# Patient Record
Sex: MALE | Race: BLACK OR AFRICAN AMERICAN | NOT HISPANIC OR LATINO | Employment: OTHER | URBAN - METROPOLITAN AREA
[De-identification: names, ages, dates, MRNs, and addresses within clinical notes are randomized per-mention and may not be internally consistent; named-entity substitution may affect disease eponyms.]

---

## 2018-03-23 ENCOUNTER — HOSPITAL ENCOUNTER (INPATIENT)
Facility: HOSPITAL | Age: 78
LOS: 19 days | Discharge: NURSING, REHAB OR LONG-TERM CARE | DRG: 064 | End: 2018-04-11
Attending: EMERGENCY MEDICINE | Admitting: PSYCHIATRY & NEUROLOGY
Payer: MEDICARE

## 2018-03-23 DIAGNOSIS — D64.9 ANEMIA, UNSPECIFIED TYPE: ICD-10-CM

## 2018-03-23 DIAGNOSIS — I63.411 EMBOLIC STROKE INVOLVING RIGHT MIDDLE CEREBRAL ARTERY: ICD-10-CM

## 2018-03-23 DIAGNOSIS — I63.9 CEREBROVASCULAR ACCIDENT (CVA), UNSPECIFIED MECHANISM: Primary | ICD-10-CM

## 2018-03-23 DIAGNOSIS — R13.12 OROPHARYNGEAL DYSPHAGIA: ICD-10-CM

## 2018-03-23 DIAGNOSIS — K85.80 OTHER ACUTE PANCREATITIS WITHOUT INFECTION OR NECROSIS: ICD-10-CM

## 2018-03-23 DIAGNOSIS — R41.82 ALTERED MENTAL STATUS: ICD-10-CM

## 2018-03-23 DIAGNOSIS — I48.91 A-FIB: ICD-10-CM

## 2018-03-23 DIAGNOSIS — R00.0 HEART RATE FAST: ICD-10-CM

## 2018-03-23 DIAGNOSIS — G93.40 ACUTE ENCEPHALOPATHY: ICD-10-CM

## 2018-03-23 DIAGNOSIS — K85.90 ACUTE PANCREATITIS, UNSPECIFIED COMPLICATION STATUS, UNSPECIFIED PANCREATITIS TYPE: ICD-10-CM

## 2018-03-23 PROBLEM — I10 HTN (HYPERTENSION): Status: ACTIVE | Noted: 2018-03-23

## 2018-03-23 PROBLEM — E78.5 HYPERLIPIDEMIA: Status: ACTIVE | Noted: 2018-03-23

## 2018-03-23 LAB
ABO + RH BLD: NORMAL
ALBUMIN SERPL BCP-MCNC: 3.2 G/DL
ALP SERPL-CCNC: 72 U/L
ALT SERPL W/O P-5'-P-CCNC: 11 U/L
AMPHET+METHAMPHET UR QL: NEGATIVE
ANION GAP SERPL CALC-SCNC: 8 MMOL/L
APTT BLDCRRT: <21 SEC
AST SERPL-CCNC: 25 U/L
BARBITURATES UR QL SCN>200 NG/ML: NEGATIVE
BASOPHILS # BLD AUTO: 0.02 K/UL
BASOPHILS NFR BLD: 0.1 %
BENZODIAZ UR QL SCN>200 NG/ML: NEGATIVE
BILIRUB SERPL-MCNC: 1.3 MG/DL
BILIRUB UR QL STRIP: NEGATIVE
BLD GP AB SCN CELLS X3 SERPL QL: NORMAL
BUN SERPL-MCNC: 14 MG/DL
BZE UR QL SCN: NEGATIVE
CALCIUM SERPL-MCNC: 10.2 MG/DL
CANNABINOIDS UR QL SCN: NEGATIVE
CHLORIDE SERPL-SCNC: 105 MMOL/L
CHOLEST SERPL-MCNC: 120 MG/DL
CHOLEST/HDLC SERPL: 3.5 {RATIO}
CLARITY UR REFRACT.AUTO: ABNORMAL
CO2 SERPL-SCNC: 24 MMOL/L
COLOR UR AUTO: YELLOW
CREAT SERPL-MCNC: 0.9 MG/DL
CREAT UR-MCNC: 107 MG/DL
CRP SERPL-MCNC: 13.33 MG/L
DIFFERENTIAL METHOD: ABNORMAL
EOSINOPHIL # BLD AUTO: 0 K/UL
EOSINOPHIL NFR BLD: 0.1 %
ERYTHROCYTE [DISTWIDTH] IN BLOOD BY AUTOMATED COUNT: 24.6 %
EST. GFR  (AFRICAN AMERICAN): >60 ML/MIN/1.73 M^2
EST. GFR  (NON AFRICAN AMERICAN): >60 ML/MIN/1.73 M^2
ESTIMATED AVG GLUCOSE: 100 MG/DL
ETHANOL UR-MCNC: <10 MG/DL
GLUCOSE SERPL-MCNC: 97 MG/DL
GLUCOSE UR QL STRIP: NEGATIVE
HBA1C MFR BLD HPLC: 5.1 %
HCT VFR BLD AUTO: 23.6 %
HDLC SERPL-MCNC: 34 MG/DL
HDLC SERPL: 28.3 %
HGB BLD-MCNC: 6.4 G/DL
HGB UR QL STRIP: NEGATIVE
IMM GRANULOCYTES # BLD AUTO: 0.12 K/UL
IMM GRANULOCYTES NFR BLD AUTO: 0.7 %
INR PPP: 1.1
KETONES UR QL STRIP: NEGATIVE
LACTATE SERPL-SCNC: 1.6 MMOL/L
LDLC SERPL CALC-MCNC: 70.2 MG/DL
LEUKOCYTE ESTERASE UR QL STRIP: NEGATIVE
LIPASE SERPL-CCNC: >1000 U/L
LYMPHOCYTES # BLD AUTO: 0.9 K/UL
LYMPHOCYTES NFR BLD: 5.5 %
MAGNESIUM SERPL-MCNC: 2.3 MG/DL
MCH RBC QN AUTO: 18.1 PG
MCHC RBC AUTO-ENTMCNC: 27.1 G/DL
MCV RBC AUTO: 67 FL
METHADONE UR QL SCN>300 NG/ML: NEGATIVE
MONOCYTES # BLD AUTO: 1.7 K/UL
MONOCYTES NFR BLD: 10.3 %
NEUTROPHILS # BLD AUTO: 13.9 K/UL
NEUTROPHILS NFR BLD: 83.3 %
NITRITE UR QL STRIP: NEGATIVE
NONHDLC SERPL-MCNC: 86 MG/DL
NRBC BLD-RTO: 0 /100 WBC
OPIATES UR QL SCN: NORMAL
PCP UR QL SCN>25 NG/ML: NEGATIVE
PH UR STRIP: 7 [PH] (ref 5–8)
PHOSPHATE SERPL-MCNC: 2.5 MG/DL
PLATELET # BLD AUTO: 438 K/UL
PMV BLD AUTO: 9.3 FL
POCT GLUCOSE: 99 MG/DL (ref 70–110)
POTASSIUM SERPL-SCNC: 4.6 MMOL/L
PROCALCITONIN SERPL IA-MCNC: 0.14 NG/ML
PROT SERPL-MCNC: 7.6 G/DL
PROT UR QL STRIP: NEGATIVE
PROTHROMBIN TIME: 11.2 SEC
RBC # BLD AUTO: 3.54 M/UL
SODIUM SERPL-SCNC: 137 MMOL/L
SP GR UR STRIP: >=1.03 (ref 1–1.03)
TOXICOLOGY INFORMATION: NORMAL
TRIGL SERPL-MCNC: 79 MG/DL
TRIGL SERPL-MCNC: 79 MG/DL
TSH SERPL DL<=0.005 MIU/L-ACNC: 0.81 UIU/ML
URN SPEC COLLECT METH UR: ABNORMAL
UROBILINOGEN UR STRIP-ACNC: NEGATIVE EU/DL
WBC # BLD AUTO: 16.66 K/UL

## 2018-03-23 PROCEDURE — 85025 COMPLETE CBC W/AUTO DIFF WBC: CPT

## 2018-03-23 PROCEDURE — 83036 HEMOGLOBIN GLYCOSYLATED A1C: CPT

## 2018-03-23 PROCEDURE — 84100 ASSAY OF PHOSPHORUS: CPT

## 2018-03-23 PROCEDURE — 86141 C-REACTIVE PROTEIN HS: CPT

## 2018-03-23 PROCEDURE — 85730 THROMBOPLASTIN TIME PARTIAL: CPT

## 2018-03-23 PROCEDURE — 84443 ASSAY THYROID STIM HORMONE: CPT

## 2018-03-23 PROCEDURE — 63600175 PHARM REV CODE 636 W HCPCS

## 2018-03-23 PROCEDURE — 99223 1ST HOSP IP/OBS HIGH 75: CPT | Mod: ,,, | Performed by: PSYCHIATRY & NEUROLOGY

## 2018-03-23 PROCEDURE — 80061 LIPID PANEL: CPT

## 2018-03-23 PROCEDURE — 84145 PROCALCITONIN (PCT): CPT

## 2018-03-23 PROCEDURE — 83605 ASSAY OF LACTIC ACID: CPT

## 2018-03-23 PROCEDURE — 93005 ELECTROCARDIOGRAM TRACING: CPT

## 2018-03-23 PROCEDURE — 82962 GLUCOSE BLOOD TEST: CPT

## 2018-03-23 PROCEDURE — 80053 COMPREHEN METABOLIC PANEL: CPT

## 2018-03-23 PROCEDURE — 25000003 PHARM REV CODE 250: Performed by: PSYCHIATRY & NEUROLOGY

## 2018-03-23 PROCEDURE — 25000003 PHARM REV CODE 250: Performed by: NURSE PRACTITIONER

## 2018-03-23 PROCEDURE — 99285 EMERGENCY DEPT VISIT HI MDM: CPT | Mod: ,,, | Performed by: PHYSICIAN ASSISTANT

## 2018-03-23 PROCEDURE — 81003 URINALYSIS AUTO W/O SCOPE: CPT

## 2018-03-23 PROCEDURE — 85610 PROTHROMBIN TIME: CPT

## 2018-03-23 PROCEDURE — 63600175 PHARM REV CODE 636 W HCPCS: Performed by: PHYSICIAN ASSISTANT

## 2018-03-23 PROCEDURE — P9021 RED BLOOD CELLS UNIT: HCPCS

## 2018-03-23 PROCEDURE — 99285 EMERGENCY DEPT VISIT HI MDM: CPT | Mod: 25

## 2018-03-23 PROCEDURE — 86901 BLOOD TYPING SEROLOGIC RH(D): CPT

## 2018-03-23 PROCEDURE — 83690 ASSAY OF LIPASE: CPT

## 2018-03-23 PROCEDURE — 80307 DRUG TEST PRSMV CHEM ANLYZR: CPT

## 2018-03-23 PROCEDURE — 36430 TRANSFUSION BLD/BLD COMPNT: CPT

## 2018-03-23 PROCEDURE — 93010 ELECTROCARDIOGRAM REPORT: CPT | Mod: ,,, | Performed by: INTERNAL MEDICINE

## 2018-03-23 PROCEDURE — 83735 ASSAY OF MAGNESIUM: CPT

## 2018-03-23 PROCEDURE — 86920 COMPATIBILITY TEST SPIN: CPT

## 2018-03-23 PROCEDURE — 25500020 PHARM REV CODE 255: Performed by: NURSE PRACTITIONER

## 2018-03-23 PROCEDURE — A4216 STERILE WATER/SALINE, 10 ML: HCPCS | Performed by: PSYCHIATRY & NEUROLOGY

## 2018-03-23 PROCEDURE — 20000000 HC ICU ROOM

## 2018-03-23 RX ORDER — INSULIN ASPART 100 [IU]/ML
1-10 INJECTION, SOLUTION INTRAVENOUS; SUBCUTANEOUS EVERY 6 HOURS PRN
Status: DISCONTINUED | OUTPATIENT
Start: 2018-03-23 | End: 2018-04-11 | Stop reason: HOSPADM

## 2018-03-23 RX ORDER — MIDAZOLAM HYDROCHLORIDE 1 MG/ML
INJECTION INTRAMUSCULAR; INTRAVENOUS
Status: COMPLETED
Start: 2018-03-23 | End: 2018-03-23

## 2018-03-23 RX ORDER — SODIUM,POTASSIUM PHOSPHATES 280-250MG
2 POWDER IN PACKET (EA) ORAL
Status: DISCONTINUED | OUTPATIENT
Start: 2018-03-23 | End: 2018-03-25

## 2018-03-23 RX ORDER — LABETALOL HYDROCHLORIDE 5 MG/ML
10 INJECTION, SOLUTION INTRAVENOUS EVERY 4 HOURS PRN
Status: DISCONTINUED | OUTPATIENT
Start: 2018-03-23 | End: 2018-03-25

## 2018-03-23 RX ORDER — SODIUM CHLORIDE 0.9 % (FLUSH) 0.9 %
3 SYRINGE (ML) INJECTION EVERY 8 HOURS
Status: DISCONTINUED | OUTPATIENT
Start: 2018-03-23 | End: 2018-03-25

## 2018-03-23 RX ORDER — POTASSIUM CHLORIDE 20 MEQ/15ML
40 SOLUTION ORAL
Status: DISCONTINUED | OUTPATIENT
Start: 2018-03-23 | End: 2018-03-25

## 2018-03-23 RX ORDER — SODIUM CHLORIDE 9 MG/ML
INJECTION, SOLUTION INTRAVENOUS CONTINUOUS
Status: DISCONTINUED | OUTPATIENT
Start: 2018-03-23 | End: 2018-03-27

## 2018-03-23 RX ORDER — HYDROCODONE BITARTRATE AND ACETAMINOPHEN 500; 5 MG/1; MG/1
TABLET ORAL
Status: DISCONTINUED | OUTPATIENT
Start: 2018-03-23 | End: 2018-03-25

## 2018-03-23 RX ORDER — ONDANSETRON 2 MG/ML
4 INJECTION INTRAMUSCULAR; INTRAVENOUS EVERY 8 HOURS PRN
Status: DISCONTINUED | OUTPATIENT
Start: 2018-03-23 | End: 2018-04-11 | Stop reason: HOSPADM

## 2018-03-23 RX ORDER — GLUCAGON 1 MG
1 KIT INJECTION
Status: DISCONTINUED | OUTPATIENT
Start: 2018-03-23 | End: 2018-04-11 | Stop reason: HOSPADM

## 2018-03-23 RX ORDER — LANOLIN ALCOHOL/MO/W.PET/CERES
800 CREAM (GRAM) TOPICAL
Status: DISCONTINUED | OUTPATIENT
Start: 2018-03-23 | End: 2018-03-25

## 2018-03-23 RX ORDER — MIDAZOLAM HYDROCHLORIDE 1 MG/ML
1 INJECTION INTRAMUSCULAR; INTRAVENOUS EVERY 5 MIN PRN
Status: COMPLETED | OUTPATIENT
Start: 2018-03-23 | End: 2018-03-23

## 2018-03-23 RX ADMIN — IOHEXOL 100 ML: 350 INJECTION, SOLUTION INTRAVENOUS at 04:03

## 2018-03-23 RX ADMIN — SODIUM CHLORIDE: 0.9 INJECTION, SOLUTION INTRAVENOUS at 10:03

## 2018-03-23 RX ADMIN — MIDAZOLAM HYDROCHLORIDE 1 MG: 1 INJECTION, SOLUTION INTRAMUSCULAR; INTRAVENOUS at 11:03

## 2018-03-23 RX ADMIN — Medication 3 ML: at 09:03

## 2018-03-23 RX ADMIN — SODIUM CHLORIDE 500 ML: 0.9 INJECTION, SOLUTION INTRAVENOUS at 08:03

## 2018-03-23 RX ADMIN — SODIUM CHLORIDE: 0.9 INJECTION, SOLUTION INTRAVENOUS at 06:03

## 2018-03-23 NOTE — ASSESSMENT & PLAN NOTE
-Lipase at OSH 3738 w/ leukocytosis, elevated Ca, peripancreatic fluid w/o gallstones on CT abdomen  -Pt NPO, NGT to suction 2/2 anemia, IV  mL/hr with strict I&Os  -Monitor CMP q8h for first 24 h, POC glucose q6h (122 mg/dL at OSH)  -Recheck of lipase pending, triglycerides and lipid panel pending  -Reimage if pt has unstable VS, increased abdominal distension  -Telemetry with continuous pulse ox in ICU x 24 h

## 2018-03-23 NOTE — ED TRIAGE NOTES
Pt arrived by [facility] from [facility] with left sided weakness, dysarthia, aphasia. Pt last known well was last night. Pt has hx HTN, pancreatitis, and chronic anemia. Pt received 1u PRBCs en route.

## 2018-03-23 NOTE — H&P
[facility]  Neurocritical Care  History & Physical    Admit Date: 3/23/2018  Service Date: 2018  Length of Stay: 0    Subjective:     Chief Complaint: Embolic stroke involving right middle cerebral artery    History of Present Illness: 77 yo M with PMHx HTN known to be non-compliant with medications and remote hx of anemia presents to [facility] [place] [facility] as a transfer from [facility].  Patient is a poor historian with no family at bedside, hx largely obtained from OSH records.  He presented to OSH originally with symptoms of weakness and fatigue, his daughter brought him to ED thinking he was likely anemic again.  It was noted that patient had some dysarthria and aphasia (unspecified expressive vs receptive) with L-sided weakness.  He was out of the window for tPA and was sent [place] [facility] [place] for CTA Stroke Multiphase and possible endovascular intervention. CTA stroke multiphase with no LVO, no intervention planned.  Of note, patient is anemic per OSH labs with a Hgb of 5.8 g/dL and likely has pancreatitis with lipase 3738, WBC count of 15.8 k/uL, and CT abdomen showing peripancreatic fluid around the tail.  He was transfused a unit of PRBCs prior to transfer.  Labs were otherwise largely unremarkable.  On presentation to [facility] [place], he is able to move both BLE but complains of pain in BLE.  Not moving LUE but is intact to noxious stimuli.  Has a R gaze preference that is not overcome by VOR testing and complaint of neck pain with moving head side-to-side.  Otherwise has mild abdominal distension with mild hypogastric tenderness to palpation. HTN to SBP 200s.  Of note, patient's wife  recently and family expressed concern in outpatient ED that patient has not been eating much for the past month but deny EtOH use.    PMHx:  HTN--non-compliant with medications  Anemia--remote, unclear what type    No past surgical history on file.   No current facility-administered medications  on file prior to encounter.      No current outpatient prescriptions on file prior to encounter.      Allergies: Patient has no allergy information on record.    No family history on file.  Social History   Substance Use Topics    Smoking status: Not on file    Smokeless tobacco: Not on file    Alcohol use Not on file     Review of Systems   Unable to perform ROS: Mental status change (Pt noting pain in neck, BLE, and lower abdomen throughout exam)     Objective:     Vitals:  Temp: 98 °F (36.7 °C)  Pulse: 88  BP: (!) 208/104  Resp: 16  SpO2: 95 %  O2 Device (Oxygen Therapy): room air    Temp  Min: 98 °F (36.7 °C)  Max: 98 °F (36.7 °C)  Pulse  Min: 84  Max: 88  BP  Min: 208/104  Max: 208/104  Resp  Min: 16  Max: 16  SpO2  Min: 95 %  Max: 99 %    No intake/output data recorded.         Physical Exam  General:  Well-developed, well-nourished, nad with notable R gaze preference  HEENT:  NCAT, PERRLA, EOMI, oropharyngeal membranes non-erythematous/without exudate  Neck:  Supple, normal ROM without nuchal rigidity but patient noting pain on moving head side-to-side for VOR testing  Resp:  Symmetric expansion, no increased wob, CTAB  CVS:  No LE edema, peripheral pulses 2+ (radial, dorsalis pedis)  GI:  Abd soft, mild distension, tympanitic to percussion, no HSM, no clear tenderness to palpation but pt reporting lower abdominal pain  Neurologic Exam:  Mental Status:  Awake, alert, not oriented to self, location, date.  Pt repeating what hurts repeatedly, unable to follow simple commands.  Cranial Nerves:  Blink to threat in R VF.  PERRLA, EOMI with prominent R gaze preference not corrected by VOR.  Mild lower L facial droop.  Pt otherwise not following commands.  Motor:  Normal bulk and tone, flaccid LUE.  Moving BLE spontaneously, moves RUE spontaneously and attempts to .  Sensory:  Intact to noxious stimuli at all extremities, does not move LUE to noxious stimuli but curses, says it hurts on testing  LUE.  Reflexes:  Biceps, brachioradialis, patellar, Achilles brisk 2+ and symmetric. +Crossductor response on patellar reflexes. No ankle clonus. Withdrawal with upgoing LLE, equivocal RLE toe.  Coordination:  Unable to follow commands for FNF, REINA, HTS. No resting tremor or myoclonus noted.   Gait:  Deferred 2/2 AMS, fall precautions    Today I personally reviewed pertinent imaging, lab results, notably:    OSH Reports:  Lipase 3738 (*H*)  WBC 15.8 k/uL (*H*)  Hgb 5.8 g/dL (*L*)    CT abdomen--peripancreatic fluid around tail, no gallstones appreciated  CT head--chronic bilateral occipital infarcts    03/23/18 CTA Stroke Multiphase:  Initial Impression:  Large hypodensity in R ICA concerning for thrombus, lack of contrast filling in segments of L vert  Radiology review:  Multifocal areas of abnormal decreased attenuation involving the left cerebellum, medulla, mike, and posterior right frontal cortex concerning for acute or subacute infarcts.  No significant mass effect, midline shift, or acute intracranial hemorrhage.  Generalized cerebral volume loss with a significant degree of chronic microvascular ischemic change, more prominent on the left.  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  Multifocal filling defects seen within the origin of the right subclavian artery, right common carotid, and left carotid bulb compatible with nonocclusive thrombi.  Additional smaller mural thrombi are seen along aortic arch.  Findings 1st observed by Dr. Aragon at 4:35 p.m on 3/23/18.  Preliminary report discussed with Dr. Polo Shook, neuro interventional fellow, by Dr. Aragon at 4:40 p.m. on 03/23/2018 on behalf of Dr. Hoffman.    Assessment/Plan:     Neuro   Embolic stroke involving right middle cerebral artery    -03/23/18 CTA Stroke Multiphase with multiple non-occlusive thrombi noted  -Vascular Neurology consulted, appreciate recs  -Risk factors:   HTN, diet, exercise  -Hgb A1c, lipid panel, TSH pending  -MRI brain w/o contrast pending  -2D Echo w/ CFD pending  -Will consult PT/OT/SLP  -Hold anticoagulation/anti-platelets due to anemia, concern for bleed  -Permissive HTN, goal SBP < 220.  prn labetalol ordered.  -q1h Neuro checks        Cardiac/Vascular   Hyperlipidemia    -Stroke risk factor, pending lipid panel        HTN (hypertension)    -Stroke risk factor, SBP 200s on admission  -Permissive HTN x 24 h, goal SBP < 220, prn labetalol ordered  -Pt non-compliant with anti-HTNives, start po meds as needed for HTN > 24 h        Oncology   Anemia    -Hgb at OSH 5.8 g/dL, repeat Hgb pending  -Pt received 1 U PRBCs en route to [facility], will transfuse if Hgb < 7.0 g/dL  -No signs of bleeding currently, will place NGT to suction, NPO 2/2 pancreatitis  -Hx of anemia per OSH records, baseline Hgb unknown        GI   Acute pancreatitis    -Lipase at OSH 3738 w/ leukocytosis, elevated Ca, peripancreatic fluid w/o gallstones on CT abdomen  -Pt NPO, NGT to suction 2/2 anemia, IV  mL/hr with strict I&Os  -Monitor CMP q8h for first 24 h, POC glucose q6h (122 mg/dL at OSH)  -Recheck of lipase pending, triglycerides and lipid panel pending  -Reimage if pt has unstable VS, increased abdominal distension  -Telemetry with continuous pulse ox in ICU x 24 h            Prophylaxis:  Venous Thromboembolism: mechanical  Stress Ulcer: None  Ventilator Pneumonia: not applicable     Activity Orders          None        Full Code    Sherry Canela MD  Neurocritical Care  [facility]

## 2018-03-23 NOTE — ASSESSMENT & PLAN NOTE
-Hgb at OSH 5.8 g/dL, repeat Hgb pending  -Pt received 1 U PRBCs en route to [facility], will transfuse if Hgb < 7.0 g/dL  -No signs of bleeding currently, will place NGT to suction, NPO 2/2 pancreatitis  -Hx of anemia per OSH records, baseline Hgb unknown

## 2018-03-23 NOTE — ASSESSMENT & PLAN NOTE
Presented to OSH in [place] with R hemispheric symptoms, anemia, and acute pancreatitis. He was transfered to [facility] for CTA multiphase and possible LVO intervention. On presentation patient is confused with left arm hemiplegia and right gaze preference. Pt H&H was 5 and 20 at the outside hospital and given 1 unit of PRBC during transport. His Lipase is >3000. CTA multiphase revealed systemic thrombus in multiple territories. IR concluded pt was a poor candidate for intervention. Consider hypercoagulable state due to acute pancreatitis. NCC called due to patient medical complexity. NCC to admit pt.   Antithrombotics for secondary stroke prevention: Hold until H&H stable and acute bleed can be ruled out  Statins for secondary stroke prevention and hyperlipidemia, if present:   Statins: Atorvastatin- 40 mg daily if LDL > 70     Aggressive risk factor modification: HTN,  hypercoagulable state      Rehab efforts: PT/OT/SLP to evaluate and treat    Diagnostics ordered/pending: HgbA1C to assess blood glucose levels, Lipid Profile to assess cholesterol levels, MRI head without contrast to assess brain parenchyma, TTE to assess cardiac function/status     VTE prophylaxis:  SCDs, Hold until H&H stable and acute bleed can be ruled out    BP parameters: Infarct: No intervention, SBP <220 if primary team deems appropriate

## 2018-03-23 NOTE — HOSPITAL COURSE
03/23/18:  Admission to Neuro ICU 2/2 concern for stroke with multiple concerns for patient to become unstable--pancreatitis, severe anemia, HTN.  03/24: has had no interval development of multiorgan involvement form pancreatitis, drop in hemoglobin secondary to erosive gastritis, transfused and on protonix bid  03/25: Patient on dilt drip for a-fib. Repeat EKG shows NSR.  3/26: Discontinuing diltiazem drip today. Will continue monitor HR. Continue IV fluids 50 cc/hr for pancreatitis. Repeat lipase.

## 2018-03-23 NOTE — HPI
Mr. Hines is a 78 year old male with past medical hx of HTN and non-compliance with medication. EMS states he is independent at home per family. He presented to OSH in [facility]) with left-sided weakness, dysarthria, anemia, and acute pancreatitis. No family present for interview. All history was obtained from outside medical records or EMS. Patient's hemoglobin was 5.8 at OSH and he was given 1 unit of PRBC during transport. His Lipase is >3000. Of note, patient is anemic per OSH labs with a Hgb of 5.8 g/dL and likely has pancreatitis with lipase 3738, WBC count of 15.8 k/uL, and CT abdomen showing peripancreatic fluid around the tail. He was transfered to [facility] for CTA multiphase and possible LVO intervention. On presentation patient is confused with left arm hemiplegia and right gaze preference. His Lipase is >3000. CTA multiphase revealed systemic thrombus in multiple territories. tPA not given as patient was out of window. IR concluded pt was a poor candidate for intervention. NCC called due to patient medical complexity. NCC admitted patient.

## 2018-03-23 NOTE — SUBJECTIVE & OBJECTIVE
No past medical history on file.  No past surgical history on file.  No family history on file.  Social History   Substance Use Topics    Smoking status: Not on file    Smokeless tobacco: Not on file    Alcohol use Not on file     Review of patient's allergies indicates:  Not on File    Medications: I have reviewed the current medication administration record.      (Not in a hospital admission)    Review of Systems   Constitutional: Negative for chills and fever.   HENT: Negative for congestion, sinus pain and sinus pressure.    Eyes: Negative for pain and redness.   Respiratory: Negative for cough and shortness of breath.    Cardiovascular: Negative for chest pain.   Gastrointestinal: Positive for abdominal pain.   Genitourinary: Negative for dysuria and hematuria.   Skin: Negative for color change and pallor.   Neurological: Positive for facial asymmetry, speech difficulty and weakness.   Psychiatric/Behavioral: Positive for confusion. Negative for behavioral problems.     Objective:     Vital Signs (Most Recent):  Temp: 98 °F (36.7 °C) (03/23/18 1552)  Pulse: 88 (03/23/18 1607)  Resp: 16 (03/23/18 1607)  BP: (!) 208/104 (03/23/18 1552)  SpO2: 95 % (03/23/18 1607)    Vital Signs Range (Last 24H):  Temp:  [98 °F (36.7 °C)]   Pulse:  [84-88]   Resp:  [16]   BP: (208)/(104)   SpO2:  [95 %-99 %]     Physical Exam   Constitutional: He appears well-developed.   HENT:   Head: Normocephalic and atraumatic.   Eyes: Pupils are equal, round, and reactive to light.   Neck: No tracheal deviation present.   Cardiovascular: Normal rate.    Pulmonary/Chest: Effort normal.   Abdominal: There is tenderness.   Musculoskeletal:   LUE weakness, flaccid    Neurological: He is alert.   Not oriented to time, place, or situation   Skin: Skin is warm. No rash noted.   Psychiatric: He has a normal mood and affect.   Vitals reviewed.      Neurological Exam:   LOC: alert  Attention Span: poor  Language: Mild aphasia  Articulation: Mild  dysarthria  Orientation: Disoriented to time, place, and situation  Visual Fields: Hemianopsia left  EOM (CN III, IV, VI): Horizontal gaze paralysis   Pupils (CN II, III): PERRL  Facial Movement (CN VII): Lower facial weakness on the Left  Motor: Arm left  Plegia 0/5  Leg left  Paresis: 2/5  Arm right  Normal 5/5  Leg right Paresis: 3/5  Cebellar: No evidence of appendicular or axial ataxia  Sensation: Intact to light touch  Tone: Flaccid  LUE       Laboratory:  CMP: No results for input(s): GLUCOSE, CALCIUM, ALBUMIN, PROT, NA, K, CO2, CL, BUN, CREATININE, ALKPHOS, ALT, AST, BILITOT in the last 24 hours.  CBC: No results for input(s): WBC, RBC, HGB, HCT, PLT, MCV, MCH, MCHC in the last 168 hours.  Lipid Panel: No results for input(s): CHOL, LDLCALC, HDL, TRIG in the last 168 hours.  Coagulation: No results for input(s): PT, INR, APTT in the last 168 hours.  Hgb A1C: No results for input(s): HGBA1C in the last 168 hours.  TSH: No results for input(s): TSH in the last 168 hours.    Diagnostic Results:      Brain imaging:  CTA 3/23  Multifocal areas of abnormal decreased attenuation involving the left cerebellum, medulla, mike, and posterior right frontal cortex concerning for acute or subacute infarcts.  No significant mass effect, midline shift, or acute intracranial hemorrhage.  Generalized cerebral volume loss with a significant degree of chronic microvascular ischemic change, more prominent on the left.  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  Multifocal filling defects seen within the origin of the right subclavian artery, right common carotid, and left carotid bulb compatible with nonocclusive thrombi.  Additional smaller mural thrombi are seen along aortic arch.    MRI pending    Vessel Imaging:  CTA 3/23  Multifocal areas of abnormal decreased attenuation involving the left cerebellum, medulla, mike, and posterior right frontal cortex  concerning for acute or subacute infarcts.  No significant mass effect, midline shift, or acute intracranial hemorrhage.  Generalized cerebral volume loss with a significant degree of chronic microvascular ischemic change, more prominent on the left.  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  Multifocal filling defects seen within the origin of the right subclavian artery, right common carotid, and left carotid bulb compatible with nonocclusive thrombi.  Additional smaller mural thrombi are seen along aortic arch.    Cardiac Evaluation:

## 2018-03-23 NOTE — ED PROVIDER NOTES
Encounter Date: 3/23/2018       History     Chief Complaint   Patient presents with    Transfer     Patient sent from [facility] for evaluation and treatment of stroke.     78 year old male with medical history of HTN, Pancreatitis, Anemia presenting to the ED transferred via flight care from [facility] for stroke evaluation. History obtained from chart review and flight care due to patient's current mental status. Patient was last seen at his baseline health last night at 7pm. Patient had evidence of left subacute cerebellar infarct and right cerebral infarct on head CT. Patient outside of the window for TPA. Patient also had significant anemia and received 1u RBC during transit. Patient evaluated by stroke team at bedside on arrival.           Review of patient's allergies indicates:  Not on File  No past medical history on file.  No past surgical history on file.  No family history on file.  Social History   Substance Use Topics    Smoking status: Not on file    Smokeless tobacco: Not on file    Alcohol use Not on file     Review of Systems   Unable to perform ROS: Acuity of condition       Physical Exam     Initial Vitals [03/23/18 1552]   BP Pulse Resp Temp SpO2   (!) 208/104 84 16 98 °F (36.7 °C) 99 %      MAP       138.67         Physical Exam    Constitutional: He appears lethargic. No distress.   HENT:   Head: Normocephalic and atraumatic.   Neck: Neck supple.   Cardiovascular: Normal rate and regular rhythm.   Pulmonary/Chest: Breath sounds normal. No respiratory distress.   Abdominal: Soft.   Musculoskeletal: He exhibits no edema or tenderness.   Lymphadenopathy:     He has no cervical adenopathy.   Neurological: He appears lethargic.   Left hemiparesis of extremities and facial movements   Skin: Skin is warm.       Imaging Results          X-Ray Chest 1 View (Final result)  Result time 03/23/18 18:08:48    Final result by Rusty Gutierrez MD (03/23/18 18:08:48)                 Impression:      1. Coarse  interstitial attenuation, nonspecific, interstitial edema is a consideration, correlation recommended.      Electronically signed by: Rusty Gutierrez MD  Date:    03/23/2018  Time:    18:08             Narrative:    EXAMINATION:  XR CHEST 1 VIEW    CLINICAL HISTORY:  Altered mental status, unspecified    TECHNIQUE:  Single frontal view of the chest was performed.    COMPARISON:  None    FINDINGS:  Patient is rotated.  The cardiomediastinal silhouette is prominent, likely related to magnification.  There is no pleural effusion.  The trachea is midline.  The lungs are symmetrically expanded bilaterally with coarse interstitial attenuation, may reflect interstitial edema.  No large focal consolidation seen.  There is no pneumothorax.  The osseous structures are remarkable for degenerative changes.                               CTA STROKE MULTI-PHASE (Final result)     Abnormal  Result time 03/23/18 17:22:45    Final result by Jian Hoffman MD (03/23/18 17:22:45)                 Impression:      Multifocal areas of abnormal decreased attenuation involving the left cerebellum, medulla, mike, and posterior right frontal cortex concerning for acute or subacute infarcts.  No significant mass effect, midline shift, or acute intracranial hemorrhage.    Generalized cerebral volume loss with a significant degree of chronic microvascular ischemic change, more prominent on the left.    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    Multifocal filling defects seen within the origin of the right subclavian artery, right common carotid, and left carotid bulb compatible with nonocclusive thrombi.  Additional smaller mural thrombi are seen along aortic arch.    Findings 1st observed by Dr. Aragon at 4:35 p.m on 3/23/18.  Preliminary report discussed with Dr. Polo Shook, neuro interventional fellow, by Dr. Aragon at 4:40 p.m. on 03/23/2018 on behalf of   Yasmin.    This report was flagged in Epic as abnormal.    Electronically signed by resident: Herber Aragon  Date:    03/23/2018  Time:    16:35    Electronically signed by: Jian Hoffman MD  Date:    03/23/2018  Time:    17:22             Narrative:    EXAMINATION:  CTA STROKE MULTI-PHASE    CLINICAL HISTORY:  stroke;    TECHNIQUE:  Non contrast low dose axial images were obtained thought the head. CT angiogram was performed from the level of the yoan to the top of the head following the IV administration of 100mL of Omnipaque 350.   Sagittal and coronal reconstructions and maximum intensity projection reconstructions were performed. Arterial stenosis percentages are based on NASCET measurement criteria.2 additional phases of immediate post-contrast CT images were performed through the head alone.    COMPARISON:  None    FINDINGS:  Intracranial Compartment:    Age-appropriate generalized cerebral volume loss with mild ex vacuo dilatation of the ventricular system without significant hydrocephalus.    There are multiple areas of abnormal decreased attenuation involving the inferior and paracentral left cerebellar hemisphere, central medulla, left and right mike and posterior right frontal lobe cortex concerning for acute infarcts.  No large intracranial hemorrhage or abnormal extra-axial fluid collection identified.  There is superimposed patchy and confluent areas of decreased attenuation involving the supratentorial white matter, asymmetrically more prominent on the left, compatible with a significant degree of chronic microvascular ischemic change.    Skull/Extracranial Contents (limited evaluation): No fracture. Mastoid air cells and paranasal sinuses are essentially clear.    Non-Vascular Structures of the Neck/Thoracic Inlet (limited evaluation): No significant magnet abnormalities detected.  There are a few mildly prominent left supraclavicular lymph nodes.  No significant cervical lymphadenopathy  detected.    Trachea appears patent.  The lung apices demonstrate changes consistent with centrilobular and paraseptal emphysema.    Aorta: Normal 3 vessel arch with scattered intraluminal filling defects predominantly along the lateral wall compatible with nonocclusive thrombi.    Extracranial carotid circulation: Prominent, nonocclusive filling defects are seen within the origin of the right subclavian vein artery, right common carotid artery just below the bifurcation, and at the left carotid bulb near the origin of the left ICA compatible with nonocclusive thrombi.  There is associated 60-70% stenosis of the distal right common carotid.  Less than 50% narrowing of the right ICA noted.  Less than 50% narrowing is seen at the left ICA.    Extracranial vertebral circulation: There is complete occlusion of the V4 segment of the left vertebral artery with associated slow flow within the distal V3 and V4 segments.  Less than 50% narrowing is seen at the origins of bilateral vertebral arteries.    Intracranial Arteries: There is mild atherosclerotic irregularity of bilateral cavernous segments and distal right M1 segment of the MCA with less than 50% stenosis.  No evidence of high-grade occlusion, aneurysm, or vascular malformation.    Venous structures (limited evaluation): Normal.                              ED Course   Procedures  Labs Reviewed   CBC W/ AUTO DIFFERENTIAL - Abnormal; Notable for the following:        Result Value    WBC 16.66 (*)     RBC 3.54 (*)     Hemoglobin 6.4 (*)     Hematocrit 23.6 (*)     MCV 67 (*)     MCH 18.1 (*)     MCHC 27.1 (*)     RDW 24.6 (*)     Platelets 438 (*)     Immature Granulocytes 0.7 (*)     Gran # (ANC) 13.9 (*)     Immature Grans (Abs) 0.12 (*)     Lymph # 0.9 (*)     Mono # 1.7 (*)     Gran% 83.3 (*)     Lymph% 5.5 (*)     All other components within normal limits   HEMOGLOBIN A1C   APTT   PROTIME-INR   TOXICOLOGY SCREEN, URINE, RANDOM (COMPLIANCE)   DRUGS OF ABUSE  SCREEN, BLOOD   POCT GLUCOSE   POCT GLUCOSE MONITORING CONTINUOUS   POCT GLUCOSE MONITORING CONTINUOUS                   APC / Resident Notes:   78 year old male with medical history of HTN, Pancreatitis, Anemia presenting to the ED transferred via flight care from [facility] for stroke evaluation. Patient evaluated by stroke team at bedside on arrival. No TPA administered as patient outside window. CTA multiphase ordered.    Patient will be admitted to Neuro ICU for further management. Will allow for permissive hypertension. I have discussed the care of this patient with my supervising physician.            Attending Attestation:     Physician Attestation Statement for NP/PA:   I discussed this assessment and plan of this patient with the NP/PA, but I did not personally examine the patient. The face to face encounter was performed by the NP/PA.                     Clinical Impression:   The primary encounter diagnosis was Cerebrovascular accident (CVA), unspecified mechanism. Diagnoses of Altered mental status, Acute pancreatitis, unspecified complication status, unspecified pancreatitis type, and Anemia, unspecified type were also pertinent to this visit.    Disposition:   Disposition: Admitted                        Riley Porras PA-C  03/23/18 [id number]

## 2018-03-23 NOTE — HPI
77 yo M with PMHx HTN known to be non-compliant with medications and remote hx of anemia presents to [facility] as a transfer from [facility].  Patient is a poor historian with no family at bedside, hx largely obtained from OSH records.  He presented to OSH originally with symptoms of weakness and fatigue, his daughter brought him to ED thinking he was likely anemic again.  It was noted that patient had some dysarthria and aphasia (unspecified expressive vs receptive) with L-sided weakness.  He was out of the window for tPA and was sent to [facility] emerg[place] for CTA Stroke Multiphase and possible endovascular intervention. CTA stroke multiphase with no LVO, no intervention planned.  Of note, patient is anemic per OSH labs with a Hgb of 5.8 g/dL and likely has pancreatitis with lipase 3738, WBC count of 15.8 k/uL, and CT abdomen showing peripancreatic fluid around the tail.  He was transfused a unit of PRBCs prior to transfer.  Labs were otherwise largely unremarkable.  On presentation to [facility], he is able to move both BLE but complains of pain in BLE.  Not moving LUE but is intact to noxious stimuli.  Has a R gaze preference that is not overcome by VOR testing and complaint of neck pain with moving head side-to-side.  Otherwise has mild abdominal distension with mild hypogastric tenderness to palpation. HTN to SBP 200s.  Of note, patient's wife  recently and family expressed concern in outpatient ED that patient has not been eating much for the past month but deny EtOH use.

## 2018-03-23 NOTE — SUBJECTIVE & OBJECTIVE
PMHx:  HTN--non-compliant with medications  Anemia--remote, unclear what type    No past surgical history on file.   No current facility-administered medications on file prior to encounter.      No current outpatient prescriptions on file prior to encounter.      Allergies: Patient has no allergy information on record.    No family history on file.  Social History   Substance Use Topics    Smoking status: Not on file    Smokeless tobacco: Not on file    Alcohol use Not on file     Review of Systems   Unable to perform ROS: Mental status change (Pt noting pain in neck, BLE, and lower abdomen throughout exam)     Objective:     Vitals:  Temp: 98 °F (36.7 °C)  Pulse: 88  BP: (!) 208/104  Resp: 16  SpO2: 95 %  O2 Device (Oxygen Therapy): room air    Temp  Min: 98 °F (36.7 °C)  Max: 98 °F (36.7 °C)  Pulse  Min: 84  Max: 88  BP  Min: 208/104  Max: 208/104  Resp  Min: 16  Max: 16  SpO2  Min: 95 %  Max: 99 %    No intake/output data recorded.         Physical Exam  General:  Well-developed, well-nourished, nad with notable R gaze preference  HEENT:  NCAT, PERRLA, EOMI, oropharyngeal membranes non-erythematous/without exudate  Neck:  Supple, normal ROM without nuchal rigidity but patient noting pain on moving head side-to-side for VOR testing  Resp:  Symmetric expansion, no increased wob, CTAB  CVS:  No LE edema, peripheral pulses 2+ (radial, dorsalis pedis)  GI:  Abd soft, mild distension, tympanitic to percussion, no HSM, no clear tenderness to palpation but pt reporting lower abdominal pain  Neurologic Exam:  Mental Status:  Awake, alert, not oriented to self, location, date.  Pt repeating what hurts repeatedly, unable to follow simple commands.  Cranial Nerves:  Blink to threat in R VF.  PERRLA, EOMI with prominent R gaze preference not corrected by VOR.  Mild lower L facial droop.  Pt otherwise not following commands.  Motor:  Normal bulk and tone, flaccid LUE.  Moving BLE spontaneously, moves RUE spontaneously and  attempts to .  Sensory:  Intact to noxious stimuli at all extremities, does not move LUE to noxious stimuli but curses, says it hurts on testing LUE.  Reflexes:  Biceps, brachioradialis, patellar, Achilles brisk 2+ and symmetric. +Crossductor response on patellar reflexes. No ankle clonus. Withdrawal with upgoing LLE, equivocal RLE toe.  Coordination:  Unable to follow commands for FNF, REINA, HTS. No resting tremor or myoclonus noted.   Gait:  Deferred 2/2 AMS, fall precautions    Today I personally reviewed pertinent imaging, lab results, notably:    OSH Reports:  Lipase 3738 (*H*)  WBC 15.8 k/uL (*H*)  Hgb 5.8 g/dL (*L*)    CT abdomen--peripancreatic fluid around tail, no gallstones appreciated  CT head--chronic bilateral occipital infarcts    03/23/18 CTA Stroke Multiphase:  Initial Impression:  Large hypodensity in R ICA concerning for thrombus, lack of contrast filling in segments of L vert  Radiology review:  Multifocal areas of abnormal decreased attenuation involving the left cerebellum, medulla, mike, and posterior right frontal cortex concerning for acute or subacute infarcts.  No significant mass effect, midline shift, or acute intracranial hemorrhage.  Generalized cerebral volume loss with a significant degree of chronic microvascular ischemic change, more prominent on the left.  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  Multifocal filling defects seen within the origin of the right subclavian artery, right common carotid, and left carotid bulb compatible with nonocclusive thrombi.  Additional smaller mural thrombi are seen along aortic arch.  Findings 1st observed by Dr. Aragon at 4:35 p.m on 3/23/18.  Preliminary report discussed with Dr. Polo Shook, neuro interventional fellow, by Dr. Aragon at 4:40 p.m. on 03/23/2018 on behalf of Dr. Hoffman.

## 2018-03-23 NOTE — ASSESSMENT & PLAN NOTE
-Stroke risk factor, SBP 200s on admission  -Permissive HTN x 24 h, goal SBP < 220, prn labetalol ordered  -Pt non-compliant with anti-HTNives, start po meds as needed for HTN > 24 h

## 2018-03-23 NOTE — ASSESSMENT & PLAN NOTE
-03/23/18 CTA Stroke Multiphase with multiple non-occlusive thrombi noted  -Vascular Neurology consulted, appreciate recs  -Risk factors:  HTN, diet, exercise  -Hgb A1c, lipid panel, TSH pending  -MRI brain w/o contrast pending  -2D Echo w/ CFD pending  -Will consult PT/OT/SLP  -Hold anticoagulation/anti-platelets due to anemia, concern for bleed  -Permissive HTN, goal SBP < 220.  prn labetalol ordered.  -q1h Neuro checks

## 2018-03-23 NOTE — CONSULTS
[facility]  Vascular Neurology  Comprehensive Stroke Center  Consult Note    Inpatient consult to Vascular (Stroke) Neurology  Consult performed by: MOHINDER PEREZ  Consult ordered by: LIZBETH BARKSDALE  Reason for consult: Stroke        Assessment/Plan:     Patient is a 78 y.o. year old male with:    * Embolic stroke involving right middle cerebral artery    Presented to OSH in [place] with R hemispheric symptoms, anemia, and acute pancreatitis. He was transfered to [facility] for [place]A multiphase and possible LVO intervention. On presentation patient is confused with left arm hemiplegia and right gaze preference.CTA multiphase revealed systemic thrombus in multiple territories. IR concluded pt was a poor candidate for intervention. Consider hypercoagulable state due to acute pancreatitis. NCC called due to patients medical complexity. NCC to admit pt and complete stroke work up.  Antithrombotics for secondary stroke prevention: Hold until H&H stable and acute bleed can be ruled out  Statins for secondary stroke prevention and hyperlipidemia, if present:   Statins: Atorvastatin- 40 mg daily if LDL > 70     Aggressive risk factor modification: HTN, acute hypercoagulable state      Rehab efforts: PT/OT/SLP to evaluate and treat    Diagnostics ordered/pending: HgbA1C to assess blood glucose levels, Lipid Profile to assess cholesterol levels, MRI head without contrast to assess brain parenchyma, TTE to assess cardiac function/status     VTE prophylaxis:  SCDs, Hold until H&H stable and acute bleed can be ruled out    BP parameters: Infarct: No intervention, SBP <220 if primary team deems appropriate           Acute pancreatitis    Per primary team  May be causing hypercoagulable state        Hyperlipidemia    LDL pending        HTN (hypertension)    Stroke risk factor   Non compliant with medication            STROKE DOCUMENTATION     Acute Stroke Times   Last Known Normal Date: 03/22/18  Last Known Normal  Time: 2100  Symptom Onset Date: 03/22/18  Symptom Onset Time: 2100  Stroke Team Called Date: 03/23/18  Stroke Team Called Time: 1555  Stroke Team Arrival Date: 03/23/18  Stroke Team Arrival Time: 1555  CT Interpretation Time: 1610  Decision to Treat Time for IR:  (Not a candidate)    NIH Scale:  1a. Level Of Consciousness: 1-->Not alert: but arousable by minor stimulation to obey, answer, or respond  1b. LOC Questions: 2-->Answers neither question correctly  1c. LOC Commands: 2-->Performs neither task correctly  2. Best Gaze: 2-->Forced deviation, or total gaze paresis not overcome by the oculocephalic maneuver  3. Visual: 1-->Partial hemianopia  4. Facial Palsy: 1-->Minor paralysis (flattened nasolabial fold, asymmetry on smiling)  5a. Motor Arm, Left: 4-->No movement  5b. Motor Arm, Right: 0-->No drift: limb holds 90 (or 45) degrees for full 10 secs  6a. Motor Leg, Left: 3-->No effort against gravity: leg falls to bed immediately  6b. Motor Leg, Right: 2-->Some effort against gravity: leg falls to bed by 5 secs, but has some effort against gravity  7. Limb Ataxia: 0-->Absent  8. Sensory: 0-->Normal: no sensory loss  9. Best Language: 2-->Severe aphasia: all communication is through fragmentary expression: great need for inference, questioning, and guessing by the listener. Range of information that can be exchanged is limited: listener carries burden of. . . (see row details)  10. Dysarthria: 1-->Mild-to-moderate dysarthria: patient slurs at least some words and, at worst, can be understood with some difficulty  11. Extinction and Inattention (formerly Neglect): 0-->No abnormality  Total (NIH Stroke Scale): 21    Modified Boston Score: 0 (per family)  R[place] Coma Scale:11   ABCD2 Score:    [id number] Score:   HAS -BLED Score:   ICH Score:   Hunt & Connors Classification:       Thrombolysis Candidate? No, Out of window       Interventional Revascularization Candidate?   Is the patient eligible for mechanical  endovascular reperfusion (LESA)?  No; No large vessel occlusion      Hemorrhagic change of an Ischemic Stroke: Does this patient have an ischemic stroke with hemorrhagic changes? No     Subjective:     History of Present Illness:  Mr. Hines is a 78 year old male with past medical hx of HTN and non compliance with medication. EMS states he is independent at home per family. He presented to OSH in [place] with R hemispheric symptoms, anemia, and acute pancreatitis. [place] [facility] and possible LVO intervention. On presentation patient is confused with left arm hemiplegia and right gaze preference. Pt H&H was 5 and 20 at the outside hospital and given 1 unit of PRBC during transport. His Lipase is >3000. CTA multiphase revealed systemic thrombus in multiple territories. IR concluded pt was a poor candidate for intervention. NCC called due to patient medical complexity. NCC to admit pt. All history was obtained from outside medical records or EMS.         No past medical history on file.  No past surgical history on file.  No family history on file.  Social History   Substance Use Topics    Smoking status: Not on file    Smokeless tobacco: Not on file    Alcohol use Not on file     Review of patient's allergies indicates:  Not on File    Medications: I have reviewed the current medication administration record.      (Not in a hospital admission)    Review of Systems   Constitutional: Negative for chills and fever.   HENT: Negative for congestion, sinus pain and sinus pressure.    Eyes: Negative for pain and redness.   Respiratory: Negative for cough and shortness of breath.    Cardiovascular: Negative for chest pain.   Gastrointestinal: Positive for abdominal pain.   Genitourinary: Negative for dysuria and hematuria.   Skin: Negative for color change and pallor.   Neurological: Positive for facial asymmetry, speech difficulty and weakness.   Psychiatric/Behavioral: Positive for confusion.  Negative for behavioral problems.     Objective:     Vital Signs (Most Recent):  Temp: 98 °F (36.7 °C) (03/23/18 1552)  Pulse: 88 (03/23/18 1607)  Resp: 16 (03/23/18 1607)  BP: (!) 208/104 (03/23/18 1552)  SpO2: 95 % (03/23/18 1607)    Vital Signs Range (Last 24H):  Temp:  [98 °F (36.7 °C)]   Pulse:  [84-88]   Resp:  [16]   BP: (208)/(104)   SpO2:  [95 %-99 %]     Physical Exam   Constitutional: He appears well-developed.   HENT:   Head: Normocephalic and atraumatic.   Eyes: Pupils are equal, round, and reactive to light.   Neck: No tracheal deviation present.   Cardiovascular: Normal rate.    Pulmonary/Chest: Effort normal.   Abdominal: There is tenderness.   Musculoskeletal:   LUE weakness, flaccid    Neurological: He is alert.   Not oriented to time, place, or situation   Skin: Skin is warm. No rash noted.   Psychiatric: He has a normal mood and affect.   Vitals reviewed.      Neurological Exam:   LOC: alert  Attention Span: poor  Language: Mild aphasia  Articulation: Mild dysarthria  Orientation: Disoriented to time, place, and situation  Visual Fields: Hemianopsia left  EOM (CN III, IV, VI): Horizontal gaze paralysis   Pupils (CN II, III): PERRL  Facial Movement (CN VII): Lower facial weakness on the Left  Motor: Arm left  Plegia 0/5  Leg left  Paresis: 2/5  Arm right  Normal 5/5  Leg right Paresis: 3/5  Cebellar: No evidence of appendicular or axial ataxia  Sensation: Intact to light touch  Tone: Flaccid  LUE       Laboratory:  CMP: No results for input(s): GLUCOSE, CALCIUM, ALBUMIN, PROT, NA, K, CO2, CL, BUN, CREATININE, ALKPHOS, ALT, AST, BILITOT in the last 24 hours.  CBC: No results for input(s): WBC, RBC, HGB, HCT, PLT, MCV, MCH, MCHC in the last 168 hours.  Lipid Panel: No results for input(s): CHOL, LDLCALC, HDL, TRIG in the last 168 hours.  Coagulation: No results for input(s): PT, INR, APTT in the last 168 hours.  Hgb A1C: No results for input(s): HGBA1C in the last 168 hours.  TSH: No results for  input(s): TSH in the last 168 hours.    Diagnostic Results:      Brain imaging:  CTA 3/23  Multifocal areas of abnormal decreased attenuation involving the left cerebellum, medulla, mike, and posterior right frontal cortex concerning for acute or subacute infarcts.  No significant mass effect, midline shift, or acute intracranial hemorrhage.  Generalized cerebral volume loss with a significant degree of chronic microvascular ischemic change, more prominent on the left.  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  Multifocal filling defects seen within the origin of the right subclavian artery, right common carotid, and left carotid bulb compatible with nonocclusive thrombi.  Additional smaller mural thrombi are seen along aortic arch.    MRI pending    Vessel Imaging:  CTA 3/23  Multifocal areas of abnormal decreased attenuation involving the left cerebellum, medulla, mike, and posterior right frontal cortex concerning for acute or subacute infarcts.  No significant mass effect, midline shift, or acute intracranial hemorrhage.  Generalized cerebral volume loss with a significant degree of chronic microvascular ischemic change, more prominent on the left.  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  Multifocal filling defects seen within the origin of the right subclavian artery, right common carotid, and left carotid bulb compatible with nonocclusive thrombi.  Additional smaller mural thrombi are seen along aortic arch.    Cardiac Evaluation:         Kika Victor NP  [facility]  Department of Vascular Neurology   [facility]

## 2018-03-23 NOTE — ED NOTES
Bed: 08  Expected date:   Expected time:   Means of arrival:   Comments:  Hold [facility]

## 2018-03-23 NOTE — ED NOTES
Patient identifiers verified and correct for Zion Hines.   LOC: The patient is awake, not alert and aware of environment with an appropriate affect, the patient is oriented x1, self, and speaking inappropriately.   APPEARANCE: Patient appears comfortable and in no acute distress, patient is clean and well groomed.  SKIN: The skin is warm and dry, color consistent with ethnicity, patient has normal skin turgor and moist mucus membranes, skin intact, no breakdown or bruising noted.   MUSCULOSKELETAL: Patient has left sided weakness, no swelling noted.  RESPIRATORY: Airway is open and patent, respirations are spontaneous, patient has a normal effort and rate, no accessory muscle use noted, pt placed on continuous pulse ox with O2 sats noted at 95% on room air.  CARDIAC: Pt placed on cardiac monitor. Patient has a normal rate and regular rhythm, no edema noted, capillary refill < 3 seconds.   GASTRO: Soft and non tender to palpation, slight distention noted, normoactive bowel sounds present in all four quadrants. Pt cannot bowel movements have been regular.  : Pt cant report/deny any pain or frequency with urination.  NEURO: Pt opens eyes spontaneously, behavior inappropriate to situation, follows commands, facial expression left sided droop, bilateral hand grasp unequal and uneven, purposeful motor response noted, abnormal sensation in left extremities when touched with a finger.

## 2018-03-24 PROBLEM — I63.411 EMBOLIC STROKE INVOLVING RIGHT MIDDLE CEREBRAL ARTERY: Status: ACTIVE | Noted: 2018-03-24

## 2018-03-24 PROBLEM — D50.9 MICROCYTIC ANEMIA: Status: ACTIVE | Noted: 2018-03-23

## 2018-03-24 PROBLEM — K85.90 ACUTE PANCREATITIS: Status: ACTIVE | Noted: 2018-03-24

## 2018-03-24 PROBLEM — D50.9 MICROCYTIC ANEMIA: Status: ACTIVE | Noted: 2018-03-24

## 2018-03-24 PROBLEM — I63.9 CEREBROVASCULAR ACCIDENT (CVA): Status: ACTIVE | Noted: 2018-03-24

## 2018-03-24 PROBLEM — G93.40 ACUTE ENCEPHALOPATHY: Status: ACTIVE | Noted: 2018-03-23

## 2018-03-24 LAB
ALBUMIN SERPL BCP-MCNC: 3 G/DL
ALP SERPL-CCNC: 71 U/L
ALT SERPL W/O P-5'-P-CCNC: 12 U/L
ANION GAP SERPL CALC-SCNC: 8 MMOL/L
ANISOCYTOSIS BLD QL SMEAR: SLIGHT
AST SERPL-CCNC: 31 U/L
BASOPHILS # BLD AUTO: 0.05 K/UL
BASOPHILS NFR BLD: 0.1 %
BILIRUB SERPL-MCNC: 3.3 MG/DL
BLD PROD TYP BPU: NORMAL
BLD PROD TYP BPU: NORMAL
BLOOD UNIT EXPIRATION DATE: NORMAL
BLOOD UNIT EXPIRATION DATE: NORMAL
BLOOD UNIT TYPE CODE: 5100
BLOOD UNIT TYPE CODE: 5100
BLOOD UNIT TYPE: NORMAL
BLOOD UNIT TYPE: NORMAL
BUN SERPL-MCNC: 16 MG/DL
BURR CELLS BLD QL SMEAR: ABNORMAL
CALCIUM SERPL-MCNC: 9.8 MG/DL
CHLORIDE SERPL-SCNC: 110 MMOL/L
CO2 SERPL-SCNC: 20 MMOL/L
CODING SYSTEM: NORMAL
CODING SYSTEM: NORMAL
CREAT SERPL-MCNC: 0.9 MG/DL
DIFFERENTIAL METHOD: ABNORMAL
DISPENSE STATUS: NORMAL
DISPENSE STATUS: NORMAL
EOSINOPHIL # BLD AUTO: 0 K/UL
EOSINOPHIL NFR BLD: 0 %
ERYTHROCYTE [DISTWIDTH] IN BLOOD BY AUTOMATED COUNT: 27.1 %
EST. GFR  (AFRICAN AMERICAN): >60 ML/MIN/1.73 M^2
EST. GFR  (NON AFRICAN AMERICAN): >60 ML/MIN/1.73 M^2
ESTIMATED PA SYSTOLIC PRESSURE: 61.24
GLUCOSE SERPL-MCNC: 87 MG/DL
HCT VFR BLD AUTO: 30.8 %
HCT VFR BLD AUTO: 30.9 %
HGB BLD-MCNC: 8.8 G/DL
HGB BLD-MCNC: 8.9 G/DL
HYPOCHROMIA BLD QL SMEAR: ABNORMAL
IMM GRANULOCYTES # BLD AUTO: 0.58 K/UL
IMM GRANULOCYTES NFR BLD AUTO: 1.6 %
LYMPHOCYTES # BLD AUTO: 1.1 K/UL
LYMPHOCYTES NFR BLD: 3.2 %
MAGNESIUM SERPL-MCNC: 2.3 MG/DL
MCH RBC QN AUTO: 20.3 PG
MCHC RBC AUTO-ENTMCNC: 28.8 G/DL
MCV RBC AUTO: 70 FL
MITRAL VALVE REGURGITATION: ABNORMAL
MONOCYTES # BLD AUTO: 2.1 K/UL
MONOCYTES NFR BLD: 5.9 %
NEUTROPHILS # BLD AUTO: 32.2 K/UL
NEUTROPHILS NFR BLD: 89.2 %
NRBC BLD-RTO: 0 /100 WBC
PHOSPHATE SERPL-MCNC: 2.1 MG/DL
PLATELET # BLD AUTO: 450 K/UL
PLATELET BLD QL SMEAR: ABNORMAL
PMV BLD AUTO: 9.1 FL
POCT GLUCOSE: 95 MG/DL (ref 70–110)
POCT GLUCOSE: 98 MG/DL (ref 70–110)
POCT GLUCOSE: 98 MG/DL (ref 70–110)
POCT GLUCOSE: 99 MG/DL (ref 70–110)
POIKILOCYTOSIS BLD QL SMEAR: SLIGHT
POLYCHROMASIA BLD QL SMEAR: ABNORMAL
POTASSIUM SERPL-SCNC: 4.5 MMOL/L
PROT SERPL-MCNC: 7.1 G/DL
RBC # BLD AUTO: 4.39 M/UL
RETIRED EF AND QEF - SEE NOTES: 15 (ref 55–65)
SCHISTOCYTES BLD QL SMEAR: PRESENT
SODIUM SERPL-SCNC: 138 MMOL/L
TRANS ERYTHROCYTES VOL PATIENT: NORMAL ML
TRANS ERYTHROCYTES VOL PATIENT: NORMAL ML
TRICUSPID VALVE REGURGITATION: ABNORMAL
WBC # BLD AUTO: 36.13 K/UL

## 2018-03-24 PROCEDURE — G8997 SWALLOW GOAL STATUS: HCPCS | Mod: CM

## 2018-03-24 PROCEDURE — 25000003 PHARM REV CODE 250

## 2018-03-24 PROCEDURE — 93306 TTE W/DOPPLER COMPLETE: CPT | Mod: 26,,, | Performed by: INTERNAL MEDICINE

## 2018-03-24 PROCEDURE — 84100 ASSAY OF PHOSPHORUS: CPT

## 2018-03-24 PROCEDURE — 99233 SBSQ HOSP IP/OBS HIGH 50: CPT | Mod: GC,,, | Performed by: PSYCHIATRY & NEUROLOGY

## 2018-03-24 PROCEDURE — 63600175 PHARM REV CODE 636 W HCPCS

## 2018-03-24 PROCEDURE — 25000003 PHARM REV CODE 250: Performed by: NURSE PRACTITIONER

## 2018-03-24 PROCEDURE — 63600175 PHARM REV CODE 636 W HCPCS: Performed by: NURSE PRACTITIONER

## 2018-03-24 PROCEDURE — 63600175 PHARM REV CODE 636 W HCPCS: Performed by: PSYCHIATRY & NEUROLOGY

## 2018-03-24 PROCEDURE — 80053 COMPREHEN METABOLIC PANEL: CPT

## 2018-03-24 PROCEDURE — 85014 HEMATOCRIT: CPT

## 2018-03-24 PROCEDURE — G8996 SWALLOW CURRENT STATUS: HCPCS | Mod: CN

## 2018-03-24 PROCEDURE — C9113 INJ PANTOPRAZOLE SODIUM, VIA: HCPCS | Performed by: PSYCHIATRY & NEUROLOGY

## 2018-03-24 PROCEDURE — 20000000 HC ICU ROOM

## 2018-03-24 PROCEDURE — 25000003 PHARM REV CODE 250: Performed by: PSYCHIATRY & NEUROLOGY

## 2018-03-24 PROCEDURE — A4216 STERILE WATER/SALINE, 10 ML: HCPCS | Performed by: PSYCHIATRY & NEUROLOGY

## 2018-03-24 PROCEDURE — 85018 HEMOGLOBIN: CPT

## 2018-03-24 PROCEDURE — 83735 ASSAY OF MAGNESIUM: CPT

## 2018-03-24 PROCEDURE — 99233 SBSQ HOSP IP/OBS HIGH 50: CPT | Mod: ,,, | Performed by: PSYCHIATRY & NEUROLOGY

## 2018-03-24 PROCEDURE — 85025 COMPLETE CBC W/AUTO DIFF WBC: CPT

## 2018-03-24 PROCEDURE — P9021 RED BLOOD CELLS UNIT: HCPCS

## 2018-03-24 PROCEDURE — 97802 MEDICAL NUTRITION INDIV IN: CPT | Performed by: DIETITIAN, REGISTERED

## 2018-03-24 PROCEDURE — 97167 OT EVAL HIGH COMPLEX 60 MIN: CPT

## 2018-03-24 PROCEDURE — 93306 TTE W/DOPPLER COMPLETE: CPT

## 2018-03-24 PROCEDURE — 92610 EVALUATE SWALLOWING FUNCTION: CPT

## 2018-03-24 RX ORDER — FENTANYL CITRATE 50 UG/ML
25 INJECTION, SOLUTION INTRAMUSCULAR; INTRAVENOUS ONCE
Status: COMPLETED | OUTPATIENT
Start: 2018-03-24 | End: 2018-03-24

## 2018-03-24 RX ORDER — OXYCODONE HYDROCHLORIDE 5 MG/1
10 TABLET ORAL EVERY 6 HOURS PRN
Status: DISCONTINUED | OUTPATIENT
Start: 2018-03-24 | End: 2018-03-26

## 2018-03-24 RX ORDER — FENTANYL CITRATE 50 UG/ML
25 INJECTION, SOLUTION INTRAMUSCULAR; INTRAVENOUS
Status: DISCONTINUED | OUTPATIENT
Start: 2018-03-24 | End: 2018-03-25

## 2018-03-24 RX ORDER — OXYCODONE HYDROCHLORIDE 5 MG/1
10 TABLET ORAL EVERY 6 HOURS PRN
Status: DISCONTINUED | OUTPATIENT
Start: 2018-03-24 | End: 2018-03-24

## 2018-03-24 RX ORDER — PANTOPRAZOLE SODIUM 40 MG/10ML
40 INJECTION, POWDER, LYOPHILIZED, FOR SOLUTION INTRAVENOUS 2 TIMES DAILY
Status: DISCONTINUED | OUTPATIENT
Start: 2018-03-24 | End: 2018-03-24

## 2018-03-24 RX ORDER — ACETAMINOPHEN 325 MG/1
650 TABLET ORAL EVERY 6 HOURS PRN
Status: DISCONTINUED | OUTPATIENT
Start: 2018-03-24 | End: 2018-04-11 | Stop reason: HOSPADM

## 2018-03-24 RX ORDER — ACETAMINOPHEN 325 MG/1
TABLET ORAL
Status: COMPLETED
Start: 2018-03-24 | End: 2018-03-24

## 2018-03-24 RX ORDER — FENTANYL CITRATE 50 UG/ML
INJECTION, SOLUTION INTRAMUSCULAR; INTRAVENOUS
Status: COMPLETED
Start: 2018-03-24 | End: 2018-03-24

## 2018-03-24 RX ORDER — BISACODYL 5 MG
5 TABLET, DELAYED RELEASE (ENTERIC COATED) ORAL DAILY
Status: DISCONTINUED | OUTPATIENT
Start: 2018-03-24 | End: 2018-03-27

## 2018-03-24 RX ADMIN — Medication 3 ML: at 06:03

## 2018-03-24 RX ADMIN — POTASSIUM & SODIUM PHOSPHATES POWDER PACK 280-160-250 MG 2 PACKET: 280-160-250 PACK at 06:03

## 2018-03-24 RX ADMIN — MOXIFLOXACIN HYDROCHLORIDE 400 MG: 400 INJECTION, SOLUTION INTRAVENOUS at 09:03

## 2018-03-24 RX ADMIN — POTASSIUM & SODIUM PHOSPHATES POWDER PACK 280-160-250 MG 2 PACKET: 280-160-250 PACK at 10:03

## 2018-03-24 RX ADMIN — FENTANYL CITRATE 25 MCG: 50 INJECTION, SOLUTION INTRAMUSCULAR; INTRAVENOUS at 03:03

## 2018-03-24 RX ADMIN — PANTOPRAZOLE SODIUM 40 MG: 40 INJECTION, POWDER, FOR SOLUTION INTRAVENOUS at 09:03

## 2018-03-24 RX ADMIN — ACETAMINOPHEN 650 MG: 325 TABLET ORAL at 09:03

## 2018-03-24 RX ADMIN — FENTANYL CITRATE 25 MCG: 50 INJECTION INTRAMUSCULAR; INTRAVENOUS at 05:03

## 2018-03-24 RX ADMIN — FENTANYL CITRATE 25 MCG: 50 INJECTION INTRAMUSCULAR; INTRAVENOUS at 10:03

## 2018-03-24 RX ADMIN — PANTOPRAZOLE SODIUM 40 MG: 40 INJECTION, POWDER, FOR SOLUTION INTRAVENOUS at 06:03

## 2018-03-24 RX ADMIN — FENTANYL CITRATE 25 MCG: 50 INJECTION INTRAMUSCULAR; INTRAVENOUS at 02:03

## 2018-03-24 RX ADMIN — BISACODYL 5 MG: 5 TABLET, COATED ORAL at 10:03

## 2018-03-24 RX ADMIN — OXYCODONE HYDROCHLORIDE 10 MG: 5 TABLET ORAL at 10:03

## 2018-03-24 RX ADMIN — POTASSIUM & SODIUM PHOSPHATES POWDER PACK 280-160-250 MG 2 PACKET: 280-160-250 PACK at 04:03

## 2018-03-24 RX ADMIN — Medication 3 ML: at 09:03

## 2018-03-24 RX ADMIN — OXYCODONE HYDROCHLORIDE 10 MG: 5 TABLET ORAL at 05:03

## 2018-03-24 RX ADMIN — SODIUM CHLORIDE: 0.9 INJECTION, SOLUTION INTRAVENOUS at 03:03

## 2018-03-24 RX ADMIN — ACETAMINOPHEN 650 MG: 325 TABLET, FILM COATED ORAL at 09:03

## 2018-03-24 NOTE — PLAN OF CARE
Problem: Occupational Therapy Goal  Goal: Occupational Therapy Goal  Goals to be met by:  2 Weeks (2018)    Patient will increase functional independence with ADLs by performin. Supine to sit with Moderate Assistance.  2. Roll to R with Min A.  3. Roll to L with Min A.  4. Grooming while supine with HOB elevated with Minimal Assistance.  5. UE Dressing with Moderate Assistance.  6. Pt will perform grasp/release 2/3 functional items (towel, cup, tissue box) per UE crossing midline with Min A while seated EOB.  7. Pt will perform functional sitting at EOB x5 min with Min A.         OT eval completed and goals set.  FRANCISCO Gonzalez  3/24/2018

## 2018-03-24 NOTE — CONSULTS
" [facility]  Adult Nutrition  Progress Note    SUMMARY       Recommendations    Recommendation/Intervention: Recommend low sodium diet, texture per SLP. If PO intake is poor, send Boost Plus TID.     Pt sleeping at visit, but left low sodium diet education at bedside.   If unable to advance diet within 3 days, RD to provide enteral nutrition recommendations.     Goals: Diet advancement within 3 days  Nutrition Goal Status: new  Communication of RD Recs: reviewed with RN    Reason for Assessment    Reason for Assessment: consult  Diagnosis: stroke/CVA  Relevant Medical History: HTN  General Information Comments: pt with acute pancreatitis, NPO awaiting SLP, pt sleeping at visit and did not awake to name, per chart wife  recently and pt with poor oral intake x1 month  Nutrition Discharge Planning: Adequate PO nutrition    Nutrition Risk Screen         Nutrition/Diet History    Do you have any cultural, spiritual, Catholic conflicts, given your current situation?: no  Factors Affecting Nutritional Intake: NPO    Anthropometrics    Temp: 96.9 °F (36.1 °C)  Height Method: Stated (per daughter )  Height: 5' 7.99" (172.7 cm)  Height (inches): 67.99 in  Weight Method: Bed Scale  Weight: 73 kg (160 lb 15 oz)  Weight (lb): 160.94 lb  Ideal Body Weight (IBW), Male: 153.94 lb  % Ideal Body Weight, Male (lb): 104.55 lb  BMI (Calculated): 24.5  BMI Grade: 18.5-24.9 - normal    Anthropometrics Special Consideration         Lab/Procedures/Meds    Pertinent Labs Reviewed: reviewed  Pertinent Labs Comments: P 2.1, TBili 3.3  Pertinent Medications Reviewed: reviewed  Pertinent Medications Comments: fentanyl, protonix, NaCl    Physical Findings/Assessment    Overall Physical Appearance: advanced age, lethargic  Oral/Mouth Cavity: tooth/teeth missing  Skin: intact    Estimated/Assessed Needs    Weight Used For Calorie Calculations: 73 kg (160 lb 15 oz)  Height: 5' 7.99" (172.7 cm)  Energy Calorie Requirements " (kcal): 1838 (1.3x PAL)  20 kcal/kg (kcal): 1460  25 kcal/kg (kcal): 1825  30 kcal/kg (kcal): 2190  35 kcal/kg (kcal): 2555  40 kcal/kg (kcal): 2920  45 kcal/kg (kcal): 3285  50 kcal/kg (kcal): 3650  RMR (Shamrock-St. Jeor Equation): 1414.37  Protein Requirements: 73g  Weight Used For Protein Calculations: 73 kg (160 lb 15 oz)  0.6 gm Protein (gm): 43.89  0.7 gm Protein (gm): 51.21  0.8 gm Protein (gm): 58.52  0.9 gm Protein (gm): 65.84  1.0 gm Protein (gm): 73.15  1.1 gm Protein (gm): 80.47  1.2 gm Protein (gm): 87.78  1.3 gm Protein (gm): 95.1  1.4 gm Protein (gm): 102.41  1.5 gm Protein (gm): 109.73  1.6 gm Protein (gm): 117.04  1.7 gm Protein (gm): 124.36  1.8 gm Protein (gm): 131.67  1.9 gm Protein (gm): 138.99  2.0 gm Protein (gm): 146.31  2.1 gm Protein (gm): 153.62  2.2 gm Protein (gm): 160.94  2.3 gm Protein (gm): 168.25  2.4 gm Protein (gm): 175.57  2.5 gm Protein (gm): 182.88  RDA Method (mL): 1838       Nutrition Prescription Ordered    Current Diet Order: NPO (awaiting SLP eval)    Evaluation of Received Nutrient/Fluid Intake    Energy Calories Required: not meeting needs  Protein Required: not meeting needs  % Intake of Estimated Energy Needs: 0 - 25 %  % Meal Intake: NPO    Nutrition Risk    Level of Risk/Frequency of Follow-up:  (2x/week)     Assessment and Plan    Cerebrovascular accident (CVA)    Contributing Nutrition Diagnosis  Inadequate energy intake    Related to (etiology):   NPO    Signs and Symptoms (as evidenced by):   Pt meeting 0% EEN/EPN    Interventions/Recommendations (treatment strategy):  See recs    Nutrition Diagnosis Status:   New               Monitor and Evaluation    Food and Nutrient Intake: energy intake, food and beverage intake  Food and Nutrient Adminstration: diet order  Knowledge/Beliefs/Attitudes: food and nutrition knowledge/skill  Anthropometric Measurements: weight, weight change, body mass index  Biochemical Data, Medical Tests and Procedures: electrolyte and renal  panel, inflammatory profile, lipid profile, gastrointestinal profile, glucose/endocrine profile  Nutrition-Focused Physical Findings: overall appearance     Nutrition Follow-Up    RD Follow-up?: Yes

## 2018-03-24 NOTE — NURSING
Pt rec'd from ED, connected to wall monitor, VSS at this time.  MD Sophie at bedside to assess pt.

## 2018-03-24 NOTE — ASSESSMENT & PLAN NOTE
-Lipase at OSH 3738 w/ leukocytosis, elevated Ca, peripancreatic fluid w/o gallstones on CT abdomen  -Pt NPO, NGT to suction 2/2 anemia, IV  mL/hr with strict I&Os  -Monitor CMP q8h for first 24 h, POC glucose q6h (122 mg/dL at OSH)  -Recheck of lipase pending, triglycerides and lipid panel pending  -Reimage if pt has unstable VS, increased abdominal distension  -Telemetry with continuous pulse ox in ICU x 24 h  No wrsening in last 24 hrs, bun remains nl, no signs of hemoconcentration or third space fluid loss  Triglycerides nl, procal nl,slight elevation of crp  Mild pancreatitis  Pain regimen optimized

## 2018-03-24 NOTE — PT/OT/SLP EVAL
"Speech Language Pathology Evaluation  Bedside Swallow    Patient Name:  Zion Hines   MRN:  [id number]  Admitting Diagnosis: Embolic stroke involving right middle cerebral artery    Recommendations:                 General Recommendations:  Dysphagia therapy, Speech language evaluation and Cognitive-linguistic evaluation  Diet recommendations:  NPO, NPO   Aspiration Precautions: Continue alternate means of nutrition and Strict aspiration precautions   General Precautions: Standard, aspiration, fall, NPO  Communication strategies:  provide increased time to answer and go to room if call light pushed    History:     No past medical history on file.    No past surgical history on file.    Social History: Patient did not respond to PLOF questions, cont to assess.    Prior Intubation HX:  None this admission    Subjective     "Take this off my hand." Pt distracted by restraints and SCDs t/o session, difficulty to redirect, decreased participation with evaluation tasks, R gaze preference with head turn.    Pain/Comfort:  · Pain Rating 1:  (pt c/o discomfort to R arm/leg intermittently t/o session)  · Pain Addressed 1: Distraction, Reposition, Cessation of Activity    Objective:     Oral Musculature Evaluation  · Oral Musculature: unable to assess due to poor participation/comprehension (mild left facial droop at rest)  · Volitional Cough: not elicited  · Volitional Swallow: not elicited  · Voice Prior to PO Intake: mildly wet intermittently    Bedside Swallow Eval:   Consistencies Assessed:  · Thin liquids tspn x1, ice chip x2     Oral Phase:   · Anterior loss  · Decreased closure around utensil  · Poor oral acceptance  · Slow oral transit time  · Spitting out of food/liquid with ice chips    Pharyngeal Phase:   · Coughing/choking delayed with thin  · delayed swallow initation    Compensatory Strategies  · None    Treatment: Education provided to pt re: role of SLP, POC, reason for po trials, and recs for npo.  Pt did not " demonstrate comprehension of any education provided.  Education to be ongoing.     Assessment:     Zion Hines is a 78 y.o. male with an SLP diagnosis of Dysphagia and Cognitive-Linguistic Impairment.     Goals:    SLP Goals        Problem: SLP Goal    Goal Priority Disciplines Outcome   SLP Goal     SLP Ongoing (interventions implemented as appropriate)   Description:  Speech Language Pathology Goals  Goals expected to be met by 3/31  1. Pt will participate with ongoing assessment of swallow.  2. Pt will participate with speech, language, cognitive evaluation to determine need for tx.                        Plan:     · Patient to be seen:  4 x/week   · Plan of Care expires:  04/23/18  · Plan of Care reviewed with:  patient   · SLP Follow-Up:  Yes       Discharge recommendations:   (tbd pending progress)   Barriers to Discharge:  Level of Skilled Assistance Needed   and Safety Awareness      Time Tracking:     SLP Treatment Date:   03/24/18  Speech Start Time:  0815  Speech Stop Time:  0823     Speech Total Time (min):  8 min    Billable Minutes: Eval Swallow and Oral Function 8    YANNI Farris, CCC-SLP  03/24/2018

## 2018-03-24 NOTE — ASSESSMENT & PLAN NOTE
-Hgb at OSH 5.8 g/dL, repeat Hgb pending  -Pt received 1 U PRBCs en route to [facility], will transfuse if Hgb < 7.0 g/dL  -No signs of bleeding currently, will place NGT to suction, NPO 2/2 pancreatitis  -Hx of anemia per OSH records, baseline Hgb unknown  Upper gi source, adequately transfused

## 2018-03-24 NOTE — PROGRESS NOTES
[facility]  Neurocritical Care  Progress Note    Admit Date: 3/23/2018  Service Date: 2018  Length of Stay: 1    Subjective:     Chief Complaint: Embolic stroke involving right middle cerebral artery    History of Present Illness: 79 yo M with PMHx HTN known to be non-compliant with medications and remote hx of anemia presents to [facility] 18 as a transfer from [facility].  Patient is a poor historian with no family at bedside, hx largely obtained from OSH records.  He presented to OSH originally with symptoms of weakness and fatigue, his daughter brought him to ED thinking he was likely anemic again.  It was noted that patient had some dysarthria and aphasia (unspecified expressive vs receptive) with L-sided weakness.  He was out of the window for tPA and was sent to [facility] emergently for CTA Stroke Multiphase and possible endovascular intervention. CTA stroke multiphase with no LVO, no intervention planned.  Of note, patient is anemic per OSH labs with a Hgb of 5.8 g/dL and likely has pancreatitis with lipase 3738, WBC count of 15.8 k/uL, and CT abdomen showing peripancreatic fluid around the tail.  He was transfused a unit of PRBCs prior to transfer.  Labs were otherwise largely unremarkable.  On presentation to [facility] ED, he is able to move both BLE but complains of pain in BLE.  Not moving LUE but is intact to noxious stimuli.  Has a R gaze preference that is not overcome by VOR testing and complaint of neck pain with moving head side-to-side.  Otherwise has mild abdominal distension with mild hypogastric tenderness to palpation. HTN to SBP 200s.  Of note, patient's wife  recently and family expressed concern in outpatient ED that patient has not been eating much for the past month but deny EtOH use.    Hospital Course: 18:  Admission to Neuro ICU 2/2 concern for stroke with multiple concerns for patient to become unstable--pancreatitis, severe anemia,  HTN.  03/24: has had no interval development of multiorgan involvement form pancreatitis, drop in hemoglobin secondary to erosive gastritis, transfused and on protonix bid        Review of Systems   Unable to perform ROS: Mental status change       Objective:     Vitals:  Temp: 96.9 °F (36.1 °C)  Pulse: 100  Rhythm: sinus tachycardia  BP: (!) 148/89  MAP (mmHg): 112  Resp: (!) 23  SpO2: (!) 93 %  O2 Device (Oxygen Therapy): room air    Temp  Min: 96.9 °F (36.1 °C)  Max: 98.7 °F (37.1 °C)  Pulse  Min: 83  Max: 127  BP  Min: 148/89  Max: 228/112  MAP (mmHg)  Min: 112  Max: 157  Resp  Min: 14  Max: 43  SpO2  Min: 91 %  Max: 99 %    03/23 0701 - 03/24 0700  In: 2907.3 [I.V.:1876]  Out: 1055 [Urine:480; Drains:575]   Unmeasured Output  Stool Occurrence: 0       Physical Exam   Constitutional: He appears distressed.   HENT:   Head: Normocephalic.   Eyes: Pupils are equal, round, and reactive to light.   Cardiovascular: Tachycardia present.    Pulmonary/Chest: Tachypnea noted.   Abdominal: Soft. Bowel sounds are decreased. There is generalized tenderness.   Musculoskeletal: He exhibits no edema.   Lymphadenopathy:     He has no cervical adenopathy.   Neurological: A cranial nerve deficit is present.   Left upper extremity >lower extremity weakness   Skin: Skin is warm.         Medications:  Continuous  sodium chloride 0.9% Last Rate: 100 mL/hr at 03/24/18 1300   Scheduled  bisacodyl 5 mg Daily   pantoprazole (PROTONIX) IVPB 40 mg BID   sodium chloride 0.9% 3 mL Q8H   PRN  sodium chloride  Q24H PRN   dextrose 50% 12.5 g PRN   fentaNYL 25 mcg Q2H PRN   glucagon (human recombinant) 1 mg PRN   insulin aspart U-100 1-10 Units Q6H PRN   labetalol 10 mg Q4H PRN   magnesium oxide 800 mg PRN   magnesium oxide 800 mg PRN   ondansetron 4 mg Q8H PRN   oxyCODONE 10 mg Q6H PRN   potassium chloride 10% 40 mEq PRN   potassium chloride 10% 40 mEq PRN   potassium chloride 10% 40 mEq PRN   potassium, sodium phosphates 2 packet PRN    potassium, sodium phosphates 2 packet PRN   potassium, sodium phosphates 2 packet PRN     Today I personally reviewed pertinent medications, lines/drains/airways, imaging, lab results, notably:    Diet  Diet NPO  Diet NPO        Assessment/Plan:     Neuro   * Embolic stroke involving right middle cerebral artery    -03/23/18 CTA Stroke Multiphase with multiple non-occlusive thrombi noted  -Vascular Neurology consulted, appreciate recs  -Risk factors:  HTN, diet, exercise  -Hgb A1c, lipid panel, TSH pending  -MRI brain w/o contrast pending  -2D Echo w/ CFD pending  -Will consult PT/OT/SLP  -Hold anticoagulation/anti-platelets due to anemia, concern for bleed  -Permissive HTN, goal SBP < 220.  prn labetalol ordered.  -q1h Neuro checks   in most cases with concomitant pancreatitis and stroke, etiology was afib        Oncology   Anemia    -Hgb at OSH 5.8 g/dL, repeat Hgb pending  -Pt received 1 U PRBCs en route to [facility], will transfuse if Hgb < 7.0 g/dL  -No signs of bleeding currently, will place NGT to suction, NPO 2/2 pancreatitis  -Hx of anemia per OSH records, baseline Hgb unknown  Upper gi source, adequately transfused        GI   Acute pancreatitis    -Lipase at OSH 3738 w/ leukocytosis, elevated Ca, peripancreatic fluid w/o gallstones on CT abdomen  -Pt NPO, NGT to suction 2/2 anemia, IV  mL/hr with strict I&Os  -Monitor CMP q8h for first 24 h, POC glucose q6h (122 mg/dL at OSH)  -Recheck of lipase pending, triglycerides and lipid panel pending  -Reimage if pt has unstable VS, increased abdominal distension  -Telemetry with continuous pulse ox in ICU x 24 h  No wrsening in last 24 hrs, bun remains nl, no signs of hemoconcentration or third space fluid loss  Triglycerides nl, procal nl,slight elevation of crp  Mild pancreatitis  Pain regimen optimized            Prophylaxis:  Venous Thromboembolism: chemical  Stress Ulcer: PPI  Ventilator Pneumonia: no     Activity Orders          None        Full  Code    Marcello Louis MD  Neurocritical Care  [facility]

## 2018-03-24 NOTE — PT/OT/SLP EVAL
"Occupational Therapy   Evaluation    Name: Zion Hines  MRN: [id number]  Admitting Diagnosis:  Embolic stroke involving right middle cerebral artery      Recommendations:     Discharge Recommendations:  (TBD pending further response to therapy; currently pt not safe or appropriate to go home or SNF/rehab facility)  Discharge Equipment Recommendations:   (TBD pending response to treatment)  Barriers to discharge:  Other (Comment)    History:     Occupational Profile:  Living Environment/ PLOF: Pt poor historian with limited verbal communication. Pt indicated he lies in a home with NAIF but unable to provide further information.   Equipment Owned:   (unable to obtain)  Assistance upon Discharge: NA    No past medical history on file.    No past surgical history on file.    Subjective     Chief Complaint: arm pain  Patient/Family stated goals: none stated  Communicated with: RN prior to session. - RN (Andreina) stated "let's just do in bed today."  Pt - "My arm hurts" frequently reported    Pain/Comfort:  · Pain Rating 1: other (see comments) (pt frequently stating "my arm hurts" but unable to quantify or indicated level (high/low))  · Pain Rating Post-Intervention 1:  (no change indicated)    Patients cultural, spiritual, Christian conflicts given the current situation: no    Objective:     Patient found with: blood pressure cuff, NG tube, castro catheter, peripheral IV, pulse ox (continuous), restraints, SCD, telemetry    General Precautions: Standard, fall   Orthopedic Precautions:    Braces:       Occupational Performance:    Bed Mobility:   Rolling: Total A to R/L   Supine>Sit: Total A by elevating HOB   Sit>Supine: Total A by lowering HOB   Scooting/Bridging: Total A to HOB drawsheet and align in bed    Functional Mobility/ Transfers:   Sit>Stand: NA   Stand>Sit: NA   Toilet: NA    Activities of Daily Living:  Feeding: NA  UE Dressing: NA  LE Dressing: NA  Grooming: Total Assistance to wash face while supine with HOB " elevated.  Toileting: NA  Bathing: NA    Balance:  Static Sitting:           NA  Dynamic Sitting:      NA  Static Standing:       NA  Dynamic Standing:  NA      Cognitive/Visual Perceptual:  Cognitive/Psychosocial Skills:     Oriented to: responds occasionally to name with sternal rub   Follows Commands/attention:lethargic with limited command following <25%  Communication: aphasia noted throughout  Memory: impaired  Safety awareness/insight to disability: impaired   Mood/Affect/Coping skills/emotional control: Lethargic and Agitated  Visual/Perceptual:      -LEVON    Physical Exam:  Postural examination/scapula alignment:    -       Rounded shoulders  -       Forward head  -       Affected scapula elevated  Skin integrity: Visible skin intact  Edema:  None noted  Sensation: limited responses; LEVON  Motor Planning: impaired  Dominant hand: LEVON  Upper Extremity Range of Motion:     -       Right Upper Extremity: PROM WFL; pt observed scratching leg but no command following during assessment  -       Left Upper Extremity: PROM WFL  Upper Extremity Strength:    -       Right Upper Extremity: NA  -       Left Upper Extremity: NA   Strength:    -       Right Upper Extremity: not following commands with stimulation provided but able to open/close per observation - grossly fair-  -       Left Upper Extremity: NA  Fine Motor Coordination:    -       Impaired  Left hand thumb/finger opposition skills   and Right hand thumb/finger opposition skills    Gross motor coordination:   impraired  Neurological: impaired - little ability to focus, follow simple commands or participate in therapy tasks; pt noted to scratch leg voluntarily with RUE       Patient left left sidelying with all lines intact, call button in reach and RN notified    AMPAC 6 Click:  AMPAC Total Score: 6    Treatment & Education:  Pt educated on orientation information, role of OT, and importance of participating in daily ax.   Pt whiteboard  "updated.    Education:    Assessment:     Zion Hines is a 78 y.o. male with a medical diagnosis of Embolic stroke involving right middle cerebral artery.  Pt tolerated session fairly well and limited participation noted due to cognition and lethargy. Pt required constant stimulation with occasional attending, complete A for assessments but pt noted to be able to move RUE when he desired. Pt overall significantly impaired (I) and safety for ADLs, self-care and functional mobility. Pt will benefit from further OT in order to maximize (I) and safety for functional tasks.      Performance deficits affecting function are weakness, impaired self care skills, impaired balance, decreased safety awareness, decreased coordination, decreased ROM, impaired coordination, decreased upper extremity function, pain, impaired functional mobilty, impaired endurance, gait instability, impaired cognition, decreased lower extremity function, impaired fine motor.      Rehab Prognosis:  Fair; patient would benefit from acute skilled OT services to address these deficits and reach maximum level of function.         Clinical Decision Making:     3.  OT High:  "Pt evaluation falls under high complexity for evaluation category due to 5+ performance deficits identified with comprehensive assessments and significant modifications/assistance required. An expanded review of history and occupational profile completed in addition to expanded review of physical, cognitive and psychosocial history. Several treatment options considered in care."     Plan:     Patient to be seen 6 x/week to address the above listed problems via self-care/home management, community/work re-entry, therapeutic activities, therapeutic exercises  · Plan of Care Expires: 04/23/18  · Plan of Care Reviewed with: patient    This Plan of care has been discussed with the patient who was involved in its development and understands and is in agreement with the identified goals and " treatment plan    GOALS:    Occupational Therapy Goals        Problem: Occupational Therapy Goal    Goal Priority Disciplines Outcome Interventions   Occupational Therapy Goal     OT, PT/OT     Description:  Goals to be met by:  2 Weeks (2018)    Patient will increase functional independence with ADLs by performin. Supine to sit with Moderate Assistance.  2. Roll to R with Min A.  3. Roll to L with Min A.  4. Grooming while supine with HOB elevated with Minimal Assistance.  5. UE Dressing with Moderate Assistance.  6. Pt will perform grasp/release 2/3 functional items (towel, cup, tissue box) per UE crossing midline with Min A while seated EOB.  7. Pt will perform functional sitting at EOB x5 min with Min A.                           Time Tracking:     OT Date of Treatment: 18  OT Start Time: 749  OT Stop Time: 807  OT Total Time (min): 18 min    Billable Minutes:Evaluation 18    FRANCISCO Gonzalez  3/24/2018

## 2018-03-24 NOTE — ASSESSMENT & PLAN NOTE
- Hemoglobin 5.8 at OSH, 6.4 here after 1UPRBC at OSH.  - 2U PRBC transfused here and now hemoglobin at ~9.  - Cause known; likely secondary to chronic inflammation; iron studies may have low yield in acute inflammatory state.   - History has not been adequately attained and unknown if patient has history of GIB; possible considering microcytic nature.   - per NCC

## 2018-03-24 NOTE — ASSESSMENT & PLAN NOTE
-03/23/18 CTA Stroke Multiphase with multiple non-occlusive thrombi noted  -Vascular Neurology consulted, appreciate recs  -Risk factors:  HTN, diet, exercise  -Hgb A1c, lipid panel, TSH pending  -MRI brain w/o contrast pending  -2D Echo w/ CFD pending  -Will consult PT/OT/SLP  -Hold anticoagulation/anti-platelets due to anemia, concern for bleed  -Permissive HTN, goal SBP < 220.  prn labetalol ordered.  -q1h Neuro checks   in most cases with concomitant pancreatitis and stroke, etiology was afib

## 2018-03-24 NOTE — PLAN OF CARE
Problem: Patient Care Overview  Goal: Plan of Care Review  POC reviewed with pt at 0500. Pt is minimally cooperative and did not verbalize understanding. Questions and concerns to be addressed with family later.     Pt transported to MRI for scheduled head scan. 2mg of versed given per order d/t restless/agitation. NGT placed during shift and hooked to low intermittent suction. 475mL bright green output resulted. Output noted to changed to coffee ground emesis and then jayleen blood. Michele with [facility] notified and at bedside for assessment. Verbal order for NG lavage, 25 mcg of fentanyl, and 40 mg of protonix IV BID ordered. 2 units of PRBCs given. H&H stablized this AM. Phos replaced per PRN. On coming RN will continue to monitor. See flowsheets for full assessment and VS info

## 2018-03-24 NOTE — ASSESSMENT & PLAN NOTE
Pt is a 77 y/o male with medical history of HTN presented to OSH in [place] ([facility]) with left-sided weakness, dysarthria, anemia, and acute pancreatitis.Patient had evidence of left subacute cerebellar infarct and right cerebral infarct on head CT. He was out of the window for tPA and was sent to [facility] emergently for CTA Stroke Multiphase and possible endovascular intervention. CTA stroke multiphase with no LVO, no intervention planned. MRI here with  bilateral frontal, parietal, occipital, cerebellar infarcts L>R. Image suggestive of acute extensive acute embolic stroke    3/24 Patient with significant leukocytosis with WBC of 36 on transfer here. With tachycardia overnight (120-130s) but upon chart review, in sinus tachycardia.     - Cause of pancreatitis is unknown. With significant leukocytosis, infection considered although LA and pro-calcitonin negative; UA negative for UTI and CXR not suspicious from PNA --> may benefit from blood cultures to rule out bacteremia.   - Awaiting TTE; septic embolism in differential, although low.  - Likely cardio-embolic; would recommend 30 day event monitor on discharge.     - If stable for stepdown from [facility], would recommend hospital medicine team due to medical complexities and lack of acute interventions from stroke team. Vascular neurology will continue to follow.     Antithrombotics for secondary stroke prevention: Being held as patient with significant anemia and cause known; r/o bleed. If ruled out, could be placed on ASA or clopidogrel.    Statins for secondary stroke prevention and hyperlipidemia, if present:   Statins: Atorvastatin- 40 mg daily LDL = 70     Aggressive risk factor modification: HTN,  hypercoagulable state with pancreatitis      Rehab efforts: PT/OT/SLP to evaluate and treat    Diagnostics ordered/pending: TTE to assess cardiac function/status, LDL 70    VTE prophylaxis:  SCDs, Hold until H&H stable and acute bleed can be ruled out    BP  parameters: Infarct: No intervention, SBP <220 if primary team deems appropriate

## 2018-03-24 NOTE — HOSPITAL COURSE
3/24 Patient's hemoglobin was 6.4 on arrival (s/p 1U PRBC on transport); transfused additional 2U PRBC; repeat hemoglobin 8.8. Of note, patient's WBC on arrival here significant for leukocytosis of 36. MRI demonstrable for bilateral frontal, parietal, occipital, cerebellar infarcts L>R. Cause likely cardio-embolic.   3/30/18 No new imaging or neurologic changes. Patient's ECHo was concerning for a stress induced cardiomyopathy, EF 15-20%. Patient likely needs PEG tube, still NPO. Disposition pending SNF. Still not anticoagulated at this time due to anemia, but primary team plans on starting soon when they feel H/H stable and appropriate.   3/31: awaiting peg tube. Hb dropped overnight from 8.4>>7.4. GI consulted and recommended further evaluation of inflammatory changes of the pancreatic tail, recommending EUS or MRCP in future with PBS follow up outpatient. US ordered for inguinal hernia.   4/1/18 No acute neuro changes. Medical management per primary team. Plan for PEG tomorrow or Tuesday.   4/3/18: PEG today. No DVT in LUE. Hb stable. Primary team working on improved BP control, started Lisinopril. On Metoprolol for NSVT. Concern for dellerium.   4/5/18 TF initiated.  Leukocytosis stable.  Restarted Eliquis.  H&H remains low but stable.  No new symptoms.  4/6/18: NAEON. Slight bump up again of WBC to 15 (12 yesterday). H/H stable.

## 2018-03-24 NOTE — PROVIDER TRANSFER
Neuro Critical Care Transfer of Care note    Date of Admit: 3/23/2018  Date of Transfer / Stepdown: 3/24/2018    Brief History of Present Illness:      Zion Hines is a 78 y.o. male who  has no past medical history on file.. The patient presented to [facility] on 3/23/2018 with a primary complaint of Transfer (Patient sent from [facility] for evaluation and treatment of stroke.)   Patient is a poor historian with no family at bedside, hx largely obtained from OSH records.  He presented to OSH originally with symptoms of weakness and fatigue, his daughter brought him to ED thinking he was likely anemic again.  It was noted that patient had some dysarthria and aphasia (unspecified expressive vs receptive) with L-sided weakness.  He was out of the window for tPA and was sent to [facility] emergently for CTA Stroke Multiphase and possible endovascular intervention. CTA stroke multiphase with no LVO, no intervention planned.  Of note, patient is anemic per OSH labs with a Hgb of 5.8 g/dL and likely has pancreatitis with lipase 3738, WBC count of 15.8 k/uL, and CT abdomen showing peripancreatic fluid around the tail.  He was transfused a unit of PRBCs prior to transfer.  Labs were otherwise largely unremarkable.  On presentation to [facility] ED, he is able to move both BLE but complains of pain in BLE.  Not moving LUE but is intact to noxious stimuli.  Has a R gaze preference that is not overcome by VOR testing and complaint of neck pain with moving head side-to-side.  Otherwise has mild abdominal distension with mild hypogastric tenderness to palpation. HTN to SBP 200s.  Of note, patient's wife  recently and family expressed concern in outpatient ED that patient has not been eating much for the past month but deny EtOH use.     Hospital Course: 18:  Admission to Neuro ICU 2/2 concern for stroke with multiple concerns for patient to become unstable--pancreatitis, severe anemia, HTN.  : has had no interval  development of multiorgan involvement form pancreatitis, drop in hemoglobin secondary to erosive gastritis, transfused and on protonix bid          Hospital Course By Problem with Pertinent Findings:     1.   Neuro   * Embolic stroke involving right middle cerebral artery     -03/23/18 CTA Stroke Multiphase with multiple non-occlusive thrombi noted  -Vascular Neurology consulted, appreciate recs  -Risk factors:  HTN, diet, exercise  -Hgb A1c, lipid panel, TSH pending  -MRI brain w/o contrast pending  -2D Echo w/ CFD pending  -Will consult PT/OT/SLP  -Hold anticoagulation/anti-platelets due to anemia, concern for bleed  -Permissive HTN, goal SBP < 220.  prn labetalol ordered.  -q1h Neuro checks   in most cases with concomitant pancreatitis and stroke, etiology was afib          Oncology   Anemia     -Hgb at OSH 5.8 g/dL, repeat Hgb pending  -Pt received 1 U PRBCs en route to [facility], will transfuse if Hgb < 7.0 g/dL  -No signs of bleeding currently, will place NGT to suction, NPO 2/2 pancreatitis  -Hx of anemia per OSH records, baseline Hgb unknown  Upper gi source, adequately transfused          GI   Acute pancreatitis     -Lipase at OSH 3738 w/ leukocytosis, elevated Ca, peripancreatic fluid w/o gallstones on CT abdomen  -Pt NPO, NGT to suction 2/2 anemia, IV  mL/hr with strict I&Os  -Monitor CMP q8h for first 24 h, POC glucose q6h (122 mg/dL at OSH)  -Recheck of lipase pending, triglycerides and lipid panel pending  -Reimage if pt has unstable VS, increased abdominal distension  -Telemetry with continuous pulse ox in ICU x 24 h  No wrsening in last 24 hrs, bun remains nl, no signs of hemoconcentration or third space fluid loss  Triglycerides nl, procal nl,slight elevation of crp  Mild pancreatitis  Pain regimen optimized                Consultants and Procedures:     Consultants:  Vascular Neurology        Transfer Information:     Diet:  NPO    Physical Activity:  PT/OT    To Do / Pending Studies / Follow  ups:  -mild pancreatitis        Ida Chapman  Neuro Crtical Care

## 2018-03-24 NOTE — ASSESSMENT & PLAN NOTE
Contributing Nutrition Diagnosis  Inadequate energy intake    Related to (etiology):   NPO    Signs and Symptoms (as evidenced by):   Pt meeting 0% EEN/EPN    Interventions/Recommendations (treatment strategy):  See recs    Nutrition Diagnosis Status:   New

## 2018-03-24 NOTE — PROGRESS NOTES
Pt transported to and from MRI by one RN and PCT via bed with cardiac monitoring. 2mg of versed given due to agitation and restlessness. 2L NC applied d/t sats of 86% while supine during scan. Pt returned to room 7070 at 0015 on room air sats in the mid 90s. Pt continues to be anxious and restless. Neuro exam remains unchanged. RN to continue to monitor.

## 2018-03-24 NOTE — SUBJECTIVE & OBJECTIVE
Neurologic Chief Complaint: L sided weakness, dysphagia, dysarthria    Subjective:     Interval History: Patient is disoriented this morning. Complains of abdominal pain. With right gaze preference and left hemiparesis     HPI, Past Medical, Family, and Social History remains the same as documented in the initial encounter.     Review of Systems   Unable to perform ROS: Mental status change   Constitutional: Negative for chills and fever.   HENT: Negative for congestion, sinus pain and sinus pressure.    Eyes: Negative for pain and redness.   Respiratory: Negative for cough and shortness of breath.    Cardiovascular: Negative for chest pain.   Gastrointestinal: Positive for abdominal pain.   Genitourinary: Negative for dysuria and hematuria.   Skin: Negative for color change and pallor.   Neurological: Positive for facial asymmetry, speech difficulty and weakness.   Psychiatric/Behavioral: Positive for confusion. Negative for behavioral problems.     Scheduled Meds:   bisacodyl  5 mg Oral Daily    pantoprazole (PROTONIX) IVPB  40 mg Intravenous BID    sodium chloride 0.9%  3 mL Intravenous Q8H     Continuous Infusions:   sodium chloride 0.9% 100 mL/hr at 03/24/18 1300     PRN Meds:sodium chloride, dextrose 50%, fentaNYL, glucagon (human recombinant), insulin aspart U-100, labetalol, magnesium oxide, magnesium oxide, ondansetron, oxyCODONE, potassium chloride 10%, potassium chloride 10%, potassium chloride 10%, potassium, sodium phosphates, potassium, sodium phosphates, potassium, sodium phosphates    Objective:     Vital Signs (Most Recent):  Temp: 96.9 °F (36.1 °C) (03/24/18 1105)  Pulse: 100 (03/24/18 1105)  Resp: (!) 23 (03/24/18 1200)  BP: (!) 148/89 (03/24/18 1105)  SpO2: (!) 93 % (03/24/18 1105)  BP Location: Left arm    Vital Signs Range (Last 24H):  Temp:  [96.9 °F (36.1 °C)-98.7 °F (37.1 °C)]   Pulse:  []   Resp:  [14-43]   BP: (148-228)/()   SpO2:  [91 %-99 %]   BP Location: Left  arm    Physical Exam   Constitutional: He appears well-developed.   HENT:   Head: Normocephalic and atraumatic.   Eyes: Pupils are equal, round, and reactive to light.   Neck: No tracheal deviation present.   Cardiovascular: Normal rate.    Pulmonary/Chest: Effort normal.   Abdominal: There is tenderness.   Musculoskeletal:   LUE weakness, flaccid    Neurological: He is alert.   Not oriented to time, place, or situation   Skin: Skin is warm. No rash noted.   Psychiatric: He has a normal mood and affect.   Vitals reviewed.      Neurological Exam:   LOC: alert  Attention Span: poor  Language: Mild aphasia  Articulation: Mild dysarthria  Orientation: Disoriented to time, place, and situation  Visual Fields: Hemianopsia left  EOM (CN III, IV, VI): Horizontal gaze paralysis   Pupils (CN II, III): PERRL  Facial Movement (CN VII): Lower facial weakness on the Left  Motor: Arm left  Paresis 1/5  Leg left  Plegia: 0/5  Arm right  Normal 5/5  Leg right Paresis: 3/5  Cebellar: No evidence of appendicular or axial ataxia  Sensation: Intact to light touch  Tone: Flaccid  LUE        Laboratory:  CMP:   Recent Labs  Lab 03/24/18  0535   CALCIUM 9.8   ALBUMIN 3.0*   PROT 7.1      K 4.5   CO2 20*      BUN 16   CREATININE 0.9   ALKPHOS 71   ALT 12   AST 31   BILITOT 3.3*     CBC:   Recent Labs  Lab 03/24/18  0535 03/24/18  1038   WBC 36.13*  --    RBC 4.39*  --    HGB 8.9* 8.8*   HCT 30.9* 30.8*   *  --    MCV 70*  --    MCH 20.3*  --    MCHC 28.8*  --      Lipid Panel:   Recent Labs  Lab 03/23/18  1731   CHOL 120   LDLCALC 70.2   HDL 34*   TRIG 79  79     Hgb A1C:   Recent Labs  Lab 03/23/18  1731   HGBA1C 5.1       Diagnostic Results     Brain Imaging   MRI Brain without Contrast Impression       Acute infarcts involving the frontal, parietal, and occipital lobes with involvement of the cerebellum, left greater than right.  These were seen to some extent on prior CTA.  No hemorrhagic conversion at this time.   The findings are concerning for extensive acute embolic stroke.    Moderate chronic microvascular ischemic changes.    This report was flagged in Epic as abnormal.    Electronically signed by resident: Barry Valadez  Date: 03/24/2018  Time: 00:16    Electronically signed by: Christos Guido MD  Date: 03/24/2018  Time: 00:47

## 2018-03-24 NOTE — PROGRESS NOTES
[facility]  Vascular Neurology  Comprehensive Stroke Center  Progress Note    Assessment/Plan:     * Embolic stroke involving right middle cerebral artery    Pt is a 79 y/o male with medical history of HTN presented to OSH in [place] ([facility]) with left-sided weakness, dysarthria, anemia, and acute pancreatitis.Patient had evidence of left subacute cerebellar infarct and right cerebral infarct on head CT. He was out of the window for tPA and was [place] [facility] [place]A Stroke Multiphase and possible endovascular intervention. CTA stroke multiphase with no LVO, no intervention planned. MRI here with  bilateral frontal, parietal, occipital, cerebellar infarcts L>R. Image suggestive of acute extensive acute embolic stroke    3/24 Patient with significant leukocytosis with WBC of 36 on transfer here. With tachycardia overnight (120-130s) but upon chart review, in sinus tachycardia.     - Cause of pancreatitis is unknown. With significant leukocytosis, infection considered although LA and pro-calcitonin negative; UA negative for UTI and CXR not suspicious from PNA --> may benefit from blood cultures to rule out bacteremia.   - Awaiting TTE; septic embolism in differential, although low.  - Likely cardio-embolic; would recommend 30 day event monitor on discharge.     - If stable for stepdown from [facility], would recommend hospital medicine team due to medical complexities and lack of acute interventions from stroke team. Vascular neurology will continue to follow.     Antithrombotics for secondary stroke prevention: Being held as patient with significant anemia and cause known; r/o bleed. If ruled out, could be placed on ASA or clopidogrel.    Statins for secondary stroke prevention and hyperlipidemia, if present:   Statins: Atorvastatin- 40 mg daily LDL = 70     Aggressive risk factor modification: HTN,  hypercoagulable state with pancreatitis      Rehab efforts: PT/OT/SLP to evaluate and  treat    Diagnostics ordered/pending: TTE to assess cardiac function/status, LDL 70    VTE prophylaxis:  SCDs, Hold until H&H stable and acute bleed can be ruled out    BP parameters: Infarct: No intervention, SBP <220 if primary team deems appropriate           Acute pancreatitis    - Per primary team, cause unknown - triglycerides not elevated and patient history has not been adequately attained.   - May be causing hypercoagulable state  - Lipase >1000 with imaging findings of peripancreatic fluid collection at tail from OSH.  - Continue IVF per NCC team        Microcytic anemia    - Hemoglobin 5.8 at OSH, 6.4 here after 1UPRBC at OSH.  - 2U PRBC transfused here and now hemoglobin at ~9.  - Cause known; likely secondary to chronic inflammation; iron studies may have low yield in acute inflammatory state.   - History has not been adequately attained and unknown if patient has history of GIB; possible considering microcytic nature.   - per [facility]        HTN (hypertension)    Stroke risk factor   Non compliant with medication        Acute encephalopathy    - Multifactorial; likely 2/2 to CVA, pancreatitis, and possible infectious cause             3/24 Patient's hemoglobin was 6.4 on arrival (s/p 1U PRBC on transport); transfused additional 2U PRBC; repeat hemoglobin 8.8. Of note, patient's WBC on arrival here significant for leukocytosis of 36. MRI demonstrable for bilateral frontal, parietal, occipital, cerebellar infarcts L>R. Cause likely cardio-embolic.     STROKE DOCUMENTATION   Acute Stroke Times   Last Known Normal Date: 03/22/18  Last Known Normal Time: 2100  Symptom Onset Date: 03/22/18  Symptom Onset Time: 2100  Stroke Team Called Date: 03/23/18  Stroke Team Called Time: 1555  Stroke Team Arrival Date: 03/23/18  Stroke Team Arrival Time: 1555  CT Interpretation Time: 1610  Decision to Treat Time for IR:  (Not a candidate)    NIH Scale:  1a. Level Of Consciousness: 2-->Not alert: requires repeated stimulation  to attend, or is obtunded and requires strong or painful stimulation to make movements (not stereotyped)  1b. LOC Questions: 2-->Answers neither question correctly  1c. LOC Commands: 2-->Performs neither task correctly  2. Best Gaze: 1-->Partial gaze palsy: gaze is abnormal in one or both eyes, but forced deviation or total gaze paresis is not present  3. Visual: 1-->Partial hemianopia  4. Facial Palsy: 0-->Normal symmetrical movements  5a. Motor Arm, Left: 3-->No effort against gravity: limb falls  5b. Motor Arm, Right: 1-->Drift: limb holds 90 (or 45) degrees, but drifts down before full 10 secs: does not hit bed or other support  6a. Motor Leg, Left: 4-->No movement  6b. Motor Leg, Right: 2-->Some effort against gravity: leg falls to bed by 5 secs, but has some effort against gravity  7. Limb Ataxia: 0-->Absent  8. Sensory: 0-->Normal: no sensory loss  9. Best Language: 2-->Severe aphasia: all communication is through fragmentary expression: great need for inference, questioning, and guessing by the listener. Range of information that can be exchanged is limited: listener carries burden of. . . (see row details)  10. Dysarthria: 2-->Severe dysarthria: patients speech is so slurred as to be unintelligible in the absence of or out of proportion to any dysphasia, or is mute/anarthric  11. Extinction and Inattention (formerly Neglect): 0-->No abnormality  Total (NIH Stroke Scale): 22       Modified Isabel Score: 1  Art Coma Scale:12   ABCD2 Score:    [id number] Score:   HAS -BLED Score:   ICH Score:   Hunt & Connors Classification:      Hemorrhagic change of an Ischemic Stroke: Does this patient have an ischemic stroke with hemorrhagic changes? No     Neurologic Chief Complaint: L sided weakness, dysphagia, dysarthria    Subjective:     Interval History: Patient is disoriented this morning. Complains of abdominal pain. With right gaze preference and left hemiparesis     HPI, Past Medical, Family, and Social  History remains the same as documented in the initial encounter.     Review of Systems   Unable to perform ROS: Mental status change   Constitutional: Negative for chills and fever.   HENT: Negative for congestion, sinus pain and sinus pressure.    Eyes: Negative for pain and redness.   Respiratory: Negative for cough and shortness of breath.    Cardiovascular: Negative for chest pain.   Gastrointestinal: Positive for abdominal pain.   Genitourinary: Negative for dysuria and hematuria.   Skin: Negative for color change and pallor.   Neurological: Positive for facial asymmetry, speech difficulty and weakness.   Psychiatric/Behavioral: Positive for confusion. Negative for behavioral problems.     Scheduled Meds:   bisacodyl  5 mg Oral Daily    pantoprazole (PROTONIX) IVPB  40 mg Intravenous BID    sodium chloride 0.9%  3 mL Intravenous Q8H     Continuous Infusions:   sodium chloride 0.9% 100 mL/hr at 03/24/18 1300     PRN Meds:sodium chloride, dextrose 50%, fentaNYL, glucagon (human recombinant), insulin aspart U-100, labetalol, magnesium oxide, magnesium oxide, ondansetron, oxyCODONE, potassium chloride 10%, potassium chloride 10%, potassium chloride 10%, potassium, sodium phosphates, potassium, sodium phosphates, potassium, sodium phosphates    Objective:     Vital Signs (Most Recent):  Temp: 96.9 °F (36.1 °C) (03/24/18 1105)  Pulse: 100 (03/24/18 1105)  Resp: (!) 23 (03/24/18 1200)  BP: (!) 148/89 (03/24/18 1105)  SpO2: (!) 93 % (03/24/18 1105)  BP Location: Left arm    Vital Signs Range (Last 24H):  Temp:  [96.9 °F (36.1 °C)-98.7 °F (37.1 °C)]   Pulse:  []   Resp:  [14-43]   BP: (148-228)/()   SpO2:  [91 %-99 %]   BP Location: Left arm    Physical Exam   Constitutional: He appears well-developed.   HENT:   Head: Normocephalic and atraumatic.   Eyes: Pupils are equal, round, and reactive to light.   Neck: No tracheal deviation present.   Cardiovascular: Normal rate.    Pulmonary/Chest: Effort  normal.   Abdominal: There is tenderness.   Musculoskeletal:   LUE weakness, flaccid    Neurological: He is alert.   Not oriented to time, place, or situation   Skin: Skin is warm. No rash noted.   Psychiatric: He has a normal mood and affect.   Vitals reviewed.      Neurological Exam:   LOC: alert  Attention Span: poor  Language: Mild aphasia  Articulation: Mild dysarthria  Orientation: Disoriented to time, place, and situation  Visual Fields: Hemianopsia left  EOM (CN III, IV, VI): Horizontal gaze paralysis   Pupils (CN II, III): PERRL  Facial Movement (CN VII): Lower facial weakness on the Left  Motor: Arm left  Paresis 1/5  Leg left  Plegia: 0/5  Arm right  Normal 5/5  Leg right Paresis: 3/5  Cebellar: No evidence of appendicular or axial ataxia  Sensation: Intact to light touch  Tone: Flaccid  LUE        Laboratory:  CMP:   Recent Labs  Lab 03/24/18  0535   CALCIUM 9.8   ALBUMIN 3.0*   PROT 7.1      K 4.5   CO2 20*      BUN 16   CREATININE 0.9   ALKPHOS 71   ALT 12   AST 31   BILITOT 3.3*     CBC:   Recent Labs  Lab 03/24/18  0535 03/24/18  1038   WBC 36.13*  --    RBC 4.39*  --    HGB 8.9* 8.8*   HCT 30.9* 30.8*   *  --    MCV 70*  --    MCH 20.3*  --    MCHC 28.8*  --      Lipid Panel:   Recent Labs  Lab 03/23/18  1731   CHOL 120   LDLCALC 70.2   HDL 34*   TRIG 79  79     Hgb A1C:   Recent Labs  Lab 03/23/18  1731   HGBA1C 5.1       Diagnostic Results     Brain Imaging   MRI Brain without Contrast Impression       Acute infarcts involving the frontal, parietal, and occipital lobes with involvement of the cerebellum, left greater than right.  These were seen to some extent on prior CTA.  No hemorrhagic conversion at this time.  The findings are concerning for extensive acute embolic stroke.    Moderate chronic microvascular ischemic changes.    This report was flagged in Epic as abnormal.    Electronically signed by resident: Barry Valadez  Date: 03/24/2018  Time: 00:16    Electronically  signed by: Christos Guido MD  Date: 03/24/2018  Time: 00:47           Daniel Cartwright MD  PGY-2 Internal Medicine  [phone]    Comprehensive Stroke Center  Department of Vascular Neurology   [facility]

## 2018-03-24 NOTE — ASSESSMENT & PLAN NOTE
- Per primary team, cause unknown - triglycerides not elevated and patient history has not been adequately attained.   - May be causing hypercoagulable state  - Lipase >1000 with imaging findings of peripancreatic fluid collection at tail from OSH.  - Continue IVF per NCC team

## 2018-03-24 NOTE — CONSULTS
Inpatient consult to Physical Medicine Rehab  Consult performed by: CONNIE BARRAGAN  Consult ordered by: LIZBETH BARKSDALE  Reason for consult: assess rehab needs        Reviewed patient history and current admission.  Rehab team following.  Full consult to follow.    SAMSON Gaston, FNP-C  Physical Medicine & Rehabilitation   03/24/2018  Spectralink: [id number]

## 2018-03-24 NOTE — SUBJECTIVE & OBJECTIVE
Review of Systems   Unable to perform ROS: Mental status change       Objective:     Vitals:  Temp: 96.9 °F (36.1 °C)  Pulse: 100  Rhythm: sinus tachycardia  BP: (!) 148/89  MAP (mmHg): 112  Resp: (!) 23  SpO2: (!) 93 %  O2 Device (Oxygen Therapy): room air    Temp  Min: 96.9 °F (36.1 °C)  Max: 98.7 °F (37.1 °C)  Pulse  Min: 83  Max: 127  BP  Min: 148/89  Max: 228/112  MAP (mmHg)  Min: 112  Max: 157  Resp  Min: 14  Max: 43  SpO2  Min: 91 %  Max: 99 %    03/23 0701 - 03/24 0700  In: 2907.3 [I.V.:1876]  Out: 1055 [Urine:480; Drains:575]   Unmeasured Output  Stool Occurrence: 0       Physical Exam   Constitutional: He appears distressed.   HENT:   Head: Normocephalic.   Eyes: Pupils are equal, round, and reactive to light.   Cardiovascular: Tachycardia present.    Pulmonary/Chest: Tachypnea noted.   Abdominal: Soft. Bowel sounds are decreased. There is generalized tenderness.   Musculoskeletal: He exhibits no edema.   Lymphadenopathy:     He has no cervical adenopathy.   Neurological: A cranial nerve deficit is present.   Left upper extremity >lower extremity weakness   Skin: Skin is warm.         Medications:  Continuous  sodium chloride 0.9% Last Rate: 100 mL/hr at 03/24/18 1300   Scheduled  bisacodyl 5 mg Daily   pantoprazole (PROTONIX) IVPB 40 mg BID   sodium chloride 0.9% 3 mL Q8H   PRN  sodium chloride  Q24H PRN   dextrose 50% 12.5 g PRN   fentaNYL 25 mcg Q2H PRN   glucagon (human recombinant) 1 mg PRN   insulin aspart U-100 1-10 Units Q6H PRN   labetalol 10 mg Q4H PRN   magnesium oxide 800 mg PRN   magnesium oxide 800 mg PRN   ondansetron 4 mg Q8H PRN   oxyCODONE 10 mg Q6H PRN   potassium chloride 10% 40 mEq PRN   potassium chloride 10% 40 mEq PRN   potassium chloride 10% 40 mEq PRN   potassium, sodium phosphates 2 packet PRN   potassium, sodium phosphates 2 packet PRN   potassium, sodium phosphates 2 packet PRN     Today I personally reviewed pertinent medications, lines/drains/airways, imaging, lab  results, notably:    Diet  Diet NPO  Diet NPO

## 2018-03-25 LAB
ACANTHOCYTES BLD QL SMEAR: PRESENT
ALBUMIN SERPL BCP-MCNC: 2.5 G/DL
ALP SERPL-CCNC: 64 U/L
ALT SERPL W/O P-5'-P-CCNC: 12 U/L
ANION GAP SERPL CALC-SCNC: 7 MMOL/L
ANISOCYTOSIS BLD QL SMEAR: ABNORMAL
AST SERPL-CCNC: 33 U/L
BASOPHILS # BLD AUTO: 0.03 K/UL
BASOPHILS NFR BLD: 0.1 %
BILIRUB SERPL-MCNC: 1.1 MG/DL
BUN SERPL-MCNC: 18 MG/DL
BURR CELLS BLD QL SMEAR: ABNORMAL
CALCIUM SERPL-MCNC: 9.2 MG/DL
CHLORIDE SERPL-SCNC: 114 MMOL/L
CO2 SERPL-SCNC: 20 MMOL/L
CREAT SERPL-MCNC: 1 MG/DL
DIFFERENTIAL METHOD: ABNORMAL
EOSINOPHIL # BLD AUTO: 0 K/UL
EOSINOPHIL NFR BLD: 0 %
ERYTHROCYTE [DISTWIDTH] IN BLOOD BY AUTOMATED COUNT: 26.6 %
EST. GFR  (AFRICAN AMERICAN): >60 ML/MIN/1.73 M^2
EST. GFR  (NON AFRICAN AMERICAN): >60 ML/MIN/1.73 M^2
GIANT PLATELETS BLD QL SMEAR: PRESENT
GLUCOSE SERPL-MCNC: 75 MG/DL
HCT VFR BLD AUTO: 29.3 %
HGB BLD-MCNC: 8.4 G/DL
HYPOCHROMIA BLD QL SMEAR: ABNORMAL
IMM GRANULOCYTES # BLD AUTO: 0.21 K/UL
IMM GRANULOCYTES NFR BLD AUTO: 0.7 %
LYMPHOCYTES # BLD AUTO: 1 K/UL
LYMPHOCYTES NFR BLD: 3.6 %
MAGNESIUM SERPL-MCNC: 1.9 MG/DL
MCH RBC QN AUTO: 20.1 PG
MCHC RBC AUTO-ENTMCNC: 28.7 G/DL
MCV RBC AUTO: 70 FL
MONOCYTES # BLD AUTO: 2.3 K/UL
MONOCYTES NFR BLD: 7.9 %
NEUTROPHILS # BLD AUTO: 25.3 K/UL
NEUTROPHILS NFR BLD: 87.7 %
NRBC BLD-RTO: 0 /100 WBC
OVALOCYTES BLD QL SMEAR: ABNORMAL
PHOSPHATE SERPL-MCNC: 2.5 MG/DL
PHOSPHATE SERPL-MCNC: 2.5 MG/DL
PHOSPHATE SERPL-MCNC: 3 MG/DL
PLATELET # BLD AUTO: 377 K/UL
PLATELET BLD QL SMEAR: ABNORMAL
PMV BLD AUTO: 9.5 FL
POCT GLUCOSE: 58 MG/DL (ref 70–110)
POCT GLUCOSE: 66 MG/DL (ref 70–110)
POCT GLUCOSE: 67 MG/DL (ref 70–110)
POCT GLUCOSE: 67 MG/DL (ref 70–110)
POCT GLUCOSE: 68 MG/DL (ref 70–110)
POCT GLUCOSE: 77 MG/DL (ref 70–110)
POCT GLUCOSE: 88 MG/DL (ref 70–110)
POCT GLUCOSE: 95 MG/DL (ref 70–110)
POCT GLUCOSE: 98 MG/DL (ref 70–110)
POIKILOCYTOSIS BLD QL SMEAR: ABNORMAL
POLYCHROMASIA BLD QL SMEAR: ABNORMAL
POTASSIUM SERPL-SCNC: 4.4 MMOL/L
PROT SERPL-MCNC: 6.5 G/DL
RBC # BLD AUTO: 4.17 M/UL
SCHISTOCYTES BLD QL SMEAR: PRESENT
SODIUM SERPL-SCNC: 141 MMOL/L
SPHEROCYTES BLD QL SMEAR: ABNORMAL
TARGETS BLD QL SMEAR: ABNORMAL
TOXIC GRANULES BLD QL SMEAR: PRESENT
WBC # BLD AUTO: 28.85 K/UL

## 2018-03-25 PROCEDURE — 97161 PT EVAL LOW COMPLEX 20 MIN: CPT

## 2018-03-25 PROCEDURE — 36415 COLL VENOUS BLD VENIPUNCTURE: CPT

## 2018-03-25 PROCEDURE — 25000003 PHARM REV CODE 250: Performed by: PSYCHIATRY & NEUROLOGY

## 2018-03-25 PROCEDURE — 80053 COMPREHEN METABOLIC PANEL: CPT

## 2018-03-25 PROCEDURE — 83735 ASSAY OF MAGNESIUM: CPT

## 2018-03-25 PROCEDURE — C9113 INJ PANTOPRAZOLE SODIUM, VIA: HCPCS | Performed by: PSYCHIATRY & NEUROLOGY

## 2018-03-25 PROCEDURE — 63600175 PHARM REV CODE 636 W HCPCS: Performed by: PSYCHIATRY & NEUROLOGY

## 2018-03-25 PROCEDURE — 99232 SBSQ HOSP IP/OBS MODERATE 35: CPT | Mod: ,,, | Performed by: PSYCHIATRY & NEUROLOGY

## 2018-03-25 PROCEDURE — A4216 STERILE WATER/SALINE, 10 ML: HCPCS | Performed by: PSYCHIATRY & NEUROLOGY

## 2018-03-25 PROCEDURE — 20600001 HC STEP DOWN PRIVATE ROOM

## 2018-03-25 PROCEDURE — 85025 COMPLETE CBC W/AUTO DIFF WBC: CPT

## 2018-03-25 PROCEDURE — 63600175 PHARM REV CODE 636 W HCPCS: Performed by: NURSE PRACTITIONER

## 2018-03-25 PROCEDURE — 84100 ASSAY OF PHOSPHORUS: CPT

## 2018-03-25 PROCEDURE — 25000003 PHARM REV CODE 250: Performed by: STUDENT IN AN ORGANIZED HEALTH CARE EDUCATION/TRAINING PROGRAM

## 2018-03-25 PROCEDURE — 84100 ASSAY OF PHOSPHORUS: CPT | Mod: 91

## 2018-03-25 RX ADMIN — DEXTROSE MONOHYDRATE 12.5 G: 500 INJECTION PARENTERAL at 11:03

## 2018-03-25 RX ADMIN — POTASSIUM & SODIUM PHOSPHATES POWDER PACK 280-160-250 MG 2 PACKET: 280-160-250 PACK at 05:03

## 2018-03-25 RX ADMIN — BISACODYL 5 MG: 5 TABLET, COATED ORAL at 08:03

## 2018-03-25 RX ADMIN — Medication 3 ML: at 05:03

## 2018-03-25 RX ADMIN — DEXTROSE MONOHYDRATE 12.5 G: 500 INJECTION PARENTERAL at 05:03

## 2018-03-25 RX ADMIN — FENTANYL CITRATE 25 MCG: 50 INJECTION INTRAMUSCULAR; INTRAVENOUS at 06:03

## 2018-03-25 RX ADMIN — PANTOPRAZOLE SODIUM 40 MG: 40 INJECTION, POWDER, FOR SOLUTION INTRAVENOUS at 08:03

## 2018-03-25 RX ADMIN — OXYCODONE HYDROCHLORIDE 10 MG: 5 TABLET ORAL at 05:03

## 2018-03-25 RX ADMIN — DEXTROSE MONOHYDRATE 12.5 G: 500 INJECTION PARENTERAL at 06:03

## 2018-03-25 RX ADMIN — DEXTROSE MONOHYDRATE 12.5 G: 500 INJECTION PARENTERAL at 12:03

## 2018-03-25 RX ADMIN — OXYCODONE HYDROCHLORIDE 10 MG: 5 TABLET ORAL at 08:03

## 2018-03-25 RX ADMIN — SODIUM CHLORIDE: 0.9 INJECTION, SOLUTION INTRAVENOUS at 05:03

## 2018-03-25 RX ADMIN — MOXIFLOXACIN HYDROCHLORIDE 400 MG: 400 INJECTION, SOLUTION INTRAVENOUS at 09:03

## 2018-03-25 RX ADMIN — Medication 3 ML: at 02:03

## 2018-03-25 RX ADMIN — FENTANYL CITRATE 25 MCG: 50 INJECTION INTRAMUSCULAR; INTRAVENOUS at 02:03

## 2018-03-25 RX ADMIN — POTASSIUM & SODIUM PHOSPHATES POWDER PACK 280-160-250 MG 2 PACKET: 280-160-250 PACK at 08:03

## 2018-03-25 RX ADMIN — FENTANYL CITRATE 25 MCG: 50 INJECTION INTRAMUSCULAR; INTRAVENOUS at 08:03

## 2018-03-25 NOTE — PLAN OF CARE
Problem: Patient Care Overview  Goal: Plan of Care Review  POC reviewed with pt at 0500. Pt minimally verbalized understanding. Questions and concerns addressed. Pt put on O2 at start of shift for low SpO2 of 90%. O2 removed this AM. Sats maintained greater than 92% since removal. PM bath given. Phos replaced per PRN order. PRN IV Dextrose given per poct bg of 66/67. 15 min recheck was 99. H&H remains stable this AM. No s/s of anemia or GI bleed noted. Pt progressing toward goals. On coming RN will continue to monitor. See flowsheets for full assessment and VS info

## 2018-03-25 NOTE — PLAN OF CARE
Problem: Patient Care Overview  Goal: Plan of Care Review  Outcome: Ongoing (interventions implemented as appropriate)  Patient transferred to floor from ICU. Patient remains NPO and in restraints to prevent pulling at NG tube. Patient has restless legs and is calling out his wife's name.

## 2018-03-25 NOTE — ASSESSMENT & PLAN NOTE
- Lipase at OSH 3738 w/ leukocytosis, elevated Ca, peripancreatic fluid w/o gallstones on CT abdomen  - Pt NPO, NGT to suction 2/2 anemia, IV  mL/hr with strict I&Os  - No worsening over the past 24 hrs, BUN remains nl, no signs of hemoconcentration or third space fluid loss  - Triglycerides nl, procal nl,slight elevation of crp  - Mild pancreatitis  - Pain regimen optimized  - IV fluids decreased to 50 cc/hr.

## 2018-03-25 NOTE — ASSESSMENT & PLAN NOTE
-03/23/18 CTA Stroke Multiphase with multiple non-occlusive thrombi noted  -MRI brain w/o show multiple infarcts without evidence of hemorrhagic conversion.  -2D Echo showed severely depressed LVEF 15-20%  -PT/OT/SLP  -Hold anticoagulation/anti-platelets due to anemia, concern for bleed.  -Vascular Neurology consulted, appreciate recs  -Permissive HTN, goal SBP < 220.  prn labetalol ordered.  -q1h Neuro checks

## 2018-03-25 NOTE — PT/OT/SLP EVAL
Physical Therapy Evaluation    Patient Name:  Zion Hines   MRN:  [id number]    Recommendations:     Discharge Recommendations:  nursing facility, skilled (pending progress)   Discharge Equipment Recommendations:  (TBD pending progress)   Barriers to discharge: Inaccessible home and Decreased caregiver support pt requires increase assist at this time    Assessment:     Zion Hines is a 78 y.o. male admitted with a medical diagnosis of Embolic stroke involving right middle cerebral artery.  He presents with the following impairments/functional limitations:  weakness, impaired endurance, gait instability, impaired balance, decreased upper extremity function, decreased lower extremity function, impaired cognition, impaired self care skills, impaired functional mobilty, decreased coordination, decreased safety awareness, impaired fine motor, abnormal tone.      Pt tolerated session well with no signs of distress or discomfort. Pt not following commands and lethargic on PT attempt.  Pt able to spontaneously move R UE and LE, but unable to reproduce upon command.   Pt would benefit from continued skilled physical therapy for the listed impairments to improve functional independence and overall safety with mobility prior to d/c. PT recommends d/c disposition to SNF pending progress and family assist at home.     Whiteboard updated with correct mobility information. RN/PCT notified.  Transfer with therapy only at this time.       Rehab Prognosis:  Good; patient would benefit from acute skilled PT services to address these deficits and reach maximum level of function.      Recent Surgery: * No surgery found *      Plan:     During this hospitalization, patient to be seen 4 x/week to address the above listed problems via gait training, therapeutic exercises, therapeutic activities, neuromuscular re-education  · Plan of Care Expires:  04/24/18   Plan of Care Reviewed with: patient    Subjective     Communicated with RN prior to  "session.  Patient found supine, upon PT entry to room, agreeable to evaluation.      "That's cold." per patient in response to RN placing temperature probe under arm  Patient comments/goals: none stated  Pain/Comfort:  None stated or indicated    Patients cultural, spiritual, Gnosticist conflicts given the current situation: none indicated    Living Environment:  Pt unable to provide social history or PLOF this visit. No family present at time of PT visit. Will follow-up as able.       Objective:     Patient found with: telemetry, restraints, peripheral IV, Condom Catheter, pressure relief boots     General Precautions: Standard, aspiration, fall, NPO   Orthopedic Precautions:N/A   Braces: N/A     Exams:  Pt oriented x pt not responding to name or orientation questions. Orientation provided. .     R cervical rotation with eyes closed throughout session. Possible L side neglect or inattention; will continue to reassess    Communication: clear/fluent    Follows Commands: followed no commands this visit    Posture: Rounded shoulder, Head forward and Posterior pelvic tilt  Skin Integrity: Visible skin intact  Edema:  none    Range of Motion and Strength Examination    Right Upper Extremity: WFL ROM; demonstrated at least 3/5 against gravity spontaneously, but unable to reproduce with MMT    Left Upper Extremity: WFL PROM; flaccid tone; no active movement either upon command or spontaneously    Right Lower Extremity: WFL ROM; demonstrated at least 2/5 with spontaneous movement    Left Lower Extremity: WFL PROM; normal tone with decrease hamstring and gastroc flexibility, but PROM easy through entire range    Sensation: unable to determine due to cognition or arousal this visit    Tone/Spasticity: flaccid L UE      Fine Motor Coordination:   impaired    Gross Motor Coordination:  impaired      Functional Mobility:    Bed Mobility:  Not assessed this visit due to pt's level of consciousness and confusion; will reassess " next visit        Education:  Education provided to pt regarding: PT role/POC.        AM-PAC 6 CLICK MOBILITY  Total Score:6       Patient left supine with all lines intact, call button in reach and restraints reapplied at end of session.    GOALS:    Physical Therapy Goals        Problem: Physical Therapy Goal    Goal Priority Disciplines Outcome Goal Variances Interventions   Physical Therapy Goal     PT/OT, PT      Description:  Goals to be met by: 18     Patient will increase functional independence with mobility by performin. Supine to sit with Maximum Assistance  2. Sit to supine with Maximum Assistance  3. Rolling to Left and Right with Maximum Assistance.  4. Sitting at edge of bed x 10 minutes with Maximum Assistance maintaining midline orientation.                      History:     No past medical history on file.    No past surgical history on file.    Clinical Decision Making:     Personal factors/comorbidities: n/a. The listed co-morbidities and personal factors impact pt's current level and progress with functional mobility and independence.   Body systems elements affected: lower extremities, upper extremities, trunk, musculoskeletal system, neuromuscular system, cardiovascular, integumentary system; orientation/level of consciousness  Impairments: see assessment  Clinical Presentation: evolving  Functional Outcome Tools: AMPAC, MMT, ROM  Evaluation Complexity Level: low    Time Tracking:     PT Received On: 18  PT Start Time: 1031     PT Stop Time: 1044  PT Total Time (min): 13 min     Billable Minutes: Evaluation 13      Nadja Sawyer PT, DPT  Pager: [phone]  3/25/2018

## 2018-03-25 NOTE — CARE UPDATE
Patient's chart was reviewed by a stroke team provider.  Patient non neurologic issues with greater urgency.  Physical examination unchanged - right gaze preference, left hemiparesis, and significant dysarthria and aphasia.  Echo now with HFrEF (EF estimated 15-20% and global akinesis with concern for stress-induced cardiomyopathy, left atrium moderately enlarged)   - Cause of multi-vascular infarcts suggestive of acute extensive embolic stroke.   - No known history of afib; would recommend 30 day event monitor on discharge.    - SBP goal<220 s/p infarction, reasonable to lower BP by 15% during the first 24 hours after stroke onset   - Continue atorvastatin  - For secondary stroke prevention, consider addition of clopidogrel 75 mg PO daily (avoiding aspirin with suspected erosive gastritis history) if acute bleed ruled out.   - Treatment of acute pancreatitis per primary team; with low-grade fever of 100.8 yesterday evening and with improving although significant of leukocytosis today with WBC of 29 - would consider blood cultures as well as further imaging as abdominal US per [facility]CC could not visualize pancreas, and infected pseudocysts has not been ruled out.     For other recommendations please see our previous note completed on: 3/24/2018    There are no new recommendations at this time. Will continue to follow. Discussed patient with staff, Dr. Perez. Please contact stroke team for any questions or concerns.     Daniel Cartwright MD  PGY-2 Internal Medicine  [phone]

## 2018-03-25 NOTE — NURSING
Patient report received from ICU nurseAndreina. Patient brought up in bed by nurse and CNA. Vital signs taken.

## 2018-03-25 NOTE — PLAN OF CARE
Problem: Physical Therapy Goal  Goal: Physical Therapy Goal  Goals to be met by: 18     Patient will increase functional independence with mobility by performin. Supine to sit with Maximum Assistance  2. Sit to supine with Maximum Assistance  3. Rolling to Left and Right with Maximum Assistance.  4. Sitting at edge of bed x 10 minutes with Maximum Assistance maintaining midline orientation.          PT evaluation completed. POC and goals established.    Nadja Sawyer, PT, DPT  3/25/2018

## 2018-03-25 NOTE — PROGRESS NOTES
[facility]  Neurocritical Care  Progress Note    Admit Date: 3/23/2018  Service Date: 2018  Length of Stay: 2    Subjective:     Chief Complaint: Embolic stroke involving right middle cerebral artery    History of Present Illness: 77 yo M with PMHx HTN known to be non-compliant with medications and remote hx of anemia [place] to [facility] as a transfer from [facility].  Patient is a poor historian with no family at bedside, hx largely obtained from OSH records.  He presented to OSH originally with symptoms of weakness and fatigue, his daughter brought him to ED thinking he was likely anemic again.  It was noted that patient had some dysarthria and aphasia (unspecified expressive vs receptive) with L-sided weakness.  He was out of the window for tPA and was sent to [facility] [place] for CTA Stroke Multiphase and possible endovascular intervention. CTA stroke multiphase with no LVO, no intervention planned.  Of note, patient is anemic per OSH labs with a Hgb of 5.8 g/dL and likely has pancreatitis with lipase 3738, WBC count of 15.8 k/uL, and CT abdomen showing peripancreatic fluid around the tail.  He was transfused a unit of PRBCs prior to transfer.  Labs were otherwise largely unremarkable.  On presentation to [facility], he is able to move both BLE but complains of pain in BLE.  Not moving LUE but is intact to noxious stimuli.  Has a R gaze preference that is not overcome by VOR testing and complaint of neck pain with moving head side-to-side.  Otherwise has mild abdominal distension with mild hypogastric tenderness to palpation. HTN to SBP 200s.  Of note, patient's wife  recently and family expressed concern in outpatient ED that patient has not been eating much for the past month but deny EtOH use.    Hospital Course: 18:  Admission to Neuro ICU 2/2 concern for stroke with multiple concerns for patient to become unstable--pancreatitis, severe anemia,  HTN.  03/24: has had no interval development of multiorgan involvement form pancreatitis, drop in hemoglobin secondary to erosive gastritis, transfused and on protonix bid    Interval History:  NAEON.     Review of Systems   Unable to perform ROS: Mental status change       Objective:     Vitals:  Temp: 97.5 °F (36.4 °C)  Pulse: 98  Rhythm: normal sinus rhythm  BP: (!) 168/86  MAP (mmHg): 116  Resp: 16  SpO2: (!) 90 %  O2 Device (Oxygen Therapy): room air    Temp  Min: 97.5 °F (36.4 °C)  Max: 100.8 °F (38.2 °C)  Pulse  Min: 90  Max: 117  BP  Min: 126/72  Max: 181/85  MAP (mmHg)  Min: 94  Max: 123  Resp  Min: 16  Max: 28  SpO2  Min: 90 %  Max: 98 %    03/24 0701 - 03/25 0700  In: 2420.2 [I.V.:2070.2]  Out: 930 [Urine:820; Drains:110]   Unmeasured Output  Stool Occurrence: 0       Physical Exam   Constitutional: He appears well-developed.   HENT:   Head: Normocephalic and atraumatic.   Eyes: Pupils are equal, round, and reactive to light.   Neck: No tracheal deviation present.   Cardiovascular: Normal rate, regular rhythm, normal heart sounds and intact distal pulses.    Pulmonary/Chest: Effort normal and breath sounds normal.   Abdominal: Soft. He exhibits no distension. There is tenderness.   Hypoactive bowel sounds.    Musculoskeletal:   LUE weakness   Neurological: He is alert.   Not oriented to time, place, or situation   Skin: Skin is warm. No rash noted.   Psychiatric: He has a normal mood and affect.   Vitals reviewed.        Medications:  Continuous  sodium chloride 0.9% Last Rate: 50 mL/hr at 03/25/18 0900   Scheduled  bisacodyl 5 mg Daily   moxifloxacin 400 mg Q24H   pantoprazole (PROTONIX) IVPB 40 mg BID   sodium chloride 0.9% 3 mL Q8H   PRN  sodium chloride  Q24H PRN   acetaminophen 650 mg Q6H PRN   dextrose 50% 12.5 g PRN   fentaNYL 25 mcg Q2H PRN   glucagon (human recombinant) 1 mg PRN   insulin aspart U-100 1-10 Units Q6H PRN   labetalol 10 mg Q4H PRN   magnesium oxide 800 mg PRN   magnesium oxide 800  mg PRN   ondansetron 4 mg Q8H PRN   oxyCODONE 10 mg Q6H PRN   potassium chloride 10% 40 mEq PRN   potassium chloride 10% 40 mEq PRN   potassium chloride 10% 40 mEq PRN   potassium, sodium phosphates 2 packet PRN   potassium, sodium phosphates 2 packet PRN   potassium, sodium phosphates 2 packet PRN     Today I personally reviewed pertinent medications, lines/drains/airways, imaging, lab results, notably:    Diet  Diet NPO  Diet NPO        Assessment/Plan:     Neuro   * Embolic stroke involving right middle cerebral artery    -03/23/18 CTA Stroke Multiphase with multiple non-occlusive thrombi noted  -MRI brain w/o show multiple infarcts without evidence of hemorrhagic conversion.  -2D Echo showed severely depressed LVEF 15-20%  -PT/OT/SLP  -Hold anticoagulation/anti-platelets due to anemia, concern for bleed.  -Vascular Neurology consulted, appreciate recs  -Permissive HTN, goal SBP < 220.  prn labetalol ordered.  -q1h Neuro checks           Cardiac/Vascular   HTN (hypertension)    -Stroke risk factor, SBP 200s on admission  -Permissive HTN x 24 h, goal SBP < 220, prn labetalol ordered  -Pt non-compliant with anti-HTNives, start po meds as needed for HTN > 24 h        Oncology   Microcytic anemia    -Hgb at OSH 5.8 g/dL, repeat Hgb pending  -Pt received 1 U PRBCs en route to [facility], will transfuse if Hgb < 7.0 g/dL  -No signs of bleeding currently, will place NGT to suction, NPO 2/2 pancreatitis  -Hx of anemia per OSH records, baseline Hgb unknown  - Upper gi source, adequately transfused  - Continue Protonix 40 mg IV BID.         GI   Acute pancreatitis    - Lipase at OSH 3738 w/ leukocytosis, elevated Ca, peripancreatic fluid w/o gallstones on CT abdomen  - Pt NPO, NGT to suction 2/2 anemia, IV  mL/hr with strict I&Os  - No worsening over the past 24 hrs, BUN remains nl, no signs of hemoconcentration or third space fluid loss  - Triglycerides nl, procal nl,slight elevation of crp  - Mild pancreatitis  - Pain  regimen optimized  - IV fluids decreased to 50 cc/hr.               Prophylaxis:  Venous Thromboembolism: mechanical  Stress Ulcer: PPI  Ventilator Pneumonia: N/A    Activity Orders          None        Full Code    Stevan Bernard MD  Neurocritical Care  [facility]

## 2018-03-25 NOTE — SUBJECTIVE & OBJECTIVE
Interval History:  NAEON.     Review of Systems   Unable to perform ROS: Mental status change       Objective:     Vitals:  Temp: 97.5 °F (36.4 °C)  Pulse: 98  Rhythm: normal sinus rhythm  BP: (!) 168/86  MAP (mmHg): 116  Resp: 16  SpO2: (!) 90 %  O2 Device (Oxygen Therapy): room air    Temp  Min: 97.5 °F (36.4 °C)  Max: 100.8 °F (38.2 °C)  Pulse  Min: 90  Max: 117  BP  Min: 126/72  Max: 181/85  MAP (mmHg)  Min: 94  Max: 123  Resp  Min: 16  Max: 28  SpO2  Min: 90 %  Max: 98 %    03/24 0701 - 03/25 0700  In: 2420.2 [I.V.:2070.2]  Out: 930 [Urine:820; Drains:110]   Unmeasured Output  Stool Occurrence: 0       Physical Exam   Constitutional: He appears well-developed.   HENT:   Head: Normocephalic and atraumatic.   Eyes: Pupils are equal, round, and reactive to light.   Neck: No tracheal deviation present.   Cardiovascular: Normal rate, regular rhythm, normal heart sounds and intact distal pulses.    Pulmonary/Chest: Effort normal and breath sounds normal.   Abdominal: Soft. He exhibits no distension. There is tenderness.   Hypoactive bowel sounds.    Musculoskeletal:   LUE weakness   Neurological: He is alert.   Not oriented to time, place, or situation   Skin: Skin is warm. No rash noted.   Psychiatric: He has a normal mood and affect.   Vitals reviewed.        Medications:  Continuous  sodium chloride 0.9% Last Rate: 50 mL/hr at 03/25/18 0900   Scheduled  bisacodyl 5 mg Daily   moxifloxacin 400 mg Q24H   pantoprazole (PROTONIX) IVPB 40 mg BID   sodium chloride 0.9% 3 mL Q8H   PRN  sodium chloride  Q24H PRN   acetaminophen 650 mg Q6H PRN   dextrose 50% 12.5 g PRN   fentaNYL 25 mcg Q2H PRN   glucagon (human recombinant) 1 mg PRN   insulin aspart U-100 1-10 Units Q6H PRN   labetalol 10 mg Q4H PRN   magnesium oxide 800 mg PRN   magnesium oxide 800 mg PRN   ondansetron 4 mg Q8H PRN   oxyCODONE 10 mg Q6H PRN   potassium chloride 10% 40 mEq PRN   potassium chloride 10% 40 mEq PRN   potassium chloride 10% 40 mEq PRN    potassium, sodium phosphates 2 packet PRN   potassium, sodium phosphates 2 packet PRN   potassium, sodium phosphates 2 packet PRN     Today I personally reviewed pertinent medications, lines/drains/airways, imaging, lab results, notably:    Diet  Diet NPO  Diet NPO

## 2018-03-25 NOTE — ASSESSMENT & PLAN NOTE
-Hgb at OSH 5.8 g/dL, repeat Hgb pending  -Pt received 1 U PRBCs en route to [facility], will transfuse if Hgb < 7.0 g/dL  -No signs of bleeding currently, will place NGT to suction, NPO 2/2 pancreatitis  -Hx of anemia per OSH records, baseline Hgb unknown  - Upper gi source, adequately transfused  - Continue Protonix 40 mg IV BID.

## 2018-03-26 LAB
ALBUMIN SERPL BCP-MCNC: 2.3 G/DL
ALP SERPL-CCNC: 72 U/L
ALT SERPL W/O P-5'-P-CCNC: 15 U/L
AMPHETAMINES SERPL QL: NEGATIVE
AMYLASE SERPL-CCNC: 198 U/L
ANION GAP SERPL CALC-SCNC: 11 MMOL/L
AST SERPL-CCNC: 33 U/L
BARBITURATES SERPL QL SCN: NEGATIVE
BASOPHILS # BLD AUTO: 0.02 K/UL
BASOPHILS NFR BLD: 0.1 %
BENZODIAZ SERPL QL: NEGATIVE
BILIRUB SERPL-MCNC: 1.4 MG/DL
BUN SERPL-MCNC: 19 MG/DL
CALCIUM SERPL-MCNC: 9.1 MG/DL
CANNABINOIDS SERPL QL: NEGATIVE
CHLORIDE SERPL-SCNC: 115 MMOL/L
CO2 SERPL-SCNC: 17 MMOL/L
COCAINE, BLOOD: NEGATIVE
CREAT SERPL-MCNC: 1 MG/DL
DIFFERENTIAL METHOD: ABNORMAL
EOSINOPHIL # BLD AUTO: 0 K/UL
EOSINOPHIL NFR BLD: 0.1 %
ERYTHROCYTE [DISTWIDTH] IN BLOOD BY AUTOMATED COUNT: 28.3 %
EST. GFR  (AFRICAN AMERICAN): >60 ML/MIN/1.73 M^2
EST. GFR  (NON AFRICAN AMERICAN): >60 ML/MIN/1.73 M^2
ETHANOL SERPL-MCNC: NEGATIVE MG/DL
GLUCOSE SERPL-MCNC: 67 MG/DL
HCT VFR BLD AUTO: 27.9 %
HGB BLD-MCNC: 8 G/DL
IMM GRANULOCYTES # BLD AUTO: 0.32 K/UL
IMM GRANULOCYTES NFR BLD AUTO: 1 %
LIPASE SERPL-CCNC: 100 U/L
LYMPHOCYTES # BLD AUTO: 1.3 K/UL
LYMPHOCYTES NFR BLD: 4.3 %
MAGNESIUM SERPL-MCNC: 1.9 MG/DL
MCH RBC QN AUTO: 20 PG
MCHC RBC AUTO-ENTMCNC: 28.7 G/DL
MCV RBC AUTO: 70 FL
METHADONE SERPL QL SCN: NEGATIVE
MONOCYTES # BLD AUTO: 2.3 K/UL
MONOCYTES NFR BLD: 7.6 %
NEUTROPHILS # BLD AUTO: 26.5 K/UL
NEUTROPHILS NFR BLD: 86.9 %
NRBC BLD-RTO: 0 /100 WBC
OPIATES SERPL QL SCN: NEGATIVE
PCP SERPL QL SCN: NEGATIVE
PHOSPHATE SERPL-MCNC: 3.1 MG/DL
PLATELET # BLD AUTO: 351 K/UL
PLATELET BLD QL SMEAR: ABNORMAL
PMV BLD AUTO: 9.3 FL
POCT GLUCOSE: 113 MG/DL (ref 70–110)
POCT GLUCOSE: 80 MG/DL (ref 70–110)
POCT GLUCOSE: 81 MG/DL (ref 70–110)
POCT GLUCOSE: 82 MG/DL (ref 70–110)
POCT GLUCOSE: 85 MG/DL (ref 70–110)
POTASSIUM SERPL-SCNC: 3.8 MMOL/L
PROPOXYPH SERPL QL: NEGATIVE
PROT SERPL-MCNC: 6.2 G/DL
RBC # BLD AUTO: 4 M/UL
SODIUM SERPL-SCNC: 143 MMOL/L
TSH SERPL DL<=0.005 MIU/L-ACNC: 1 UIU/ML
WBC # BLD AUTO: 30.51 K/UL

## 2018-03-26 PROCEDURE — 25000003 PHARM REV CODE 250: Performed by: ANESTHESIOLOGY

## 2018-03-26 PROCEDURE — 99222 1ST HOSP IP/OBS MODERATE 55: CPT | Mod: ,,, | Performed by: NURSE PRACTITIONER

## 2018-03-26 PROCEDURE — 83690 ASSAY OF LIPASE: CPT

## 2018-03-26 PROCEDURE — 93010 ELECTROCARDIOGRAM REPORT: CPT | Mod: 76,,, | Performed by: INTERNAL MEDICINE

## 2018-03-26 PROCEDURE — 93005 ELECTROCARDIOGRAM TRACING: CPT

## 2018-03-26 PROCEDURE — 63600175 PHARM REV CODE 636 W HCPCS: Performed by: ANESTHESIOLOGY

## 2018-03-26 PROCEDURE — 92526 ORAL FUNCTION THERAPY: CPT

## 2018-03-26 PROCEDURE — 84100 ASSAY OF PHOSPHORUS: CPT

## 2018-03-26 PROCEDURE — 94761 N-INVAS EAR/PLS OXIMETRY MLT: CPT

## 2018-03-26 PROCEDURE — 20000000 HC ICU ROOM

## 2018-03-26 PROCEDURE — 93010 ELECTROCARDIOGRAM REPORT: CPT | Mod: ,,, | Performed by: INTERNAL MEDICINE

## 2018-03-26 PROCEDURE — 85025 COMPLETE CBC W/AUTO DIFF WBC: CPT

## 2018-03-26 PROCEDURE — 63600175 PHARM REV CODE 636 W HCPCS: Performed by: PSYCHIATRY & NEUROLOGY

## 2018-03-26 PROCEDURE — 36415 COLL VENOUS BLD VENIPUNCTURE: CPT

## 2018-03-26 PROCEDURE — 92523 SPEECH SOUND LANG COMPREHEN: CPT

## 2018-03-26 PROCEDURE — 25000003 PHARM REV CODE 250: Performed by: PSYCHIATRY & NEUROLOGY

## 2018-03-26 PROCEDURE — C9113 INJ PANTOPRAZOLE SODIUM, VIA: HCPCS | Performed by: PSYCHIATRY & NEUROLOGY

## 2018-03-26 PROCEDURE — 80053 COMPREHEN METABOLIC PANEL: CPT

## 2018-03-26 PROCEDURE — 27000221 HC OXYGEN, UP TO 24 HOURS

## 2018-03-26 PROCEDURE — 84443 ASSAY THYROID STIM HORMONE: CPT

## 2018-03-26 PROCEDURE — 83735 ASSAY OF MAGNESIUM: CPT

## 2018-03-26 PROCEDURE — 82150 ASSAY OF AMYLASE: CPT

## 2018-03-26 RX ORDER — OXYCODONE HYDROCHLORIDE 5 MG/1
10 TABLET ORAL EVERY 6 HOURS PRN
Status: DISCONTINUED | OUTPATIENT
Start: 2018-03-26 | End: 2018-04-06

## 2018-03-26 RX ORDER — METOPROLOL TARTRATE 1 MG/ML
INJECTION, SOLUTION INTRAVENOUS
Status: DISPENSED
Start: 2018-03-26 | End: 2018-03-26

## 2018-03-26 RX ORDER — DILTIAZEM HYDROCHLORIDE 5 MG/ML
0.25 INJECTION INTRAVENOUS ONCE
Status: COMPLETED | OUTPATIENT
Start: 2018-03-26 | End: 2018-03-26

## 2018-03-26 RX ORDER — METOPROLOL TARTRATE 1 MG/ML
5 INJECTION, SOLUTION INTRAVENOUS EVERY 30 MIN PRN
Status: DISCONTINUED | OUTPATIENT
Start: 2018-03-26 | End: 2018-03-29

## 2018-03-26 RX ORDER — DILTIAZEM HCL/D5W 125 MG/125
5 PLASTIC BAG, INJECTION (ML) INTRAVENOUS CONTINUOUS
Status: DISCONTINUED | OUTPATIENT
Start: 2018-03-26 | End: 2018-03-26

## 2018-03-26 RX ORDER — PANTOPRAZOLE SODIUM 40 MG/1
40 TABLET, DELAYED RELEASE ORAL DAILY
Status: DISCONTINUED | OUTPATIENT
Start: 2018-03-27 | End: 2018-03-27

## 2018-03-26 RX ADMIN — OXYCODONE HYDROCHLORIDE 10 MG: 5 TABLET ORAL at 09:03

## 2018-03-26 RX ADMIN — MOXIFLOXACIN HYDROCHLORIDE 400 MG: 400 INJECTION, SOLUTION INTRAVENOUS at 10:03

## 2018-03-26 RX ADMIN — DILTIAZEM HYDROCHLORIDE 17.5 MG: 5 INJECTION INTRAVENOUS at 04:03

## 2018-03-26 RX ADMIN — OXYCODONE HYDROCHLORIDE 10 MG: 5 TABLET ORAL at 02:03

## 2018-03-26 RX ADMIN — BISACODYL 5 MG: 5 TABLET, COATED ORAL at 09:03

## 2018-03-26 RX ADMIN — METOPROLOL TARTRATE 5 MG: 5 INJECTION INTRAVENOUS at 04:03

## 2018-03-26 RX ADMIN — Medication 5 MG/HR: at 04:03

## 2018-03-26 RX ADMIN — ACETAMINOPHEN 650 MG: 325 TABLET ORAL at 09:03

## 2018-03-26 NOTE — ASSESSMENT & PLAN NOTE
-Hgb at OSH 5.8 g/dL, repeat Hgb pending  -Pt received 1 U PRBCs en route to [facility], will transfuse if Hgb < 7.0 g/dL  -No signs of bleeding currently, will place NGT to suction, NPO 2/2 pancreatitis  -Hx of anemia per OSH records, baseline Hgb unknown  - Upper gi source, adequately transfused  - Continue Protonix

## 2018-03-26 NOTE — HOSPITAL COURSE
3/24/18:  Evaluated by OT and SLP.  Found to have dysphagia and cognitive-linguistic impairment.  SLP recommendation:  NPO.  Bed mobility totalA.  Grooming totalA.   3/25/18:  Evaluated by PT.  Not following commands.  Bed mobility not assess 2/2 mental status/LOC and confusion.

## 2018-03-26 NOTE — PROGRESS NOTES
[facility]  Neurocritical Care  Progress Note    Admit Date: 3/23/2018  Service Date: 2018  Length of Stay: 3    Subjective:     Chief Complaint: Embolic stroke involving right middle cerebral artery    History of Present Illness: 77 yo M with PMHx HTN known to be non-compliant with medications and remote hx of anemia [place] [facility] [place] a [facility] from [facility].  Patient is a poor historian with no family at bedside, hx largely obtained from OSH records.  He presented to OSH originally with symptoms of weakness and fatigue, his daughter brought him to ED thinking he was likely anemic again.  It was noted that patient had some dysarthria and aphasia (unspecified expressive vs receptive) with L-sided weakness.  He was out of the window for tPA and was [place] [facility] [place]A Stroke Multiphase and possible endovascular intervention. CTA stroke multiphase with no LVO, no intervention planned.  Of note, patient is anemic per OSH labs with a Hgb of 5.8 g/dL and likely has pancreatitis with lipase 3738, WBC count of 15.8 k/uL, and CT abdomen showing peripancreatic fluid around the tail.  He was transfused a unit of PRBCs prior to transfer.  Labs were otherwise largely unremarkable.  [place] [facility] [place], he is able to move both BLE but complains of pain in BLE.  Not moving LUE but is intact to noxious stimuli.  Has a R gaze preference that is not overcome by VOR testing and complaint of neck pain with moving head side-to-side.  Otherwise has mild abdominal distension with mild hypogastric tenderness to palpation. HTN to SBP 200s.  Of note, patient's wife  recently and family expressed concern in outpatient ED that patient has not been eating much for the past month but deny EtOH use.    Hospital Course: 18:  Admission to Neuro ICU 2/2 concern for stroke with multiple concerns for patient to become unstable--pancreatitis, severe anemia,  HTN.  03/24: has had no interval development of multiorgan involvement form pancreatitis, drop in hemoglobin secondary to erosive gastritis, transfused and on protonix bid  03/25: Patient on dilt drip for a-fib. Repeat EKG shows NSR.  3/26: Discontinuing diltiazem drip today. Will continue monitor HR. Continue IV fluids 50 cc/hr for pancreatitis. Repeat lipase.     Interval History:  NAEON.     Review of Systems   Unable to perform ROS: Mental status change       Objective:     Vitals:  Temp: 98.4 °F (36.9 °C)  Pulse: 80  Rhythm: atrial rhythm  BP: (!) 149/83  MAP (mmHg): 110  Resp: 20  SpO2: 100 %  O2 Device (Oxygen Therapy): nasal cannula    Temp  Min: 98.4 °F (36.9 °C)  Max: 99.9 °F (37.7 °C)  Pulse  Min: 77  Max: 200  BP  Min: 98/73  Max: 165/93  MAP (mmHg)  Min: 88  Max: 110  Resp  Min: 12  Max: 22  SpO2  Min: 91 %  Max: 100 %    03/25 0701 - 03/26 0700  In: 1313.3 [I.V.:883.3]  Out: 405 [Urine:405]   Unmeasured Output  Urine Occurrence: 0  Stool Occurrence: 0  Emesis Occurrence: 0  Pad Count: 0       Physical Exam   Constitutional: He appears well-developed. No distress.   HENT:   Head: Normocephalic and atraumatic.   Eyes: Conjunctivae are normal. Pupils are equal, round, and reactive to light.   Neck: Neck supple. No JVD present.   Cardiovascular: Normal rate, regular rhythm, normal heart sounds and intact distal pulses.    Pulmonary/Chest: Effort normal and breath sounds normal.   Abdominal: Soft. Bowel sounds are normal. He exhibits no distension. There is no tenderness.   Musculoskeletal: He exhibits no edema or deformity.   LUE weakness   Neurological: He is alert.   Oriented to person and type of place only.   LUE flaccid.  Rightward gaze deviation.    Skin: Skin is warm. Capillary refill takes less than 2 seconds. No rash noted. He is not diaphoretic.   Psychiatric: He has a normal mood and affect.   Vitals reviewed.        Medications:  Continuous  sodium chloride 0.9% Last Rate: 50 mL/hr at 03/26/18  1605   Scheduled  bisacodyl 5 mg Daily   moxifloxacin 400 mg Q24H   [START ON 3/27/2018] pantoprazole 40 mg Daily   PRN  acetaminophen 650 mg Q6H PRN   dextrose 50% 12.5 g PRN   glucagon (human recombinant) 1 mg PRN   insulin aspart U-100 1-10 Units Q6H PRN   metoprolol 5 mg Q30 Min PRN   ondansetron 4 mg Q8H PRN   oxyCODONE 10 mg Q6H PRN     Today I personally reviewed pertinent medications, lines/drains/airways, imaging, cardiology, lab results, notably:    Diet  Diet NPO  Diet NPO        Assessment/Plan:     Neuro   * Embolic stroke involving right middle cerebral artery    -03/23/18 CTA Stroke Multiphase with multiple non-occlusive thrombi noted  -MRI brain w/o show multiple infarcts without evidence of hemorrhagic conversion.  -2D Echo showed severely depressed LVEF 15-20%  -PT/OT/SLP  -Hold anticoagulation/anti-platelets due to anemia, concern for bleed.  -Vascular Neurology consulted, appreciate recs  - SBP <180. prn labetalol ordered.  -q1h Neuro checks           Cardiac/Vascular   HTN (hypertension)    -Stroke risk factor, SBP 200s on admission  - SBP goal <180, prn labetalol ordered          Oncology   Microcytic anemia    -Hgb at OSH 5.8 g/dL, repeat Hgb pending  -Pt received 1 U PRBCs en route to [facility], will transfuse if Hgb < 7.0 g/dL  -No signs of bleeding currently, will place NGT to suction, NPO 2/2 pancreatitis  -Hx of anemia per OSH records, baseline Hgb unknown  - Upper gi source, adequately transfused  - Continue Protonix         GI   Acute pancreatitis    - Lipase at OSH 3738 w/ leukocytosis, elevated Ca, peripancreatic fluid w/o gallstones on CT abdomen  - Pt NPO, NGT to suction   - No worsening over the past 24 hrs, BUN remains nl, no signs of hemoconcentration or third space fluid loss  - Triglycerides nl, procal nl,slight elevation of crp  - Mild pancreatitis  - Pain regimen optimized  - Continue IV fluids @ 50 cc/hr.               Prophylaxis:  Venous Thromboembolism: mechanical  Stress  Ulcer: PPI  Ventilator Pneumonia: N/A    Activity Orders          None        Full Code    Stevan Bernard MD  Neurocritical Care  [facility]

## 2018-03-26 NOTE — PLAN OF CARE
Problem: Patient Care Overview  Goal: Plan of Care Review  Pt alert to self only on shift. Pt complains of pain but unable to verbalize where. PRN oxycodone given via NG tube to R nare. NG tube at 62 cm and set to low intermittent suction. Pts R hand in mitten restraints as well as soft wrist restraint. L arm flaccid. Restraint checks performed q 2 hrs as well as ROM exercises. NS infusing at 50 ml/hr. Cardizem gtt currently infusing at 5 ml/hr. A-fib on tele continued but rate better controlled. BP remains WNL. No other issues noted at this time. Will continue to monitor.

## 2018-03-26 NOTE — PT/OT/SLP DISCHARGE
Occupational Therapy Discharge Summary    Zion Hines  MRN: [id number]   Principal Problem: Embolic stroke involving right middle cerebral artery      Patient Discharged from acute Occupational Therapy on 3/26/18.  Please refer to prior OT note dated 3/24/18 for functional status.    Assessment:      Patient has not met goals.    Objective:     GOALS:    Occupational Therapy Goals     Not on file          Multidisciplinary Problems (Resolved)        Problem: Occupational Therapy Goal    Goal Priority Disciplines Outcome Interventions   Occupational Therapy Goal   (Resolved)     OT, PT/OT Outcome(s) achieved    Description:  Goals to be met by:  2 Weeks (2018)    Patient will increase functional independence with ADLs by performin. Supine to sit with Moderate Assistance. - not met  2. Roll to R with Min A. - not met  3. Roll to L with Min A. - not met  4. Grooming while supine with HOB elevated with Minimal Assistance. - not met  5. UE Dressing with Moderate Assistance. - not met  6. Pt will perform grasp/release 2/3 functional items (towel, cup, tissue box) per UE crossing midline with Min A while seated EOB. - not met  7. Pt will perform functional sitting at EOB x5 min with Min A. - not met                            Reasons for Discontinuation of Therapy Services  Transfer to alternate level of care.  Pt transferred to ICU on this date therefore will place pt on hold for therapy & discontinue OT orders.    Plan:     Patient Discharged to: ICU Discharge OT on acute until new resume OT orders are written.    FRANCISCO Quintero  3/26/2018

## 2018-03-26 NOTE — SUBJECTIVE & OBJECTIVE
Interval History:  NAEON.     Review of Systems   Unable to perform ROS: Mental status change       Objective:     Vitals:  Temp: 98.4 °F (36.9 °C)  Pulse: 80  Rhythm: atrial rhythm  BP: (!) 149/83  MAP (mmHg): 110  Resp: 20  SpO2: 100 %  O2 Device (Oxygen Therapy): nasal cannula    Temp  Min: 98.4 °F (36.9 °C)  Max: 99.9 °F (37.7 °C)  Pulse  Min: 77  Max: 200  BP  Min: 98/73  Max: 165/93  MAP (mmHg)  Min: 88  Max: 110  Resp  Min: 12  Max: 22  SpO2  Min: 91 %  Max: 100 %    03/25 0701 - 03/26 0700  In: 1313.3 [I.V.:883.3]  Out: 405 [Urine:405]   Unmeasured Output  Urine Occurrence: 0  Stool Occurrence: 0  Emesis Occurrence: 0  Pad Count: 0       Physical Exam   Constitutional: He appears well-developed. No distress.   HENT:   Head: Normocephalic and atraumatic.   Eyes: Conjunctivae are normal. Pupils are equal, round, and reactive to light.   Neck: Neck supple. No JVD present.   Cardiovascular: Normal rate, regular rhythm, normal heart sounds and intact distal pulses.    Pulmonary/Chest: Effort normal and breath sounds normal.   Abdominal: Soft. Bowel sounds are normal. He exhibits no distension. There is no tenderness.   Musculoskeletal: He exhibits no edema or deformity.   LUE weakness   Neurological: He is alert.   Oriented to person and type of place only.   LUE flaccid.  Rightward gaze deviation.    Skin: Skin is warm. Capillary refill takes less than 2 seconds. No rash noted. He is not diaphoretic.   Psychiatric: He has a normal mood and affect.   Vitals reviewed.        Medications:  Continuous  sodium chloride 0.9% Last Rate: 50 mL/hr at 03/26/18 1605   Scheduled  bisacodyl 5 mg Daily   moxifloxacin 400 mg Q24H   [START ON 3/27/2018] pantoprazole 40 mg Daily   PRN  acetaminophen 650 mg Q6H PRN   dextrose 50% 12.5 g PRN   glucagon (human recombinant) 1 mg PRN   insulin aspart U-100 1-10 Units Q6H PRN   metoprolol 5 mg Q30 Min PRN   ondansetron 4 mg Q8H PRN   oxyCODONE 10 mg Q6H PRN     Today I personally  reviewed pertinent medications, lines/drains/airways, imaging, cardiology, lab results, notably:    Diet  Diet NPO  Diet NPO

## 2018-03-26 NOTE — PROGRESS NOTES
Patient's chart was reviewed by a stroke team provider.  Patient developed afib with RVR overnight.  Was admitted to [facility] for management.  No new neurologic symptoms.  There is no new imaging to review.    For other recommendations please see our previous note completed on: 3/24 and 3/25.    There are no new recommendations at this time. Will continue to follow. Discussed patient with Dr. Perez Please contact stroke team for any questions or concerns.       Zoila Seals, NP-C  Vascular Neurology  [phone]

## 2018-03-26 NOTE — PLAN OF CARE
Problem: Occupational Therapy Goal  Goal: Occupational Therapy Goal  Goals to be met by:  2 Weeks (2018)    Patient will increase functional independence with ADLs by performin. Supine to sit with Moderate Assistance. - not met  2. Roll to R with Min A. - not met  3. Roll to L with Min A. - not met  4. Grooming while supine with HOB elevated with Minimal Assistance. - not met  5. UE Dressing with Moderate Assistance. - not met  6. Pt will perform grasp/release 2/3 functional items (towel, cup, tissue box) per UE crossing midline with Min A while seated EOB. - not met  7. Pt will perform functional sitting at EOB x5 min with Min A. - not met          Outcome: Outcome(s) achieved Date Met: 18   No goals met due to transfer to ICU.  Will discharge pt from OT due to transfer to ICU on this date.

## 2018-03-26 NOTE — NURSING
RN Proactive Rounding Note  Time of Visit: 430    Admit Date: 3/23/2018  LOS: 3  Code Status: Full Code   Date of Visit: 2018  : 1940  Age: 78 y.o.  Sex: male  Bed: [facility]8/[facility] A:   MRN: [id number]  Was the patient discharged from an ICU this admission? YES   Was the patient discharged from a PACU within last 24 hours? NO  Did the patient receive conscious sedation/general anesthesia in last 24 hours? NO  Was the patient in the ED within the past 24 hours? NO  Was the patient started on NIPPV within the past 24 hours? NO  Attending Physician: Marcello Loving MD  Primary Service: Networked reference to record PCT       ASSESSMENT:     Abnormal Vital Signs: Tachycardia  Clinical Issues: Circulatory     INTERVENTIONS/ RECOMMENDATIONS:     Received phone call that Mr. Hines was in AFIB w/ RVR, with HR >170 sustained, and had been as high as 190bpm. Charge nurse Vandana stated that she had spoken with Neuro CC, and a nontitrating Cardizem gtt was ordered, but pharmacy would be a minute in getting order filled. I recommended calling Neuro CC back and requesting Lopressor order as patient's BP was WNL. Patient had some reduction in HR, but remained tachy until Cardizem bolus/gtt was given, which brought HR down to WDL.     Discussed plan of care with TREY Ross and Kika BAKER ESCALATION:     Yes/No: No    Disposition: remains on floor unit    FOLLOW-UP/CONTINGENCY:       Call back the Rapid Response Nurse at x [phone]  for additional questions or concerns

## 2018-03-26 NOTE — CARE UPDATE
Spoke with Roman Eid, NP with Neuro critical Care. Patient will be transferred back to Neuro Critical Care Unit today.     Bahman Arroyo  10:22 AM

## 2018-03-26 NOTE — PROGRESS NOTES
"Patients family (daughters and niece) removed patients right wrist restraint and mitten and states patient is not pulling at lines/tubes. RN noted patient still attempting to pull at lines/tubes. RN explained need for restraint r/t patients AMS and that family may sit next to patients hand and watch closely and that break from restraints would be allowed. Family states they will move by patients hand "later" and that he "is not a child and will not pull at it". RN emphasized and educated on need for restraint if family unable to be next to bedside at all times. Family states they are watching him and he will not pull. RN to continue to monitor patient closely and continue to educate family and patient on plan of care and all care given.   "

## 2018-03-26 NOTE — ASSESSMENT & PLAN NOTE
-  Likely multifactorial--> stroke, pancreatitis, possible infection  -  On diltiazem gtt   Recommendations  -  Monitor sleep disturbances and establish consistent sleep-wake cycle  ·  Day time- lights on and shades open, night time- lights dim/off  -  Environmental modifications to limit agitation/confusion   · Appropriate lighting, family at bedside, visible clock and calendar, updated white board, reduce noise, limited visitors, clustered nursing care  -  Reorient patient to person, place, time, and situation on each encounter  -  Avoid restraints  -  May benefit from 24/7 supervision  -  Avoid/limit medications that can worsen delirium (benzodiazepines, antihistamines, anticholinergics, hypnotics, opiates)  -  Encourage mobility, OOB in chair, and early ambulation as appropriate  -  PT/OT evaluate and treat

## 2018-03-26 NOTE — ASSESSMENT & PLAN NOTE
-  Presented with left hemiparesis, right gaze preference, and dysarthria  -  CTH revealed L subacute cerebellar and R cerebral infarct  -  CTA without large vessel occlusion  -  Not tPA or interventional candidate  -  MRI brain showed bilateral frontal, parietal, occipital, and cerebellar infarcts L>R suggestive of acute extensive embolic stroke  See hospital course for functional, cognitive/speech/language, and nutrition/swallow status.      Recommendations  -  Encourage mobility, OOB in chair, and early ambulation as appropriate   -  PT/OT evaluate and treat  -  SLP speech and cognitive evaluate and treat  -  Monitor mood and sleep disturbances  -  Establish consistent sleep-wake cycle  -  Monitor for bowel and bladder dysfunction  -  Monitor for shoulder pain and subluxation  -  Monitor for spasticity  -  Monitor for and prevent skin breakdown and pressure ulcers  · Early mobility, repositioning/weight shifting every 20-30 minutes when sitting, turn patient every 2 hours, proper mattress/overlay and chair cushioning, pressure relief/heel protector boots  -  DVT prophylaxis:  ZENA, SCD

## 2018-03-26 NOTE — PROGRESS NOTES
Received patient via bed transfer from [facility]. Patient stable, no acute distress, cardiazem infusing per order, vitals wnl, notified CORINE Eid NP with N[facility]. Will continue to monitor.

## 2018-03-26 NOTE — HPI
Zion Hines is an 78-year-old male with PMHx of HTN and medication non-compliance.  Patient presented to [facility] with weakness and fatigue.  On arrival, found to have left hemiparesis, right gaze preference, and dysarthria.  Work-up revealed anemia requiring transfusion and acute pancreatitis.  CTH revealed L subacute cerebellar and R cerebral infarct.  Outside of tPA window.  Transferred to [facility] on 3/23/18 for further evaluation and management.  Upon admission, CTA without large vessel occlusion; therefore, not an interventional candidate.  MRI brain showed bilateral frontal, parietal, occipital, and cerebellar infarcts L>R suggestive of acute extensive embolic stroke.  Hospital course further complicated by significant leukocytosis (presented on admission) with tachycardia and acute pancreatitis.         Functional History: Unable to obtain 2/2 mental status and family/friends unavailable.

## 2018-03-26 NOTE — ASSESSMENT & PLAN NOTE
-03/23/18 CTA Stroke Multiphase with multiple non-occlusive thrombi noted  -MRI brain w/o show multiple infarcts without evidence of hemorrhagic conversion.  -2D Echo showed severely depressed LVEF 15-20%  -PT/OT/SLP  -Hold anticoagulation/anti-platelets due to anemia, concern for bleed.  -Vascular Neurology consulted, appreciate recs  - SBP <180. prn labetalol ordered.  -q1h Neuro checks

## 2018-03-26 NOTE — ASSESSMENT & PLAN NOTE
- Lipase at OSH 3738 w/ leukocytosis, elevated Ca, peripancreatic fluid w/o gallstones on CT abdomen  - Pt NPO, NGT to suction   - No worsening over the past 24 hrs, BUN remains nl, no signs of hemoconcentration or third space fluid loss  - Triglycerides nl, procal nl,slight elevation of crp  - Mild pancreatitis  - Pain regimen optimized  - Continue IV fluids @ 50 cc/hr.

## 2018-03-26 NOTE — NURSING TRANSFER
Nursing Transfer Note      3/26/2018     Transfer from 1068 to 70    Transfer via Bed    Transfer with IV, NG tube in place, belongings, SCD machine, bedside monitor    Transported by Barbara, RN, Gretchen, RN    Medicines sent: Yes    Chart send with patient: Yes    Notified:  Tele, Neuro ICU RN

## 2018-03-26 NOTE — CONSULTS
[facility]  Physical Medicine & Rehab  Consult Note    Patient Name: Zion Hines  MRN: [id number]  Admission Date: 3/23/2018  Hospital Length of Stay: 3 days  Attending Physician: Mica Mckeon MD     Inpatient consult to Physical Medicine & Rehabilitation  Consult performed by: Flower Longo NP  Consult requested by:  Mica Mckeon MD    Collaborating Physician: Aida Pedroza MD  Reason for Consult:  assess rehabilitation needs    Consults  Subjective:     Principal Problem: Embolic stroke involving right middle cerebral artery    HPI: Zion Hines is an 78-year-old male with PMHx of HTN and medication non-compliance.  Patient presented to [facility] with weakness and fatigue.  On arrival, found to have left hemiparesis, right gaze preference, and dysarthria.  Work-up revealed anemia requiring transfusion and acute pancreatitis.  CTH revealed L subacute cerebellar and R cerebral infarct.  Outside of tPA window.  Transferred to [facility] on 3/23/18 for further evaluation and management.  Upon admission, CTA without large vessel occlusion; therefore, not an interventional candidate.  MRI brain showed bilateral frontal, parietal, occipital, and cerebellar infarcts L>R suggestive of acute extensive embolic stroke.  Hospital course further complicated by significant leukocytosis (presented on admission) with tachycardia and acute pancreatitis.         Functional History: Unable to obtain 2/2 mental status and family/friends unavailable.     Hospital Course:   3/24/18:  Evaluated by OT and SLP.  Found to have dysphagia and cognitive-linguistic impairment.  SLP recommendation:  NPO.  Bed mobility totalA.  Grooming totalA.   3/25/18:  Evaluated by PT.  Not following commands.  Bed mobility not assess 2/2 mental status/LOC and confusion.     No past medical history on file.  No past surgical history on file.  Review of patient's allergies indicates:  Not on File    Scheduled  Medications:    bisacodyl  5 mg Oral Daily    metoprolol        moxifloxacin  400 mg Intravenous Q24H    pantoprazole (PROTONIX) IVPB  40 mg Intravenous BID       PRN Medications: acetaminophen, dextrose 50%, glucagon (human recombinant), insulin aspart U-100, metoprolol, ondansetron, oxyCODONE    Family History     None        Social History Main Topics    Smoking status: Not on file    Smokeless tobacco: Not on file    Alcohol use Not on file    Drug use: Unknown    Sexual activity: Not on file     Review of Systems   Unable to perform ROS: Other (mental status/aphasia)     Objective:     Vital Signs (Most Recent):  Temp: 98.8 °F (37.1 °C) (03/26/18 1120)  Pulse: 81 (03/26/18 1210)  Resp: 14 (03/26/18 1210)  BP: 115/70 (03/26/18 1210)  SpO2: 99 % (03/26/18 1210)    Vital Signs (24h Range):  Temp:  [98.4 °F (36.9 °C)-99.9 °F (37.7 °C)] 98.8 °F (37.1 °C)  Pulse:  [] 81  Resp:  [12-22] 14  SpO2:  [91 %-99 %] 99 %  BP: ()/(62-93) 115/70     Body mass index is 25.01 kg/m².    Physical Exam   Constitutional: He appears lethargic. He appears ill. No distress.   Appears elderly, frail.   HENT:   Head: Normocephalic and atraumatic.   Right Ear: External ear normal.   Left Ear: External ear normal.   Nose: Nose normal.   NGT in place.   Eyes: Right eye exhibits no discharge. Left eye exhibits no discharge. No scleral icterus.   Neck: Normal range of motion.   Cardiovascular: Normal rate, regular rhythm and intact distal pulses.    Pulmonary/Chest: Effort normal. No respiratory distress. He has no wheezes.   Abdominal: Soft. He exhibits no distension. There is no tenderness.   Musculoskeletal: Normal range of motion. He exhibits no edema or tenderness.   Neurological: He appears lethargic.   -  Mental Status:  Lethargic.  Opens eyes to voice.  Not following commands.   -  Speech and language:   Aphasia.  -  Motor:  Unable to formally assess 2/2 command following, moving R side spontaneously    Skin: Skin  is warm and dry. No rash noted.   Psychiatric: Cognition and memory are impaired.   In restraints   Vitals reviewed.         Diagnostic Results:   Labs: Reviewed  X-Ray: Reviewed  MRI: Reviewed  CTA: Reviewed    Assessment/Plan:     * Embolic stroke involving right middle cerebral artery    -  Presented with left hemiparesis, right gaze preference, and dysarthria  -  CTH revealed L subacute cerebellar and R cerebral infarct  -  CTA without large vessel occlusion  -  Not tPA or interventional candidate  -  MRI brain showed bilateral frontal, parietal, occipital, and cerebellar infarcts L>R suggestive of acute extensive embolic stroke  See hospital course for functional, cognitive/speech/language, and nutrition/swallow status.      Recommendations  -  Encourage mobility, OOB in chair, and early ambulation as appropriate   -  PT/OT evaluate and treat  -  SLP speech and cognitive evaluate and treat  -  Monitor mood and sleep disturbances  -  Establish consistent sleep-wake cycle  -  Monitor for bowel and bladder dysfunction  -  Monitor for shoulder pain and subluxation  -  Monitor for spasticity  -  Monitor for and prevent skin breakdown and pressure ulcers  · Early mobility, repositioning/weight shifting every 20-30 minutes when sitting, turn patient every 2 hours, proper mattress/overlay and chair cushioning, pressure relief/heel protector boots  -  DVT prophylaxis:  ZENA, SCD        Acute encephalopathy    -  Likely multifactorial--> stroke, pancreatitis, possible infection  -  On diltiazem gtt   Recommendations  -  Monitor sleep disturbances and establish consistent sleep-wake cycle  ·  Day time- lights on and shades open, night time- lights dim/off  -  Environmental modifications to limit agitation/confusion   · Appropriate lighting, family at bedside, visible clock and calendar, updated white board, reduce noise, limited visitors, clustered nursing care  -  Reorient patient to person, place, time, and situation on  each encounter  -  Avoid restraints  -  May benefit from 24/7 supervision  -  Avoid/limit medications that can worsen delirium (benzodiazepines, antihistamines, anticholinergics, hypnotics, opiates)  -  Encourage mobility, OOB in chair, and early ambulation as appropriate  -  PT/OT evaluate and treat        Per chart review, patient transferred back to ICU.  In addition, currently functionally low level (totalA with ADLs and mobility) and not following commands.  More appropriate for SNF at this time.  Continue therapy as appropriate.  Will sign off.  Please call with questions/concerns or re-consult if situation changes.    Thank you for your consult.     MATT Dowell  Department of Physical Medicine & Rehab  [facility]

## 2018-03-26 NOTE — SUBJECTIVE & OBJECTIVE
No past medical history on file.  No past surgical history on file.  Review of patient's allergies indicates:  Not on File    Scheduled Medications:    bisacodyl  5 mg Oral Daily    metoprolol        moxifloxacin  400 mg Intravenous Q24H    pantoprazole (PROTONIX) IVPB  40 mg Intravenous BID       PRN Medications: acetaminophen, dextrose 50%, glucagon (human recombinant), insulin aspart U-100, metoprolol, ondansetron, oxyCODONE    Family History     None        Social History Main Topics    Smoking status: Not on file    Smokeless tobacco: Not on file    Alcohol use Not on file    Drug use: Unknown    Sexual activity: Not on file     Review of Systems   Unable to perform ROS: Other (mental status/aphasia)     Objective:     Vital Signs (Most Recent):  Temp: 98.8 °F (37.1 °C) (03/26/18 1120)  Pulse: 81 (03/26/18 1210)  Resp: 14 (03/26/18 1210)  BP: 115/70 (03/26/18 1210)  SpO2: 99 % (03/26/18 1210)    Vital Signs (24h Range):  Temp:  [98.4 °F (36.9 °C)-99.9 °F (37.7 °C)] 98.8 °F (37.1 °C)  Pulse:  [] 81  Resp:  [12-22] 14  SpO2:  [91 %-99 %] 99 %  BP: ()/(62-93) 115/70     Body mass index is 25.01 kg/m².    Physical Exam   Constitutional: He appears lethargic. He appears ill. No distress.   Appears elderly, frail.   HENT:   Head: Normocephalic and atraumatic.   Right Ear: External ear normal.   Left Ear: External ear normal.   Nose: Nose normal.   NGT in place.   Eyes: Right eye exhibits no discharge. Left eye exhibits no discharge. No scleral icterus.   Neck: Normal range of motion.   Cardiovascular: Normal rate, regular rhythm and intact distal pulses.    Pulmonary/Chest: Effort normal. No respiratory distress. He has no wheezes.   Abdominal: Soft. He exhibits no distension. There is no tenderness.   Musculoskeletal: Normal range of motion. He exhibits no edema or tenderness.   Neurological: He appears lethargic.   -  Mental Status:  Lethargic.  Opens eyes to voice.  Not following commands.   -   Speech and language:   Aphasia.  -  Motor:  Unable to formally assess 2/2 command following, moving R side spontaneously    Skin: Skin is warm and dry. No rash noted.   Psychiatric: Cognition and memory are impaired.   In restraints   Vitals reviewed.         Diagnostic Results:   Labs: Reviewed  X-Ray: Reviewed  MRI: Reviewed  CTA: Reviewed

## 2018-03-26 NOTE — PT/OT/SLP EVAL
"Speech Language Pathology Evaluation  Cognitive Communication  Dysphagia Therapy    Patient Name:  Zion Hines   MRN:  [id number]   1068/1068 A    Admitting Diagnosis: Embolic stroke involving right middle cerebral artery    Recommendations:     Recommendations:                General Recommendations:  Dysphagia therapy and Cognitive-linguistic therapy  Diet recommendations:  NPO, NPO   Aspiration Precautions: Frequent oral care and Strict aspiration precautions   General Precautions: Standard, fall, aspiration, NPO  Communication strategies:  none    History:     No past medical history on file.    No past surgical history on file.    History of Present Illness: "79 yo M with PMHx HTN known to be non-compliant with medications and remote hx of anemia [place] [facility] [place] as a transfer from [facility].  Patient is a poor historian with no family at bedside, hx largely obtained from OSH records.  He presented to OSH originally with symptoms of weakness and fatigue, his daughter brought him to ED thinking he was likely anemic again.  It was noted that patient had some dysarthria and aphasia (unspecified expressive vs receptive) with L-sided weakness.  He was out of the window for tPA and was [place] [facility] [place] CTA Stroke Multiphase and possible endovascular intervention. CTA stroke multiphase with no LVO, no intervention planned.  Of note, patient is anemic per OSH labs with a Hgb of 5.8 g/dL and likely has pancreatitis with lipase 3738, WBC count of 15.8 k/uL, and CT abdomen showing peripancreatic fluid around the tail.  He was transfused a unit of PRBCs prior to transfer.  Labs were otherwise largely unremarkable.  On [place] [facility] [place], he is able to move both BLE but complains of pain in BLE.  Not moving LUE but is intact to noxious stimuli.  Has a R gaze preference that is not overcome by VOR testing and complaint of neck pain with moving head side-to-side.  Otherwise has mild " "abdominal distension with mild hypogastric tenderness to palpation. HTN to SBP 200s.  Of note, patient's wife  recently and family expressed concern in outpatient ED that patient has not been eating much for the past month but deny EtOH use."    Chest X-Rays:   Results for orders placed or performed during the hospital encounter of 18   X-Ray Chest 1 View    Narrative    EXAMINATION:  XR CHEST 1 VIEW    CLINICAL HISTORY:  Concern for pulmonary edema;    TECHNIQUE:  Single frontal view of the chest was performed.    COMPARISON:  2018.    FINDINGS:  Cardiac wires overlie the chest.  Cardiac silhouette is not significantly enlarged.  There is central vascular congestion and prominence of the abbi.  Interval development of patchy bibasilar airspace disease and coarsened interstitial lung markings.  Suspect small bilateral pleural effusions.  No pneumothorax.  Nasogastric tube is present with the tip terminating below the field of view.      Impression    Interval development of patchy bibasilar airspace disease suggestive of pulmonary edema.      Electronically signed by: Latrell Biggs MD  Date:    2018  Time:    20:47       Prior diet: unknown; no family present; pt unreliable         Subjective     Patient awake. Confusion evident.   Patient goals: "Just give me some water."     Pain/Comfort:  Pain Rating 1: 0/10    Objective:     COGNITIVE STATUS:  Behavioral Observations: confused and distractible-  Memory:  · Immediate: Impaired; patient unable to immediately recall words or functional information  · Short Term: impaired  · Long Term: impaired  Orientation: Inconsistent responses; patient initially not oriented to , year, month, situation, time, or place. When MD entered room, patient oriented to year, name, , and place.   Attention: impaired; requires frequent redirection.  Problem Solving: patient did not answer problem solving questions; perseverating on drinking water and " talking about his children despite attempts to redirect  Insight Awareness: impaired    LANGUAGE:   Receptive Language:  Simple y/n Questions: impaired; 70% acc  Complex y/n Questions: impaired; 50% acc  Identification: wf  1 Step Directions: impaired; unable to follow simple commands for oral motor examination    Expressive Language:   Naming:   · Divergent: Impaired; patient unable to follow directions  · Convergent: patient did not respond  · Confrontational: 85% acc  Automatic Speech: patient did not complete despite cues  Sentence Completion: patient did not complete despite cues  Responsive Speech: Patient did not complete despite cues  Repetition: patient did not attempt to repeat SLP despite cues    Motor Speech: dysarthria noted in conversational speech; patient 70-80% intelligible to an unknown skilled listener    Voice: wfl    Augmentative Alternative Communication: no     Visual-Spatial: Patient's gaze remained at midline despite cues to look to left and right. Patient does not track despite cues.       Treatment: Patient seen for an ongoing assessment of swallowing function. He was assessed with an ice chip x1, tsp sip of water x2, cup sips of water x2, cup sips of nectar thick x2, tsp sip of honey thick x2, 1/2 tsp bites of apple sauce x3. Patient presented with a delayed cough and/or throat clear following all trials. O2 sats noted to drop from low 90s to upper 80s during po trials. SLP cued patient to cough and patient noted to cough up thick, frothy mucous. SLP provided patient education on SLP role, s/s and risks of aspiraiton, safe swallow precautions, POC. He will require reinforcement. Two MDs entered room during ST session. SLP communicated continued NPO recommendations with both MDs.     Assessment:   Zion Hines is a 78 y.o. male with an SLP diagnosis of Dysphagia, Dysarthria and Cognitive-Linguistic Impairment.  ST will continue to follow.     Goals:    SLP Goals        Problem: SLP Goal     Goal Priority Disciplines Outcome   SLP Goal     SLP Ongoing (interventions implemented as appropriate)   Description:  Speech Therapy Short Term Goals  Goal expected to be met by 4/2  1. Patient will participate in an ongoing swallowing assessment to determine the least restrictive and safest po diet.   2. Patient will answer orientation questions x4 with 100% acc no cues.   3. Patient will follow 1 step directions given 1 repetition with 90% acc.   4. Patient will answer simple y/n questions with 90% acc no cues.   5. Patient will immediately recall functional information with 90% acc given 1 repetition.   6. Patient will participate in further assessment of convergent naming, sentence completion, and automatic speech tasks with updated goals to follow as appropriate.     Speech Language Pathology Goals  Goals expected to be met by 3/31  1. Pt will participate with ongoing assessment of swallow. -ongoing  2. Pt will participate with speech, language, cognitive evaluation to determine need for tx. -met                         Plan:   · Patient to be seen:  4 x/week   · Plan of Care expires:  04/23/18  · Plan of Care reviewed with:  patient   · SLP Follow-Up:  Yes       Discharge recommendations:  Discharge Facility/Level Of Care Needs:  (tbd pending progress)       Time Tracking:   SLP Treatment Date:   03/26/18  Speech Start Time:  0753  Speech Stop Time:  0823     Speech Total Time (min):  30 min    Billable Minutes: Eval 20  and Treatment Swallowing Dysfunction 10    YANNI Bautista, CCC-SLP   Pager: [phone]  03/26/2018

## 2018-03-26 NOTE — PLAN OF CARE
Problem: SLP Goal  Goal: SLP Goal  Speech Language Pathology Goals  Goals expected to be met by 3/31  1. Pt will participate with ongoing assessment of swallow.  2. Pt will participate with speech, language, cognitive evaluation to determine need for tx.       Outcome: Ongoing (interventions implemented as appropriate)  Pt seen for an ongoing swallowing assessment and Uclshk-[id number]-[id number] Evaluation completed. Recommend: continue NPO, excellent and frequent oral care. ST will continue to follow.   DADA Goel., CCC-SLP  Pager: [phone]  03/26/2018

## 2018-03-27 PROBLEM — I48.91 ATRIAL FIBRILLATION WITH RVR: Status: ACTIVE | Noted: 2018-03-27

## 2018-03-27 LAB
ACANTHOCYTES BLD QL SMEAR: PRESENT
ALBUMIN SERPL BCP-MCNC: 2.1 G/DL
ALP SERPL-CCNC: 73 U/L
ALT SERPL W/O P-5'-P-CCNC: 13 U/L
AMYLASE SERPL-CCNC: 151 U/L
ANION GAP SERPL CALC-SCNC: 7 MMOL/L
ANISOCYTOSIS BLD QL SMEAR: ABNORMAL
AST SERPL-CCNC: 23 U/L
BASOPHILS # BLD AUTO: 0.02 K/UL
BASOPHILS NFR BLD: 0.1 %
BILIRUB SERPL-MCNC: 0.8 MG/DL
BUN SERPL-MCNC: 22 MG/DL
BURR CELLS BLD QL SMEAR: ABNORMAL
CALCIUM SERPL-MCNC: 9 MG/DL
CHLORIDE SERPL-SCNC: 116 MMOL/L
CO2 SERPL-SCNC: 19 MMOL/L
CREAT SERPL-MCNC: 0.9 MG/DL
DACRYOCYTES BLD QL SMEAR: ABNORMAL
DIFFERENTIAL METHOD: ABNORMAL
EOSINOPHIL # BLD AUTO: 0.2 K/UL
EOSINOPHIL NFR BLD: 1 %
ERYTHROCYTE [DISTWIDTH] IN BLOOD BY AUTOMATED COUNT: 28.9 %
EST. GFR  (AFRICAN AMERICAN): >60 ML/MIN/1.73 M^2
EST. GFR  (NON AFRICAN AMERICAN): >60 ML/MIN/1.73 M^2
GIANT PLATELETS BLD QL SMEAR: PRESENT
GLUCOSE SERPL-MCNC: 135 MG/DL
HCT VFR BLD AUTO: 25.2 %
HGB BLD-MCNC: 7.2 G/DL
HYPOCHROMIA BLD QL SMEAR: ABNORMAL
IMM GRANULOCYTES # BLD AUTO: 0.13 K/UL
IMM GRANULOCYTES NFR BLD AUTO: 0.6 %
LIPASE SERPL-CCNC: 93 U/L
LYMPHOCYTES # BLD AUTO: 0.7 K/UL
LYMPHOCYTES NFR BLD: 2.9 %
MAGNESIUM SERPL-MCNC: 1.9 MG/DL
MCH RBC QN AUTO: 20.1 PG
MCHC RBC AUTO-ENTMCNC: 28.6 G/DL
MCV RBC AUTO: 70 FL
MONOCYTES # BLD AUTO: 1.9 K/UL
MONOCYTES NFR BLD: 8.3 %
NEUTROPHILS # BLD AUTO: 20.2 K/UL
NEUTROPHILS NFR BLD: 87.1 %
NRBC BLD-RTO: 0 /100 WBC
OVALOCYTES BLD QL SMEAR: ABNORMAL
PHOSPHATE SERPL-MCNC: 2.7 MG/DL
PLATELET # BLD AUTO: 267 K/UL
PLATELET BLD QL SMEAR: ABNORMAL
PMV BLD AUTO: 9.1 FL
POCT GLUCOSE: 116 MG/DL (ref 70–110)
POCT GLUCOSE: 118 MG/DL (ref 70–110)
POCT GLUCOSE: 119 MG/DL (ref 70–110)
POCT GLUCOSE: 120 MG/DL (ref 70–110)
POCT GLUCOSE: 66 MG/DL (ref 70–110)
POCT GLUCOSE: 68 MG/DL (ref 70–110)
POCT GLUCOSE: 73 MG/DL (ref 70–110)
POIKILOCYTOSIS BLD QL SMEAR: ABNORMAL
POLYCHROMASIA BLD QL SMEAR: ABNORMAL
POTASSIUM SERPL-SCNC: 3.7 MMOL/L
PROT SERPL-MCNC: 5.7 G/DL
RBC # BLD AUTO: 3.58 M/UL
SCHISTOCYTES BLD QL SMEAR: PRESENT
SODIUM SERPL-SCNC: 142 MMOL/L
SPHEROCYTES BLD QL SMEAR: ABNORMAL
TARGETS BLD QL SMEAR: ABNORMAL
TOXIC GRANULES BLD QL SMEAR: PRESENT
WBC # BLD AUTO: 23.15 K/UL

## 2018-03-27 PROCEDURE — 83690 ASSAY OF LIPASE: CPT

## 2018-03-27 PROCEDURE — 27000221 HC OXYGEN, UP TO 24 HOURS

## 2018-03-27 PROCEDURE — 85025 COMPLETE CBC W/AUTO DIFF WBC: CPT

## 2018-03-27 PROCEDURE — 84100 ASSAY OF PHOSPHORUS: CPT

## 2018-03-27 PROCEDURE — 25000003 PHARM REV CODE 250: Performed by: PSYCHIATRY & NEUROLOGY

## 2018-03-27 PROCEDURE — 25000003 PHARM REV CODE 250: Performed by: NURSE PRACTITIONER

## 2018-03-27 PROCEDURE — 83735 ASSAY OF MAGNESIUM: CPT

## 2018-03-27 PROCEDURE — 25000003 PHARM REV CODE 250: Performed by: PHYSICIAN ASSISTANT

## 2018-03-27 PROCEDURE — 82270 OCCULT BLOOD FECES: CPT

## 2018-03-27 PROCEDURE — 99233 SBSQ HOSP IP/OBS HIGH 50: CPT | Mod: GC,,, | Performed by: PSYCHIATRY & NEUROLOGY

## 2018-03-27 PROCEDURE — 80053 COMPREHEN METABOLIC PANEL: CPT

## 2018-03-27 PROCEDURE — 20000000 HC ICU ROOM

## 2018-03-27 PROCEDURE — 63600175 PHARM REV CODE 636 W HCPCS: Performed by: STUDENT IN AN ORGANIZED HEALTH CARE EDUCATION/TRAINING PROGRAM

## 2018-03-27 PROCEDURE — 25000003 PHARM REV CODE 250: Performed by: STUDENT IN AN ORGANIZED HEALTH CARE EDUCATION/TRAINING PROGRAM

## 2018-03-27 PROCEDURE — 82150 ASSAY OF AMYLASE: CPT

## 2018-03-27 RX ORDER — POTASSIUM CHLORIDE 20 MEQ/15ML
60 SOLUTION ORAL
Status: DISCONTINUED | OUTPATIENT
Start: 2018-03-27 | End: 2018-03-29

## 2018-03-27 RX ORDER — POTASSIUM CHLORIDE 20 MEQ/15ML
40 SOLUTION ORAL
Status: DISCONTINUED | OUTPATIENT
Start: 2018-03-27 | End: 2018-03-29

## 2018-03-27 RX ORDER — PANTOPRAZOLE SODIUM 40 MG/1
40 FOR SUSPENSION ORAL DAILY
Status: DISCONTINUED | OUTPATIENT
Start: 2018-03-27 | End: 2018-03-30

## 2018-03-27 RX ORDER — HEPARIN SODIUM 5000 [USP'U]/ML
5000 INJECTION, SOLUTION INTRAVENOUS; SUBCUTANEOUS EVERY 8 HOURS
Status: COMPLETED | OUTPATIENT
Start: 2018-03-27 | End: 2018-04-01

## 2018-03-27 RX ORDER — NAPROXEN SODIUM 220 MG/1
81 TABLET, FILM COATED ORAL DAILY
Status: DISCONTINUED | OUTPATIENT
Start: 2018-03-27 | End: 2018-03-31

## 2018-03-27 RX ORDER — METOPROLOL TARTRATE 25 MG/1
25 TABLET, FILM COATED ORAL 3 TIMES DAILY
Status: DISCONTINUED | OUTPATIENT
Start: 2018-03-27 | End: 2018-04-01

## 2018-03-27 RX ORDER — METOPROLOL TARTRATE 25 MG/1
25 TABLET, FILM COATED ORAL 3 TIMES DAILY
Status: DISCONTINUED | OUTPATIENT
Start: 2018-03-27 | End: 2018-03-27

## 2018-03-27 RX ORDER — POLYETHYLENE GLYCOL 3350 17 G/17G
17 POWDER, FOR SOLUTION ORAL DAILY
Status: DISCONTINUED | OUTPATIENT
Start: 2018-03-27 | End: 2018-04-03

## 2018-03-27 RX ORDER — AMOXICILLIN 250 MG
1 CAPSULE ORAL DAILY
Status: DISCONTINUED | OUTPATIENT
Start: 2018-03-27 | End: 2018-04-03

## 2018-03-27 RX ORDER — POLYETHYLENE GLYCOL 3350 17 G/17G
17 POWDER, FOR SOLUTION ORAL DAILY
Status: DISCONTINUED | OUTPATIENT
Start: 2018-03-27 | End: 2018-03-27

## 2018-03-27 RX ADMIN — POLYETHYLENE GLYCOL 3350 17 G: 17 POWDER, FOR SOLUTION ORAL at 09:03

## 2018-03-27 RX ADMIN — HEPARIN SODIUM 5000 UNITS: 5000 INJECTION, SOLUTION INTRAVENOUS; SUBCUTANEOUS at 01:03

## 2018-03-27 RX ADMIN — ASPIRIN 81 MG CHEWABLE TABLET 81 MG: 81 TABLET CHEWABLE at 09:03

## 2018-03-27 RX ADMIN — STANDARDIZED SENNA CONCENTRATE AND DOCUSATE SODIUM 1 TABLET: 8.6; 5 TABLET, FILM COATED ORAL at 09:03

## 2018-03-27 RX ADMIN — POTASSIUM CHLORIDE 40 MEQ: 20 SOLUTION ORAL at 05:03

## 2018-03-27 RX ADMIN — HEPARIN SODIUM 5000 UNITS: 5000 INJECTION, SOLUTION INTRAVENOUS; SUBCUTANEOUS at 09:03

## 2018-03-27 RX ADMIN — DEXTROSE MONOHYDRATE 12.5 G: 500 INJECTION PARENTERAL at 12:03

## 2018-03-27 RX ADMIN — OXYCODONE HYDROCHLORIDE 10 MG: 5 TABLET ORAL at 08:03

## 2018-03-27 RX ADMIN — METOPROLOL TARTRATE 25 MG: 25 TABLET ORAL at 09:03

## 2018-03-27 RX ADMIN — METOPROLOL TARTRATE 25 MG: 25 TABLET ORAL at 02:03

## 2018-03-27 RX ADMIN — PANTOPRAZOLE SODIUM 40 MG: 40 GRANULE, DELAYED RELEASE ORAL at 09:03

## 2018-03-27 NOTE — ASSESSMENT & PLAN NOTE
· Patient noted to be in Afib with RVR yesterday.  · In NSR this AM with rate of ~60s.  · Stroke risk factor  · Would recommend anticoaulation but will defer for now given h/o noncompliance, EtOH use and frequent falls.  · Currently on metoprolol 25mg TID for rate control.

## 2018-03-27 NOTE — PT/OT/SLP DISCHARGE
Physical Therapy Discharge Summary    Name: Zion Hines  MRN: [id number]   Principal Problem: Embolic stroke involving right middle cerebral artery     Patient Discharged from acute Physical Therapy on 3/26/18.  Please refer to prior PT noted date on 3/25/18 for functional status.     Assessment:     Patient has not met goals.    Objective:     GOALS:    Physical Therapy Goals        Problem: Physical Therapy Goal    Goal Priority Disciplines Outcome Goal Variances Interventions   Physical Therapy Goal     PT/OT, PT      Description:  Goals to be met by: 18     Patient will increase functional independence with mobility by performin. Supine to sit with Maximum Assistance  2. Sit to supine with Maximum Assistance  3. Rolling to Left and Right with Maximum Assistance.  4. Sitting at edge of bed x 10 minutes with Maximum Assistance maintaining midline orientation.                      Reasons for Discontinuation of Therapy Services  Transfer to alternate level of care. and Pt transferred to ICU      Plan:     Patient Discharged to: ICU. Will await new PT order to resume therapy. .    Zhanna Whitfield, PT  3/27/2018

## 2018-03-27 NOTE — PROGRESS NOTES
Patient's chart was reviewed by a stroke team provider.  Patient with no acute neurologic changes.   Started ASA and hep vte on 3/27/18 - plans to monitor in ICU x 24 hours     There is no new imaging to review.  Pending diagnostics to follow up on include:   TSH normal  LDL 70.2 - can consider if statin indicated prior to dc.   Echo - TTE suspicious for takotsubo cardiomyopathy, will repeat TTE in 1-2 weeks    May need to be on anticoagulation however, need to discuss risk vs benefit as patient has a history of alcohol abuse and poor compliance and also high fall risk.     For other recommendations please see our previous note completed on: 3/24/18 & 3/25/18      There are no new recommendations at this time. Will continue to follow. Discussed patient with Dr. Perez. Please contact stroke team for any questions or concerns.     Liza Brower PA-C  Vascular Neurology  [phone]

## 2018-03-27 NOTE — PROGRESS NOTES
[facility]  Neurocritical Care  Progress Note    Admit Date: 3/23/2018  Service Date: 2018  Length of Stay: 4    Subjective:     Chief Complaint: Embolic stroke involving right middle cerebral artery    History of Present Illness: 77 yo M with PMHx HTN known to be non-compliant with medications and remote hx of anemia presents to [facility] ED 18 as a transfer from [facility].  Patient is a poor historian with no family at bedside, hx largely obtained from OSH records.  He presented to OSH originally with symptoms of weakness and fatigue, his daughter brought him to ED thinking he was likely anemic again.  It was noted that patient had some dysarthria and aphasia (unspecified expressive vs receptive) with L-sided weakness.  He was out of the window for tPA and was sent to [facility] emergently for CTA Stroke Multiphase and possible endovascular intervention. CTA stroke multiphase with no LVO, no intervention planned.  Of note, patient is anemic per OSH labs with a Hgb of 5.8 g/dL and likely has pancreatitis with lipase 3738, WBC count of 15.8 k/uL, and CT abdomen showing peripancreatic fluid around the tail.  He was transfused a unit of PRBCs prior to transfer.  Labs were otherwise largely unremarkable.  On presentation to [facility] ED, he is able to move both BLE but complains of pain in BLE.  Not moving LUE but is intact to noxious stimuli.  Has a R gaze preference that is not overcome by VOR testing and complaint of neck pain with moving head side-to-side.  Otherwise has mild abdominal distension with mild hypogastric tenderness to palpation. HTN to SBP 200s.  Of note, patient's wife  recently and family expressed concern in outpatient ED that patient has not been eating much for the past month but deny EtOH use.    Hospital Course: 18:  Admission to Neuro ICU 2/2 concern for stroke with multiple concerns for patient to become unstable--pancreatitis, severe anemia,  HTN.  03/24: has had no interval development of multiorgan involvement form pancreatitis, drop in hemoglobin secondary to erosive gastritis, transfused and on protonix bid  03/25: Patient on dilt drip for a-fib. Repeat EKG shows NSR.  3/26: Discontinuing diltiazem drip today. Will continue monitor HR. Continue IV fluids 50 cc/hr for pancreatitis. Repeat lipase.     Interval History:  NAEON. VSSA. No overt signs of bleeding. Neuro exam unchanged from prior documented exams. Patient still speaking nonsensically with dysarthria.   Review of Systems   Unable to perform ROS: Mental status change       Objective:     Vitals:  Temp: 98.6 °F (37 °C)  Pulse: 95  Rhythm: normal sinus rhythm  BP: (!) 159/86  MAP (mmHg): 116  Resp: 20  SpO2: 97 %  O2 Device (Oxygen Therapy): nasal cannula    Temp  Min: 98.4 °F (36.9 °C)  Max: 98.9 °F (37.2 °C)  Pulse  Min: 77  Max: 99  BP  Min: 115/70  Max: 171/93  MAP (mmHg)  Min: 88  Max: 125  Resp  Min: 12  Max: 22  SpO2  Min: 97 %  Max: 100 %    03/26 0701 - 03/27 0700  In: 1399.4 [I.V.:949.4]  Out: 460 [Urine:460]   Unmeasured Output  Urine Occurrence: 0  Stool Occurrence: 0  Emesis Occurrence: 0  Pad Count: 0       Physical Exam   Constitutional: He appears well-developed. No distress.   HENT:   Head: Normocephalic and atraumatic.   Eyes: Conjunctivae are normal. Pupils are equal, round, and reactive to light.   Neck: Neck supple. No JVD present.   Cardiovascular: Normal rate, regular rhythm, normal heart sounds and intact distal pulses.    Pulmonary/Chest: Effort normal and breath sounds normal.   Abdominal: Soft. Bowel sounds are normal. He exhibits no distension. There is no tenderness.   Musculoskeletal: He exhibits no edema or deformity.   LUE weakness   Neurological: He is alert.   Oriented to person and type of place only.   LUE flaccid.  LLE markedly weak.  Rightward gaze deviation.    Skin: Skin is warm. Capillary refill takes less than 2 seconds. No rash noted. He is not  diaphoretic.   Psychiatric: His speech is slurred. He is slowed. He is inattentive.   Vitals reviewed.        Medications:  Continuous Scheduled    aspirin 81 mg Daily   heparin (porcine) 5,000 Units Q8H   metoprolol tartrate 25 mg TID   pantoprazole 40 mg Daily   polyethylene glycol 17 g Daily   senna-docusate 8.6-50 mg 1 tablet Daily   PRN    acetaminophen 650 mg Q6H PRN   dextrose 50% 12.5 g PRN   glucagon (human recombinant) 1 mg PRN   insulin aspart U-100 1-10 Units Q6H PRN   metoprolol 5 mg Q30 Min PRN   ondansetron 4 mg Q8H PRN   oxyCODONE 10 mg Q6H PRN   potassium chloride 10% 40 mEq PRN   potassium chloride 10% 40 mEq PRN   potassium chloride 10% 60 mEq PRN     Today I personally reviewed pertinent medications, lines/drains/airways, imaging, cardiology, lab results, notably:    Diet  Diet NPO  Diet NPO  Tube Feeds started today, Isosource with goal of 50cc/hr increasing to goal.     Assessment/Plan:     Neuro   * Embolic stroke involving right middle cerebral artery    · 03/23/18 CTA Stroke Multiphase with multiple non-occlusive thrombi noted  · MRI brain w/o show multiple infarcts without evidence of hemorrhagic conversion.  · Given recent in-hospital atrial fibrillation suspicion for embolic stroke.  · In addition 2D Echo showed severely depressed LVEF 15-20%  · PT/OT/SLP  · Anticoagulation held  · We do recommend careful consideration of anticoagulation but will defer at this time given history of EtOH abuse, frequent falls and medication noncompliance.  · Will start ASA 81 today  · Vascular Neurology consulted, recommendations appreciated.  · SBP <180. prn labetalol ordered.  · Has not required.  · q1h Neuro checks           Cerebrovascular accident (CVA)    · See embolic stroke involving R MCA        Cardiac/Vascular   Atrial fibrillation with RVR    · Patient noted to be in Afib with RVR yesterday.  · In NSR this AM with rate of ~60s.  · Stroke risk factor  · Would recommend anticoaulation but will  defer for now given h/o noncompliance, EtOH use and frequent falls.  · Currently on metoprolol 25mg TID for rate control.         HTN (hypertension)    · Stroke risk factor, SBP 200s on admission  · SBP goal <180, prn labetalol ordered  · Has not required.   · Now on metoprolol 25mg TID.           Oncology   Microcytic anemia    · Noted to have hgb 5.8 at OSH and was transfused  · Repeat Hgb on admit 6.8 and transfused 1 unit  · Total 2 units PRBCs so far.  · No obvious source of active bleeding.  · No blood in NGT suction  · No melena or bloody bowel movements  · Patient is also constipated making fulminant GI bleed unlikely.  · Will provide protonix IV 40mg daily  · Records mention history of anemia but baseline is unknown.  · No known history of GIB or varices either.  · Recent downtrend in Hgb may be dilutional as patient is 6L positive since admission.  · Will continue to monitor and transfuse prn Hgb <7.0 or acute bleed.  · Will send hemolytic workup, Fe studies with AM labs tomorrow.  · Consider GI consult if persistent transfusion need for non-emergent evaluation.        GI   Acute pancreatitis    · Lipase at OSH 3738 w/ leukocytosis, elevated Ca, peripancreatic fluid w/o gallstones on CT abdomen  · U/S also unremarkable.  · Lipase and amylase have trended down appropriately.   · No increase in LFTs or synthetic function.  · WIll start refeeding today with tube feeds.  · D/C IVF today.              Prophylaxis:  Venous Thromboembolism: mechanical  Stress Ulcer: PPI  Ventilator Pneumonia: not applicable     Activity Orders          None        Full Code    BOB Perez II  Neurocritical Care  [facility]

## 2018-03-27 NOTE — PHYSICIAN QUERY
PT Name: Zion Hines  MR #: [id number]    Physician Query Form - Nutrition Clarification     CDS: Malini Wallace RN, CCDS       Contact information: [email or web address]    This form is a permanent document in the medical record.     Query Date: 2018    By submitting this query, we are merely seeking further clarification of documentation.. Please utilize your independent clinical judgment when addressing the question(s) below.    The Medical record contains the following:   Indicators  Supporting Clinical Findings Location in Medical Record   X % of Estimated Energy Intake over a time frame from p.o., TF, or TPN % Intake of Estimated Energy Needs: 0 - 25 %  % Meal Intake: NPO 3/24- Consult    Weight Status over a time frame      Subcutaneous Fat and/or Muscle Loss      Fluid Accumulation or Edema      Reduced  Strength     X Wt / BMI / Usual Body Weight BMI=24.5 3/24-RD Consult    Delayed Wound Healing / Failure to Thrive     X Acute or Chronic Illness Embolic stroke involving right middle cerebral artery, Acute pancreatitis, Anemia 3/23-H&P    Medication     X Treatment Recommend low sodium diet, texture per SLP. If PO intake is poor, send Boost Plus TID.  3/24- Consult   X      X Other At present findings suggest mild pancreatitis, given malnourished status would observe overnight in ICU.    Wife  recently and pt with poor oral intake x1 month   3/23-H&P      3/24-RD consult     AND / ASPEN Clinical Characteristics (2011)  A minimum of two characteristics is recommended for diagnosing either moderate or severe malnutrition   Mild Malnutrition Moderate Malnutrition Severe Malnutrition   Energy Intake from p.o., TF or TPN. < 75% intake of estimated energy needs for less than 7 days < 75% intake of estimated energy needs for greater than 7 days < 50% intake of estimated energy needs for > 5 days   Weight Loss 1-2% in 1 month  5% in 3 months  7.5% in 6 months  10% in 1 year 1-2 % in 1  week  5% in 1 month  7.5% in 3 months  10% in 6 months  20% in 1 year > 2% in 1 week  > 5% in 1 month  > 7.5% in 3 months  > 10% in 6 months  > 20% in 1 year   Physical Findings     None *Mild subcutaneous fat and/or muscle loss  *Mild fluid accumulation  *Stage II decubitus  *Surgical wound or non-healing wound *Mod/severe subcutaneous fat and/or muscle loss  *Mod/severe fluid accumulation  *Stage III or IV decubitus  *Non-healing surgical wound     Provider, please specify diagnosis or diagnoses associated with above clinical findings.    [ ] Mild Protein-Calorie Malnutrition  [ x ] Moderate Protein-Calorie Malnutrition  [ ] Other Nutritional Diagnosis (please specify): ____________________________________  [ ] Other: ________________________________  [ ] Clinically Undetermined    Please document in your progress notes daily for the duration of treatment until resolved and include in your discharge summary.

## 2018-03-27 NOTE — PROGRESS NOTES
- Neurological exam is unchanged  - He will need to be eventually on AC but will need further discussion of benefits and risks in the settings of alcohol abuse, poor compliance and fall risks. At this point we will start asa  - On 2 L NC, wean as tolerated  - SBP<180. TTE suspicious for takotsubo cardiomyopathy, will repeat TTE in 1-2 weeks  - Start metoprolol for rate control  - DC IVF  - Start TF  SSI  - Start SQH   - Monitor in the ICU for another 24 h    Care level 3

## 2018-03-27 NOTE — PHYSICIAN QUERY
PT Name: Zion Hines  MR #: [id number]    Physician Query Form - Atrial Fibrillation Specificity     CDS: Malini Wallace RN, CCDS       Contact information: [email or web address]     This form is a permanent document in the medical record.     Query Date: March 27, 2018    By submitting this query, we are merely seeking further clarification of documentation. Please utilize your independent clinical judgment when addressing the question(s) below.    The medical record contains the following:   Indicators Supporting Clinical Findings Location in Medical Record   X Atrial Fibrillation Atrial fibrillation with RVR  Patient noted to be in Afib with RVR yesterday.  In NSR this AM with rate of ~60s.  Stroke risk factor  Would recommend anticoaulation but will defer for now given h/o noncompliance, EtOH use and frequent falls. 3/27-[facility]    EKG results     X Medication Currently on metoprolol 25mg TID for rate control.  3/27-[facility] PN    Treatment      Other         Provider, please further specify the Atrial Fibrillation diagnosis.    [  ] Chronic  [  ] Paroxysmal  [  ] Permanent  [  ] Persistent  [  ] Other (please specify): ____________________________  [ x  ] Clinically Undetermined    Please document in your progress notes daily for the duration of treatment until resolved, and include in your discharge summary.

## 2018-03-27 NOTE — ASSESSMENT & PLAN NOTE
· Noted to have hgb 5.8 at OSH and was transfused  · Repeat Hgb on admit 6.8 and transfused 1 unit  · Total 2 units PRBCs so far.  · No obvious source of active bleeding.  · No blood in NGT suction  · No melena or bloody bowel movements  · Patient is also constipated making fulminant GI bleed unlikely.  · Will provide protonix IV 40mg daily  · Records mention history of anemia but baseline is unknown.  · No known history of GIB or varices either.  · Recent downtrend in Hgb may be dilutional as patient is 6L positive since admission.  · Will continue to monitor and transfuse prn Hgb <7.0 or acute bleed.  · Will send hemolytic workup, Fe studies with AM labs tomorrow.  · Consider GI consult if persistent transfusion need for non-emergent evaluation.

## 2018-03-27 NOTE — SUBJECTIVE & OBJECTIVE
Interval History:  NAEON. VSSA. No overt signs of bleeding. Neuro exam unchanged from prior documented exams. Patient still speaking nonsensically with dysarthria.   Review of Systems   Unable to perform ROS: Mental status change       Objective:     Vitals:  Temp: 98.6 °F (37 °C)  Pulse: 95  Rhythm: normal sinus rhythm  BP: (!) 159/86  MAP (mmHg): 116  Resp: 20  SpO2: 97 %  O2 Device (Oxygen Therapy): nasal cannula    Temp  Min: 98.4 °F (36.9 °C)  Max: 98.9 °F (37.2 °C)  Pulse  Min: 77  Max: 99  BP  Min: 115/70  Max: 171/93  MAP (mmHg)  Min: 88  Max: 125  Resp  Min: 12  Max: 22  SpO2  Min: 97 %  Max: 100 %    03/26 0701 - 03/27 0700  In: 1399.4 [I.V.:949.4]  Out: 460 [Urine:460]   Unmeasured Output  Urine Occurrence: 0  Stool Occurrence: 0  Emesis Occurrence: 0  Pad Count: 0       Physical Exam   Constitutional: He appears well-developed. No distress.   HENT:   Head: Normocephalic and atraumatic.   Eyes: Conjunctivae are normal. Pupils are equal, round, and reactive to light.   Neck: Neck supple. No JVD present.   Cardiovascular: Normal rate, regular rhythm, normal heart sounds and intact distal pulses.    Pulmonary/Chest: Effort normal and breath sounds normal.   Abdominal: Soft. Bowel sounds are normal. He exhibits no distension. There is no tenderness.   Musculoskeletal: He exhibits no edema or deformity.   LUE weakness   Neurological: He is alert.   Oriented to person and type of place only.   LUE flaccid.  LLE markedly weak.  Rightward gaze deviation.    Skin: Skin is warm. Capillary refill takes less than 2 seconds. No rash noted. He is not diaphoretic.   Psychiatric: His speech is slurred. He is slowed. He is inattentive.   Vitals reviewed.        Medications:  Continuous Scheduled    aspirin 81 mg Daily   heparin (porcine) 5,000 Units Q8H   metoprolol tartrate 25 mg TID   pantoprazole 40 mg Daily   polyethylene glycol 17 g Daily   senna-docusate 8.6-50 mg 1 tablet Daily   PRN    acetaminophen 650 mg Q6H PRN    dextrose 50% 12.5 g PRN   glucagon (human recombinant) 1 mg PRN   insulin aspart U-100 1-10 Units Q6H PRN   metoprolol 5 mg Q30 Min PRN   ondansetron 4 mg Q8H PRN   oxyCODONE 10 mg Q6H PRN   potassium chloride 10% 40 mEq PRN   potassium chloride 10% 40 mEq PRN   potassium chloride 10% 60 mEq PRN     Today I personally reviewed pertinent medications, lines/drains/airways, imaging, cardiology, lab results, notably:    Diet  Diet NPO  Diet NPO  Tube Feeds started today, Isosource with goal of 50cc/hr increasing to goal.

## 2018-03-27 NOTE — PT/OT/SLP PROGRESS
Speech Language Pathology discharge Note      Zion Hines  MRN: [id number]    Patient not seen today secondary to pt t/f to ICU. Will discharge orders and will need new orders when appropriate.     YANNI Fisher, CCC-SLP  3/27/2018

## 2018-03-27 NOTE — ASSESSMENT & PLAN NOTE
· Lipase at OSH 3738 w/ leukocytosis, elevated Ca, peripancreatic fluid w/o gallstones on CT abdomen  · U/S also unremarkable.  · Lipase and amylase have trended down appropriately.   · No increase in LFTs or synthetic function.  · WIll start refeeding today with tube feeds.  · D/C IVF today.

## 2018-03-27 NOTE — ASSESSMENT & PLAN NOTE
· 03/23/18 CTA Stroke Multiphase with multiple non-occlusive thrombi noted  · MRI brain w/o show multiple infarcts without evidence of hemorrhagic conversion.  · Given recent in-hospital atrial fibrillation suspicion for embolic stroke.  · In addition 2D Echo showed severely depressed LVEF 15-20%  · PT/OT/SLP  · Anticoagulation held  · We do recommend careful consideration of anticoagulation but will defer at this time given history of EtOH abuse, frequent falls and medication noncompliance.  · Will start ASA 81 today  · Vascular Neurology consulted, recommendations appreciated.  · SBP <180. prn labetalol ordered.  · Has not required.  · q1h Neuro checks

## 2018-03-27 NOTE — PLAN OF CARE
Problem: Patient Care Overview  Goal: Plan of Care Review  Outcome: Ongoing (interventions implemented as appropriate)  POC reviewed with pt and family at bedside by RN and MD. Pt unable to verbalize understanding, family (niece) verbalized understanding. Questions and concerns addressed. No acute events today. Patient tolerating tube feeding at this time. No acute distress noted at this time. Pt progressing toward goals. Will continue to monitor. See flowsheets for full assessment and VS info.

## 2018-03-27 NOTE — PHYSICIAN QUERY
PT Name: Zion Hines  MR #: [id number]    Physician Query Form - Neurological Condition Clarification       CDS: Malini Wallace RN, CCDS       Contact information: [email or web address]    This form is a permanent document in the medical record.     Query Date: March 27, 2018    By submitting this query, we are merely seeking further clarification of documentation. Please utilize your independent clinical judgment when addressing the question(s) below.    The Medical record contains the following:   Indicators   Supporting Clinical Findings Location in Medical Record   X AMS, Confusion, LOC, etc. Gait:  Deferred 2/2 AMS, fall precautions 3/23-H&P   X Acute / Chronic Illness Embolic stroke involving right middle cerebral artery, Acute pancreatitis 3/23-H&P    Radiology Findings      Electrolyte Imbalance      Medication      Treatment     X        X Other Acute encephalopathy  - Multifactorial; likely 2/2 to CVA, pancreatitis, and possible infectious cause    Acute encephalopathy  -  Likely multifactorial--> stroke, pancreatitis, possible infection  -  On diltiazem gtt  3/24-Vascular Neuro PN        3/26-Rehab Consult     Provider, please specify the diagnosis or diagnoses associated with above clinical findings.    [  ] Metabolic Encephalopathy    [  ] Other Encephalopathy    [  X ] Unspecified Encephalopathy    [  ] Other (please specify): _____________________________________    [  ] Clinically Undetermined      Please document in your progress notes daily for the duration of treatment until resolved, and  include in your discharge summary.

## 2018-03-27 NOTE — PLAN OF CARE
This CM spoke with patient niece at bedside, she did not know his PCP or pharmacy patient use. Called place to patient neva Mcgarry, left message.       03/27/18 2566   Discharge Assessment   Assessment Type Discharge Planning Assessment   Confirmed/corrected address and phone number on facesheet? Yes   Assessment information obtained from? Caregiver   Expected Length of Stay (days) 3   Communicated expected length of stay with patient/caregiver yes   Prior to hospitilization cognitive status: Alert/Oriented   Prior to hospitalization functional status: Independent   Current cognitive status: Unable to Assess  (some confusion)   Current Functional Status: Completely Dependent   Facility Arrived From: ([facility])   Lives With child(harleen), adult   Able to Return to Prior Arrangements unable to determine at this time (comments)   Is patient able to care for self after discharge? Unable to determine at this time (comments)   Who are your caregiver(s) and their phone number(s)? (neva Mcgarry [phone])   Patient's perception of discharge disposition other (comments)  (tiesha)   Readmission Within The Last 30 Days no previous admission in last 30 days   Patient currently being followed by outpatient case management? No   Patient currently receives any other outside agency services? No   Equipment Currently Used at Home none   Do you have any problems affording any of your prescribed medications? No   Is the patient taking medications as prescribed? yes   Does the patient have transportation home? Yes   Transportation Available family or friend will provide   Does the patient receive services at the [facility]? No   Discharge Plan A New Nursing Home placement - longterm care facility   Discharge Plan B Home with family   Patient/Family In Agreement With Plan yes     Tamra Chapin RN/BSN/JUAN PABLO  [phone]  [facility]

## 2018-03-27 NOTE — ASSESSMENT & PLAN NOTE
· Stroke risk factor, SBP 200s on admission  · SBP goal <180, prn labetalol ordered  · Has not required.   · Now on metoprolol 25mg TID.

## 2018-03-27 NOTE — PLAN OF CARE
SW met with Pt neices at bedside and Pt daughter via phone. Discussed requested list for SNF/NH in area and provided list. Family to review and give choices asap.    Ivana Doshi LMSW  Neurocritical Care   [facility]  [id number]

## 2018-03-27 NOTE — PHYSICIAN QUERY
PT Name: Zion Hines  MR #: [id number]     Physician Query Form - Documentation Clarification      CDS: Malini Wallace RN, CCDS       Contact information: [email or web address]    This form is a permanent document in the medical record.     Query Date: March 27, 2018    By submitting this query, we are merely seeking further clarification of documentation. Please utilize your independent clinical judgment when addressing the question(s) below.    The Medical record reflects the following:    Supporting Clinical Findings Location in Medical Record     Echo now with HFrEF (EF estimated 15-20% and global akinesis with concern for stress-induced cardiomyopathy, left atrium moderately enlarged)    No LE edema  Resp:  Symmetric expansion, no increased wob, CTAB   3/25-Vascular Neuro Note      3/23-H&P       1 - Consider Stress induced CM - Takotsubo CM.     2 - Eccentric LVH with severely depressed left ventricular systolic function (EF 15-20%).     3 - Normal RV size with moderately depressed right ventricular systolic function .     4 - Mild mitral regurgitation.     5 - Mild tricuspid regurgitation.     6 - Pulmonary hypertension. The estimated PA systolic pressure is 61 mmHg.     7 - Increased central venous pressure.     8 - No prior echo in our system.     The lungs are symmetrically expanded bilaterally with coarse interstitial attenuation, may reflect interstitial edema.     Interval development of patchy bibasilar airspace disease suggestive of pulmonary edema.     3/24-ECHO                        3/23-CXR      3/24-CXR                                                                            Doctor, Please specify diagnosis or diagnoses associated with above clinical findings.    Please clarify Acuity of HFrEF.    Provider Use Only       [   ] Chronic HFrEF/Systolic CHF     [  x ] Other clarification (please specify) stress induces cardiomyopathy                                                                                                            [   ] Clinically undetermined

## 2018-03-28 PROBLEM — D50.9 IRON DEFICIENCY ANEMIA: Status: ACTIVE | Noted: 2018-03-28

## 2018-03-28 LAB
ALBUMIN SERPL BCP-MCNC: 2.3 G/DL
ALP SERPL-CCNC: 70 U/L
ALT SERPL W/O P-5'-P-CCNC: 14 U/L
ANION GAP SERPL CALC-SCNC: 8 MMOL/L
ANISOCYTOSIS BLD QL SMEAR: ABNORMAL
AST SERPL-CCNC: 20 U/L
BASOPHILS # BLD AUTO: 0.03 K/UL
BASOPHILS NFR BLD: 0.2 %
BILIRUB SERPL-MCNC: 0.6 MG/DL
BUN SERPL-MCNC: 24 MG/DL
BURR CELLS BLD QL SMEAR: ABNORMAL
CALCIUM SERPL-MCNC: 9.9 MG/DL
CHLORIDE SERPL-SCNC: 118 MMOL/L
CO2 SERPL-SCNC: 19 MMOL/L
CREAT SERPL-MCNC: 0.9 MG/DL
DIFFERENTIAL METHOD: ABNORMAL
EOSINOPHIL # BLD AUTO: 0.3 K/UL
EOSINOPHIL NFR BLD: 1.5 %
ERYTHROCYTE [DISTWIDTH] IN BLOOD BY AUTOMATED COUNT: 29.4 %
EST. GFR  (AFRICAN AMERICAN): >60 ML/MIN/1.73 M^2
EST. GFR  (NON AFRICAN AMERICAN): >60 ML/MIN/1.73 M^2
GLUCOSE SERPL-MCNC: 139 MG/DL
HAPTOGLOB SERPL-MCNC: 241 MG/DL
HCT VFR BLD AUTO: 29.3 %
HGB BLD-MCNC: 8 G/DL
IMM GRANULOCYTES # BLD AUTO: 0.15 K/UL
IMM GRANULOCYTES NFR BLD AUTO: 0.9 %
IRON SERPL-MCNC: 17 UG/DL
LDH SERPL L TO P-CCNC: 292 U/L
LYMPHOCYTES # BLD AUTO: 1.1 K/UL
LYMPHOCYTES NFR BLD: 6 %
MAGNESIUM SERPL-MCNC: 1.9 MG/DL
MCH RBC QN AUTO: 20 PG
MCHC RBC AUTO-ENTMCNC: 27.3 G/DL
MCV RBC AUTO: 73 FL
MONOCYTES # BLD AUTO: 1.8 K/UL
MONOCYTES NFR BLD: 10.1 %
NEUTROPHILS # BLD AUTO: 14.4 K/UL
NEUTROPHILS NFR BLD: 81.3 %
NRBC BLD-RTO: 1 /100 WBC
OB PNL STL: NEGATIVE
PHOSPHATE SERPL-MCNC: 1.9 MG/DL
PLATELET # BLD AUTO: 328 K/UL
PMV BLD AUTO: 9.3 FL
POCT GLUCOSE: 126 MG/DL (ref 70–110)
POCT GLUCOSE: 128 MG/DL (ref 70–110)
POCT GLUCOSE: 144 MG/DL (ref 70–110)
POIKILOCYTOSIS BLD QL SMEAR: ABNORMAL
POTASSIUM SERPL-SCNC: 3.6 MMOL/L
PROT SERPL-MCNC: 6.5 G/DL
RBC # BLD AUTO: 4.01 M/UL
RETICS/RBC NFR AUTO: 1.6 %
SATURATED IRON: 7 %
SCHISTOCYTES BLD QL SMEAR: ABNORMAL
SODIUM SERPL-SCNC: 145 MMOL/L
SPHEROCYTES BLD QL SMEAR: ABNORMAL
TARGETS BLD QL SMEAR: ABNORMAL
TOTAL IRON BINDING CAPACITY: 246 UG/DL
TRANSFERRIN SERPL-MCNC: 166 MG/DL
TRANSFERRIN SERPL-MCNC: 166 MG/DL
WBC # BLD AUTO: 17.63 K/UL

## 2018-03-28 PROCEDURE — 99233 SBSQ HOSP IP/OBS HIGH 50: CPT | Mod: ,,, | Performed by: PSYCHIATRY & NEUROLOGY

## 2018-03-28 PROCEDURE — 85045 AUTOMATED RETICULOCYTE COUNT: CPT

## 2018-03-28 PROCEDURE — 25000003 PHARM REV CODE 250: Performed by: PHYSICIAN ASSISTANT

## 2018-03-28 PROCEDURE — 97803 MED NUTRITION INDIV SUBSEQ: CPT

## 2018-03-28 PROCEDURE — 25000003 PHARM REV CODE 250: Performed by: STUDENT IN AN ORGANIZED HEALTH CARE EDUCATION/TRAINING PROGRAM

## 2018-03-28 PROCEDURE — 11000001 HC ACUTE MED/SURG PRIVATE ROOM

## 2018-03-28 PROCEDURE — 63600175 PHARM REV CODE 636 W HCPCS: Performed by: STUDENT IN AN ORGANIZED HEALTH CARE EDUCATION/TRAINING PROGRAM

## 2018-03-28 PROCEDURE — 83735 ASSAY OF MAGNESIUM: CPT

## 2018-03-28 PROCEDURE — 25000003 PHARM REV CODE 250: Performed by: PSYCHIATRY & NEUROLOGY

## 2018-03-28 PROCEDURE — 94761 N-INVAS EAR/PLS OXIMETRY MLT: CPT

## 2018-03-28 PROCEDURE — 25000003 PHARM REV CODE 250: Performed by: HOSPITALIST

## 2018-03-28 PROCEDURE — 83540 ASSAY OF IRON: CPT

## 2018-03-28 PROCEDURE — 25000003 PHARM REV CODE 250: Performed by: NURSE PRACTITIONER

## 2018-03-28 PROCEDURE — 83010 ASSAY OF HAPTOGLOBIN QUANT: CPT

## 2018-03-28 PROCEDURE — 83615 LACTATE (LD) (LDH) ENZYME: CPT

## 2018-03-28 PROCEDURE — 85025 COMPLETE CBC W/AUTO DIFF WBC: CPT

## 2018-03-28 PROCEDURE — 84100 ASSAY OF PHOSPHORUS: CPT

## 2018-03-28 PROCEDURE — 80053 COMPREHEN METABOLIC PANEL: CPT

## 2018-03-28 RX ORDER — LANOLIN ALCOHOL/MO/W.PET/CERES
800 CREAM (GRAM) TOPICAL
Status: DISCONTINUED | OUTPATIENT
Start: 2018-03-28 | End: 2018-03-29

## 2018-03-28 RX ORDER — FERROUS GLUCONATE 324(38)MG
324 TABLET ORAL
Status: DISCONTINUED | OUTPATIENT
Start: 2018-03-28 | End: 2018-03-28

## 2018-03-28 RX ORDER — SODIUM,POTASSIUM PHOSPHATES 280-250MG
2 POWDER IN PACKET (EA) ORAL
Status: DISCONTINUED | OUTPATIENT
Start: 2018-03-28 | End: 2018-03-29

## 2018-03-28 RX ORDER — FERROUS SULFATE 300 MG/5ML
300 LIQUID (ML) ORAL DAILY
Status: DISCONTINUED | OUTPATIENT
Start: 2018-03-28 | End: 2018-03-30

## 2018-03-28 RX ORDER — FOLIC ACID 1 MG/1
1 TABLET ORAL DAILY
Status: DISCONTINUED | OUTPATIENT
Start: 2018-03-28 | End: 2018-04-03

## 2018-03-28 RX ORDER — THIAMINE HCL 100 MG
100 TABLET ORAL DAILY
Status: DISCONTINUED | OUTPATIENT
Start: 2018-03-28 | End: 2018-04-03

## 2018-03-28 RX ADMIN — POTASSIUM & SODIUM PHOSPHATES POWDER PACK 280-160-250 MG 2 PACKET: 280-160-250 PACK at 05:03

## 2018-03-28 RX ADMIN — POTASSIUM & SODIUM PHOSPHATES POWDER PACK 280-160-250 MG 2 PACKET: 280-160-250 PACK at 11:03

## 2018-03-28 RX ADMIN — HEPARIN SODIUM 5000 UNITS: 5000 INJECTION, SOLUTION INTRAVENOUS; SUBCUTANEOUS at 02:03

## 2018-03-28 RX ADMIN — ASPIRIN 81 MG CHEWABLE TABLET 81 MG: 81 TABLET CHEWABLE at 08:03

## 2018-03-28 RX ADMIN — POTASSIUM CHLORIDE 40 MEQ: 20 SOLUTION ORAL at 05:03

## 2018-03-28 RX ADMIN — HEPARIN SODIUM 5000 UNITS: 5000 INJECTION, SOLUTION INTRAVENOUS; SUBCUTANEOUS at 09:03

## 2018-03-28 RX ADMIN — POTASSIUM & SODIUM PHOSPHATES POWDER PACK 280-160-250 MG 2 PACKET: 280-160-250 PACK at 08:03

## 2018-03-28 RX ADMIN — STANDARDIZED SENNA CONCENTRATE AND DOCUSATE SODIUM 1 TABLET: 8.6; 5 TABLET, FILM COATED ORAL at 08:03

## 2018-03-28 RX ADMIN — Medication 10 ML: at 11:03

## 2018-03-28 RX ADMIN — METOPROLOL TARTRATE 25 MG: 25 TABLET ORAL at 08:03

## 2018-03-28 RX ADMIN — METOPROLOL TARTRATE 25 MG: 25 TABLET ORAL at 03:03

## 2018-03-28 RX ADMIN — Medication 100 MG: at 11:03

## 2018-03-28 RX ADMIN — PANTOPRAZOLE SODIUM 40 MG: 40 GRANULE, DELAYED RELEASE ORAL at 08:03

## 2018-03-28 RX ADMIN — HEPARIN SODIUM 5000 UNITS: 5000 INJECTION, SOLUTION INTRAVENOUS; SUBCUTANEOUS at 05:03

## 2018-03-28 RX ADMIN — FOLIC ACID 1 MG: 1 TABLET ORAL at 11:03

## 2018-03-28 RX ADMIN — MINERAL SUPPLEMENT IRON 300 MG / 5 ML STRENGTH LIQUID 100 PER BOX UNFLAVORED 300 MG: at 11:03

## 2018-03-28 RX ADMIN — POLYETHYLENE GLYCOL 3350 17 G: 17 POWDER, FOR SOLUTION ORAL at 08:03

## 2018-03-28 NOTE — PROGRESS NOTES
Patient's chart was reviewed by a stroke team provider.  Patient has no new neurological symptoms. Pt transfered to hospital medicine per NCC team.   There is no new imaging to review.  Pending diagnostics to follow up on include: NA.  For other recommendations please see our previous note completed on: 3/25/18.    LDL of 70.2 consider starting pt on a statin   Repeat TTE in 1-2 weeks for concern of takotsubo cardiomyopathy on TTE completed on 3/24/18        There are no new recommendations at this time. Will continue to follow. Discussed patient with staff. Please contact stroke team for any questions or concerns.

## 2018-03-28 NOTE — ASSESSMENT & PLAN NOTE
· RESOLVED  · Lipase at OSH 3738 w/ leukocytosis, elevated Ca, peripancreatic fluid w/o gallstones on CT abdomen  · U/S also unremarkable.  · Lipase and amylase have trended down appropriately.   · No increase in LFTs or synthetic function.  · Tolerating tube feeds.

## 2018-03-28 NOTE — ASSESSMENT & PLAN NOTE
· Patient noted to be in Afib with RVR 3/25.  · In NSR this AM with rate of 70s-80s  · Stroke risk factor  · Would recommend anticoaulation but will defer for now given h/o noncompliance, EtOH use and frequent falls.  · Currently on metoprolol 25mg TID for rate control.

## 2018-03-28 NOTE — PLAN OF CARE
ICU Attending Note  Neurocritical Care    [id number]    -aspirin instead of anticoagulation currently given concern for GIB  -no statin given LDL  --180  -metoprolol for AF  -ferrous sulfate for Fe deficiency anemia  -HGB >7  -restart TF  -heparin prophylaxis  -pantoprazole for GI prophylaxis  -coordinate transfer to floor with Medicine after discussion with Stroke

## 2018-03-28 NOTE — PLAN OF CARE
Problem: Patient Care Overview  Goal: Plan of Care Review  Outcome: Ongoing (interventions implemented as appropriate)  POC reviewed with patient. Pt did not verbalized understanding. Questions and concerns addressed.   No acute events overnight. Pt progressing toward goals.   NCC notified of pt's decreased anemia lab values. No further orders given at this time.  Potassium and phosphorus replaced. Pt remained confused and dysarthric during the night.   No acute neuro changes noted. HR and BP stable.  Will continue to monitor. See flow sheets for full assessment and VS info

## 2018-03-28 NOTE — PROGRESS NOTES
" [facility]  Adult Nutrition  Progress Note    SUMMARY       Recommendations    Recommendation/Intervention:   1. Continue Isosource 1.5 @ goal rate 50mL/hr.   - Provides 1800kcals, 82g protein and 917mL free water.     2. If more concentrated formula warranted (EF 45%), recommend Nutren 2.0 @ goal rate 40mL/hr.   - Provides 1920kcals, 81g protein and 664mL free water.   - Hold for residuals >500mL.     RD to monitor.    Goals: Diet advancement within 3 days  Nutrition Goal Status: goal discontinued  Communication of RD Recs: reviewed with RN    Reason for Assessment    Reason for Assessment: RD follow-up  Diagnosis: stroke/CVA  Relevant Medical History: HTN  General Information Comments: Pt remains NPO. TF held this morning s/p pt pulling NG tube.   Nutrition Discharge Planning: Adequate PO nutrition    Nutrition Risk Screen    Nutrition Risk Screen: dysphagia or difficulty swallowing    Nutrition/Diet History    Food Preferences: LEVON  Do you have any cultural, spiritual, Tenriism conflicts, given your current situation?: none indicated  Factors Affecting Nutritional Intake: impaired cognitive status/motor control, NPO    Anthropometrics    Temp: 99.1 °F (37.3 °C)  Height Method: Stated (per daughter )  Height: 5' 7.99" (172.7 cm)  Height (inches): 67.99 in  Weight Method: Bed Scale  Weight: 71.5 kg (157 lb 10.1 oz)  Weight (lb): 157.63 lb  Ideal Body Weight (IBW), Male: 153.94 lb  % Ideal Body Weight, Male (lb): 104.55 lb  BMI (Calculated): 24.5  BMI Grade: 18.5-24.9 - normal    Lab/Procedures/Meds    Pertinent Labs Reviewed: reviewed  Pertinent Labs Comments: POCT Glu 116-144  Pertinent Medications Reviewed: reviewed  Pertinent Medications Comments: ferrous sulfate, folic acid, thiamine, insulin    Physical Findings/Assessment    Overall Physical Appearance: lethargic, listlessness  Tubes: nasogastric tube  Oral/Mouth Cavity: tooth/teeth missing  Skin: intact    Estimated/Assessed " "Needs    Weight Used For Calorie Calculations: 73 kg (160 lb 15 oz)  Height: 5' 7.99" (172.7 cm)  Energy Calorie Requirements (kcal): 1838 (1.3x PAL)    RMR (Munford-St. Jeor Equation): 1414.37  Protein Requirements: 73g  Weight Used For Protein Calculations: 73 kg (160 lb 15 oz)    RDA Method (mL): 1838       Nutrition Prescription Ordered    Current Diet Order: NPO (awaiting SLP eval)  Nutrition Order Comments: TF held  Current Nutrition Support Formula Ordered: Isosource 1.5  Current Nutrition Support Rate Ordered: 50 (ml)  Current Nutrition Support Frequency Ordered: mL/hr    Evaluation of Received Nutrient/Fluid Intake    Enteral Calories (kcal): 1800  Enteral Protein (gm): 82  Enteral (Free Water) Fluid (mL): 917    % Kcal Needs: 98  % Protein Needs: 100    I/O: +I/O, good UOP, LBM 3/28    Energy Calories Required: meeting needs  Protein Required: meeting needs  Fluid Required:  (per MD)    Comments: 10mL residuals 3/28    % Intake of Estimated Energy Needs: 75 - 100 %  % Meal Intake: NPO    Nutrition Risk    Level of Risk/Frequency of Follow-up:  (f/u 1x/week)     Assessment and Plan    Cerebrovascular accident (CVA)     Contributing Nutrition Diagnosis  Inadequate energy intake     Related to (etiology):   NPO     Signs and Symptoms (as evidenced by):   Pt meeting 0% EEN/EPN     Interventions/Recommendations (treatment strategy):  See recs     Nutrition Diagnosis Status:   Improving            Monitor and Evaluation    Food and Nutrient Intake: enteral nutrition intake  Food and Nutrient Adminstration: enteral and parenteral nutrition administration  Knowledge/Beliefs/Attitudes: food and nutrition knowledge/skill  Anthropometric Measurements: weight, weight change, body mass index  Biochemical Data, Medical Tests and Procedures: electrolyte and renal panel, inflammatory profile, lipid profile, gastrointestinal profile, glucose/endocrine profile  Nutrition-Focused Physical Findings: overall appearance "     Nutrition Follow-Up    RD Follow-up?: Yes

## 2018-03-28 NOTE — NURSING TRANSFER
Nursing Transfer Note      3/28/2018     Transfer: transfer to 1107 from 70    Transfer via  bed    Transfer with  tele box    Transported by CORINE Edge RN    Medicines sent: N/a    Chart send with patient: Yes    Notified: Joesph Yan/ Malgorzata (daughter)    Patient reassessed at: 3/28/18 1300    Upon arrival to floor: no acute distress

## 2018-03-28 NOTE — SUBJECTIVE & OBJECTIVE
Interval History:  NAEON. VSSA. No overt signs of bleeding. Neuro exam unchanged from prior documented exams. Continues to not follow commands, continues to answer questions inappropriately. Remains effectively hemiplegic on left. No further ICU needs. S/D today to hospital medicine after d/w vascular neurology.     Review of Systems   Unable to perform ROS: Mental status change       Objective:     Vitals:  Temp: 98.9 °F (37.2 °C)  Pulse: 84  Rhythm: normal sinus rhythm  BP: (!) 146/70  MAP (mmHg): 101  Resp: (!) 23  SpO2: 99 %  O2 Device (Oxygen Therapy): room air    Temp  Min: 98.3 °F (36.8 °C)  Max: 99.1 °F (37.3 °C)  Pulse  Min: 73  Max: 96  BP  Min: 132/70  Max: 175/108  MAP (mmHg)  Min: 96  Max: 137  Resp  Min: 15  Max: 30  SpO2  Min: 96 %  Max: 100 %    03/27 0701 - 03/28 0700  In: 945 [I.V.:100]  Out: 275 [Urine:275]   Unmeasured Output  Urine Occurrence: 1  Stool Occurrence: 1  Emesis Occurrence: 0  Pad Count: 1       Physical Exam   Constitutional: He appears well-developed. No distress.   HENT:   Head: Normocephalic and atraumatic.   Eyes: Conjunctivae are normal. Pupils are equal, round, and reactive to light.   Neck: Neck supple. No JVD present.   Cardiovascular: Normal rate, regular rhythm, normal heart sounds and intact distal pulses.    Pulmonary/Chest: Effort normal and breath sounds normal.   Abdominal: Soft. Bowel sounds are normal. He exhibits no distension. There is no tenderness.   Musculoskeletal: He exhibits no edema or deformity.   LUE weakness   Neurological: He is alert.   Oriented to person and type of place only.   LUE flaccid.  LLE markedly weak.  Rightward gaze deviation.    Skin: Skin is warm. Capillary refill takes less than 2 seconds. No rash noted. He is not diaphoretic.   Psychiatric: His speech is slurred. He is slowed. He is inattentive.   Vitals reviewed.        Medications:  Continuous Scheduled    aspirin 81 mg Daily   ferrous sulfate 300 mg Daily   folic acid 1 mg Daily    heparin (porcine) 5,000 Units Q8H   metoprolol tartrate 25 mg TID   multivitamin liquid no.118 10 mL Daily   pantoprazole 40 mg Daily   polyethylene glycol 17 g Daily   senna-docusate 8.6-50 mg 1 tablet Daily   thiamine 100 mg Daily   PRN    acetaminophen 650 mg Q6H PRN   dextrose 50% 12.5 g PRN   glucagon (human recombinant) 1 mg PRN   insulin aspart U-100 1-10 Units Q6H PRN   magnesium oxide 800 mg PRN   magnesium oxide 800 mg PRN   metoprolol 5 mg Q30 Min PRN   ondansetron 4 mg Q8H PRN   oxyCODONE 10 mg Q6H PRN   potassium chloride 10% 40 mEq PRN   potassium chloride 10% 40 mEq PRN   potassium chloride 10% 60 mEq PRN   potassium, sodium phosphates 2 packet PRN   potassium, sodium phosphates 2 packet PRN   potassium, sodium phosphates 2 packet PRN     Today I personally reviewed pertinent medications, lines/drains/airways, imaging, cardiology, lab results, notably:    Diet  Diet NPO  Diet NPO  Tube Feeds started today, Isosource with goal of 50cc/hr increasing to goal.

## 2018-03-28 NOTE — RESIDENT HANDOFF
Handoff     Primary Team: Networked reference to record [facility]  Room Number: 1107/1107 A     Patient Name: Zion Hines MRN: [id number]     Date of Birth: [id number] Allergies: Patient has no allergy information on record.     Age: 78 y.o. Admit Date: 3/23/2018     Sex: male  BMI: Body mass index is 23.97 kg/m².     Code Status: Full Code        Illness Level (current clinical status): Watcher - Yes - Has had issues with Afib but currently rate controlled.     Reason for Admission: Embolic stroke involving right middle cerebral artery    Brief HPI (pertinent PMH and diagnosis or differential diagnosis):   Only known prior medical history HTN, noncompliacne and chronic anemia presented to [facility] with weakness and fatigue. Initial suspicion of daughters, per chart review, was of symptomatic anemia. On evlaution he was noted to be dysarthric, week and aphasic. He was past beyond the window for TPA. CTA was ordered without evidence of large vessel occlusion and no intervention was planned.    Initial neurological evaluation was notable for hemiplegia on the left. Sensation was intact to noxious stimuli and a right gaze preference was noted.     Of note he has a Hgb of 5.8 at OSH and was transfused one unit requiring a second unit on arrival for Hgb 6.8. In addition he was diagnosed with pancreatitis given elevated lipase/amylase and was hypertensive to a systolic of >200.     Procedure Date: NA    Hospital Course (updated, brief assessment by system or problem, significant events):   Admitted to neurocritical care for hypertensive emergency. Successfully transitioned off of cardene drip.     Has had no further transfusion requirements with Hgb stable around 8. No evidence of melena or upper GI bleeding.    Pancreatitis was mild and has resolved. Inciting event is still unclear but is now tolerating TFs without any difficulty or abdominal pain.     Vascular neurology was consulted with no acute interventions planned.      Patient's neurological exam has remained stable.    On 3/25 had an episode of Afib/RVR following S/D to hospital medicine and re-admitted to ICU. Started on NG tube metoprolol and stable since. In addition Echo notable for EF 15% and concern expressed for taketsubo cardiomyopathy.     Tasks (specific, using if-then statements):   · Follow up with family. Efforts have been made to contact them without success. The patient is unable to make medical decisions for himself.  · Will almost assuredly require PEG tube, if consistent with family wishes, for any form of nutrition.  · Follow up repeat echo in approximately 2 weeks.  · Consider anticoagulation.  · Have deferred for now given concerns for anemia, history of EtOH abuse and frequent falls.  · Follow up PT OT recommendations.       Contingency Plan (special circumstances anticipated and plan):   If patient has drop in Hgb consider GI consultation. There has been no evidence of GI bleeding during ICU stay - no melanotic stools and no blood in NGTube suction canister.    Estimated Discharge Date: 4/15/2018    Discharge Disposition: Nursing Facility    Mentored By: Dr. Yusuf

## 2018-03-28 NOTE — PROGRESS NOTES
[facility]  Neurocritical Care  Progress Note    Admit Date: 3/23/2018  Service Date: 2018  Length of Stay: 5    Subjective:     Chief Complaint: Embolic stroke involving right middle cerebral artery    History of Present Illness: 77 yo M with PMHx HTN known to be non-compliant with medications and remote hx of anemia presents to [facility] ED 18 as a transfer from [facility].  Patient is a poor historian with no family at bedside, hx largely obtained from OSH records.  He presented to OSH originally with symptoms of weakness and fatigue, his daughter brought him to ED thinking he was likely anemic again.  It was noted that patient had some dysarthria and aphasia (unspecified expressive vs receptive) with L-sided weakness.  He was out of the window for tPA and was sent to [facility] emergently for CTA Stroke Multiphase and possible endovascular intervention. CTA stroke multiphase with no LVO, no intervention planned.  Of note, patient is anemic per OSH labs with a Hgb of 5.8 g/dL and likely has pancreatitis with lipase 3738, WBC count of 15.8 k/uL, and CT abdomen showing peripancreatic fluid around the tail.  He was transfused a unit of PRBCs prior to transfer.  Labs were otherwise largely unremarkable.  On presentation to [facility] ED, he is able to move both BLE but complains of pain in BLE.  Not moving LUE but is intact to noxious stimuli.  Has a R gaze preference that is not overcome by VOR testing and complaint of neck pain with moving head side-to-side.  Otherwise has mild abdominal distension with mild hypogastric tenderness to palpation. HTN to SBP 200s.  Of note, patient's wife  recently and family expressed concern in outpatient ED that patient has not been eating much for the past month but deny EtOH use.    Hospital Course: 18:  Admission to Neuro ICU 2/2 concern for stroke with multiple concerns for patient to become unstable--pancreatitis, severe anemia,  HTN.  03/24: has had no interval development of multiorgan involvement form pancreatitis, drop in hemoglobin secondary to erosive gastritis, transfused and on protonix bid  03/25: Patient on dilt drip for a-fib. Repeat EKG shows NSR.  3/26: Discontinuing diltiazem drip today. Will continue monitor HR. Continue IV fluids 50 cc/hr for pancreatitis. Repeat lipase.     Interval History:  NAEON. VSSA. No overt signs of bleeding. Neuro exam unchanged from prior documented exams. Continues to not follow commands, continues to answer questions inappropriately. Remains effectively hemiplegic on left. No further ICU needs. S/D today to hospital medicine after d/w vascular neurology.     Review of Systems   Unable to perform ROS: Mental status change       Objective:     Vitals:  Temp: 98.9 °F (37.2 °C)  Pulse: 84  Rhythm: normal sinus rhythm  BP: (!) 146/70  MAP (mmHg): 101  Resp: (!) 23  SpO2: 99 %  O2 Device (Oxygen Therapy): room air    Temp  Min: 98.3 °F (36.8 °C)  Max: 99.1 °F (37.3 °C)  Pulse  Min: 73  Max: 96  BP  Min: 132/70  Max: 175/108  MAP (mmHg)  Min: 96  Max: 137  Resp  Min: 15  Max: 30  SpO2  Min: 96 %  Max: 100 %    03/27 0701 - 03/28 0700  In: 945 [I.V.:100]  Out: 275 [Urine:275]   Unmeasured Output  Urine Occurrence: 1  Stool Occurrence: 1  Emesis Occurrence: 0  Pad Count: 1       Physical Exam   Constitutional: He appears well-developed. No distress.   HENT:   Head: Normocephalic and atraumatic.   Eyes: Conjunctivae are normal. Pupils are equal, round, and reactive to light.   Neck: Neck supple. No JVD present.   Cardiovascular: Normal rate, regular rhythm, normal heart sounds and intact distal pulses.    Pulmonary/Chest: Effort normal and breath sounds normal.   Abdominal: Soft. Bowel sounds are normal. He exhibits no distension. There is no tenderness.   Musculoskeletal: He exhibits no edema or deformity.   LUE weakness   Neurological: He is alert.   Oriented to person and type of place only.   LUE  flaccid.  LLE markedly weak.  Rightward gaze deviation.    Skin: Skin is warm. Capillary refill takes less than 2 seconds. No rash noted. He is not diaphoretic.   Psychiatric: His speech is slurred. He is slowed. He is inattentive.   Vitals reviewed.        Medications:  Continuous Scheduled    aspirin 81 mg Daily   ferrous sulfate 300 mg Daily   folic acid 1 mg Daily   heparin (porcine) 5,000 Units Q8H   metoprolol tartrate 25 mg TID   multivitamin liquid no.118 10 mL Daily   pantoprazole 40 mg Daily   polyethylene glycol 17 g Daily   senna-docusate 8.6-50 mg 1 tablet Daily   thiamine 100 mg Daily   PRN    acetaminophen 650 mg Q6H PRN   dextrose 50% 12.5 g PRN   glucagon (human recombinant) 1 mg PRN   insulin aspart U-100 1-10 Units Q6H PRN   magnesium oxide 800 mg PRN   magnesium oxide 800 mg PRN   metoprolol 5 mg Q30 Min PRN   ondansetron 4 mg Q8H PRN   oxyCODONE 10 mg Q6H PRN   potassium chloride 10% 40 mEq PRN   potassium chloride 10% 40 mEq PRN   potassium chloride 10% 60 mEq PRN   potassium, sodium phosphates 2 packet PRN   potassium, sodium phosphates 2 packet PRN   potassium, sodium phosphates 2 packet PRN     Today I personally reviewed pertinent medications, lines/drains/airways, imaging, cardiology, lab results, notably:    Diet  Diet NPO  Diet NPO  Tube Feeds started today, Isosource with goal of 50cc/hr increasing to goal.     Assessment/Plan:     Neuro   * Embolic stroke involving right middle cerebral artery    · 03/23/18 CTA Stroke Multiphase with multiple non-occlusive thrombi noted  · MRI brain w/o show multiple infarcts without evidence of hemorrhagic conversion.  · Given recent in-hospital atrial fibrillation suspicion for embolic stroke.  · In addition 2D Echo showed severely depressed LVEF 15-20%  · PT/OT/SLP  · Anticoagulation held  · We do recommend careful consideration of anticoagulation but will defer at this time given history of EtOH abuse, frequent falls and medication  noncompliance.  · Will start ASA 81 today  · Vascular Neurology consulted, recommendations appreciated.  · SBP <180. prn labetalol ordered.  · Has not required.  · q1h Neuro checks           Cerebrovascular accident (CVA)    · See embolic stroke involving R MCA        Acute encephalopathy    · Likely 2/2 sequelae of embolic stroke.        Cardiac/Vascular   Atrial fibrillation with RVR    · Patient noted to be in Afib with RVR 3/25.  · In NSR this AM with rate of 70s-80s  · Stroke risk factor  · Would recommend anticoaulation but will defer for now given h/o noncompliance, EtOH use and frequent falls.  · Currently on metoprolol 25mg TID for rate control.         HTN (hypertension)    · Stroke risk factor, SBP 200s on admission  · SBP goal <180, prn labetalol ordered  · Has not required.   · Now on metoprolol 25mg TID.           Oncology   Iron deficiency anemia    · See iron studies 3/28.  · Iron replacement per MAR.        Microcytic anemia    · Noted to have hgb 5.8 at OSH and was transfused  · Repeat Hgb on admit 6.8 and transfused 1 unit  · Total 2 units PRBCs so far.  · No obvious source of active bleeding.  · No blood in NGT suction  · No melena or bloody bowel movements  · Patient is also constipated making fulminant GI bleed unlikely.  · Will provide protonix IV 40mg daily  · Records mention history of anemia but baseline is unknown.  · No known history of GIB or varices either.  · Recent downtrend in Hgb may be dilutional as patient is 6L positive since admission.  · Will continue to monitor and transfuse prn Hgb <7.0 or acute bleed.  · Consider GI consult if persistent transfusion need for non-emergent evaluation.        GI   Acute pancreatitis    · RESOLVED  · Lipase at OSH 3738 w/ leukocytosis, elevated Ca, peripancreatic fluid w/o gallstones on CT abdomen  · U/S also unremarkable.  · Lipase and amylase have trended down appropriately.   · No increase in LFTs or synthetic function.  · Tolerating tube  feeds.               Prophylaxis:  Venous Thromboembolism: mechanical chemical  Stress Ulcer: PPI  Ventilator Pneumonia: not applicable     Activity Orders          None        Full Code    BOB Perez II  Neurocritical Care  [facility]

## 2018-03-28 NOTE — ASSESSMENT & PLAN NOTE
· Noted to have hgb 5.8 at OSH and was transfused  · Repeat Hgb on admit 6.8 and transfused 1 unit  · Total 2 units PRBCs so far.  · No obvious source of active bleeding.  · No blood in NGT suction  · No melena or bloody bowel movements  · Patient is also constipated making fulminant GI bleed unlikely.  · Will provide protonix IV 40mg daily  · Records mention history of anemia but baseline is unknown.  · No known history of GIB or varices either.  · Recent downtrend in Hgb may be dilutional as patient is 6L positive since admission.  · Will continue to monitor and transfuse prn Hgb <7.0 or acute bleed.  · Consider GI consult if persistent transfusion need for non-emergent evaluation.

## 2018-03-28 NOTE — PROGRESS NOTES
Notified Ms. Mcgarry (daughter) that patient is transferring to room 1107. Ms. Mcgarry verbalized understanding.

## 2018-03-29 PROBLEM — I69.320 APHASIA AS LATE EFFECT OF CEREBROVASCULAR ACCIDENT: Status: ACTIVE | Noted: 2018-03-29

## 2018-03-29 PROBLEM — F10.10 ALCOHOL ABUSE: Status: ACTIVE | Noted: 2018-03-29

## 2018-03-29 PROBLEM — I51.81 TAKOTSUBO CARDIOMYOPATHY: Status: ACTIVE | Noted: 2018-03-29

## 2018-03-29 PROBLEM — G81.04 FLACCID HEMIPLEGIA OF LEFT NONDOMINANT SIDE DUE TO INFARCTION OF BRAIN: Status: ACTIVE | Noted: 2018-03-29

## 2018-03-29 PROBLEM — I63.9 FLACCID HEMIPLEGIA OF LEFT NONDOMINANT SIDE DUE TO INFARCTION OF BRAIN: Status: ACTIVE | Noted: 2018-03-29

## 2018-03-29 PROBLEM — I69.391 DYSPHAGIA AS LATE EFFECT OF CEREBROVASCULAR ACCIDENT (CVA): Status: ACTIVE | Noted: 2018-03-29

## 2018-03-29 LAB
ALBUMIN SERPL BCP-MCNC: 2.8 G/DL
ALP SERPL-CCNC: 103 U/L
ALT SERPL W/O P-5'-P-CCNC: 16 U/L
ANION GAP SERPL CALC-SCNC: 12 MMOL/L
ANISOCYTOSIS BLD QL SMEAR: ABNORMAL
AST SERPL-CCNC: 29 U/L
BASOPHILS # BLD AUTO: 0.02 K/UL
BASOPHILS NFR BLD: 0.1 %
BILIRUB SERPL-MCNC: 1 MG/DL
BUN SERPL-MCNC: 25 MG/DL
BURR CELLS BLD QL SMEAR: ABNORMAL
CALCIUM SERPL-MCNC: 10.4 MG/DL
CHLORIDE SERPL-SCNC: 115 MMOL/L
CO2 SERPL-SCNC: 19 MMOL/L
CREAT SERPL-MCNC: 0.9 MG/DL
DIFFERENTIAL METHOD: ABNORMAL
EOSINOPHIL # BLD AUTO: 0.1 K/UL
EOSINOPHIL NFR BLD: 0.9 %
ERYTHROCYTE [DISTWIDTH] IN BLOOD BY AUTOMATED COUNT: 31 %
EST. GFR  (AFRICAN AMERICAN): >60 ML/MIN/1.73 M^2
EST. GFR  (NON AFRICAN AMERICAN): >60 ML/MIN/1.73 M^2
GLUCOSE SERPL-MCNC: 88 MG/DL
HCT VFR BLD AUTO: 33.8 %
HGB BLD-MCNC: 9.4 G/DL
HYPOCHROMIA BLD QL SMEAR: ABNORMAL
IMM GRANULOCYTES # BLD AUTO: 0.12 K/UL
IMM GRANULOCYTES NFR BLD AUTO: 0.8 %
LYMPHOCYTES # BLD AUTO: 1.1 K/UL
LYMPHOCYTES NFR BLD: 7.7 %
MAGNESIUM SERPL-MCNC: 2 MG/DL
MCH RBC QN AUTO: 20.1 PG
MCHC RBC AUTO-ENTMCNC: 27.8 G/DL
MCV RBC AUTO: 72 FL
MONOCYTES # BLD AUTO: 1.8 K/UL
MONOCYTES NFR BLD: 12 %
NEUTROPHILS # BLD AUTO: 11.7 K/UL
NEUTROPHILS NFR BLD: 78.5 %
NRBC BLD-RTO: 0 /100 WBC
OVALOCYTES BLD QL SMEAR: ABNORMAL
PHOSPHATE SERPL-MCNC: 3 MG/DL
PLATELET # BLD AUTO: 317 K/UL
PMV BLD AUTO: 10 FL
POCT GLUCOSE: 101 MG/DL (ref 70–110)
POCT GLUCOSE: 144 MG/DL (ref 70–110)
POIKILOCYTOSIS BLD QL SMEAR: ABNORMAL
POLYCHROMASIA BLD QL SMEAR: ABNORMAL
POTASSIUM SERPL-SCNC: 3.9 MMOL/L
PROT SERPL-MCNC: 7.6 G/DL
RBC # BLD AUTO: 4.68 M/UL
SCHISTOCYTES BLD QL SMEAR: ABNORMAL
SODIUM SERPL-SCNC: 146 MMOL/L
SPHEROCYTES BLD QL SMEAR: ABNORMAL
TARGETS BLD QL SMEAR: ABNORMAL
WBC # BLD AUTO: 14.89 K/UL

## 2018-03-29 PROCEDURE — 25000003 PHARM REV CODE 250: Performed by: NURSE PRACTITIONER

## 2018-03-29 PROCEDURE — 80053 COMPREHEN METABOLIC PANEL: CPT

## 2018-03-29 PROCEDURE — 84100 ASSAY OF PHOSPHORUS: CPT

## 2018-03-29 PROCEDURE — 63600175 PHARM REV CODE 636 W HCPCS: Performed by: STUDENT IN AN ORGANIZED HEALTH CARE EDUCATION/TRAINING PROGRAM

## 2018-03-29 PROCEDURE — 11000001 HC ACUTE MED/SURG PRIVATE ROOM

## 2018-03-29 PROCEDURE — 97112 NEUROMUSCULAR REEDUCATION: CPT

## 2018-03-29 PROCEDURE — 85025 COMPLETE CBC W/AUTO DIFF WBC: CPT

## 2018-03-29 PROCEDURE — 97164 PT RE-EVAL EST PLAN CARE: CPT

## 2018-03-29 PROCEDURE — 97168 OT RE-EVAL EST PLAN CARE: CPT

## 2018-03-29 PROCEDURE — 86580 TB INTRADERMAL TEST: CPT | Performed by: HOSPITALIST

## 2018-03-29 PROCEDURE — 63600175 PHARM REV CODE 636 W HCPCS: Performed by: HOSPITALIST

## 2018-03-29 PROCEDURE — 36415 COLL VENOUS BLD VENIPUNCTURE: CPT

## 2018-03-29 PROCEDURE — 94761 N-INVAS EAR/PLS OXIMETRY MLT: CPT

## 2018-03-29 PROCEDURE — 83735 ASSAY OF MAGNESIUM: CPT

## 2018-03-29 PROCEDURE — 97530 THERAPEUTIC ACTIVITIES: CPT

## 2018-03-29 PROCEDURE — 25000003 PHARM REV CODE 250: Performed by: STUDENT IN AN ORGANIZED HEALTH CARE EDUCATION/TRAINING PROGRAM

## 2018-03-29 PROCEDURE — 99233 SBSQ HOSP IP/OBS HIGH 50: CPT | Mod: GC,,, | Performed by: HOSPITALIST

## 2018-03-29 PROCEDURE — 25000003 PHARM REV CODE 250: Performed by: PHYSICIAN ASSISTANT

## 2018-03-29 RX ADMIN — Medication 10 ML: at 09:03

## 2018-03-29 RX ADMIN — FOLIC ACID 1 MG: 1 TABLET ORAL at 09:03

## 2018-03-29 RX ADMIN — METOPROLOL TARTRATE 25 MG: 25 TABLET ORAL at 02:03

## 2018-03-29 RX ADMIN — MINERAL SUPPLEMENT IRON 300 MG / 5 ML STRENGTH LIQUID 100 PER BOX UNFLAVORED 300 MG: at 09:03

## 2018-03-29 RX ADMIN — PANTOPRAZOLE SODIUM 40 MG: 40 GRANULE, DELAYED RELEASE ORAL at 09:03

## 2018-03-29 RX ADMIN — METOPROLOL TARTRATE 25 MG: 25 TABLET ORAL at 09:03

## 2018-03-29 RX ADMIN — HEPARIN SODIUM 5000 UNITS: 5000 INJECTION, SOLUTION INTRAVENOUS; SUBCUTANEOUS at 05:03

## 2018-03-29 RX ADMIN — ASPIRIN 81 MG CHEWABLE TABLET 81 MG: 81 TABLET CHEWABLE at 09:03

## 2018-03-29 RX ADMIN — TUBERCULIN PURIFIED PROTEIN DERIVATIVE 5 UNITS: 5 INJECTION, SOLUTION INTRADERMAL at 04:03

## 2018-03-29 RX ADMIN — HEPARIN SODIUM 5000 UNITS: 5000 INJECTION, SOLUTION INTRAVENOUS; SUBCUTANEOUS at 10:03

## 2018-03-29 RX ADMIN — HEPARIN SODIUM 5000 UNITS: 5000 INJECTION, SOLUTION INTRAVENOUS; SUBCUTANEOUS at 02:03

## 2018-03-29 RX ADMIN — Medication 100 MG: at 09:03

## 2018-03-29 NOTE — PROGRESS NOTES
Patient's chart was reviewed by a stroke team provider.  Patient stepped back down to Medicine yesterday after being readmitted to [facility] for afib with RVR.  HR now controlled.  Continues with anemia but improving.  Continuing to hold anticoagulation due to risk of bleeding.  Will continue to monitor and consider resuming anticoagulation once stable.  There is no new imaging to review.  Pending diagnostics to follow up on include:None    For other recommendations please see our previous note completed on: 3/24 and 3/25    There are no new recommendations at this time. Will continue to follow. Discussed patient with Dr. Perez. Please contact stroke team for any questions or concerns.     Zoila Seals, NP-C  Vascular Neurology  [phone]

## 2018-03-29 NOTE — PLAN OF CARE
Problem: Physical Therapy Goal  Goal: Physical Therapy Goal  Goals to be met by: 18     Patient will increase functional independence with mobility by performin. Supine to sit with Maximum Assistance  2. Sit to supine with Maximum Assistance  3. Rolling to Left and Right with Maximum Assistance.  4. Sitting at edge of bed x 10 minutes with Minimal Assistance maintaining midline orientation and using at least 1 UE support for balance.   5.  Sit to stand with Maximum Assistance.   6. Bed to chair transfer with Maximum Assistance using most appropriate AD.         PT re-evaluation completed. POC and goals updated/established.    Nadja Sawyer, PT, DPT  3/29/2018

## 2018-03-29 NOTE — PROGRESS NOTES
[facility] Hospital Medicine  Progress Note    Patient Name: Zion Hines  MRN: [id number]  Patient Class: IP- Inpatient   Admission Date: 3/23/2018  Length of Stay: 6 days  Attending Physician: Mica Mckeon MD  Primary Care Provider: Primary Doctor [facility] Medicine Team: Networked reference to record PCT  Riley Milner MD    Subjective:     Principal Problem:Embolic stroke involving right middle cerebral artery    HPI:  79 yo M with PMHx HTN known to be non-compliant with medications and remote hx of anemia presents to [facility] ED 18 as a transfer from [facility].  Patient is a poor historian with no family at bedside, hx largely obtained from OSH records.  He presented to OSH originally with symptoms of weakness and fatigue, his daughter brought him to ED thinking he was likely anemic again.  It was noted that patient had some dysarthria and aphasia (unspecified expressive vs receptive) with L-sided weakness.  He was out of the window for tPA and was sent to [facility] emergently for CTA Stroke Multiphase and possible endovascular intervention. CTA stroke multiphase with no LVO, no intervention planned.  Of note, patient is anemic per OSH labs with a Hgb of 5.8 g/dL and likely has pancreatitis with lipase 3738, WBC count of 15.8 k/uL, and CT abdomen showing peripancreatic fluid around the tail.  He was transfused a unit of PRBCs prior to transfer.  Labs were otherwise largely unremarkable.  On presentation to [facility] ED, he is able to move both BLE but complains of pain in BLE.  Not moving LUE but is intact to noxious stimuli.  Has a R gaze preference that is not overcome by VOR testing and complaint of neck pain with moving head side-to-side.  Otherwise has mild abdominal distension with mild hypogastric tenderness to palpation. HTN to SBP 200s.  Of note, patient's wife  recently and family expressed concern in outpatient ED that patient has not been eating much for the past  month but deny EtOH use.    Hospital Course:  03/23/18:  Admission to Neuro ICU 2/2 concern for stroke with multiple concerns for patient to become unstable--pancreatitis, severe anemia, HTN.  03/24: has had no interval development of multiorgan involvement form pancreatitis, drop in hemoglobin secondary to erosive gastritis, transfused and on protonix bid  03/25: Patient on dilt drip for a-fib. Repeat EKG shows NSR.  3/26: Discontinuing diltiazem drip today. Will continue monitor HR. Continue IV fluids 50 cc/hr for pancreatitis. Repeat lipase.   3/28: NAEON. VSSA. No overt signs of bleeding. Neuro exam unchanged from prior documented exams. Continues to not follow commands, continues to answer questions inappropriately. Remains effectively hemiplegic on left. No further ICU needs. S/D today to hospital medicine after d/w vascular neurology.  03/29/2018 VSS, no signs of blood loss in stool. Loose stools on examination. NG tube replaced and xray confirmed in duodenum - retracted. Will make definitive decision on anticoagulation 3/30. Furthering discussions with family regarding definitive feeding discussions.     Interval History: see hospital course    Review of Systems   Unable to perform ROS: Mental status change     Objective:     Vital Signs (Most Recent):  Temp: 97.9 °F (36.6 °C) (03/29/18 1119)  Pulse: 76 (03/29/18 1119)  Resp: 19 (03/29/18 1119)  BP: (!) 148/83 (03/29/18 1119)  SpO2: 96 % (03/29/18 1119) Vital Signs (24h Range):  Temp:  [97.1 °F (36.2 °C)-98.3 °F (36.8 °C)] 97.9 °F (36.6 °C)  Pulse:  [76-94] 76  Resp:  [16-20] 19  SpO2:  [95 %-99 %] 96 %  BP: (144-175)/(74-94) 148/83     Weight: 71.5 kg (157 lb 10.1 oz)  Body mass index is 23.97 kg/m².    Intake/Output Summary (Last 24 hours) at 03/29/18 1346  Last data filed at 03/29/18 0911   Gross per 24 hour   Intake              750 ml   Output             4250 ml   Net            -3500 ml      Physical Exam   Constitutional: He appears well-developed.    Patient lying on pad in feces and urine.    HENT:   Head: Atraumatic.   Eyes: Pupils are equal, round, and reactive to light.   Patient with rightward gaze preference   Neck: No JVD present.   Cardiovascular: Normal rate, regular rhythm, S1 normal, S2 normal and intact distal pulses.    Pulses:       Radial pulses are 2+ on the right side, and 2+ on the left side.   Pulmonary/Chest: Effort normal and breath sounds normal.   Abdominal: Soft. Bowel sounds are normal. He exhibits no distension. There is no tenderness. There is no guarding.   Genitourinary:   Genitourinary Comments: Condom catheter in place   Musculoskeletal: He exhibits no edema.   Neurological: He is alert. He exhibits abnormal muscle tone. GCS eye subscore is 4. GCS verbal subscore is 3. GCS motor subscore is 6.   Only oriented to person. Expressive and receptive aphasia. LUE weak.      Skin: Skin is warm. No rash noted.   Psychiatric: His speech is slurred.   Nursing note and vitals reviewed.      Significant Labs:   Recent Results (from the past 24 hour(s))   POCT glucose    Collection Time: 03/28/18  4:07 PM   Result Value Ref Range    POCT Glucose 126 (H) 70 - 110 mg/dL   POCT glucose    Collection Time: 03/29/18  1:15 AM   Result Value Ref Range    POCT Glucose 144 (H) 70 - 110 mg/dL   Comprehensive metabolic panel    Collection Time: 03/29/18  4:03 AM   Result Value Ref Range    Sodium 146 (H) 136 - 145 mmol/L    Potassium 3.9 3.5 - 5.1 mmol/L    Chloride 115 (H) 95 - 110 mmol/L    CO2 19 (L) 23 - 29 mmol/L    Glucose 88 70 - 110 mg/dL    BUN, Bld 25 (H) 8 - 23 mg/dL    Creatinine 0.9 0.5 - 1.4 mg/dL    Calcium 10.4 8.7 - 10.5 mg/dL    Total Protein 7.6 6.0 - 8.4 g/dL    Albumin 2.8 (L) 3.5 - 5.2 g/dL    Total Bilirubin 1.0 0.1 - 1.0 mg/dL    Alkaline Phosphatase 103 55 - 135 U/L    AST 29 10 - 40 U/L    ALT 16 10 - 44 U/L    Anion Gap 12 8 - 16 mmol/L    eGFR if African American >60.0 >60 mL/min/1.73 m^2    eGFR if non African American >60.0  >60 mL/min/1.73 m^2   Magnesium    Collection Time: 03/29/18  4:03 AM   Result Value Ref Range    Magnesium 2.0 1.6 - 2.6 mg/dL   Phosphorus    Collection Time: 03/29/18  4:03 AM   Result Value Ref Range    Phosphorus 3.0 2.7 - 4.5 mg/dL   CBC auto differential    Collection Time: 03/29/18  4:04 AM   Result Value Ref Range    WBC 14.89 (H) 3.90 - 12.70 K/uL    RBC 4.68 4.60 - 6.20 M/uL    Hemoglobin 9.4 (L) 14.0 - 18.0 g/dL    Hematocrit 33.8 (L) 40.0 - 54.0 %    MCV 72 (L) 82 - 98 fL    MCH 20.1 (L) 27.0 - 31.0 pg    MCHC 27.8 (L) 32.0 - 36.0 g/dL    RDW 31.0 (H) 11.5 - 14.5 %    Platelets 317 150 - 350 K/uL    MPV 10.0 9.2 - 12.9 fL    Immature Granulocytes 0.8 (H) 0.0 - 0.5 %    Gran # (ANC) 11.7 (H) 1.8 - 7.7 K/uL    Immature Grans (Abs) 0.12 (H) 0.00 - 0.04 K/uL    Lymph # 1.1 1.0 - 4.8 K/uL    Mono # 1.8 (H) 0.3 - 1.0 K/uL    Eos # 0.1 0.0 - 0.5 K/uL    Baso # 0.02 0.00 - 0.20 K/uL    nRBC 0 0 /100 WBC    Gran% 78.5 (H) 38.0 - 73.0 %    Lymph% 7.7 (L) 18.0 - 48.0 %    Mono% 12.0 4.0 - 15.0 %    Eosinophil% 0.9 0.0 - 8.0 %    Basophil% 0.1 0.0 - 1.9 %    Aniso Moderate     Poik Moderate     Poly Occasional     Hypo Occasional     Ovalocytes Occasional     Target Cells Occasional     Joe Cells Occasional     Spherocytes Occasional     Fragmented Cells Occasional     Differential Method Automated    POCT glucose    Collection Time: 03/29/18  4:52 AM   Result Value Ref Range    POCT Glucose 101 70 - 110 mg/dL   POCT glucose    Collection Time: 03/29/18 11:15 AM   Result Value Ref Range    POCT Glucose 101 70 - 110 mg/dL         Significant Imaging: I have reviewed all pertinent imaging results/findings within the past 24 hours.    Assessment/Plan:      * Embolic stroke involving right middle cerebral artery    - 03/23/18 CTA Stroke Multiphase with multiple non-occlusive thrombi noted  - MRI brain w/o show multiple infarcts without evidence of hemorrhagic conversion.  - Given recent in-hospital atrial fibrillation  and takotsubo's CM suspicion for embolic stroke, in addition 2D Echo showed severely depressed LVEF 15-20% with evidence of stress CM  - he was beyond the   - PT/OT reordered   - eval for anticoagulation on 3/30          Takotsubo cardiomyopathy    - pt with loss of wife 2/2018, now with dilated cardiomyopathy EF 15-20percent and imaging suggestive of takotsubo CM  - will need repeat echo to evaluate improvement  - embolic episode may have been exacerbated by new onset CM   - elevated ePAP 61          Alcohol abuse    - unable to obtain alcohol history from patient          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 Option and will be in hospital 3/30 to discuss    1. Continue Isosource 1.5 @ goal rate 50mL/hr.   - Provides 1800kcals, 82g protein and 917mL free water.      2. If more concentrated formula warranted (EF 45%), recommend Nutren 2.0 @ goal rate 40mL/hr.   - Provides 1920kcals, 81g protein and 664mL free water.   - Hold for residuals >500mL.      RD to monitor.            Aphasia as late effect of cerebrovascular accident    - seems both receptive and expressive  - patient perseverating about drinking water          Iron deficiency anemia    - see microcyctic anemia        Atrial fibrillation with RVR    - Patient noted to be in Afib with RVR 3/25  - In NSR this AM with rate of 70s-90s  - Would recommend anticoaulation - If anticoagulation is initiated for Afib, Eliquis is $8.35/month--Eliquis was shown to have similar GIB risk as warfarin in [facility]; other DOACs shown to have increased risk compared to warfarin in major Afib studies  - cardiac monitoring  - Currently on metoprolol 25mg TID for rate control               Microcytic anemia    - has iron def anemia, continue iron replacement  - unknown baseline  hb, no melena or bloody bowel movements reported while IP  - no known history of GIB or varices  - Hb stable improving         Acute pancreatitis    RESOLVED  - Lipase at OSH 3738 w/ leukocytosis, elevated Ca, peripancreatic fluid w/o gallstones on CT abdomen  - U/S also unremarkable.  - Lipase and amylase have trended down appropriately.   - No increase in LFTs or synthetic function.  - Tolerating tube feeds           Essential hypertension     - SBP goal <180, prn labetalol ordered  - Has not required PRNS  - Now on metoprolol 25mg TID           Acute encephalopathy    - Likely 2/2 sequelae of embolic stroke.            VTE Risk Mitigation         Ordered     heparin (porcine) injection 5,000 Units  Every 8 hours     Route:  Subcutaneous        03/27/18 0984              Riley Milner MD  Department of Hospital Medicine   [facility]

## 2018-03-29 NOTE — ASSESSMENT & PLAN NOTE
- has iron def anemia, continue iron replacement  - unknown baseline hb, no melena or bloody bowel movements reported while IP  - no known history of GIB or varices  - Hb stable improving

## 2018-03-29 NOTE — ASSESSMENT & PLAN NOTE
RESOLVED  - Lipase at OSH 3738 w/ leukocytosis, elevated Ca, peripancreatic fluid w/o gallstones on CT abdomen  - U/S also unremarkable.  - Lipase and amylase have trended down appropriately.   - No increase in LFTs or synthetic function.  - Tolerating tube feeds

## 2018-03-29 NOTE — ASSESSMENT & PLAN NOTE
- pt with loss of wife 2/2018, now with dilated cardiomyopathy EF 15-20percent and imaging suggestive of takotsubo CM  - will need repeat echo to evaluate improvement  - embolic episode may have been exacerbated by new onset CM   - elevated ePAP 61

## 2018-03-29 NOTE — PLAN OF CARE
Problem: Occupational Therapy Goal  Goal: Occupational Therapy Goal  Goals to be met by:  18    Patient will increase functional independence with ADLs by performin. Supine to sit with Moderate Assistance. - not met  2. Roll to R with Min A. - not met  3. Roll to L with Min A. - not met  4. Grooming while supine with HOB elevated with Minimal Assistance. - not met  5. UE Dressing with Moderate Assistance. - not met  6. Pt will perform grasp/release 2/3 functional items (towel, cup, tissue box) per UE crossing midline with Min A while seated EOB. - not met  7. Pt will perform functional sitting at EOB x5 min with stand by Assist. - not met  8. Transfers with Moderate assistance.           Outcome: Ongoing (interventions implemented as appropriate)  OT re-eval completed.

## 2018-03-29 NOTE — PLAN OF CARE
03/29/18 1456   Discharge Reassessment   Assessment Type Discharge Planning Reassessment   Provided patient/caregiver education on the expected discharge date and the discharge plan Yes   Do you have any problems affording any of your prescribed medications? No   Discharge Plan A Skilled Nursing Facility   Can the patient answer the patient profile reliably? No, cognitively impaired   How does the patient rate their overall health at the present time? Poor   Describe the patient's ability to walk at the present time. Major restrictions/daily assistance from another person

## 2018-03-29 NOTE — HPI
79 yo M with PMHx HTN known to be non-compliant with medications and remote hx of anemia presents to [facility] as a transfer from [facility].  Patient is a poor historian with no family at bedside, hx largely obtained from OSH records.  He presented to OSH originally with symptoms of weakness and fatigue, his daughter brought him to ED thinking he was likely anemic again.  It was noted that patient had some dysarthria and aphasia (unspecified expressive vs receptive) with L-sided weakness.  He was out of the window for tPA and was sent to [facility] [place] for CTA Stroke Multiphase and possible endovascular intervention. CTA stroke multiphase with no LVO, no intervention planned.  Of note, patient is anemic per OSH labs with a Hgb of 5.8 g/dL and likely has pancreatitis with lipase 3738, WBC count of 15.8 k/uL, and CT abdomen showing peripancreatic fluid around the tail.  He was transfused a unit of PRBCs prior to transfer.  Labs were otherwise largely unremarkable.  On presentation to [facility], he is able to move both BLE but complains of pain in BLE.  Not moving LUE but is intact to noxious stimuli.  Has a R gaze preference that is not overcome by VOR testing and complaint of neck pain with moving head side-to-side.  Otherwise has mild abdominal distension with mild hypogastric tenderness to palpation. HTN to SBP 200s.  Of note, patient's wife  recently and family expressed concern in outpatient ED that patient has not been eating much for the past month but deny EtOH use.

## 2018-03-29 NOTE — PT/OT/SLP RE-EVAL
Occupational Therapy   Re-evaluation    Name: Zion Hines  MRN: [id number]  Admitting Diagnosis:  Embolic stroke involving right middle cerebral artery      Recommendations:     Discharge Recommendations: nursing facility, skilled    History:     Past Medical History:   Diagnosis Date    Alcohol abuse 3/29/2018    Atrial fibrillation with RVR 3/27/2018    Embolic stroke involving right middle cerebral artery 3/24/2018    Essential hypertension 3/23/2018       History reviewed. No pertinent surgical history.    Subjective   Pt with no complaints during session.  Chief Complaint: wanting water  (explained to pt several times during session his NPO status & reasons for this.  Patient/Family stated goals: to get some water  Communicated with: RN prior to session.  Pain/Comfort:  · Pain Rating 1: 0/10  · Pain Rating Post-Intervention 1: 0/10    Objective:     Patient found with: restraints, telemetry, peripheral IV, NG tube (catheter)    General Precautions: Standard, aspiration, fall, NPO (cardiac)     Occupational Performance:    Bed Mobility:    · Patient completed Scooting/Bridging with total assistance forward on EOB & to the right along EOB  · Patient completed Supine to Sit with total assistance  · Patient completed Sit to Supine with total assistance    Activities of Daily Living:  · Grooming: total assistance while seated EOB    Cognitive/Visual Perceptual:  Cognitive/Psychosocial Skills:     -       Oriented to: Person and year & hospital   -       Follows Commands/attention:followed 2/2 one step verbal commands however required increased time  -       Communication: delayed responses  -       Memory: questionable  -       Safety awareness/insight to disability: impaired   -       Mood/Affect/Coping skills/emotional control: Appropriate to situation  Pt with delayed verbal & physical responses to all activities during session.    Physical Exam:  Postural examination/scapula alignment:    -       Rounded  shoulders  -       Forward head  -       Posterior pelvic tilt  Sensation: impaired LUE  Dominant hand: right  Upper Extremity Range of Motion:     -       Right Upper Extremity: WFL  -       Left Upper Extremity: PROM WFL (shoulder flexion 90*)  Upper Extremity Strength:    -       Right Upper Extremity: 3+/5 except 3-/5 shoulder flexion  -       Left Upper Extremity: 0/5   Strength:  RUE WFL; LUE 0/5  Fine Motor Coordination: impaired BUE  Gross motor coordination: impaired BUE    Patient left supine with all lines intact, call button in reach, bed alarm on, restraints reapplied at end of session, RN notified and white board updated.    [facility] 6 Click:  [facility] Total Score: 6    Treatment & Education:  Pt with noted 1 finger subluxation to left shoulder.  Pt with left protracted scapula.  Pt with head generally turned to the right however with cues was able to turn minimally to the left (visually scanned to the left partially).  Pt with delayed responses to directions during session.  Pt required Min-Mod (A) with postural control while seated EOB.  Provided verbal & physical cues to facilitate postural control while seated EOB.  Provided education regarding role of OT & POC.  Provided education on NPO status & reasons for NPO status.  Provided elevated positioning of LUE at end of session on pillow to facilitate shoulder approximation.  Pt had no further questions & when asked whether there were any concerns pt reported none.    Education:    Assessment:     Zion Hines is a 78 y.o. male with a medical diagnosis of Embolic stroke involving right middle cerebral artery.  He presents with fair participation and motivation.  Pt is at high fall risk.  Performance deficits affecting function are weakness, impaired endurance, impaired self care skills, impaired functional mobilty, impaired sensation, impaired balance, visual deficits, impaired cognition, decreased coordination, decreased safety awareness, decreased  "lower extremity function, decreased upper extremity function.      Rehab Prognosis:  fair; patient would benefit from acute skilled OT services to address these deficits and reach maximum level of function.         Clinical Decision Makin.  OT Low:  "Pt evaluation falls under low complexity for evaluation coding due to performance deficits noted in 1-3 areas as stated above and 0 co-morbities affecting current functional status. Data obtained from problem focused assessments. No modifications or assistance was required for completion of evaluation. Only brief occupational profile and history review completed."     Plan:     Patient to be seen 4 x/week to address the above listed problems via self-care/home management, therapeutic activities, therapeutic exercises, neuromuscular re-education, sensory integration, cognitive retraining  · Plan of Care Expires: 18  · Plan of Care Reviewed with: patient    This Plan of care has been discussed with the patient who was involved in its development and understands and is in agreement with the identified goals and treatment plan    GOALS:    Occupational Therapy Goals        Problem: Occupational Therapy Goal    Goal Priority Disciplines Outcome Interventions   Occupational Therapy Goal     OT, PT/OT Ongoing (interventions implemented as appropriate)    Description:  Goals to be met by:  18    Patient will increase functional independence with ADLs by performin. Supine to sit with Moderate Assistance. - not met  2. Roll to R with Min A. - not met  3. Roll to L with Min A. - not met  4. Grooming while supine with HOB elevated with Minimal Assistance. - not met  5. UE Dressing with Moderate Assistance. - not met  6. Pt will perform grasp/release 2/3 functional items (towel, cup, tissue box) per UE crossing midline with Min A while seated EOB. - not met  7. Pt will perform functional sitting at EOB x5 min with stand by Assist. - not met  8. Transfers " with Moderate assistance.                             Time Tracking:     OT Date of Treatment: 03/29/18  OT Start Time: 1035  OT Stop Time: 1055  OT Total Time (min): 20 min    Billable Minutes:Re-eval 10  Therapeutic Activity 8    FRANCISCO Quintero  3/29/2018

## 2018-03-29 NOTE — PROGRESS NOTES
Received call from tele.  Pt had a run of 4 beats of Vtach, escape beat, and another 4 beat run of Vtach.  VSS.  Reported to Dr. Arroyo.

## 2018-03-29 NOTE — ASSESSMENT & PLAN NOTE
- Patient noted to be in Afib with RVR 3/25  - In NSR this AM with rate of 70s-90s  - Would recommend anticoaulation - If anticoagulation is initiated for Afib, Eliquis is $8.35/month--Eliquis was shown to have similar GIB risk as warfarin in [facility]; other DOACs shown to have increased risk compared to warfarin in major Afib studies  - cardiac monitoring  - Currently on metoprolol 25mg TID for rate control

## 2018-03-29 NOTE — PLAN OF CARE
Call received from patient's daughter Malgorzata to discuss discharge planning. Ms. Mcgarry informed this CM that she and her sisters would be unable to visit today due to the bad weather and no transportation available. Explained that CM/SW would leave a list of skilled facilities near [place] at the bedside so they could decide on which SNF's they would like referrals sent to. Family is planning to visit in am. SW informed of above conversation.    Cassie Carlos RN  Ext [phone]

## 2018-03-29 NOTE — HOSPITAL COURSE
03/23/18:  Admission to Neuro ICU 2/2 concern for stroke with multiple concerns for patient to become unstable--pancreatitis, severe anemia, HTN.  03/24: has had no interval development of multiorgan involvement form pancreatitis, drop in hemoglobin secondary to erosive gastritis, transfused and on protonix bid  03/25: Patient on dilt drip for a-fib. Repeat EKG shows NSR.  3/26: Discontinuing diltiazem drip today. Will continue monitor HR. Continue IV fluids 50 cc/hr for pancreatitis. Repeat lipase.   3/28: NAEON. VSSA. No overt signs of bleeding. Neuro exam unchanged from prior documented exams. Continues to not follow commands, continues to answer questions inappropriately. Remains effectively hemiplegic on left. No further ICU needs. S/D today to hospital medicine after d/w vascular neurology.  03/29/2018 VSS, no signs of blood loss in stool. Loose stools on examination. NG tube replaced and xray confirmed in duodenum - retracted. Will make definitive decision on anticoagulation 3/30. Furthering discussions with family regarding definitive feeding  03/30/2018 VSS, no signs of blood loss on ZENIA. NG tube replaced after patient pulled O/N. Confirmed placement.Family desires PEG tube placed. GI consulted for placement on Monday.   03/31/2018 VSS. Drop in Hb from 8.1 to 7.4 overnight. TF continued. Holding anticoagulation due to worsening anemia. GI recommendations for further evaluation of inflammatory changes of the pancreatic tail, recommending EUS or MRCP in future with PBS follow up outpatient. New fluid filled hernia noted on exam - US ordered  04/02/2018 PEG placement today. WBC 15. Hb improving. Metoprolol changes to 50mg BID, Lisinopril increased 20 mg QD.   04/03/2018 PEG placement scheduled for today. Leukocytosis stable. Hb stable. Added Norvasc for BP control today. Will monitor. Unable to contact any family members since yesterday afternoon.   04/04/2018 PEG in place, OK to use for TF, Meds etc.  Leukocytosis stable. Hb stable - so signs of bleeding, will start Eliquis for his anticoagulation post stroke.   04/06/2018 patient continues on TF, interval increase in leukocyte count, hb stable, patient less responsive and less appropriate today, CXR with no edema or consolidation, CT Head non con for change in responsiveness  04/07/2018 patient returned to baseline mental status, will escalate bowel regime today due to no recorded BM for >1 week, leukocytosis and reactive thrombocytosis are concerning for infection but VSS - will monitor  04/08/2018 patient at baseline mental state, escalating bowel regime today, leukocytosis resolved but urine culture growing gram neg non lactose fermeter at the moment, patient asymptomatic, will hold treating at this time  04/09/2018 patient at baseline mental state, multiple bowel movements after edema yesterday, colonized with ESBL e.coli, asymptomatic, awaiting placement  04/10/2018 patient at baseline orientation, contact precautions for ESBL e.coli, awaiting placement, being evaluated today for placement  Discharged in stable condition to SNF for PT/OT and skilled nursing.

## 2018-03-29 NOTE — PHARMACY MED REC
Admission Medication Reconciliation - Pharmacy Consult Note    The home medication history was taken by Jessy Morrow Pharmacy Tech.     No issues noted with the medication reconciliation.    Potential issues to be addressed PRIOR TO DISCHARGE  o If anticoagulation is initiated for Afib, Eliquis is $8.35/month--Eliquis was shown to have similar GIB risk as warfarin in [facility]; other DOACs shown to have increased risk compared to warfarin in major Afib studies      Vadim Grey, PharmD  [id number]        Patient's prior to admission medication regimen was as follows:  No current outpatient prescriptions on file prior to encounter.         .    .

## 2018-03-29 NOTE — ASSESSMENT & PLAN NOTE
- advancing discussions with family regarding g tube placement, they are aware NG is not perm. Option and will be in hospital 3/30 to discuss    1. Continue Isosource 1.5 @ goal rate 50mL/hr.   - Provides 1800kcals, 82g protein and 917mL free water.      2. If more concentrated formula warranted (EF 45%), recommend Nutren 2.0 @ goal rate 40mL/hr.   - Provides 1920kcals, 81g protein and 664mL free water.   - Hold for residuals >500mL.      RD to monitor.

## 2018-03-29 NOTE — PT/OT/SLP EVAL
Physical Therapy Re-evaluation    Patient Name:  Zion Hines   MRN:  [id number]    Recommendations:     Discharge Recommendations:  nursing facility, skilled   Discharge Equipment Recommendations:  (TBD pending progress)   Barriers to discharge: Inaccessible home and Decreased caregiver support pt requires increase assist at this time    Assessment:     Zion Hines is a 78 y.o. male admitted with a medical diagnosis of Embolic stroke involving right middle cerebral artery.  He presents with the following impairments/functional limitations:  weakness, impaired endurance, gait instability, impaired balance, decreased upper extremity function, decreased lower extremity function, impaired fine motor, decreased coordination, impaired functional mobilty, impaired self care skills, impaired cognition, decreased safety awareness, abnormal tone.      Pt tolerated session well, but reported fatigue and dizziness while sitting up, no other adverse signs/symptoms. Pt required Max A for all mobility this visit, but remained more alert and interactive this visit compared to previous evaluation.  Pt noted with R cervical rotation and gaze, able to track some to the Left, but difficulty rotating neck due to muscle tightness.   Pt would benefit from continued skilled physical therapy for the listed impairments to improve functional independence and overall safety with mobility prior to d/c. PT recommends d/c disposition to SNF for further PT, OT, and SLP intervention.       Education:  Education provided to pt regarding: PT role/POC. Verbalized understanding.     Whiteboard updated with correct mobility information. RN/PCT notified.  Transfer with therapy only at this time.       Rehab Prognosis:  good; patient would benefit from acute skilled PT services to address these deficits and reach maximum level of function.      Recent Surgery: * No surgery found *      Plan:     During this hospitalization, patient to be seen 4 x/week to  "address the above listed problems via gait training, therapeutic activities, therapeutic exercises, neuromuscular re-education  · Plan of Care Expires:  04/28/18   Plan of Care Reviewed with: patient    Subjective     Communicated with RN prior to session.  Patient found supine, upon PT entry to room, agreeable to evaluation.      "I'm cold. Can I have some water?"   Patient comments/goals: none stated  Pain/Comfort: none stated    Patients cultural, spiritual, L[employer]-day conflicts given the current situation: no      Objective:     Patient found with: restraints, telemetry, NG tube, peripheral IV, castro catheter     General Precautions: Standard, aspiration, fall, NPO   Orthopedic Precautions:N/A   Braces: N/A     Exams:  Pt oriented x Person, Place, Time . (year only)    Communication: clear/fluent and delayed responses    Follows Commands: Follows one-step commands    Posture: Rounded shoulder, Head forward, Affected scapula abducted, Affected scapula upwardly rotated, Posterior pelvic tilt and R cervical lateral rotation  Skin Integrity: Visible skin intact  Edema: none noted      Range of Motion and Strength Examination    Right Lower Extremity Range of Motion: WFL except; increase hamstring tightness against gravity    Left Lower Extremity Range of Motion: WFL      Gross Manual Muscle Testing     Right LE Left LE   Hip Abd/Add 2/5 2/5   Hip Flexion 1/5 1/5   Knee extension 2+/5 3-/5   DF/PF 2/5 1/5       **MMT limited by pt's cognition, following commands, and delayed responses**    Fine Motor Coordination:   impaired      Gross Motor Coordination:  impaired      Functional Mobility:  Bed Mobility:  Supine to Sit:  Total Assistance   Sit to supine: Total Assistance   Scooting: Total Assistance while seated to the Right along EOB    Transfers:   Unable to reach standing this visit; poor sitting balance       Balance:  Static Sitting: Maximum Assistance posterior lean with difficulty returning to " midline  Dynamic Sitting: Maximum Assistance   Static Standing: Activity did not occur   Dynamic Standing: Activity did not occur         AM-PAC 6 CLICK MOBILITY  Total Score:8       Patient left supine with all lines intact, call button in reach, bed alarm on and restraints reapplied at end of session.    GOALS:    Physical Therapy Goals        Problem: Physical Therapy Goal    Goal Priority Disciplines Outcome Goal Variances Interventions   Physical Therapy Goal     PT/OT, PT      Description:  Goals to be met by: 18     Patient will increase functional independence with mobility by performin. Supine to sit with Maximum Assistance  2. Sit to supine with Maximum Assistance  3. Rolling to Left and Right with Maximum Assistance.  4. Sitting at edge of bed x 10 minutes with Minimal Assistance maintaining midline orientation and using at least 1 UE support for balance.   5.  Sit to stand with Maximum Assistance.   6. Bed to chair transfer with Maximum Assistance using most appropriate AD.                     History:     Past Medical History:   Diagnosis Date    Alcohol abuse 3/29/2018    Atrial fibrillation with RVR 3/27/2018    Embolic stroke involving right middle cerebral artery 3/24/2018    Essential hypertension 3/23/2018       History reviewed. No pertinent surgical history.    Clinical Decision Making:     Personal factors/comorbidities: Afib; R MCA CVA; HTN. The listed co-morbidities and personal factors impact pt's current level and progress with functional mobility and independence.   Body systems elements affected: lower extremities, upper extremities, trunk, musculoskeletal system, neuromuscular system, cardiovascular, pulmonary system, integumentary system; orientation/level of consciousness  Impairments: see assessment below  Clinical Presentation: stable  Functional Outcome Tools: AMPAC, MMT, ROM  Evaluation Complexity Level: low    Time Tracking:     PT Received On: 18  PT Start  Time: 1035     PT Stop Time: 1053  PT Total Time (min): 18 min     Billable Minutes: Re-eval 10 and Neuromuscular Re-education 8      Nadja Sawyer PT, DPT  Pager: [phone]  3/29/2018

## 2018-03-29 NOTE — NURSING
NGT t  tube pulled out new one placed,MD notified,x-ray ordered to confirm placement ,on going monitoring.

## 2018-03-29 NOTE — ASSESSMENT & PLAN NOTE
- 03/23/18 CTA Stroke Multiphase with multiple non-occlusive thrombi noted  - MRI brain w/o show multiple infarcts without evidence of hemorrhagic conversion.  - Given recent in-hospital atrial fibrillation and takotsubo's CM suspicion for embolic stroke, in addition 2D Echo showed severely depressed LVEF 15-20% with evidence of stress CM  - he was beyond the   - PT/OT reordered   - eval for anticoagulation on 3/30

## 2018-03-29 NOTE — CARE UPDATE
Spoke with patient's niece, Malgorzata, at  regarding Mr. Hines's condition, need for g-tube and regarding discharge planning. She is overwhelmed by what has happened, the recent loss of her mother as well and needs assistance from her siblings with decision making. Mr. Hines does not have a medical POA. She asked that I call her sister Oumar Zurita at . Ms. Zurita did not answer. I will continue to call.     Riley Milner M.D. PGY-1  [facility]  1:20 PM  3/29/2018

## 2018-03-29 NOTE — PLAN OF CARE
Hospital Medicine Consult Service  Case discussed with Dr. Humberto Bolton from Neurocritical team. Patient to be accepted to hospital medicine service and hospital medicine to assume care of patient tomorrow morning at 7:00 am if patient is out of the Neuro ICU and on the floor. Patient to be placed on IMT list this evening and be reassigned to hospital medicine service for the am. Please see Neuro ICU stepdown note for full details on patient's hospital course.     Patient admitted with acute stroke from outside facility to neuro ICU. Patient not felt to be candidate for any Neuro intervention for stroke as patient not noted to have any large vessel occlusion and felt likely embolic cause for stroke. Patient also noted to have acute pancreatitis on admit and placed on bowel rest with IVF's and pancreatitis much improved. Patient with known alcohol abuse and pancreatitis felt to be alcohol related. Patient with significant dysarthria, aphasia and left sided plegic paresis to left side related to stroke and currently with NGT getting TF and likely will need PEG placement but Neuro ICU has been having difficulty contacting family to discuss any goals of care or PEG placement according to Dr. Bolton. Patient initially stepped down to hospital medicine briefly on 3/26 due to medical complexity from pancreatitis and severe anemia but then developed atrial fibrillation with RVR and sent back to Neuro ICU and placed on Diltiazem drip. HR improved and patient now ready for step down and Neuro ICU requesting step down to Hospital medicine instead of vascular Neurology service. No anticoagulation started for atrial fibrillation due to anemia and concern for GI bleeding.     PHYLLIS JUÁREZ MD  Attending Staff Physician   Hospital Medicine  Pager: [phone]  Spectralink: [phone]

## 2018-03-29 NOTE — SUBJECTIVE & OBJECTIVE
Interval History: see hospital course    Review of Systems   Unable to perform ROS: Mental status change     Objective:     Vital Signs (Most Recent):  Temp: 97.9 °F (36.6 °C) (03/29/18 1119)  Pulse: 76 (03/29/18 1119)  Resp: 19 (03/29/18 1119)  BP: (!) 148/83 (03/29/18 1119)  SpO2: 96 % (03/29/18 1119) Vital Signs (24h Range):  Temp:  [97.1 °F (36.2 °C)-98.3 °F (36.8 °C)] 97.9 °F (36.6 °C)  Pulse:  [76-94] 76  Resp:  [16-20] 19  SpO2:  [95 %-99 %] 96 %  BP: (144-175)/(74-94) 148/83     Weight: 71.5 kg (157 lb 10.1 oz)  Body mass index is 23.97 kg/m².    Intake/Output Summary (Last 24 hours) at 03/29/18 1346  Last data filed at 03/29/18 0919   Gross per 24 hour   Intake              750 ml   Output             4250 ml   Net            -3500 ml      Physical Exam   Constitutional: He appears well-developed.   Patient lying on pad in feces and urine.    HENT:   Head: Atraumatic.   Eyes: Pupils are equal, round, and reactive to light.   Patient with rightward gaze preference   Neck: No JVD present.   Cardiovascular: Normal rate, regular rhythm, S1 normal, S2 normal and intact distal pulses.    Pulses:       Radial pulses are 2+ on the right side, and 2+ on the left side.   Pulmonary/Chest: Effort normal and breath sounds normal.   Abdominal: Soft. Bowel sounds are normal. He exhibits no distension. There is no tenderness. There is no guarding.   Genitourinary:   Genitourinary Comments: Condom catheter in place   Musculoskeletal: He exhibits no edema.   Neurological: He is alert. He exhibits abnormal muscle tone. GCS eye subscore is 4. GCS verbal subscore is 3. GCS motor subscore is 6.   Only oriented to person. Expressive and receptive aphasia. LUE weak.      Skin: Skin is warm. No rash noted.   Psychiatric: His speech is slurred.   Nursing note and vitals reviewed.      Significant Labs:   Recent Results (from the past 24 hour(s))   POCT glucose    Collection Time: 03/28/18  4:07 PM   Result Value Ref Range    POCT  Glucose 126 (H) 70 - 110 mg/dL   POCT glucose    Collection Time: 03/29/18  1:15 AM   Result Value Ref Range    POCT Glucose 144 (H) 70 - 110 mg/dL   Comprehensive metabolic panel    Collection Time: 03/29/18  4:03 AM   Result Value Ref Range    Sodium 146 (H) 136 - 145 mmol/L    Potassium 3.9 3.5 - 5.1 mmol/L    Chloride 115 (H) 95 - 110 mmol/L    CO2 19 (L) 23 - 29 mmol/L    Glucose 88 70 - 110 mg/dL    BUN, Bld 25 (H) 8 - 23 mg/dL    Creatinine 0.9 0.5 - 1.4 mg/dL    Calcium 10.4 8.7 - 10.5 mg/dL    Total Protein 7.6 6.0 - 8.4 g/dL    Albumin 2.8 (L) 3.5 - 5.2 g/dL    Total Bilirubin 1.0 0.1 - 1.0 mg/dL    Alkaline Phosphatase 103 55 - 135 U/L    AST 29 10 - 40 U/L    ALT 16 10 - 44 U/L    Anion Gap 12 8 - 16 mmol/L    eGFR if African American >60.0 >60 mL/min/1.73 m^2    eGFR if non African American >60.0 >60 mL/min/1.73 m^2   Magnesium    Collection Time: 03/29/18  4:03 AM   Result Value Ref Range    Magnesium 2.0 1.6 - 2.6 mg/dL   Phosphorus    Collection Time: 03/29/18  4:03 AM   Result Value Ref Range    Phosphorus 3.0 2.7 - 4.5 mg/dL   CBC auto differential    Collection Time: 03/29/18  4:04 AM   Result Value Ref Range    WBC 14.89 (H) 3.90 - 12.70 K/uL    RBC 4.68 4.60 - 6.20 M/uL    Hemoglobin 9.4 (L) 14.0 - 18.0 g/dL    Hematocrit 33.8 (L) 40.0 - 54.0 %    MCV 72 (L) 82 - 98 fL    MCH 20.1 (L) 27.0 - 31.0 pg    MCHC 27.8 (L) 32.0 - 36.0 g/dL    RDW 31.0 (H) 11.5 - 14.5 %    Platelets 317 150 - 350 K/uL    MPV 10.0 9.2 - 12.9 fL    Immature Granulocytes 0.8 (H) 0.0 - 0.5 %    Gran # (ANC) 11.7 (H) 1.8 - 7.7 K/uL    Immature Grans (Abs) 0.12 (H) 0.00 - 0.04 K/uL    Lymph # 1.1 1.0 - 4.8 K/uL    Mono # 1.8 (H) 0.3 - 1.0 K/uL    Eos # 0.1 0.0 - 0.5 K/uL    Baso # 0.02 0.00 - 0.20 K/uL    nRBC 0 0 /100 WBC    Gran% 78.5 (H) 38.0 - 73.0 %    Lymph% 7.7 (L) 18.0 - 48.0 %    Mono% 12.0 4.0 - 15.0 %    Eosinophil% 0.9 0.0 - 8.0 %    Basophil% 0.1 0.0 - 1.9 %    Aniso Moderate     Poik Moderate     Poly Occasional      Hypo Occasional     Ovalocytes Occasional     Target Cells Occasional     [place] Cells Occasional     Spherocytes Occasional     Fragmented Cells Occasional     Differential Method Automated    POCT glucose    Collection Time: 03/29/18  4:52 AM   Result Value Ref Range    POCT Glucose 101 70 - 110 mg/dL   POCT glucose    Collection Time: 03/29/18 11:15 AM   Result Value Ref Range    POCT Glucose 101 70 - 110 mg/dL         Significant Imaging: I have reviewed all pertinent imaging results/findings within the past 24 hours.

## 2018-03-29 NOTE — PLAN OF CARE
Call placed to patient's niece Malgorzata ([phone]) to discuss discharge planning. Left voicemail requesting call back. Will continue to follow.    Cassie Carlos RN  Ext [phone]

## 2018-03-30 PROBLEM — G93.6 CYTOTOXIC CEREBRAL EDEMA: Status: ACTIVE | Noted: 2018-03-30

## 2018-03-30 PROBLEM — I50.22 CHRONIC SYSTOLIC HF (HEART FAILURE): Status: ACTIVE | Noted: 2018-03-30

## 2018-03-30 PROBLEM — I47.29 NSVT (NONSUSTAINED VENTRICULAR TACHYCARDIA): Status: ACTIVE | Noted: 2018-03-30

## 2018-03-30 PROBLEM — R13.12 OROPHARYNGEAL DYSPHAGIA: Status: ACTIVE | Noted: 2018-03-30

## 2018-03-30 PROBLEM — D64.9 ANEMIA: Status: ACTIVE | Noted: 2018-03-24

## 2018-03-30 LAB
ALBUMIN SERPL BCP-MCNC: 2.3 G/DL
ALP SERPL-CCNC: 60 U/L
ALT SERPL W/O P-5'-P-CCNC: 15 U/L
ANION GAP SERPL CALC-SCNC: 8 MMOL/L
ANISOCYTOSIS BLD QL SMEAR: SLIGHT
AST SERPL-CCNC: 21 U/L
BASOPHILS # BLD AUTO: 0.03 K/UL
BASOPHILS NFR BLD: 0.2 %
BILIRUB SERPL-MCNC: 0.6 MG/DL
BUN SERPL-MCNC: 26 MG/DL
CALCIUM SERPL-MCNC: 9.7 MG/DL
CHLORIDE SERPL-SCNC: 116 MMOL/L
CO2 SERPL-SCNC: 23 MMOL/L
CREAT SERPL-MCNC: 0.8 MG/DL
DIFFERENTIAL METHOD: ABNORMAL
EOSINOPHIL # BLD AUTO: 0.3 K/UL
EOSINOPHIL NFR BLD: 2.3 %
ERYTHROCYTE [DISTWIDTH] IN BLOOD BY AUTOMATED COUNT: 31 %
EST. GFR  (AFRICAN AMERICAN): >60 ML/MIN/1.73 M^2
EST. GFR  (NON AFRICAN AMERICAN): >60 ML/MIN/1.73 M^2
FERRITIN SERPL-MCNC: 127 NG/ML
FOLATE SERPL-MCNC: 9.7 NG/ML
GLUCOSE SERPL-MCNC: 121 MG/DL
HCT VFR BLD AUTO: 29.4 %
HGB BLD-MCNC: 8.1 G/DL
HYPOCHROMIA BLD QL SMEAR: ABNORMAL
IMM GRANULOCYTES # BLD AUTO: 0.16 K/UL
IMM GRANULOCYTES NFR BLD AUTO: 1.2 %
LYMPHOCYTES # BLD AUTO: 1.1 K/UL
LYMPHOCYTES NFR BLD: 8.8 %
MAGNESIUM SERPL-MCNC: 1.9 MG/DL
MCH RBC QN AUTO: 19.9 PG
MCHC RBC AUTO-ENTMCNC: 27.6 G/DL
MCV RBC AUTO: 72 FL
MONOCYTES # BLD AUTO: 1.9 K/UL
MONOCYTES NFR BLD: 14.9 %
NEUTROPHILS # BLD AUTO: 9.5 K/UL
NEUTROPHILS NFR BLD: 72.6 %
NRBC BLD-RTO: 0 /100 WBC
OVALOCYTES BLD QL SMEAR: ABNORMAL
PHOSPHATE SERPL-MCNC: 3 MG/DL
PLATELET # BLD AUTO: 274 K/UL
PMV BLD AUTO: ABNORMAL FL
POCT GLUCOSE: 135 MG/DL (ref 70–110)
POCT GLUCOSE: 137 MG/DL (ref 70–110)
POCT GLUCOSE: 151 MG/DL (ref 70–110)
POCT GLUCOSE: 171 MG/DL (ref 70–110)
POIKILOCYTOSIS BLD QL SMEAR: SLIGHT
POLYCHROMASIA BLD QL SMEAR: ABNORMAL
POTASSIUM SERPL-SCNC: 3.4 MMOL/L
PROT SERPL-MCNC: 6.2 G/DL
RBC # BLD AUTO: 4.07 M/UL
SCHISTOCYTES BLD QL SMEAR: ABNORMAL
SODIUM SERPL-SCNC: 147 MMOL/L
TARGETS BLD QL SMEAR: ABNORMAL
VIT B12 SERPL-MCNC: 578 PG/ML
WBC # BLD AUTO: 13.02 K/UL

## 2018-03-30 PROCEDURE — 63600175 PHARM REV CODE 636 W HCPCS: Performed by: PSYCHIATRY & NEUROLOGY

## 2018-03-30 PROCEDURE — 82607 VITAMIN B-12: CPT

## 2018-03-30 PROCEDURE — 11000001 HC ACUTE MED/SURG PRIVATE ROOM

## 2018-03-30 PROCEDURE — 25000003 PHARM REV CODE 250: Performed by: NURSE PRACTITIONER

## 2018-03-30 PROCEDURE — 36415 COLL VENOUS BLD VENIPUNCTURE: CPT

## 2018-03-30 PROCEDURE — 25000003 PHARM REV CODE 250: Performed by: STUDENT IN AN ORGANIZED HEALTH CARE EDUCATION/TRAINING PROGRAM

## 2018-03-30 PROCEDURE — 99233 SBSQ HOSP IP/OBS HIGH 50: CPT | Mod: ,,, | Performed by: PSYCHIATRY & NEUROLOGY

## 2018-03-30 PROCEDURE — C9113 INJ PANTOPRAZOLE SODIUM, VIA: HCPCS | Performed by: HOSPITALIST

## 2018-03-30 PROCEDURE — 84100 ASSAY OF PHOSPHORUS: CPT

## 2018-03-30 PROCEDURE — 63600175 PHARM REV CODE 636 W HCPCS: Performed by: STUDENT IN AN ORGANIZED HEALTH CARE EDUCATION/TRAINING PROGRAM

## 2018-03-30 PROCEDURE — 63600175 PHARM REV CODE 636 W HCPCS: Performed by: HOSPITALIST

## 2018-03-30 PROCEDURE — 83735 ASSAY OF MAGNESIUM: CPT

## 2018-03-30 PROCEDURE — 80053 COMPREHEN METABOLIC PANEL: CPT

## 2018-03-30 PROCEDURE — 82728 ASSAY OF FERRITIN: CPT

## 2018-03-30 PROCEDURE — 99233 SBSQ HOSP IP/OBS HIGH 50: CPT | Mod: GC,,, | Performed by: INTERNAL MEDICINE

## 2018-03-30 PROCEDURE — 99232 SBSQ HOSP IP/OBS MODERATE 35: CPT | Mod: GC,,, | Performed by: HOSPITALIST

## 2018-03-30 PROCEDURE — 85025 COMPLETE CBC W/AUTO DIFF WBC: CPT

## 2018-03-30 PROCEDURE — 25000003 PHARM REV CODE 250: Performed by: PHYSICIAN ASSISTANT

## 2018-03-30 PROCEDURE — 82746 ASSAY OF FOLIC ACID SERUM: CPT

## 2018-03-30 RX ORDER — LISINOPRIL 10 MG/1
10 TABLET ORAL DAILY
Status: DISCONTINUED | OUTPATIENT
Start: 2018-03-30 | End: 2018-04-02

## 2018-03-30 RX ADMIN — METOPROLOL TARTRATE 25 MG: 25 TABLET ORAL at 11:03

## 2018-03-30 RX ADMIN — HEPARIN SODIUM 5000 UNITS: 5000 INJECTION, SOLUTION INTRAVENOUS; SUBCUTANEOUS at 05:03

## 2018-03-30 RX ADMIN — Medication 100 MG: at 11:03

## 2018-03-30 RX ADMIN — ASPIRIN 81 MG CHEWABLE TABLET 81 MG: 81 TABLET CHEWABLE at 11:03

## 2018-03-30 RX ADMIN — OXYCODONE HYDROCHLORIDE 10 MG: 5 TABLET ORAL at 09:03

## 2018-03-30 RX ADMIN — PANTOPRAZOLE SODIUM 40 MG: 40 GRANULE, DELAYED RELEASE ORAL at 11:03

## 2018-03-30 RX ADMIN — Medication 10 ML: at 11:03

## 2018-03-30 RX ADMIN — PANTOPRAZOLE SODIUM 40 MG: 40 INJECTION, POWDER, FOR SOLUTION INTRAVENOUS at 09:03

## 2018-03-30 RX ADMIN — POLYETHYLENE GLYCOL 3350 17 G: 17 POWDER, FOR SOLUTION ORAL at 11:03

## 2018-03-30 RX ADMIN — METOPROLOL TARTRATE 25 MG: 25 TABLET ORAL at 09:03

## 2018-03-30 RX ADMIN — FOLIC ACID 1 MG: 1 TABLET ORAL at 11:03

## 2018-03-30 RX ADMIN — INSULIN ASPART 2 UNITS: 100 INJECTION, SOLUTION INTRAVENOUS; SUBCUTANEOUS at 11:03

## 2018-03-30 RX ADMIN — HEPARIN SODIUM 5000 UNITS: 5000 INJECTION, SOLUTION INTRAVENOUS; SUBCUTANEOUS at 09:03

## 2018-03-30 RX ADMIN — METOPROLOL TARTRATE 25 MG: 25 TABLET ORAL at 02:03

## 2018-03-30 RX ADMIN — HEPARIN SODIUM 5000 UNITS: 5000 INJECTION, SOLUTION INTRAVENOUS; SUBCUTANEOUS at 02:03

## 2018-03-30 RX ADMIN — MINERAL SUPPLEMENT IRON 300 MG / 5 ML STRENGTH LIQUID 100 PER BOX UNFLAVORED 300 MG: at 11:03

## 2018-03-30 RX ADMIN — LISINOPRIL 10 MG: 10 TABLET ORAL at 11:03

## 2018-03-30 RX ADMIN — STANDARDIZED SENNA CONCENTRATE AND DOCUSATE SODIUM 1 TABLET: 8.6; 5 TABLET, FILM COATED ORAL at 11:03

## 2018-03-30 NOTE — ASSESSMENT & PLAN NOTE
Microcytic anemia with no overt signs of bleeding at this time    Recommendations:  Monitor daily CBC  Monitor BM for melena or BRBPR  Increase PPI to PPI IV BID in the interm

## 2018-03-30 NOTE — ASSESSMENT & PLAN NOTE
- 03/23/18 CTA Stroke Multiphase with multiple non-occlusive thrombi noted  - MRI brain w/o show multiple infarcts without evidence of hemorrhagic conversion.  - Given recent in-hospital atrial fibrillation and takotsubo's CM suspicion for embolic stroke, in addition 2D Echo showed severely depressed LVEF 15-20% with evidence of stress CM  - PT/OT reordered   - patient with repeat drop in H/H but the 3/29 increase was likely outlier  - no active blood loss - needs NOAC

## 2018-03-30 NOTE — SUBJECTIVE & OBJECTIVE
Interval History: see hospital course    Review of Systems   Unable to perform ROS: Mental status change     Objective:     Vital Signs (Most Recent):  Temp: 97.9 °F (36.6 °C) (03/30/18 0720)  Pulse: 96 (03/30/18 0738)  Resp: 16 (03/30/18 0720)  BP: (!) 156/81 (03/30/18 0720)  SpO2: 96 % (03/30/18 0720) Vital Signs (24h Range):  Temp:  [97.4 °F (36.3 °C)-98.3 °F (36.8 °C)] 97.9 °F (36.6 °C)  Pulse:  [76-96] 96  Resp:  [16-19] 16  SpO2:  [96 %-98 %] 96 %  BP: (148-179)/(81-93) 156/81     Weight: 71.5 kg (157 lb 10.1 oz)  Body mass index is 23.97 kg/m².    Intake/Output Summary (Last 24 hours) at 03/30/18 0905  Last data filed at 03/29/18 0919   Gross per 24 hour   Intake              250 ml   Output              250 ml   Net                0 ml      Physical Exam   Constitutional: He appears well-developed.   HENT:   Head: Atraumatic.   Eyes: Pupils are equal, round, and reactive to light.   Patient with rightward gaze preference  Arcus senilis bilaterally   Neck: No JVD present.   Cardiovascular: Normal rate, regular rhythm, S1 normal, S2 normal and intact distal pulses.    Pulses:       Radial pulses are 2+ on the right side, and 2+ on the left side.        Dorsalis pedis pulses are 1+ on the right side, and 1+ on the left side.   Pulmonary/Chest: Effort normal and breath sounds normal.   Abdominal: Soft. Bowel sounds are normal. He exhibits no distension. There is no tenderness. There is no guarding.   Genitourinary:   Genitourinary Comments: Condom catheter in place   Musculoskeletal: He exhibits no edema.   Neurological: He is alert. He exhibits abnormal muscle tone. GCS eye subscore is 4. GCS verbal subscore is 3. GCS motor subscore is 6.   Only oriented to person. Expressive and receptive aphasia. LUE weak - 2/5 strength in Left hand     Skin: Skin is warm. No rash noted.   Psychiatric: His speech is slurred.   Nursing note and vitals reviewed.      Significant Labs:   Recent Results (from the past 24 hour(s))    POCT glucose    Collection Time: 03/29/18 11:15 AM   Result Value Ref Range    POCT Glucose 101 70 - 110 mg/dL   POCT glucose    Collection Time: 03/29/18 10:36 PM   Result Value Ref Range    POCT Glucose 101 70 - 110 mg/dL   POCT glucose    Collection Time: 03/30/18  5:45 AM   Result Value Ref Range    POCT Glucose 151 (H) 70 - 110 mg/dL   Comprehensive metabolic panel    Collection Time: 03/30/18  6:13 AM   Result Value Ref Range    Sodium 147 (H) 136 - 145 mmol/L    Potassium 3.4 (L) 3.5 - 5.1 mmol/L    Chloride 116 (H) 95 - 110 mmol/L    CO2 23 23 - 29 mmol/L    Glucose 121 (H) 70 - 110 mg/dL    BUN, Bld 26 (H) 8 - 23 mg/dL    Creatinine 0.8 0.5 - 1.4 mg/dL    Calcium 9.7 8.7 - 10.5 mg/dL    Total Protein 6.2 6.0 - 8.4 g/dL    Albumin 2.3 (L) 3.5 - 5.2 g/dL    Total Bilirubin 0.6 0.1 - 1.0 mg/dL    Alkaline Phosphatase 60 55 - 135 U/L    AST 21 10 - 40 U/L    ALT 15 10 - 44 U/L    Anion Gap 8 8 - 16 mmol/L    eGFR if African American >60.0 >60 mL/min/1.73 m^2    eGFR if non African American >60.0 >60 mL/min/1.73 m^2   CBC auto differential    Collection Time: 03/30/18  6:13 AM   Result Value Ref Range    WBC 13.02 (H) 3.90 - 12.70 K/uL    RBC 4.07 (L) 4.60 - 6.20 M/uL    Hemoglobin 8.1 (L) 14.0 - 18.0 g/dL    Hematocrit 29.4 (L) 40.0 - 54.0 %    MCV 72 (L) 82 - 98 fL    MCH 19.9 (L) 27.0 - 31.0 pg    MCHC 27.6 (L) 32.0 - 36.0 g/dL    RDW 31.0 (H) 11.5 - 14.5 %    Platelets 274 150 - 350 K/uL    MPV SEE COMMENT 9.2 - 12.9 fL   Magnesium    Collection Time: 03/30/18  6:13 AM   Result Value Ref Range    Magnesium 1.9 1.6 - 2.6 mg/dL   Phosphorus    Collection Time: 03/30/18  6:13 AM   Result Value Ref Range    Phosphorus 3.0 2.7 - 4.5 mg/dL   Ferritin    Collection Time: 03/30/18  6:13 AM   Result Value Ref Range    Ferritin 127 20.0 - 300.0 ng/mL   Vitamin B12    Collection Time: 03/30/18  6:13 AM   Result Value Ref Range    Vitamin B-12 578 210 - 950 pg/mL   Folate    Collection Time: 03/30/18  6:13 AM   Result  Value Ref Range    Folate 9.7 4.0 - 24.0 ng/mL         Significant Imaging: I have reviewed all pertinent imaging results/findings within the past 24 hours.

## 2018-03-30 NOTE — ASSESSMENT & PLAN NOTE
- Hemoglobin 5.8 at OSH, 6.4 here after 1UPRBC at OSH upon admission   - 2U PRBC transfused here and now relatively stable  - Workup and management per primary team  - GI consulted, no apparent bleeding

## 2018-03-30 NOTE — ASSESSMENT & PLAN NOTE
78 year old male with a history of HTN currently admitted for a right MCA stroke on who GI is being consulted for PEG placement in the setting of dysphagia.    On our exam patient exhibits trouble with his speech as well as left sided paralysis. Although PEG seems warranted would recommend that patient continued to work with Speech therapy.    Only on ASA no additional AC with no abdominal scar and mild WBC in the setting of no fevers.    Recommendations:  --Continue TF via NGT  --Speech therapy for continued therapy after stroke  --Tentative PEG Monday vs Tuesday (will keep team updated)

## 2018-03-30 NOTE — SUBJECTIVE & OBJECTIVE
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 No pertinent surgical history.    Review of patient's allergies indicates:  Not on File  Family History     None        Social History Main Topics    Smoking status: Unknown If Ever Smoked    Smokeless tobacco: Not on file    Alcohol use Not on file    Drug use: Unknown    Sexual activity: Not on file     Review of Systems   Unable to perform ROS: Mental status change     Objective:     Vital Signs (Most Recent):  Temp: 97.8 °F (36.6 °C) (03/30/18 1120)  Pulse: 95 (03/30/18 1120)  Resp: 18 (03/30/18 1120)  BP: (!) 180/96 (03/30/18 1120)  SpO2: 97 % (03/30/18 1120) Vital Signs (24h Range):  Temp:  [97.4 °F (36.3 °C)-98.3 °F (36.8 °C)] 97.8 °F (36.6 °C)  Pulse:  [76-96] 95  Resp:  [16-18] 18  SpO2:  [96 %-98 %] 97 %  BP: (154-180)/(81-96) 180/96     Weight: 71.5 kg (157 lb 10.1 oz) (03/28/18 0400)  Body mass index is 23.97 kg/m².      Intake/Output Summary (Last 24 hours) at 03/30/18 1252  Last data filed at 03/30/18 1100   Gross per 24 hour   Intake              300 ml   Output                0 ml   Net              300 ml       Lines/Drains/Airways     Drain            Male External Urinary Catheter 03/28/18 0705 Medium 2 days         NG/OG Tube 03/30/18 0700 Right mouth less than 1 day          Peripheral Intravenous Line                 Peripheral IV - Single Lumen 03/26/18 1120 Left Antecubital 4 days         Peripheral IV - Single Lumen 03/27/18 1505 Right Forearm 2 days                Physical Exam   Constitutional: No distress.   HENT:   Head: Normocephalic and atraumatic.   Eyes: No scleral icterus.   Neck: Normal range of motion.   Cardiovascular: Normal rate and regular rhythm.    Pulmonary/Chest: Effort normal and breath sounds normal.   Abdominal: Soft. Bowel sounds  are normal. He exhibits no distension. There is no tenderness.   No prior surgical scars   Musculoskeletal: He exhibits no edema.   Neurological:   Unable to assess orientation  Unable to move LUE and LLE extremity   Skin: He is not diaphoretic.       Significant Labs:  CBC:   Recent Labs  Lab 03/29/18  0404 03/30/18  0613   WBC 14.89* 13.02*   HGB 9.4* 8.1*   HCT 33.8* 29.4*    274     CMP:   Recent Labs  Lab 03/30/18  0613   *   CALCIUM 9.7   ALBUMIN 2.3*   PROT 6.2   *   K 3.4*   CO2 23   *   BUN 26*   CREATININE 0.8   ALKPHOS 60   ALT 15   AST 21   BILITOT 0.6     Coagulation: No results for input(s): PT, INR, APTT in the last 48 hours.    Significant Imaging:  Imaging results within the past 24 hours have been reviewed.

## 2018-03-30 NOTE — ASSESSMENT & PLAN NOTE
Pt is a 79 y/o male with medical history of HTN presented to OSH in [place] ([facility]) with left-sided weakness, dysarthria, anemia, and acute pancreatitis. Patient had evidence of left subacute cerebellar infarct and right cerebral infarct on head CT. He was out of the window for tPA and was sent to [facility] emergently for CTA Stroke Multiphase and possible endovascular intervention. CTA stroke multiphase with no LVO, no intervention planned. MRI here with  bilateral frontal, parietal, occipital, cerebellar infarcts L>R. Image suggestive of acute extensive acute embolic stroke. Likely cardioembolic, documented afib in chart. ECHO shows EF 15-20% concerning for stress induced cardiomyopathy.       Antithrombotics for secondary stroke prevention: Being held as patient with significant anemia and cause known; r/o bleed. If ruled out, start anticoagulation (any DOAC or warfarin is reasonable from stroke standpoint for secondary stroke prevention).     Statins for secondary stroke prevention and hyperlipidemia, if present: Statins: Atorvastatin- 40 mg daily LDL = 70     Aggressive risk factor modification: HTN,  hypercoagulable state with pancreatitis      Rehab efforts: PT/OT/SLP to evaluate and treat -recommended SNF. Likely need PEG, still NPO    Diagnostics ordered/pending: none     VTE prophylaxis:  SCDs    BP parameters: Infarct: No intervention, SBP <180 if primary team deems appropriate

## 2018-03-30 NOTE — ASSESSMENT & PLAN NOTE
- Patient noted to be in Afib with RVR 3/25  - In NSR this AM with rate of 70s-90s, multiple short runs of NSVT  - Would recommend anticoaulation - If anticoagulation is initiated for Afib, Eliquis is $8.35/month--Eliquis was shown to have similar GIB risk as warfarin in [facility]; other DOACs shown to have increased risk compared to warfarin in major Afib studies  - cardiac monitoring  - Currently on metoprolol 25mg TID for rate control

## 2018-03-30 NOTE — PROGRESS NOTES
[facility]  Vascular Neurology  Comprehensive Stroke Center  Progress Note    Assessment/Plan:     * Embolic stroke involving right middle cerebral artery    Pt is a 77 y/o male with medical history of HTN presented to OSH in [place] ([facility]) with left-sided weakness, dysarthria, anemia, and acute pancreatitis. Patient had evidence of left subacute cerebellar infarct and right cerebral infarct on head CT. He was out of the window for tPA and was sent to [facility] emergently for CTA Stroke Multiphase and possible endovascular intervention. CTA stroke multiphase with no LVO, no intervention planned. MRI here with  bilateral frontal, parietal, occipital, cerebellar infarcts L>R. Image suggestive of acute extensive acute embolic stroke. Likely cardioembolic, documented afib in chart. ECHO shows EF 15-20% concerning for stress induced cardiomyopathy.       Antithrombotics for secondary stroke prevention: Being held as patient with significant anemia and cause known; r/o bleed. If ruled out, start anticoagulation (any DOAC or warfarin is reasonable from stroke standpoint for secondary stroke prevention).     Statins for secondary stroke prevention and hyperlipidemia, if present: Statins: Atorvastatin- 40 mg daily LDL = 70     Aggressive risk factor modification: HTN,  hypercoagulable state with pancreatitis      Rehab efforts: PT/OT/SLP to evaluate and treat -recommended SNF. Likely need PEG, still NPO    Diagnostics ordered/pending: none     VTE prophylaxis:  SCDs    BP parameters: Infarct: No intervention, SBP <180 if primary team deems appropriate           Cytotoxic cerebral edema    Secondary to ischemic stroke  Noted on imaging         Chronic systolic HF (heart failure)    Most recent ECHO shows EF 15-20%  Suggestive of stress induced cardiomyopathy         Takotsubo cardiomyopathy    Noted on ECHO         Aphasia as late effect of cerebrovascular accident    Aggressive ST        Atrial  fibrillation with RVR    Stroke risk factor. Likely etiology of multiple embolic infarcts   Recommend anticoagulation when appropriate; patient requires PEG and has had anemia requiring transfusions        Microcytic anemia    - Hemoglobin 5.8 at OSH, 6.4 here after 1UPRBC at OSH upon admission   - 2U PRBC transfused here and now relatively stable  - Workup and management per primary team  - GI consulted, no apparent bleeding         Acute pancreatitis    - Lipase >1000 initially with imaging findings of peripancreatic fluid collection at tail from OSH.  - Continue IVF and management per primary team        Essential hypertension    Stroke risk factor   Non compliant with medication        Acute encephalopathy    - Multifactorial; likely 2/2 to CVA, pancreatitis, and possible infectious cause             3/24 Patient's hemoglobin was 6.4 on arrival (s/p 1U PRBC on transport); transfused additional 2U PRBC; repeat hemoglobin 8.8. Of note, patient's WBC on arrival here significant for leukocytosis of 36. MRI demonstrable for bilateral frontal, parietal, occipital, cerebellar infarcts L>R. Cause likely cardio-embolic.   3/30/18 No new imaging or neurologic changes. Patient's ECHo was concerning for a stress induced cardiomyopathy, EF 15-20%. Patient likely needs PEG tube, still NPO. Disposition pending SNF. Still not anticoagulated at this time due to anemia, but primary team plans on starting soon when they feel H/H stable and appropriate.     STROKE DOCUMENTATION   Acute Stroke Times   Last Known Normal Date: 03/22/18  Last Known Normal Time: 2100  Symptom Onset Date: 03/22/18  Symptom Onset Time: 2100  Stroke Team Called Date: 03/23/18  Stroke Team Called Time: 1555  Stroke Team Arrival Date: 03/23/18  Stroke Team Arrival Time: 1555  CT Interpretation Time: 1610  Decision to Treat Time for IR:  (Not a candidate)    NIH Scale:  1a. Level Of Consciousness: 0-->Alert: keenly responsive  1b. LOC Questions: 2-->Answers  neither question correctly  1c. LOC Commands: 2-->Performs neither task correctly  2. Best Gaze: 1-->Partial gaze palsy: gaze is abnormal in one or both eyes, but forced deviation or total gaze paresis is not present  3. Visual: 2-->Complete hemianopia  4. Facial Palsy: 1-->Minor paralysis (flattened nasolabial fold, asymmetry on smiling)  5a. Motor Arm, Left: 4-->No movement  5b. Motor Arm, Right: 1-->Drift: limb holds 90 (or 45) degrees, but drifts down before full 10 secs: does not hit bed or other support  6a. Motor Leg, Left: 3-->No effort against gravity: leg falls to bed immediately  6b. Motor Leg, Right: 1-->Drift: leg falls by the end of the 5-sec period but does not hit bed  7. Limb Ataxia: 0-->Absent  8. Sensory: 0-->Normal: no sensory loss  9. Best Language: 2-->Severe aphasia: all communication is through fragmentary expression: great need for inference, questioning, and guessing by the listener. Range of information that can be exchanged is limited: listener carries burden of. . . (see row details)  10. Dysarthria: 2-->Severe dysarthria: patients speech is so slurred as to be unintelligible in the absence of or out of proportion to any dysphasia, or is mute/anarthric  11. Extinction and Inattention (formerly Neglect): 1-->Visual, tactile, auditory, spatial, or personal inattention or extinction to bilateral simultaneous stimulation in one of the sensory modalities  Total (NIH Stroke Scale): 22       Modified [place] Score: 1  Art Coma Scale:    ABCD2 Score:    [id number] Score:   HAS -BLED Score:   ICH Score:   Hunt & Connors Classification:      Hemorrhagic change of an Ischemic Stroke: Does this patient have an ischemic stroke with hemorrhagic changes? No     Neurologic Chief Complaint: L sided weakness, dysphagia, dysarthria    Subjective:     Interval History:     No new imaging or neurologic changes. Patient's ECHO was concerning for a stress induced cardiomyopathy, EF 15-20%. Patient likely  needs PEG tube, still NPO. Disposition pending SNF. Still not anticoagulated at this time due to anemia, but primary team plans on starting soon when they feel H/H stable and appropriate.     HPI, Past Medical, Family, and Social History remains the same as documented in the initial encounter.     Review of Systems   Constitutional: Negative for fever.   HENT: Negative for congestion.    Eyes: Negative for redness.   Respiratory: Negative for cough and shortness of breath.    Cardiovascular: Negative for chest pain.   Gastrointestinal: Positive for abdominal pain.   Genitourinary: Negative for dysuria and hematuria.   Skin: Negative for color change and pallor.   Neurological: Positive for facial asymmetry, speech difficulty and weakness.   Psychiatric/Behavioral: Positive for confusion. Negative for behavioral problems.     Scheduled Meds:   aspirin  81 mg Per NG tube Daily    folic acid  1 mg Per NG tube Daily    heparin (porcine)  5,000 Units Subcutaneous Q8H    lisinopril  10 mg Oral Daily    metoprolol tartrate  25 mg Per NG tube TID    multivitamin liquid no.118  10 mL Per NG tube Daily    pantoprazole 40 mg in dextrose 5 % 100 mL infusion (ready to mix system)  40 mg Intravenous BID    polyethylene glycol  17 g Per NG tube Daily    senna-docusate 8.6-50 mg  1 tablet Per NG tube Daily    thiamine  100 mg Per NG tube Daily     Continuous Infusions:    PRN Meds:acetaminophen, dextrose 50%, glucagon (human recombinant), insulin aspart U-100, ondansetron, oxyCODONE    Objective:     Vital Signs (Most Recent):  Temp: 97.8 °F (36.6 °C) (03/30/18 1120)  Pulse: 74 (03/30/18 1429)  Resp: 18 (03/30/18 1120)  BP: (!) 180/96 (03/30/18 1120)  SpO2: 97 % (03/30/18 1120)  BP Location: Right arm    Vital Signs Range (Last 24H):  Temp:  [97.4 °F (36.3 °C)-98.3 °F (36.8 °C)]   Pulse:  [74-96]   Resp:  [16-18]   BP: (154-180)/(81-96)   SpO2:  [96 %-98 %]   BP Location: Right arm    Physical Exam   Constitutional: He  appears well-developed.   HENT:   Head: Normocephalic and atraumatic.   Eyes: Pupils are equal, round, and reactive to light.   Neck: No tracheal deviation present.   Cardiovascular: Normal rate.    Pulmonary/Chest: Effort normal.   Abdominal: He exhibits distension.   Musculoskeletal:   LUE weakness, flaccid    Neurological: He is alert.   Not oriented to time, place, or situation   Skin: Skin is warm. No rash noted.   Psychiatric: He has a normal mood and affect.   Vitals reviewed.      Neurological Exam:   LOC: alert  Attention Span: poor  Language: aphasia  Articulation: dysarthria  Orientation: Disoriented to time, place, and situation  Visual Fields: Hemianopsia left  EOM (CN III, IV, VI): Horizontal gaze preference R   Pupils (CN II, III): PERRL  Facial Movement (CN VII): Lower facial weakness on the Left  Motor: Arm left  Paresis  0/5  Leg left  Paresis: 1/5  Arm right  Normal 5/5  Leg right Paresis: 3/5  Cebellar: No evidence of appendicular or axial ataxia  Sensation: Intact to light touch  Tone: Flaccid  LUE        Laboratory:  CMP:     Recent Labs  Lab 03/30/18  0613   CALCIUM 9.7   ALBUMIN 2.3*   PROT 6.2   *   K 3.4*   CO2 23   *   BUN 26*   CREATININE 0.8   ALKPHOS 60   ALT 15   AST 21   BILITOT 0.6     CBC:     Recent Labs  Lab 03/30/18  0613   WBC 13.02*   RBC 4.07*   HGB 8.1*   HCT 29.4*      MCV 72*   MCH 19.9*   MCHC 27.6*     Lipid Panel:     Recent Labs  Lab 03/23/18  1731   CHOL 120   LDLCALC 70.2   HDL 34*   TRIG 79  79     Hgb A1C:     Recent Labs  Lab 03/23/18  1731   HGBA1C 5.1       Diagnostic Results     Brain Imaging   MRI Brain without Contrast Impression 3/24/18      Acute infarcts involving the frontal, parietal, and occipital lobes with involvement of the cerebellum, left greater than right.  These were seen to some extent on prior CTA.  No hemorrhagic conversion at this time.  The findings are concerning for extensive acute embolic stroke.    Moderate chronic  microvascular ischemic changes.                 ECHO 3/24/18:   CONCLUSIONS     1 - Consider Stress induced CM - Takotsubo CM.     2 - Eccentric LVH with severely depressed left ventricular systolic function (EF 15-20%).     3 - Normal RV size with moderately depressed right ventricular systolic function .     4 - Mild mitral regurgitation.     5 - Mild tricuspid regurgitation.     6 - Pulmonary hypertension. The estimated PA systolic pressure is 61 mmHg.     7 - Increased central venous pressure.     8 - No prior echo in our system       Sabrina Crenshaw PA-C  Comprehensive Stroke Center  Department of Vascular Neurology   [facility]

## 2018-03-30 NOTE — SUBJECTIVE & OBJECTIVE
Neurologic Chief Complaint: L sided weakness, dysphagia, dysarthria    Subjective:     Interval History:     No new imaging or neurologic changes. Patient's ECHO was concerning for a stress induced cardiomyopathy, EF 15-20%. Patient likely needs PEG tube, still NPO. Disposition pending SNF. Still not anticoagulated at this time due to anemia, but primary team plans on starting soon when they feel H/H stable and appropriate.     HPI, Past Medical, Family, and Social History remains the same as documented in the initial encounter.     Review of Systems   Constitutional: Negative for fever.   HENT: Negative for congestion.    Eyes: Negative for redness.   Respiratory: Negative for cough and shortness of breath.    Cardiovascular: Negative for chest pain.   Gastrointestinal: Positive for abdominal pain.   Genitourinary: Negative for dysuria and hematuria.   Skin: Negative for color change and pallor.   Neurological: Positive for facial asymmetry, speech difficulty and weakness.   Psychiatric/Behavioral: Positive for confusion. Negative for behavioral problems.     Scheduled Meds:   aspirin  81 mg Per NG tube Daily    folic acid  1 mg Per NG tube Daily    heparin (porcine)  5,000 Units Subcutaneous Q8H    lisinopril  10 mg Oral Daily    metoprolol tartrate  25 mg Per NG tube TID    multivitamin liquid no.118  10 mL Per NG tube Daily    pantoprazole 40 mg in dextrose 5 % 100 mL infusion (ready to mix system)  40 mg Intravenous BID    polyethylene glycol  17 g Per NG tube Daily    senna-docusate 8.6-50 mg  1 tablet Per NG tube Daily    thiamine  100 mg Per NG tube Daily     Continuous Infusions:    PRN Meds:acetaminophen, dextrose 50%, glucagon (human recombinant), insulin aspart U-100, ondansetron, oxyCODONE    Objective:     Vital Signs (Most Recent):  Temp: 97.8 °F (36.6 °C) (03/30/18 1120)  Pulse: 74 (03/30/18 1429)  Resp: 18 (03/30/18 1120)  BP: (!) 180/96 (03/30/18 1120)  SpO2: 97 % (03/30/18 1120)  BP  Location: Right arm    Vital Signs Range (Last 24H):  Temp:  [97.4 °F (36.3 °C)-98.3 °F (36.8 °C)]   Pulse:  [74-96]   Resp:  [16-18]   BP: (154-180)/(81-96)   SpO2:  [96 %-98 %]   BP Location: Right arm    Physical Exam   Constitutional: He appears well-developed.   HENT:   Head: Normocephalic and atraumatic.   Eyes: Pupils are equal, round, and reactive to light.   Neck: No tracheal deviation present.   Cardiovascular: Normal rate.    Pulmonary/Chest: Effort normal.   Abdominal: He exhibits distension.   Musculoskeletal:   LUE weakness, flaccid    Neurological: He is alert.   Not oriented to time, place, or situation   Skin: Skin is warm. No rash noted.   Psychiatric: He has a normal mood and affect.   Vitals reviewed.      Neurological Exam:   LOC: alert  Attention Span: poor  Language: aphasia  Articulation: dysarthria  Orientation: Disoriented to time, place, and situation  Visual Fields: Hemianopsia left  EOM (CN III, IV, VI): Horizontal gaze preference R   Pupils (CN II, III): PERRL  Facial Movement (CN VII): Lower facial weakness on the Left  Motor: Arm left  Paresis 0/5  Leg left  Paresis: 1/5  Arm right  Normal 5/5  Leg right Paresis: 3/5  Cebellar: No evidence of appendicular or axial ataxia  Sensation: Intact to light touch  Tone: Flaccid  LUE        Laboratory:  CMP:     Recent Labs  Lab 03/30/18  0613   CALCIUM 9.7   ALBUMIN 2.3*   PROT 6.2   *   K 3.4*   CO2 23   *   BUN 26*   CREATININE 0.8   ALKPHOS 60   ALT 15   AST 21   BILITOT 0.6     CBC:     Recent Labs  Lab 03/30/18  0613   WBC 13.02*   RBC 4.07*   HGB 8.1*   HCT 29.4*      MCV 72*   MCH 19.9*   MCHC 27.6*     Lipid Panel:     Recent Labs  Lab 03/23/18  1731   CHOL 120   LDLCALC 70.2   HDL 34*   TRIG 79  79     Hgb A1C:     Recent Labs  Lab 03/23/18  1731   HGBA1C 5.1       Diagnostic Results     Brain Imaging   MRI Brain without Contrast Impression 3/24/18      Acute infarcts involving the frontal, parietal, and occipital  lobes with involvement of the cerebellum, left greater than right.  These were seen to some extent on prior CTA.  No hemorrhagic conversion at this time.  The findings are concerning for extensive acute embolic stroke.    Moderate chronic microvascular ischemic changes.                 ECHO 3/24/18:   CONCLUSIONS     1 - Consider Stress induced CM - Takotsubo CM.     2 - Eccentric LVH with severely depressed left ventricular systolic function (EF 15-20%).     3 - Normal RV size with moderately depressed right ventricular systolic function .     4 - Mild mitral regurgitation.     5 - Mild tricuspid regurgitation.     6 - Pulmonary hypertension. The estimated PA systolic pressure is 61 mmHg.     7 - Increased central venous pressure.     8 - No prior echo in our system

## 2018-03-30 NOTE — ASSESSMENT & PLAN NOTE
Acute pancreatitis in the setting of no reported alcohol use, visualized stones (although US here was days after initial diagnosis), normal Tg, or significant hypercalcemia.     Recommendations:  Needs outpatient follow up with [facility] for possible EUS vs MRCP

## 2018-03-30 NOTE — ASSESSMENT & PLAN NOTE
- patient with daily episodes of NSVT  - remains on metoprolol for Afib w RVR  - unable to obtain ROS if symptomatic during episodes  - on telemetry - will monitor

## 2018-03-30 NOTE — HPI
78 year old male with a history of HTN currently admitted for a right MCA stroke on who GI is being consulted for PEG placement in the setting of dysphagia.    History obtained from primary team's documentation.   77 yo M with PMHx HTN known to be non-compliant with medications and remote hx of anemia presents to [facility] ED 18 as a transfer from [facility].  Patient is a poor historian with no family at bedside, hx largely obtained from OSH records.  He presented to OSH originally with symptoms of weakness and fatigue, his daughter brought him to ED thinking he was likely anemic again.  It was noted that patient had some dysarthria and aphasia (unspecified expressive vs receptive) with L-sided weakness.  He was out of the window for tPA and was sent to [facility] emergently for CTA Stroke Multiphase and possible endovascular intervention. CTA stroke multiphase with no LVO, no intervention planned.  Of note, patient is anemic per OSH labs with a Hgb of 5.8 g/dL and likely has pancreatitis with lipase 3738, WBC count of 15.8 k/uL, and CT abdomen showing peripancreatic fluid around the tail.  He was transfused a unit of PRBCs prior to transfer.  Labs were otherwise largely unremarkable.  On presentation to [facility] ED, he is able to move both BLE but complains of pain in BLE.  Not moving LUE but is intact to noxious stimuli.  Has a R gaze preference that is not overcome by VOR testing and complaint of neck pain with moving head side-to-side.  Otherwise has mild abdominal distension with mild hypogastric tenderness to palpation. HTN to SBP 200s.  Of note, patient's wife  recently and family expressed concern in outpatient ED that patient has not been eating much for the past month but deny EtOH use.     Hospital Course:  18:  Admission to Neuro ICU 2/2 concern for stroke with multiple concerns for patient to become unstable--pancreatitis, severe anemia, HTN.  : has had no interval development of  multiorgan involvement form pancreatitis, drop in hemoglobin secondary to erosive gastritis, transfused and on protonix bid  03/25: Patient on dilt drip for a-fib. Repeat EKG shows NSR.  3/26: Discontinuing diltiazem drip today. Will continue monitor HR. Continue IV fluids 50 cc/hr for pancreatitis. Repeat lipase.   3/28: NAEON. VSSA. No overt signs of bleeding. Neuro exam unchanged from prior documented exams. Continues to not follow commands, continues to answer questions inappropriately. Remains effectively hemiplegic on left. No further ICU needs. S/D today to hospital medicine after d/w vascular neurology.  03/29/2018 VSS, no signs of blood loss in stool. Loose stools on examination. NG tube replaced and xray confirmed in duodenum - retracted. Will make definitive decision on anticoagulation 3/30. Furthering discussions with family regarding definitive feeding  03/30/2018 VSS, no signs of blood loss on ZENIA. NG tube replaced after patient pulled O/N. Confirmed placement.Family desires PEG tube placed. GI consulted for placement on Monday.     Interval History:  Unable to obtain any information from patient this morning.   Will need to speak to family for consent and to explain procedure.

## 2018-03-30 NOTE — TREATMENT PLAN
Treatment Plan  03/30/2018  11:48 AM    Patient seen and examined by attending.  Tentative PEG Monday vs Tuesday (will keep team updated).  Full consult note to follow.    Cammie Carter M.D.  Gastroenterology Fellow, PGY-IV  Pager: [phone]  [facility]

## 2018-03-30 NOTE — NURSING
Med team 2 called concerning NGT placement verified via xray. MD stated that they would look at xray and place order to use if placement is Ok. Will continue to monitor

## 2018-03-30 NOTE — CONSULTS
[facility]  Gastroenterology  Consult Note    Patient Name: Zion Hines  MRN: [id number]  Admission Date: 3/23/2018  Hospital Length of Stay: 7 days  Code Status: Full Code   Attending Provider: Mica Mckeon MD   Consulting Provider: Abebe Arriaga MD  Primary Care Physician: Primary Doctor No  Principal Problem:Embolic stroke involving right middle cerebral artery    Inpatient consult to Gastroenterology  Consult performed by: ABEBE ARRIAGA  Consult ordered by: DIANE MEDRANO        Subjective:     HPI:  78 year old male with a history of HTN currently admitted for a right MCA stroke on who GI is being consulted for PEG placement in the setting of dysphagia.    History obtained from primary team's documentation.   77 yo M with PMHx HTN known to be non-compliant with medications and remote hx of anemia presents to [facility] ED 03/23/18 as a transfer from [facility].  Patient is a poor historian with no family at bedside, hx largely obtained from OSH records.  He presented to OSH originally with symptoms of weakness and fatigue, his daughter brought him to ED thinking he was likely anemic again.  It was noted that patient had some dysarthria and aphasia (unspecified expressive vs receptive) with L-sided weakness.  He was out of the window for tPA and was sent to [facility] emergently for CTA Stroke Multiphase and possible endovascular intervention. CTA stroke multiphase with no LVO, no intervention planned.  Of note, patient is anemic per OSH labs with a Hgb of 5.8 g/dL and likely has pancreatitis with lipase 3738, WBC count of 15.8 k/uL, and CT abdomen showing peripancreatic fluid around the tail.  He was transfused a unit of PRBCs prior to transfer.  Labs were otherwise largely unremarkable.  On presentation to [facility] ED, he is able to move both BLE but complains of pain in BLE.  Not moving LUE but is intact to noxious stimuli.  Has a R gaze preference that is not overcome by VOR testing and  complaint of neck pain with moving head side-to-side.  Otherwise has mild abdominal distension with mild hypogastric tenderness to palpation. HTN to SBP 200s.  Of note, patient's wife  recently and family expressed concern in outpatient ED that patient has not been eating much for the past month but deny EtOH use.     Hospital Course:  18:  Admission to Neuro ICU 2/2 concern for stroke with multiple concerns for patient to become unstable--pancreatitis, severe anemia, HTN.  : has had no interval development of multiorgan involvement form pancreatitis, drop in hemoglobin secondary to erosive gastritis, transfused and on protonix bid  : Patient on dilt drip for a-fib. Repeat EKG shows NSR.  3/26: Discontinuing diltiazem drip today. Will continue monitor HR. Continue IV fluids 50 cc/hr for pancreatitis. Repeat lipase.   3/28: NAEON. VSSA. No overt signs of bleeding. Neuro exam unchanged from prior documented exams. Continues to not follow commands, continues to answer questions inappropriately. Remains effectively hemiplegic on left. No further ICU needs. S/D today to hospital medicine after d/w vascular neurology.  2018 VSS, no signs of blood loss in stool. Loose stools on examination. NG tube replaced and xray confirmed in duodenum - retracted. Will make definitive decision on anticoagulation 3/30. Furthering discussions with family regarding definitive feeding  2018 VSS, no signs of blood loss on ZENIA. NG tube replaced after patient pulled O/N. Confirmed placement.Family desires PEG tube placed. GI consulted for placement on Monday.     Interval History:  Unable to obtain any information from patient this morning.   Will need to speak to family for consent and to explain procedure.    Past Medical History:   Diagnosis Date    Alcohol abuse 3/29/2018    Atrial fibrillation with RVR 3/27/2018    Embolic stroke involving right middle cerebral artery 3/24/2018    Essential hypertension  3/23/2018       History reviewed. No pertinent surgical history.    Review of patient's allergies indicates:  Not on File  Family History     None        Social History Main Topics    Smoking status: Unknown If Ever Smoked    Smokeless tobacco: Not on file    Alcohol use Not on file    Drug use: Unknown    Sexual activity: Not on file     Review of Systems   Unable to perform ROS: Mental status change     Objective:     Vital Signs (Most Recent):  Temp: 97.8 °F (36.6 °C) (03/30/18 1120)  Pulse: 95 (03/30/18 1120)  Resp: 18 (03/30/18 1120)  BP: (!) 180/96 (03/30/18 1120)  SpO2: 97 % (03/30/18 1120) Vital Signs (24h Range):  Temp:  [97.4 °F (36.3 °C)-98.3 °F (36.8 °C)] 97.8 °F (36.6 °C)  Pulse:  [76-96] 95  Resp:  [16-18] 18  SpO2:  [96 %-98 %] 97 %  BP: (154-180)/(81-96) 180/96     Weight: 71.5 kg (157 lb 10.1 oz) (03/28/18 0400)  Body mass index is 23.97 kg/m².      Intake/Output Summary (Last 24 hours) at 03/30/18 1252  Last data filed at 03/30/18 1100   Gross per 24 hour   Intake              300 ml   Output                0 ml   Net              300 ml       Lines/Drains/Airways     Drain            Male External Urinary Catheter 03/28/18 0705 Medium 2 days         NG/OG Tube 03/30/18 0700 Right mouth less than 1 day          Peripheral Intravenous Line                 Peripheral IV - Single Lumen 03/26/18 1120 Left Antecubital 4 days         Peripheral IV - Single Lumen 03/27/18 1505 Right Forearm 2 days                Physical Exam   Constitutional: No distress.   HENT:   Head: Normocephalic and atraumatic.   Eyes: No scleral icterus.   Neck: Normal range of motion.   Cardiovascular: Normal rate and regular rhythm.    Pulmonary/Chest: Effort normal and breath sounds normal.   Abdominal: Soft. Bowel sounds are normal. He exhibits no distension. There is no tenderness.   No prior surgical scars   Musculoskeletal: He exhibits no edema.   Neurological:   Unable to assess orientation  Unable to move LUE and LLE  extremity   Skin: He is not diaphoretic.       Significant Labs:  CBC:   Recent Labs  Lab 03/29/18  0404 03/30/18  0613   WBC 14.89* 13.02*   HGB 9.4* 8.1*   HCT 33.8* 29.4*    274     CMP:   Recent Labs  Lab 03/30/18  0613   *   CALCIUM 9.7   ALBUMIN 2.3*   PROT 6.2   *   K 3.4*   CO2 23   *   BUN 26*   CREATININE 0.8   ALKPHOS 60   ALT 15   AST 21   BILITOT 0.6     Coagulation: No results for input(s): PT, INR, APTT in the last 48 hours.    Significant Imaging:  Imaging results within the past 24 hours have been reviewed.    Assessment/Plan:     Dysphagia as late effect of cerebrovascular accident (CVA)    78 year old male with a history of HTN currently admitted for a right MCA stroke on who GI is being consulted for PEG placement in the setting of dysphagia.    On our exam patient exhibits trouble with his speech as well as left sided paralysis. Although PEG seems warranted would recommend that patient continued to work with Speech therapy.    Only on ASA no additional AC with no abdominal scar and mild WBC in the setting of no fevers.    Recommendations:  --Continue TF via NGT  --Speech therapy for continued therapy after stroke  --Tentative PEG Monday vs Tuesday (will keep team updated)        Acute pancreatitis    Acute pancreatitis in the setting of no reported alcohol use, visualized stones (although US here was days after initial diagnosis), normal Tg, or significant hypercalcemia.     Recommendations:  Needs outpatient follow up with [facility] for possible EUS vs MRCP        Microcytic anemia    Microcytic anemia with no overt signs of bleeding at this time    Recommendations:  Monitor daily CBC  Monitor BM for melena or BRBPR  Increase PPI to PPI IV BID in the interm            Thank you for your consult. I will follow-up with patient. Please contact us if you have any additional questions.    Cammie Carter M.D.  Gastroenterology Fellow, PGY-IV  Pager: [phone]  [facility]  [place]

## 2018-03-30 NOTE — NURSING
14 Divehi NGT inserted into right nare. Attempts x 2. Patient tolerated well. Will order stat xray to confirm placement at this time.

## 2018-03-30 NOTE — ASSESSMENT & PLAN NOTE
- Lipase >1000 initially with imaging findings of peripancreatic fluid collection at tail from OSH.  - Continue IVF and management per primary team

## 2018-03-30 NOTE — ASSESSMENT & PLAN NOTE
- has iron def anemia, continue iron replacement  - unknown baseline hb, no melena or bloody bowel movements reported while IP  - no known history of GIB or varices  - Hb stable   - ZENIA negative for black/red stool

## 2018-03-30 NOTE — ASSESSMENT & PLAN NOTE
- SBP goal <180, prn labetalol ordered  - Has not required PRNS  - Now on metoprolol 25mg TID   - has had multiple short runs of NSVT - patient unable to provide review if symptomatic

## 2018-03-30 NOTE — ASSESSMENT & PLAN NOTE
Stroke risk factor. Likely etiology of multiple embolic infarcts   Recommend anticoagulation when appropriate; patient requires PEG and has had anemia requiring transfusions

## 2018-03-30 NOTE — PROGRESS NOTES
Pt had one 14 beat run and one 8 beat run of V tach at start of shift. Team notified. No new oreders

## 2018-03-30 NOTE — PROGRESS NOTES
[facility] Hospital Medicine  Progress Note    Patient Name: Zion Hines  MRN: [id number]  Patient Class: IP- Inpatient   Admission Date: 3/23/2018  Length of Stay: 7 days  Attending Physician: Mica Mckeon MD  Primary Care Provider: Primary Doctor [facility] Medicine Team: [facility] HOSP MED 2 Riley Milner MD    Subjective:     Principal Problem:Embolic stroke involving right middle cerebral artery    HPI:  77 yo M with PMHx HTN known to be non-compliant with medications and remote hx of anemia presents to [facility] ED 18 as a transfer from [facility].  Patient is a poor historian with no family at bedside, hx largely obtained from OSH records.  He presented to OSH originally with symptoms of weakness and fatigue, his daughter brought him to ED thinking he was likely anemic again.  It was noted that patient had some dysarthria and aphasia (unspecified expressive vs receptive) with L-sided weakness.  He was out of the window for tPA and was sent to [facility] emergently for CTA Stroke Multiphase and possible endovascular intervention. CTA stroke multiphase with no LVO, no intervention planned.  Of note, patient is anemic per OSH labs with a Hgb of 5.8 g/dL and likely has pancreatitis with lipase 3738, WBC count of 15.8 k/uL, and CT abdomen showing peripancreatic fluid around the tail.  He was transfused a unit of PRBCs prior to transfer.  Labs were otherwise largely unremarkable.  On presentation to [facility] ED, he is able to move both BLE but complains of pain in BLE.  Not moving LUE but is intact to noxious stimuli.  Has a R gaze preference that is not overcome by VOR testing and complaint of neck pain with moving head side-to-side.  Otherwise has mild abdominal distension with mild hypogastric tenderness to palpation. HTN to SBP 200s.  Of note, patient's wife  recently and family expressed concern in outpatient ED that patient has not been eating much for the past month but deny EtOH  use.    Hospital Course:  03/23/18:  Admission to Neuro ICU 2/2 concern for stroke with multiple concerns for patient to become unstable--pancreatitis, severe anemia, HTN.  03/24: has had no interval development of multiorgan involvement form pancreatitis, drop in hemoglobin secondary to erosive gastritis, transfused and on protonix bid  03/25: Patient on dilt drip for a-fib. Repeat EKG shows NSR.  3/26: Discontinuing diltiazem drip today. Will continue monitor HR. Continue IV fluids 50 cc/hr for pancreatitis. Repeat lipase.   3/28: NAEON. VSSA. No overt signs of bleeding. Neuro exam unchanged from prior documented exams. Continues to not follow commands, continues to answer questions inappropriately. Remains effectively hemiplegic on left. No further ICU needs. S/D today to hospital medicine after d/w vascular neurology.  03/29/2018 VSS, no signs of blood loss in stool. Loose stools on examination. NG tube replaced and xray confirmed in duodenum - retracted. Will make definitive decision on anticoagulation 3/30. Furthering discussions with family regarding definitive feeding  03/30/2018 VSS, no signs of blood loss on ZENIA. NG tube replaced after patient pulled O/N. Confirmed placement.Family desires PEG tube placed. GI consulted for placement on Monday.     Interval History: see hospital course    Review of Systems   Unable to perform ROS: Mental status change     Objective:     Vital Signs (Most Recent):  Temp: 97.9 °F (36.6 °C) (03/30/18 0720)  Pulse: 96 (03/30/18 0738)  Resp: 16 (03/30/18 0720)  BP: (!) 156/81 (03/30/18 0720)  SpO2: 96 % (03/30/18 0720) Vital Signs (24h Range):  Temp:  [97.4 °F (36.3 °C)-98.3 °F (36.8 °C)] 97.9 °F (36.6 °C)  Pulse:  [76-96] 96  Resp:  [16-19] 16  SpO2:  [96 %-98 %] 96 %  BP: (148-179)/(81-93) 156/81     Weight: 71.5 kg (157 lb 10.1 oz)  Body mass index is 23.97 kg/m².    Intake/Output Summary (Last 24 hours) at 03/30/18 0905  Last data filed at 03/29/18 0919   Gross per 24 hour    Intake              250 ml   Output              250 ml   Net                0 ml      Physical Exam   Constitutional: He appears well-developed.   HENT:   Head: Atraumatic.   Eyes: Pupils are equal, round, and reactive to light.   Patient with rightward gaze preference  Arcus senilis bilaterally   Neck: No JVD present.   Cardiovascular: Normal rate, regular rhythm, S1 normal, S2 normal and intact distal pulses.    Pulses:       Radial pulses are 2+ on the right side, and 2+ on the left side.        Dorsalis pedis pulses are 1+ on the right side, and 1+ on the left side.   Pulmonary/Chest: Effort normal and breath sounds normal.   Abdominal: Soft. Bowel sounds are normal. He exhibits no distension. There is no tenderness. There is no guarding.   Genitourinary:   Genitourinary Comments: Condom catheter in place   Musculoskeletal: He exhibits no edema.   Neurological: He is alert. He exhibits abnormal muscle tone. GCS eye subscore is 4. GCS verbal subscore is 3. GCS motor subscore is 6.   Only oriented to person. Expressive and receptive aphasia. LUE weak - 2/5 strength in Left hand     Skin: Skin is warm. No rash noted.   Psychiatric: His speech is slurred.   Nursing note and vitals reviewed.      Significant Labs:   Recent Results (from the past 24 hour(s))   POCT glucose    Collection Time: 03/29/18 11:15 AM   Result Value Ref Range    POCT Glucose 101 70 - 110 mg/dL   POCT glucose    Collection Time: 03/29/18 10:36 PM   Result Value Ref Range    POCT Glucose 101 70 - 110 mg/dL   POCT glucose    Collection Time: 03/30/18  5:45 AM   Result Value Ref Range    POCT Glucose 151 (H) 70 - 110 mg/dL   Comprehensive metabolic panel    Collection Time: 03/30/18  6:13 AM   Result Value Ref Range    Sodium 147 (H) 136 - 145 mmol/L    Potassium 3.4 (L) 3.5 - 5.1 mmol/L    Chloride 116 (H) 95 - 110 mmol/L    CO2 23 23 - 29 mmol/L    Glucose 121 (H) 70 - 110 mg/dL    BUN, Bld 26 (H) 8 - 23 mg/dL    Creatinine 0.8 0.5 - 1.4  mg/dL    Calcium 9.7 8.7 - 10.5 mg/dL    Total Protein 6.2 6.0 - 8.4 g/dL    Albumin 2.3 (L) 3.5 - 5.2 g/dL    Total Bilirubin 0.6 0.1 - 1.0 mg/dL    Alkaline Phosphatase 60 55 - 135 U/L    AST 21 10 - 40 U/L    ALT 15 10 - 44 U/L    Anion Gap 8 8 - 16 mmol/L    eGFR if African American >60.0 >60 mL/min/1.73 m^2    eGFR if non African American >60.0 >60 mL/min/1.73 m^2   CBC auto differential    Collection Time: 03/30/18  6:13 AM   Result Value Ref Range    WBC 13.02 (H) 3.90 - 12.70 K/uL    RBC 4.07 (L) 4.60 - 6.20 M/uL    Hemoglobin 8.1 (L) 14.0 - 18.0 g/dL    Hematocrit 29.4 (L) 40.0 - 54.0 %    MCV 72 (L) 82 - 98 fL    MCH 19.9 (L) 27.0 - 31.0 pg    MCHC 27.6 (L) 32.0 - 36.0 g/dL    RDW 31.0 (H) 11.5 - 14.5 %    Platelets 274 150 - 350 K/uL    MPV SEE COMMENT 9.2 - 12.9 fL   Magnesium    Collection Time: 03/30/18  6:13 AM   Result Value Ref Range    Magnesium 1.9 1.6 - 2.6 mg/dL   Phosphorus    Collection Time: 03/30/18  6:13 AM   Result Value Ref Range    Phosphorus 3.0 2.7 - 4.5 mg/dL   Ferritin    Collection Time: 03/30/18  6:13 AM   Result Value Ref Range    Ferritin 127 20.0 - 300.0 ng/mL   Vitamin B12    Collection Time: 03/30/18  6:13 AM   Result Value Ref Range    Vitamin B-12 578 210 - 950 pg/mL   Folate    Collection Time: 03/30/18  6:13 AM   Result Value Ref Range    Folate 9.7 4.0 - 24.0 ng/mL         Significant Imaging: I have reviewed all pertinent imaging results/findings within the past 24 hours.    Assessment/Plan:      * Embolic stroke involving right middle cerebral artery    - 03/23/18 CTA Stroke Multiphase with multiple non-occlusive thrombi noted  - MRI brain w/o show multiple infarcts without evidence of hemorrhagic conversion.  - Given recent in-hospital atrial fibrillation and takotsubo's CM suspicion for embolic stroke, in addition 2D Echo showed severely depressed LVEF 15-20% with evidence of stress CM  - PT/OT reordered   - patient with repeat drop in H/H but the 3/29 increase was  likely outlier  - no active blood loss - needs NOAC but will hold due to low H/H           NSVT (nonsustained ventricular tachycardia)    - patient with daily episodes of NSVT  - remains on metoprolol for Afib w RVR  - unable to obtain ROS if symptomatic during episodes  - on telemetry - will monitor          Takotsubo cardiomyopathy    - pt with loss of wife 2/2018, now with dilated cardiomyopathy EF 15-20percent and imaging suggestive of takotsubo CM  - will need repeat echo to evaluate improvement  - embolic episode may have been exacerbated by new onset CM   - elevated ePAP 61  - lisinopril 10mg          Alcohol abuse    - unable to obtain alcohol history from patient          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 Option and will be in hospital 3/30 to discuss    1. Continue Isosource 1.5 @ goal rate 50mL/hr.   - Provides 1800kcals, 82g protein and 917mL free water.      2. If more concentrated formula warranted (EF 45%), recommend Nutren 2.0 @ goal rate 40mL/hr.   - Provides 1920kcals, 81g protein and 664mL free water.   - Hold for residuals >500mL.      RD to monitor.            Aphasia as late effect of cerebrovascular accident    - seems both receptive and expressive  - patient perseverating about drinking water          Iron deficiency anemia    - see microcyctic anemia        Atrial fibrillation with RVR    - Patient noted to be in Afib with RVR 3/25  - In NSR this AM with rate of 70s-90s, multiple short runs of NSVT  - Would recommend anticoaulation - If anticoagulation is initiated for Afib, Eliquis is $8.35/month--Eliquis was shown to have similar GIB risk as warfarin in [facility]; other DOACs shown to have increased risk compared to warfarin in major Afib studies  - cardiac monitoring  - Currently on  metoprolol 25mg TID for rate control               Microcytic anemia    - has iron def anemia, continue iron replacement  - unknown baseline hb, no melena or bloody bowel movements reported while IP  - no known history of GIB or varices  - Hb stable   - ZENIA negative for black/red stool        Acute pancreatitis    RESOLVED  - Lipase at OSH 3738 w/ leukocytosis, elevated Ca, peripancreatic fluid w/o gallstones on CT abdomen  - U/S also unremarkable.  - Lipase and amylase have trended down appropriately.   - No increase in LFTs or synthetic function.  - Tolerating tube feeds           Essential hypertension     - SBP goal <180, prn labetalol ordered  - Has not required PRNS  - Now on metoprolol 25mg TID   - has had multiple short runs of NSVT - patient unable to provide review if symptomatic          Acute encephalopathy    - Likely 2/2 sequelae of embolic stroke            VTE Risk Mitigation         Ordered     heparin (porcine) injection 5,000 Units  Every 8 hours     Route:  Subcutaneous        03/27/18 0875              Riley Milner MD  Department of Hospital Medicine   [facility]

## 2018-03-31 PROBLEM — K40.90 INGUINAL HERNIA OF RIGHT SIDE WITHOUT OBSTRUCTION OR GANGRENE: Status: ACTIVE | Noted: 2018-03-31

## 2018-03-31 LAB
ALBUMIN SERPL BCP-MCNC: 2.2 G/DL
ALP SERPL-CCNC: 56 U/L
ALT SERPL W/O P-5'-P-CCNC: 15 U/L
ANION GAP SERPL CALC-SCNC: 7 MMOL/L
ANISOCYTOSIS BLD QL SMEAR: ABNORMAL
AST SERPL-CCNC: 28 U/L
BASOPHILS # BLD AUTO: 0.02 K/UL
BASOPHILS NFR BLD: 0.2 %
BILIRUB SERPL-MCNC: 0.4 MG/DL
BUN SERPL-MCNC: 26 MG/DL
CALCIUM SERPL-MCNC: 9.6 MG/DL
CHLORIDE SERPL-SCNC: 117 MMOL/L
CO2 SERPL-SCNC: 21 MMOL/L
CREAT SERPL-MCNC: 0.8 MG/DL
DIFFERENTIAL METHOD: ABNORMAL
EOSINOPHIL # BLD AUTO: 0.6 K/UL
EOSINOPHIL NFR BLD: 4.8 %
ERYTHROCYTE [DISTWIDTH] IN BLOOD BY AUTOMATED COUNT: 31 %
EST. GFR  (AFRICAN AMERICAN): >60 ML/MIN/1.73 M^2
EST. GFR  (NON AFRICAN AMERICAN): >60 ML/MIN/1.73 M^2
GLUCOSE SERPL-MCNC: 100 MG/DL
HCT VFR BLD AUTO: 26 %
HGB BLD-MCNC: 7.4 G/DL
HYPOCHROMIA BLD QL SMEAR: ABNORMAL
IMM GRANULOCYTES # BLD AUTO: 0.17 K/UL
IMM GRANULOCYTES NFR BLD AUTO: 1.4 %
LYMPHOCYTES # BLD AUTO: 1.2 K/UL
LYMPHOCYTES NFR BLD: 9.4 %
MAGNESIUM SERPL-MCNC: 1.9 MG/DL
MCH RBC QN AUTO: 20.2 PG
MCHC RBC AUTO-ENTMCNC: 28.5 G/DL
MCV RBC AUTO: 71 FL
MONOCYTES # BLD AUTO: 1.9 K/UL
MONOCYTES NFR BLD: 15.1 %
NEUTROPHILS # BLD AUTO: 8.7 K/UL
NEUTROPHILS NFR BLD: 69.1 %
NRBC BLD-RTO: 0 /100 WBC
OVALOCYTES BLD QL SMEAR: ABNORMAL
PHOSPHATE SERPL-MCNC: 2.8 MG/DL
PLATELET # BLD AUTO: 286 K/UL
PMV BLD AUTO: ABNORMAL FL
POCT GLUCOSE: 126 MG/DL (ref 70–110)
POCT GLUCOSE: 126 MG/DL (ref 70–110)
POCT GLUCOSE: 128 MG/DL (ref 70–110)
POCT GLUCOSE: 98 MG/DL (ref 70–110)
POIKILOCYTOSIS BLD QL SMEAR: SLIGHT
POLYCHROMASIA BLD QL SMEAR: ABNORMAL
POTASSIUM SERPL-SCNC: 4 MMOL/L
PROT SERPL-MCNC: 5.9 G/DL
RBC # BLD AUTO: 3.66 M/UL
SCHISTOCYTES BLD QL SMEAR: ABNORMAL
SODIUM SERPL-SCNC: 145 MMOL/L
SPHEROCYTES BLD QL SMEAR: ABNORMAL
TARGETS BLD QL SMEAR: ABNORMAL
TB INDURATION 48 - 72 HR READ: 0 0
WBC # BLD AUTO: 12.58 K/UL

## 2018-03-31 PROCEDURE — 99232 SBSQ HOSP IP/OBS MODERATE 35: CPT | Mod: GC,,, | Performed by: HOSPITALIST

## 2018-03-31 PROCEDURE — 85025 COMPLETE CBC W/AUTO DIFF WBC: CPT

## 2018-03-31 PROCEDURE — 80053 COMPREHEN METABOLIC PANEL: CPT

## 2018-03-31 PROCEDURE — 25000003 PHARM REV CODE 250: Performed by: STUDENT IN AN ORGANIZED HEALTH CARE EDUCATION/TRAINING PROGRAM

## 2018-03-31 PROCEDURE — 84100 ASSAY OF PHOSPHORUS: CPT

## 2018-03-31 PROCEDURE — 25000003 PHARM REV CODE 250: Performed by: HOSPITALIST

## 2018-03-31 PROCEDURE — 36415 COLL VENOUS BLD VENIPUNCTURE: CPT

## 2018-03-31 PROCEDURE — C9113 INJ PANTOPRAZOLE SODIUM, VIA: HCPCS | Performed by: HOSPITALIST

## 2018-03-31 PROCEDURE — 63600175 PHARM REV CODE 636 W HCPCS: Performed by: STUDENT IN AN ORGANIZED HEALTH CARE EDUCATION/TRAINING PROGRAM

## 2018-03-31 PROCEDURE — 63600175 PHARM REV CODE 636 W HCPCS: Performed by: HOSPITALIST

## 2018-03-31 PROCEDURE — 25000003 PHARM REV CODE 250: Performed by: PHYSICIAN ASSISTANT

## 2018-03-31 PROCEDURE — 83735 ASSAY OF MAGNESIUM: CPT

## 2018-03-31 PROCEDURE — 11000001 HC ACUTE MED/SURG PRIVATE ROOM

## 2018-03-31 PROCEDURE — 25000003 PHARM REV CODE 250: Performed by: NURSE PRACTITIONER

## 2018-03-31 PROCEDURE — 99233 SBSQ HOSP IP/OBS HIGH 50: CPT | Mod: ,,, | Performed by: PSYCHIATRY & NEUROLOGY

## 2018-03-31 RX ORDER — B-COMPLEX WITH VITAMIN C
1 TABLET ORAL DAILY
Status: DISCONTINUED | OUTPATIENT
Start: 2018-03-31 | End: 2018-04-03

## 2018-03-31 RX ORDER — FERROUS SULFATE 300 MG/5ML
300 LIQUID (ML) ORAL 2 TIMES DAILY
Status: DISCONTINUED | OUTPATIENT
Start: 2018-03-31 | End: 2018-04-03

## 2018-03-31 RX ADMIN — HEPARIN SODIUM 5000 UNITS: 5000 INJECTION, SOLUTION INTRAVENOUS; SUBCUTANEOUS at 05:03

## 2018-03-31 RX ADMIN — MINERAL SUPPLEMENT IRON 300 MG / 5 ML STRENGTH LIQUID 100 PER BOX UNFLAVORED 300 MG: at 01:03

## 2018-03-31 RX ADMIN — POLYETHYLENE GLYCOL 3350 17 G: 17 POWDER, FOR SOLUTION ORAL at 09:03

## 2018-03-31 RX ADMIN — HEPARIN SODIUM 5000 UNITS: 5000 INJECTION, SOLUTION INTRAVENOUS; SUBCUTANEOUS at 02:03

## 2018-03-31 RX ADMIN — FOLIC ACID 1 MG: 1 TABLET ORAL at 09:03

## 2018-03-31 RX ADMIN — LISINOPRIL 10 MG: 10 TABLET ORAL at 09:03

## 2018-03-31 RX ADMIN — Medication 100 MG: at 09:03

## 2018-03-31 RX ADMIN — VITAMIN C 1 TABLET: TAB at 01:03

## 2018-03-31 RX ADMIN — OXYCODONE HYDROCHLORIDE 10 MG: 5 TABLET ORAL at 10:03

## 2018-03-31 RX ADMIN — STANDARDIZED SENNA CONCENTRATE AND DOCUSATE SODIUM 1 TABLET: 8.6; 5 TABLET, FILM COATED ORAL at 09:03

## 2018-03-31 RX ADMIN — MINERAL SUPPLEMENT IRON 300 MG / 5 ML STRENGTH LIQUID 100 PER BOX UNFLAVORED 300 MG: at 10:03

## 2018-03-31 RX ADMIN — METOPROLOL TARTRATE 25 MG: 25 TABLET ORAL at 10:03

## 2018-03-31 RX ADMIN — METOPROLOL TARTRATE 25 MG: 25 TABLET ORAL at 03:03

## 2018-03-31 RX ADMIN — HEPARIN SODIUM 5000 UNITS: 5000 INJECTION, SOLUTION INTRAVENOUS; SUBCUTANEOUS at 10:03

## 2018-03-31 RX ADMIN — PANTOPRAZOLE SODIUM 40 MG: 40 INJECTION, POWDER, FOR SOLUTION INTRAVENOUS at 10:03

## 2018-03-31 RX ADMIN — METOPROLOL TARTRATE 25 MG: 25 TABLET ORAL at 09:03

## 2018-03-31 RX ADMIN — Medication 10 ML: at 09:03

## 2018-03-31 RX ADMIN — PANTOPRAZOLE SODIUM 40 MG: 40 INJECTION, POWDER, FOR SOLUTION INTRAVENOUS at 09:03

## 2018-03-31 NOTE — PROGRESS NOTES
Dr. Milner notified that patient had a 7 beat run V tach.  Blood pressure 155/82, pulse 68, respirations 17. No patient complaints. Skin warm and dry. Will continue to monitor.

## 2018-03-31 NOTE — SUBJECTIVE & OBJECTIVE
Neurologic Chief Complaint: L sided weakness, dysphagia, dysarthria    Subjective:     Interval History: Patient is seen for follow-up neurological assessment and treatment recommendations:     Patient remains confused on exam but awake and alert. His RUE is restrained. He is constant requesting food cause he's hungry. Patient's ECHO was concerning for a stress induced cardiomyopathy, EF 15-20%. Patient is NPO and has NG Tube for nutrition, awaiting PEG tube.  Still not anticoagulated at this time due to anemia. Hb dropped from 8.1 >> 7.4 overnight and he has inguinal hernia that pending US. GI recommending further evaluation of inflammatory changes of the pancreatic tail, recommending EUS or MRCP in future with PBS follow up outpatient. Disposition pending SNF.      HPI, Past Medical, Family, and Social History remains the same as documented in the initial encounter.     Review of Systems   Constitutional: Negative for fever.   HENT: Negative for congestion.    Eyes: Negative for redness.   Respiratory: Negative for cough and shortness of breath.    Cardiovascular: Negative for chest pain.   Gastrointestinal: Positive for abdominal pain.   Genitourinary: Negative for dysuria and hematuria.   Skin: Negative for color change and pallor.   Neurological: Positive for facial asymmetry, speech difficulty and weakness.   Psychiatric/Behavioral: Positive for confusion. Negative for behavioral problems.   Positive for anemia.       Scheduled Meds:   B-complex with vitamin C  1 tablet Per NG tube Daily    ferrous sulfate  300 mg Per NG tube BID    folic acid  1 mg Per NG tube Daily    heparin (porcine)  5,000 Units Subcutaneous Q8H    lisinopril  10 mg Oral Daily    metoprolol tartrate  25 mg Per NG tube TID    multivitamin liquid no.118  10 mL Per NG tube Daily    pantoprazole 40 mg in dextrose 5 % 100 mL infusion (ready to mix system)  40 mg Intravenous BID    polyethylene glycol  17 g Per NG tube Daily     senna-docusate 8.6-50 mg  1 tablet Per NG tube Daily    thiamine  100 mg Per NG tube Daily     Continuous Infusions:  PRN Meds:acetaminophen, dextrose 50%, glucagon (human recombinant), insulin aspart U-100, ondansetron, oxyCODONE    Objective:     Vital Signs (Most Recent):  Temp: 97.6 °F (36.4 °C) (03/31/18 1128)  Pulse: 80 (03/31/18 1128)  Resp: 18 (03/31/18 1128)  BP: (!) 161/77 (03/31/18 1128)  SpO2: 98 % (03/31/18 1128)  BP Location: Right arm    Vital Signs Range (Last 24H):  Temp:  [97 °F (36.1 °C)-98.7 °F (37.1 °C)]   Pulse:  [66-83]   Resp:  [16-18]   BP: (140-161)/(67-79)   SpO2:  [98 %-99 %]   BP Location: Right arm    Physical Exam  Vital signs: reviewed above.   Constitutional: well-developed, no acute distress  Cardiovascular: regular rate and rhythm  Resp: normal respirations, not labored, no accessory muscles used  Abdomen: soft, non-distended, not tender to palpation  Pulses: palpable distal pulses   Skin: warm, dry and intact, no rashes  Neurological  GCS: Motor: 6/Verbal: 4/Eyes: 4 GCS Total: 14  Mental Status: Awake, Alert, Oriented to self and hospital   Following commands intermittently   Dysarthric and aphasic; tangential speech   Poor attention span  Head: normocephalic, atraumatic       Visual Fields: unable to accurately assess       EOM (CN III, IV, VI): unable to accurately assess       Pupils (CN II, III): PERRL        Facial Sensation (CN V): Normal       Facial Movement (CN VII): Lower facial weakness on the Left  LUE flaccid paralysis 0/5  LLE 1/5  RUE 5/5 (patient in restraint)  RLE 3/5 with no limb drift     Stratton in place.  NG tube in place     Laboratory:  CMP:   Recent Labs  Lab 03/31/18  0351   CALCIUM 9.6   ALBUMIN 2.2*   PROT 5.9*      K 4.0   CO2 21*   *   BUN 26*   CREATININE 0.8   ALKPHOS 56   ALT 15   AST 28   BILITOT 0.4     CBC:   Recent Labs  Lab 03/31/18  0351   WBC 12.58   RBC 3.66*   HGB 7.4*   HCT 26.0*      MCV 71*   MCH 20.2*   MCHC 28.5*      Lipid Panel: No results for input(s): CHOL, LDLCALC, HDL, TRIG in the last 168 hours.  Coagulation: No results for input(s): PT, INR, APTT in the last 168 hours.  Platelet Aggregation Study: No results for input(s): PLTAGG, PLTAGINTERP, PLTAGREGLACO, ADPPLTAGGREG in the last 168 hours.  Hgb A1C: No results for input(s): HGBA1C in the last 168 hours.  TSH:   Recent Labs  Lab 03/26/18  0743   TSH 0.996       Diagnostic Results     Brain Imaging   MRI Brain without Contrast Impression 3/24/18    Acute infarcts involving the frontal, parietal, and occipital lobes with involvement of the cerebellum, left greater than right.  These were seen to some extent on prior CTA.  No hemorrhagic conversion at this time.  The findings are concerning for extensive acute embolic stroke.    Moderate chronic microvascular ischemic changes.                       ECHO 3/24/18:   CONCLUSIONS     1 - Consider Stress induced CM - Takotsubo CM.     2 - Eccentric LVH with severely depressed left ventricular systolic function (EF 15-20%).     3 - Normal RV size with moderately depressed right ventricular systolic function .     4 - Mild mitral regurgitation.     5 - Mild tricuspid regurgitation.     6 - Pulmonary hypertension. The estimated PA systolic pressure is 61 mmHg.     7 - Increased central venous pressure.     8 - No prior echo in our system

## 2018-03-31 NOTE — PROGRESS NOTES
[facility]  Vascular Neurology  Comprehensive Stroke Center  Progress Note    Assessment/Plan:     * Embolic stroke involving right middle cerebral artery    Pt is a 79 y/o male with medical history of HTN presented to OSH in [place] ([facility]) with left-sided weakness, dysarthria, anemia, and acute pancreatitis. Patient had evidence of left subacute cerebellar infarct and right cerebral infarct on head CT. He was out of the window for tPA and was sent [place] [facility] [place] for CTA Stroke Multiphase and possible endovascular intervention. CTA stroke multiphase with no LVO, no intervention planned. MRI here with  bilateral frontal, parietal, occipital, cerebellar infarcts L>R. Image suggestive of acute extensive acute embolic stroke. Likely cardioembolic, documented afib in chart. ECHO shows EF 15-20% concerning for stress induced cardiomyopathy.       Antithrombotics for secondary stroke prevention: Being held as patient with significant anemia and cause known; r/o bleed. If ruled out, start anticoagulation (any DOAC or warfarin is reasonable from stroke standpoint for secondary stroke prevention).     Statins for secondary stroke prevention and hyperlipidemia, if present: Statins: Atorvastatin- 40 mg daily LDL = 70     Aggressive risk factor modification: HTN,  hypercoagulable state with pancreatitis      Rehab efforts: PT/OT/SLP to evaluate and treat -recommended SNF. Awaiting PEG, still NPO    Diagnostics ordered/pending: none     VTE prophylaxis:  SCDs    BP parameters: Infarct: No intervention, SBP <180 if primary team deems appropriate           Acute encephalopathy    - Multifactorial; likely 2/2 to CVA, pancreatitis, and possible infectious cause        Atrial fibrillation with RVR    Stroke risk factor. Likely etiology of multiple embolic infarcts   Recommend anticoagulation when appropriate; patient requires PEG and has had anemia requiring transfusions        Dysphagia as late effect  of cerebrovascular accident (CVA)    Currently on TF. Awaiting peg.        Inguinal hernia of right side without obstruction or gangrene    Pending US per primary service.         Cytotoxic cerebral edema    Secondary to ischemic stroke  Noted on imaging         Chronic systolic HF (heart failure)    Most recent ECHO shows EF 15-20%  Suggestive of stress induced cardiomyopathy         Takotsubo cardiomyopathy    Noted on ECHO         Aphasia as late effect of cerebrovascular accident    Aggressive ST        Anemia    - Hemoglobin 5.8 at OSH, 6.4 here after 1UPRBC at OSH upon admission   - 2U PRBC transfused here.  - HB dropped overnight 3/31  - Workup and management per primary team  - GI consulted, no apparent bleeding         Acute pancreatitis    - Lipase >1000 initially with imaging findings of peripancreatic fluid collection at tail from OSH.  - Continue IVF and management per primary team  - Resolved per primary team.         Essential hypertension    Stroke risk factor   Non compliant with medication             3/24 Patient's hemoglobin was 6.4 on arrival (s/p 1U PRBC on transport); transfused additional 2U PRBC; repeat hemoglobin 8.8. Of note, patient's WBC on arrival here significant for leukocytosis of 36. MRI demonstrable for bilateral frontal, parietal, occipital, cerebellar infarcts L>R. Cause likely cardio-embolic.   3/30/18 No new imaging or neurologic changes. Patient's ECHo was concerning for a stress induced cardiomyopathy, EF 15-20%. Patient likely needs PEG tube, still NPO. Disposition pending SNF. Still not anticoagulated at this time due to anemia, but primary team plans on starting soon when they feel H/H stable and appropriate.   3/31: awaiting peg tube. Hb dropped overnight from 8.4>>7.4. GI consulted and recommended further evaluation of inflammatory changes of the pancreatic tail, recommending EUS or MRCP in future with PBS follow up outpatient. US ordered for inguinal hernia.     STROKE  DOCUMENTATION   Acute Stroke Times   Last Known Normal Date: 03/22/18  Last Known Normal Time: 2100  Symptom Onset Date: 03/22/18  Symptom Onset Time: 2100  Stroke Team Called Date: 03/23/18  Stroke Team Called Time: 1555  Stroke Team Arrival Date: 03/23/18  Stroke Team Arrival Time: 1555  CT Interpretation Time: 1610  Decision to Treat Time for IR:  (Not a candidate)    NIH Scale:  1a. Level Of Consciousness: 0-->Alert: keenly responsive  1b. LOC Questions: 0-->Answers both questions correctly  1c. LOC Commands: 1-->Performs one task correctly  2. Best Gaze: 0-->Normal  3. Visual: 0-->No visual loss  4. Facial Palsy: 1-->Minor paralysis (flattened nasolabial fold, asymmetry on smiling)  5a. Motor Arm, Left: 4-->No movement  5b. Motor Arm, Right: 0-->No drift: limb holds 90 (or 45) degrees for full 10 secs  6a. Motor Leg, Left: 3-->No effort against gravity: leg falls to bed immediately  6b. Motor Leg, Right: 0-->No drift: leg holds 30 degree position for full 5 secs  7. Limb Ataxia: 0-->Absent  8. Sensory: 0-->Normal: no sensory loss  9. Best Language: 2-->Severe aphasia: all communication is through fragmentary expression: great need for inference, questioning, and guessing by the listener. Range of information that can be exchanged is limited: listener carries burden of. . . (see row details)  10. Dysarthria: 2-->Severe dysarthria: patients speech is so slurred as to be unintelligible in the absence of or out of proportion to any dysphasia, or is mute/anarthric  11. Extinction and Inattention (formerly Neglect): 0-->No abnormality  Total (NIH Stroke Scale): 13       Modified Bayfield Score: 1  Art Coma Scale:14   ABCD2 Score:    [id number] Score:6  HAS -BLED Score:3  ICH Score:   Hunt & Connors Classification:      Hemorrhagic change of an Ischemic Stroke: Does this patient have an ischemic stroke with hemorrhagic changes? No     Neurologic Chief Complaint: L sided weakness, dysphagia, dysarthria    Subjective:      Interval History: Patient is seen for follow-up neurological assessment and treatment recommendations:     Patient remains confused on exam but awake and alert. His RUE is restrained. He is constant requesting food cause he's hungry. Patient's ECHO was concerning for a stress induced cardiomyopathy, EF 15-20%. Patient is NPO and has NG Tube for nutrition, awaiting PEG tube.  Still not anticoagulated at this time due to anemia. Hb dropped from 8.1 >> 7.4 overnight and he has inguinal hernia that pending US. GI recommending further evaluation of inflammatory changes of the pancreatic tail, recommending EUS or MRCP in future with PBS follow up outpatient. Disposition pending SNF.      HPI, Past Medical, Family, and Social History remains the same as documented in the initial encounter.     Review of Systems   Constitutional: Negative for fever.   HENT: Negative for congestion.    Eyes: Negative for redness.   Respiratory: Negative for cough and shortness of breath.    Cardiovascular: Negative for chest pain.   Gastrointestinal: Positive for abdominal pain.   Genitourinary: Negative for dysuria and hematuria.   Skin: Negative for color change and pallor.   Neurological: Positive for facial asymmetry, speech difficulty and weakness.   Psychiatric/Behavioral: Positive for confusion. Negative for behavioral problems.   Positive for anemia.       Scheduled Meds:   B-complex with vitamin C  1 tablet Per NG tube Daily    ferrous sulfate  300 mg Per NG tube BID    folic acid  1 mg Per NG tube Daily    heparin (porcine)  5,000 Units Subcutaneous Q8H    lisinopril  10 mg Oral Daily    metoprolol tartrate  25 mg Per NG tube TID    multivitamin liquid no.118  10 mL Per NG tube Daily    pantoprazole 40 mg in dextrose 5 % 100 mL infusion (ready to mix system)  40 mg Intravenous BID    polyethylene glycol  17 g Per NG tube Daily    senna-docusate 8.6-50 mg  1 tablet Per NG tube Daily    thiamine  100 mg Per NG tube  Daily     Continuous Infusions:  PRN Meds:acetaminophen, dextrose 50%, glucagon (human recombinant), insulin aspart U-100, ondansetron, oxyCODONE    Objective:     Vital Signs (Most Recent):  Temp: 97.6 °F (36.4 °C) (03/31/18 1128)  Pulse: 80 (03/31/18 1128)  Resp: 18 (03/31/18 1128)  BP: (!) 161/77 (03/31/18 1128)  SpO2: 98 % (03/31/18 1128)  BP Location: Right arm    Vital Signs Range (Last 24H):  Temp:  [97 °F (36.1 °C)-98.7 °F (37.1 °C)]   Pulse:  [66-83]   Resp:  [16-18]   BP: (140-161)/(67-79)   SpO2:  [98 %-99 %]   BP Location: Right arm    Physical Exam  Vital signs: reviewed above.   Constitutional: well-developed, no acute distress  Cardiovascular: regular rate and rhythm  Resp: normal respirations, not labored, no accessory muscles used  Abdomen: soft, non-distended, not tender to palpation  Pulses: palpable distal pulses   Skin: warm, dry and intact, no rashes  Neurological  GCS: Motor: 6/Verbal: 4/Eyes: 4 GCS Total: 14  Mental Status: Awake, Alert, Oriented to self and hospital   Following commands intermittently   Dysarthric and aphasic; tangential speech   Poor attention span  Head: normocephalic, atraumatic       Visual Fields: unable to accurately assess       EOM (CN III, IV, VI): unable to accurately assess       Pupils (CN II, III): PERRL        Facial Sensation (CN V): Normal       Facial Movement (CN VII): Lower facial weakness on the Left  LUE flaccid paralysis 0/5  LLE 1/5  RUE 5/5 (patient in restraint)  RLE 3/5 with no limb drift     Stratton in place.  NG tube in place     Laboratory:  CMP:   Recent Labs  Lab 03/31/18  0351   CALCIUM 9.6   ALBUMIN 2.2*   PROT 5.9*      K 4.0   CO2 21*   *   BUN 26*   CREATININE 0.8   ALKPHOS 56   ALT 15   AST 28   BILITOT 0.4     CBC:   Recent Labs  Lab 03/31/18  0351   WBC 12.58   RBC 3.66*   HGB 7.4*   HCT 26.0*      MCV 71*   MCH 20.2*   MCHC 28.5*     Lipid Panel: No results for input(s): CHOL, LDLCALC, HDL, TRIG in the last 168  hours.  Coagulation: No results for input(s): PT, INR, APTT in the last 168 hours.  Platelet Aggregation Study: No results for input(s): PLTAGG, PLTAGINTERP, PLTAGREGLACO, ADPPLTAGGREG in the last 168 hours.  Hgb A1C: No results for input(s): HGBA1C in the last 168 hours.  TSH:   Recent Labs  Lab 03/26/18  0743   TSH 0.996       Diagnostic Results     Brain Imaging   MRI Brain without Contrast Impression 3/24/18    Acute infarcts involving the frontal, parietal, and occipital lobes with involvement of the cerebellum, left greater than right.  These were seen to some extent on prior CTA.  No hemorrhagic conversion at this time.  The findings are concerning for extensive acute embolic stroke.    Moderate chronic microvascular ischemic changes.                       ECHO 3/24/18:   CONCLUSIONS     1 - Consider Stress induced CM - Takotsubo CM.     2 - Eccentric LVH with severely depressed left ventricular systolic function (EF 15-20%).     3 - Normal RV size with moderately depressed right ventricular systolic function .     4 - Mild mitral regurgitation.     5 - Mild tricuspid regurgitation.     6 - Pulmonary hypertension. The estimated PA systolic pressure is 61 mmHg.     7 - Increased central venous pressure.     8 - No prior echo in our system              Vel Ramsey PA-C  Comprehensive Stroke Center  Department of Vascular Neurology   [facility]

## 2018-03-31 NOTE — ASSESSMENT & PLAN NOTE
- Lipase >1000 initially with imaging findings of peripancreatic fluid collection at tail from OSH.  - Continue IVF and management per primary team  - Resolved per primary team.

## 2018-03-31 NOTE — ASSESSMENT & PLAN NOTE
- has iron def anemia, continue iron replacement  - unknown baseline hb, no melena or bloody bowel movements reported while IP  - no known history of GIB or varices  - Hb decreased   - ZENIA negative for black/red stool  - will evaluate further

## 2018-03-31 NOTE — ASSESSMENT & PLAN NOTE
- Hemoglobin 5.8 at OSH, 6.4 here after 1UPRBC at OSH upon admission   - 2U PRBC transfused here.  - HB dropped overnight 3/31  - Workup and management per primary team  - GI consulted, no apparent bleeding

## 2018-03-31 NOTE — SUBJECTIVE & OBJECTIVE
Interval History: see hospital course    Review of Systems   Unable to perform ROS: Mental status change     Objective:     Vital Signs (Most Recent):  Temp: 98.7 °F (37.1 °C) (03/31/18 0356)  Pulse: 75 (03/31/18 0719)  Resp: 18 (03/31/18 0356)  BP: (!) 148/76 (03/31/18 0356)  SpO2: 98 % (03/31/18 0356) Vital Signs (24h Range):  Temp:  [97 °F (36.1 °C)-98.7 °F (37.1 °C)] 98.7 °F (37.1 °C)  Pulse:  [66-95] 75  Resp:  [18] 18  SpO2:  [97 %-99 %] 98 %  BP: (140-180)/(76-96) 148/76     Weight: 67.6 kg (149 lb 1.6 oz)  Body mass index is 22.68 kg/m².    Intake/Output Summary (Last 24 hours) at 03/31/18 0817  Last data filed at 03/31/18 0500   Gross per 24 hour   Intake              900 ml   Output              825 ml   Net               75 ml      Physical Exam   Constitutional: He appears well-developed.   HENT:   Head: Atraumatic.   Eyes: Pupils are equal, round, and reactive to light.   Patient with rightward gaze preference  Arcus senilis bilaterally   Neck: No JVD present.   Cardiovascular: Normal rate, regular rhythm, S1 normal, S2 normal and intact distal pulses.    Pulses:       Radial pulses are 2+ on the right side, and 2+ on the left side.        Dorsalis pedis pulses are 1+ on the right side, and 1+ on the left side.   Pulmonary/Chest: Effort normal and breath sounds normal.   Abdominal: Soft. Bowel sounds are normal. He exhibits no distension. There is tenderness in the suprapubic area and left lower quadrant. There is no guarding.       Genitourinary:   Genitourinary Comments: Condom catheter in place   Musculoskeletal: He exhibits no edema.   Neurological: He is alert. He exhibits abnormal muscle tone. GCS eye subscore is 4. GCS verbal subscore is 3. GCS motor subscore is 6.   Only oriented to person and is aware that he is in a hospital. Expressive and receptive aphasia. LUE weak - 2/5 strength in Left hand     Skin: Skin is warm. No rash noted.   Psychiatric: His speech is slurred.   Nursing note and  vitals reviewed.      Significant Labs:   Recent Results (from the past 24 hour(s))   POCT glucose    Collection Time: 03/30/18 11:25 AM   Result Value Ref Range    POCT Glucose 171 (H) 70 - 110 mg/dL   POCT glucose    Collection Time: 03/30/18  5:29 PM   Result Value Ref Range    POCT Glucose 135 (H) 70 - 110 mg/dL   POCT glucose    Collection Time: 03/30/18 10:03 PM   Result Value Ref Range    POCT Glucose 137 (H) 70 - 110 mg/dL   Comprehensive metabolic panel    Collection Time: 03/31/18  3:51 AM   Result Value Ref Range    Sodium 145 136 - 145 mmol/L    Potassium 4.0 3.5 - 5.1 mmol/L    Chloride 117 (H) 95 - 110 mmol/L    CO2 21 (L) 23 - 29 mmol/L    Glucose 100 70 - 110 mg/dL    BUN, Bld 26 (H) 8 - 23 mg/dL    Creatinine 0.8 0.5 - 1.4 mg/dL    Calcium 9.6 8.7 - 10.5 mg/dL    Total Protein 5.9 (L) 6.0 - 8.4 g/dL    Albumin 2.2 (L) 3.5 - 5.2 g/dL    Total Bilirubin 0.4 0.1 - 1.0 mg/dL    Alkaline Phosphatase 56 55 - 135 U/L    AST 28 10 - 40 U/L    ALT 15 10 - 44 U/L    Anion Gap 7 (L) 8 - 16 mmol/L    eGFR if African American >60.0 >60 mL/min/1.73 m^2    eGFR if non African American >60.0 >60 mL/min/1.73 m^2   CBC auto differential    Collection Time: 03/31/18  3:51 AM   Result Value Ref Range    WBC 12.58 3.90 - 12.70 K/uL    RBC 3.66 (L) 4.60 - 6.20 M/uL    Hemoglobin 7.4 (L) 14.0 - 18.0 g/dL    Hematocrit 26.0 (L) 40.0 - 54.0 %    MCV 71 (L) 82 - 98 fL    MCH 20.2 (L) 27.0 - 31.0 pg    MCHC 28.5 (L) 32.0 - 36.0 g/dL    RDW 31.0 (H) 11.5 - 14.5 %    Platelets 286 150 - 350 K/uL    MPV SEE COMMENT 9.2 - 12.9 fL    Immature Granulocytes 1.4 (H) 0.0 - 0.5 %    Gran # (ANC) 8.7 (H) 1.8 - 7.7 K/uL    Immature Grans (Abs) 0.17 (H) 0.00 - 0.04 K/uL    Lymph # 1.2 1.0 - 4.8 K/uL    Mono # 1.9 (H) 0.3 - 1.0 K/uL    Eos # 0.6 (H) 0.0 - 0.5 K/uL    Baso # 0.02 0.00 - 0.20 K/uL    nRBC 0 0 /100 WBC    Gran% 69.1 38.0 - 73.0 %    Lymph% 9.4 (L) 18.0 - 48.0 %    Mono% 15.1 (H) 4.0 - 15.0 %    Eosinophil% 4.8 0.0 - 8.0 %     Basophil% 0.2 0.0 - 1.9 %    Aniso Moderate     Poik Slight     Poly Occasional     Hypo Occasional     Ovalocytes Occasional     Target Cells Occasional     Spherocytes Occasional     Fragmented Cells Occasional     Differential Method Automated    Magnesium    Collection Time: 03/31/18  3:51 AM   Result Value Ref Range    Magnesium 1.9 1.6 - 2.6 mg/dL   Phosphorus    Collection Time: 03/31/18  3:51 AM   Result Value Ref Range    Phosphorus 2.8 2.7 - 4.5 mg/dL   POCT glucose    Collection Time: 03/31/18  5:21 AM   Result Value Ref Range    POCT Glucose 126 (H) 70 - 110 mg/dL         Significant Imaging: I have reviewed all pertinent imaging results/findings within the past 24 hours.

## 2018-03-31 NOTE — PROGRESS NOTES
[facility] Hospital Medicine  Progress Note    Patient Name: Zion Hines  MRN: [id number]  Patient Class: IP- Inpatient   Admission Date: 3/23/2018  Length of Stay: 8 days  Attending Physician: Mica Mckeon MD  Primary Care Provider: [facility] Medicine Team: [facility]OSP MED 2 Riley Milner MD    Subjective:     Principal Problem:Embolic stroke involving right middle cerebral artery    HPI:  79 yo M with PMHx HTN known to be non-compliant with medications and remote hx of anemia presents to [facility] ED 18 as a transfer from [facility].  Patient is a poor historian with no family at bedside, hx largely obtained from OSH records.  He presented to OSH originally with symptoms of weakness and fatigue, his daughter brought him to ED thinking he was likely anemic again.  It was noted that patient had some dysarthria and aphasia (unspecified expressive vs receptive) with L-sided weakness.  He was out of the window for tPA and was sent to [facility] emergently for CTA Stroke Multiphase and possible endovascular intervention. CTA stroke multiphase with no LVO, no intervention planned.  Of note, patient is anemic per OSH labs with a Hgb of 5.8 g/dL and likely has pancreatitis with lipase 3738, WBC count of 15.8 k/uL, and CT abdomen showing peripancreatic fluid around the tail.  He was transfused a unit of PRBCs prior to transfer.  Labs were otherwise largely unremarkable.  On presentation to [facility] ED, he is able to move both BLE but complains of pain in BLE.  Not moving LUE but is intact to noxious stimuli.  Has a R gaze preference that is not overcome by VOR testing and complaint of neck pain with moving head side-to-side.  Otherwise has mild abdominal distension with mild hypogastric tenderness to palpation. HTN to SBP 200s.  Of note, patient's wife  recently and family expressed concern in outpatient ED that patient has not been eating much for the past month but deny EtOH  use.    Hospital Course:  03/23/18:  Admission to Neuro ICU 2/2 concern for stroke with multiple concerns for patient to become unstable--pancreatitis, severe anemia, HTN.  03/24: has had no interval development of multiorgan involvement form pancreatitis, drop in hemoglobin secondary to erosive gastritis, transfused and on protonix bid  03/25: Patient on dilt drip for a-fib. Repeat EKG shows NSR.  3/26: Discontinuing diltiazem drip today. Will continue monitor HR. Continue IV fluids 50 cc/hr for pancreatitis. Repeat lipase.   3/28: NAEON. VSSA. No overt signs of bleeding. Neuro exam unchanged from prior documented exams. Continues to not follow commands, continues to answer questions inappropriately. Remains effectively hemiplegic on left. No further ICU needs. S/D today to hospital medicine after d/w vascular neurology.  03/29/2018 VSS, no signs of blood loss in stool. Loose stools on examination. NG tube replaced and xray confirmed in duodenum - retracted. Will make definitive decision on anticoagulation 3/30. Furthering discussions with family regarding definitive feeding  03/30/2018 VSS, no signs of blood loss on ZENIA. NG tube replaced after patient pulled O/N. Confirmed placement.Family desires PEG tube placed. GI consulted for placement on Monday.   03/31/2018 VSS. Drop in Hb from 8.1 to 7.4 overnight. TF continued. Holding anticoagulation due to worsening anemia. GI recommendations for further evaluation of inflammatory changes of the pancreatic tail, recommending EUS or MRCP in future with PBS follow up outpatient. New fluid filled hernia noted on exam - US ordered    Interval History: see hospital course    Review of Systems   Unable to perform ROS: Mental status change     Objective:     Vital Signs (Most Recent):  Temp: 98.7 °F (37.1 °C) (03/31/18 0356)  Pulse: 75 (03/31/18 0719)  Resp: 18 (03/31/18 0356)  BP: (!) 148/76 (03/31/18 0356)  SpO2: 98 % (03/31/18 0356) Vital Signs (24h Range):  Temp:  [97 °F  (36.1 °C)-98.7 °F (37.1 °C)] 98.7 °F (37.1 °C)  Pulse:  [66-95] 75  Resp:  [18] 18  SpO2:  [97 %-99 %] 98 %  BP: (140-180)/(76-96) 148/76     Weight: 67.6 kg (149 lb 1.6 oz)  Body mass index is 22.68 kg/m².    Intake/Output Summary (Last 24 hours) at 03/31/18 0817  Last data filed at 03/31/18 0500   Gross per 24 hour   Intake              900 ml   Output              825 ml   Net               75 ml      Physical Exam   Constitutional: He appears well-developed.   HENT:   Head: Atraumatic.   Eyes: Pupils are equal, round, and reactive to light.   Patient with rightward gaze preference  Arcus senilis bilaterally   Neck: No JVD present.   Cardiovascular: Normal rate, regular rhythm, S1 normal, S2 normal and intact distal pulses.    Pulses:       Radial pulses are 2+ on the right side, and 2+ on the left side.        Dorsalis pedis pulses are 1+ on the right side, and 1+ on the left side.   Pulmonary/Chest: Effort normal and breath sounds normal.   Abdominal: Soft. Bowel sounds are normal. He exhibits no distension. There is tenderness in the suprapubic area and left lower quadrant. There is no guarding.       Genitourinary:   Genitourinary Comments: Condom catheter in place   Musculoskeletal: He exhibits no edema.   Neurological: He is alert. He exhibits abnormal muscle tone. GCS eye subscore is 4. GCS verbal subscore is 3. GCS motor subscore is 6.   Only oriented to person and is aware that he is in a hospital. Expressive and receptive aphasia. LUE weak - 2/5 strength in Left hand     Skin: Skin is warm. No rash noted.   Psychiatric: His speech is slurred.   Nursing note and vitals reviewed.      Significant Labs:   Recent Results (from the past 24 hour(s))   POCT glucose    Collection Time: 03/30/18 11:25 AM   Result Value Ref Range    POCT Glucose 171 (H) 70 - 110 mg/dL   POCT glucose    Collection Time: 03/30/18  5:29 PM   Result Value Ref Range    POCT Glucose 135 (H) 70 - 110 mg/dL   POCT glucose    Collection  Time: 03/30/18 10:03 PM   Result Value Ref Range    POCT Glucose 137 (H) 70 - 110 mg/dL   Comprehensive metabolic panel    Collection Time: 03/31/18  3:51 AM   Result Value Ref Range    Sodium 145 136 - 145 mmol/L    Potassium 4.0 3.5 - 5.1 mmol/L    Chloride 117 (H) 95 - 110 mmol/L    CO2 21 (L) 23 - 29 mmol/L    Glucose 100 70 - 110 mg/dL    BUN, Bld 26 (H) 8 - 23 mg/dL    Creatinine 0.8 0.5 - 1.4 mg/dL    Calcium 9.6 8.7 - 10.5 mg/dL    Total Protein 5.9 (L) 6.0 - 8.4 g/dL    Albumin 2.2 (L) 3.5 - 5.2 g/dL    Total Bilirubin 0.4 0.1 - 1.0 mg/dL    Alkaline Phosphatase 56 55 - 135 U/L    AST 28 10 - 40 U/L    ALT 15 10 - 44 U/L    Anion Gap 7 (L) 8 - 16 mmol/L    eGFR if African American >60.0 >60 mL/min/1.73 m^2    eGFR if non African American >60.0 >60 mL/min/1.73 m^2   CBC auto differential    Collection Time: 03/31/18  3:51 AM   Result Value Ref Range    WBC 12.58 3.90 - 12.70 K/uL    RBC 3.66 (L) 4.60 - 6.20 M/uL    Hemoglobin 7.4 (L) 14.0 - 18.0 g/dL    Hematocrit 26.0 (L) 40.0 - 54.0 %    MCV 71 (L) 82 - 98 fL    MCH 20.2 (L) 27.0 - 31.0 pg    MCHC 28.5 (L) 32.0 - 36.0 g/dL    RDW 31.0 (H) 11.5 - 14.5 %    Platelets 286 150 - 350 K/uL    MPV SEE COMMENT 9.2 - 12.9 fL    Immature Granulocytes 1.4 (H) 0.0 - 0.5 %    Gran # (ANC) 8.7 (H) 1.8 - 7.7 K/uL    Immature Grans (Abs) 0.17 (H) 0.00 - 0.04 K/uL    Lymph # 1.2 1.0 - 4.8 K/uL    Mono # 1.9 (H) 0.3 - 1.0 K/uL    Eos # 0.6 (H) 0.0 - 0.5 K/uL    Baso # 0.02 0.00 - 0.20 K/uL    nRBC 0 0 /100 WBC    Gran% 69.1 38.0 - 73.0 %    Lymph% 9.4 (L) 18.0 - 48.0 %    Mono% 15.1 (H) 4.0 - 15.0 %    Eosinophil% 4.8 0.0 - 8.0 %    Basophil% 0.2 0.0 - 1.9 %    Aniso Moderate     Poik Slight     Poly Occasional     Hypo Occasional     Ovalocytes Occasional     Target Cells Occasional     Spherocytes Occasional     Fragmented Cells Occasional     Differential Method Automated    Magnesium    Collection Time: 03/31/18  3:51 AM   Result Value Ref Range    Magnesium 1.9 1.6 -  2.6 mg/dL   Phosphorus    Collection Time: 03/31/18  3:51 AM   Result Value Ref Range    Phosphorus 2.8 2.7 - 4.5 mg/dL   POCT glucose    Collection Time: 03/31/18  5:21 AM   Result Value Ref Range    POCT Glucose 126 (H) 70 - 110 mg/dL         Significant Imaging: I have reviewed all pertinent imaging results/findings within the past 24 hours.    Assessment/Plan:      * Embolic stroke involving right middle cerebral artery    - 03/23/18 CTA Stroke Multiphase with multiple non-occlusive thrombi noted  - MRI brain w/o show multiple infarcts without evidence of hemorrhagic conversion.  - Given recent in-hospital atrial fibrillation and takotsubo's CM suspicion for embolic stroke, in addition 2D Echo showed severely depressed LVEF 15-20% with evidence of stress CM  - PT/OT reordered   - patient with repeat drop in H/H but the 3/29 increase was likely outlier  - no active blood loss - needs NOAC but continued with anemia, will hold until anemia improves          Inguinal hernia of right side without obstruction or gangrene    - feels fluid filled on exam  - US ordered        Oropharyngeal dysphagia    - GI consulted for PEG tube placement          Cytotoxic cerebral edema              Chronic systolic HF (heart failure)              NSVT (nonsustained ventricular tachycardia)    - patient with daily episodes of NSVT  - remains on metoprolol for Afib w RVR  - unable to obtain ROS if symptomatic during episodes  - on telemetry - will monitor          Takotsubo cardiomyopathy    - pt with loss of wife 2/2018, now with dilated cardiomyopathy EF 15-20percent and imaging suggestive of takotsubo CM  - will need repeat echo to evaluate improvement  - embolic episode may have been exacerbated by new onset CM   - elevated ePAP 61          Alcohol abuse    - unable to obtain alcohol history from patient          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 Continue Isosource 1.5 @ goal rate 50mL/hr.   - Provides 1800kcals, 82g protein and 917mL free water.      2. If more concentrated formula warranted (EF 45%), recommend Nutren 2.0 @ goal rate 40mL/hr.   - Provides 1920kcals, 81g protein and 664mL free water.   - Hold for residuals >500mL.      RD to monitor.            Aphasia as late effect of cerebrovascular accident    - seems both receptive and expressive  - patient perseverating about drinking water          Iron deficiency anemia    - see microcyctic anemia        Atrial fibrillation with RVR    - Patient noted to be in Afib with RVR 3/25  - In NSR this AM with rate of 70s-90s, multiple short runs of NSVT  - Would recommend anticoaulation - If anticoagulation is initiated for Afib, Eliquis is $8.35/month--Eliquis was shown to have similar GIB risk as warfarin in [facility]; other DOACs shown to have increased risk compared to warfarin in major Afib studies  - cardiac monitoring  - Currently on metoprolol 25mg TID for rate control               Anemia    - has iron def anemia, continue iron replacement  - unknown baseline hb, no melena or bloody bowel movements reported while IP  - no known history of GIB or varices  - Hb decreased   - ZENIA negative for black/red stool  - will evaluate further        Acute pancreatitis    RESOLVED  - Lipase at OSH 3738 w/ leukocytosis, elevated Ca, peripancreatic fluid w/o gallstones on CT abdomen  - U/S also unremarkable.  - Lipase and amylase have trended down appropriately.   - No increase in LFTs or synthetic function.  - Tolerating tube feeds           Essential hypertension     - SBP goal <180, prn labetalol ordered  - Has not required PRNS  - Now on metoprolol 25mg TID   - has had multiple short runs of NSVT - patient unable to provide review if symptomatic          Acute encephalopathy    - Likely 2/2 sequelae of  embolic stroke            VTE Risk Mitigation         Ordered     heparin (porcine) injection 5,000 Units  Every 8 hours     Route:  Subcutaneous        03/27/18 0921              Riley Milner MD  Department of Hospital Medicine   [facility]

## 2018-03-31 NOTE — PLAN OF CARE
Problem: Patient Care Overview  Goal: Plan of Care Review  Outcome: Ongoing (interventions implemented as appropriate)  No falls during shift. Restraints continued for medical device removal. No BM during shift. Pt has been restless throughout shift. Will continue to monitor

## 2018-03-31 NOTE — ASSESSMENT & PLAN NOTE
Pt is a 77 y/o male with medical history of HTN presented to OSH in [place] ([facility]) with left-sided weakness, dysarthria, anemia, and acute pancreatitis. Patient had evidence of left subacute cerebellar infarct and right cerebral infarct on head CT. He was out of the window for tPA and was sent to [facility] emergently for CTA Stroke Multiphase and possible endovascular intervention. CTA stroke multiphase with no LVO, no intervention planned. MRI here with  bilateral frontal, parietal, occipital, cerebellar infarcts L>R. Image suggestive of acute extensive acute embolic stroke. Likely cardioembolic, documented afib in chart. ECHO shows EF 15-20% concerning for stress induced cardiomyopathy.       Antithrombotics for secondary stroke prevention: Being held as patient with significant anemia and cause known; r/o bleed. If ruled out, start anticoagulation (any DOAC or warfarin is reasonable from stroke standpoint for secondary stroke prevention).     Statins for secondary stroke prevention and hyperlipidemia, if present: Statins: Atorvastatin- 40 mg daily LDL = 70     Aggressive risk factor modification: HTN,  hypercoagulable state with pancreatitis      Rehab efforts: PT/OT/SLP to evaluate and treat -recommended SNF. Awaiting PEG, still NPO    Diagnostics ordered/pending: none     VTE prophylaxis:  SCDs    BP parameters: Infarct: No intervention, SBP <180 if primary team deems appropriate

## 2018-03-31 NOTE — ASSESSMENT & PLAN NOTE
- 03/23/18 CTA Stroke Multiphase with multiple non-occlusive thrombi noted  - MRI brain w/o show multiple infarcts without evidence of hemorrhagic conversion.  - Given recent in-hospital atrial fibrillation and takotsubo's CM suspicion for embolic stroke, in addition 2D Echo showed severely depressed LVEF 15-20% with evidence of stress CM  - PT/OT reordered   - patient with repeat drop in H/H but the 3/29 increase was likely outlier  - no active blood loss - needs NOAC but continued with anemia, will hold until anemia improves

## 2018-04-01 LAB
ALBUMIN SERPL BCP-MCNC: 2.3 G/DL
ALP SERPL-CCNC: 66 U/L
ALT SERPL W/O P-5'-P-CCNC: 20 U/L
ANION GAP SERPL CALC-SCNC: 6 MMOL/L
ANISOCYTOSIS BLD QL SMEAR: ABNORMAL
AST SERPL-CCNC: 39 U/L
BASOPHILS # BLD AUTO: 0.03 K/UL
BASOPHILS NFR BLD: 0.2 %
BILIRUB SERPL-MCNC: 0.4 MG/DL
BUN SERPL-MCNC: 25 MG/DL
CALCIUM SERPL-MCNC: 9.7 MG/DL
CHLORIDE SERPL-SCNC: 112 MMOL/L
CO2 SERPL-SCNC: 24 MMOL/L
CREAT SERPL-MCNC: 0.8 MG/DL
DIFFERENTIAL METHOD: ABNORMAL
EOSINOPHIL # BLD AUTO: 0.6 K/UL
EOSINOPHIL NFR BLD: 4.5 %
ERYTHROCYTE [DISTWIDTH] IN BLOOD BY AUTOMATED COUNT: 31.6 %
EST. GFR  (AFRICAN AMERICAN): >60 ML/MIN/1.73 M^2
EST. GFR  (NON AFRICAN AMERICAN): >60 ML/MIN/1.73 M^2
GLUCOSE SERPL-MCNC: 115 MG/DL
HCT VFR BLD AUTO: 30.1 %
HGB BLD-MCNC: 8.6 G/DL
HYPOCHROMIA BLD QL SMEAR: ABNORMAL
IMM GRANULOCYTES # BLD AUTO: 0.18 K/UL
IMM GRANULOCYTES NFR BLD AUTO: 1.5 %
LYMPHOCYTES # BLD AUTO: 1.2 K/UL
LYMPHOCYTES NFR BLD: 10.1 %
MAGNESIUM SERPL-MCNC: 2 MG/DL
MCH RBC QN AUTO: 21 PG
MCHC RBC AUTO-ENTMCNC: 28.6 G/DL
MCV RBC AUTO: 74 FL
MONOCYTES # BLD AUTO: 1.7 K/UL
MONOCYTES NFR BLD: 13.7 %
NEUTROPHILS # BLD AUTO: 8.5 K/UL
NEUTROPHILS NFR BLD: 70 %
NRBC BLD-RTO: 0 /100 WBC
PHOSPHATE SERPL-MCNC: 2.8 MG/DL
PLATELET # BLD AUTO: 281 K/UL
PLATELET BLD QL SMEAR: ABNORMAL
PMV BLD AUTO: ABNORMAL FL
POCT GLUCOSE: 127 MG/DL (ref 70–110)
POCT GLUCOSE: 130 MG/DL (ref 70–110)
POIKILOCYTOSIS BLD QL SMEAR: SLIGHT
POLYCHROMASIA BLD QL SMEAR: ABNORMAL
POTASSIUM SERPL-SCNC: 3.8 MMOL/L
PROT SERPL-MCNC: 6.2 G/DL
RBC # BLD AUTO: 4.09 M/UL
SODIUM SERPL-SCNC: 142 MMOL/L
TARGETS BLD QL SMEAR: ABNORMAL
WBC # BLD AUTO: 12.12 K/UL

## 2018-04-01 PROCEDURE — 92523 SPEECH SOUND LANG COMPREHEN: CPT

## 2018-04-01 PROCEDURE — 99233 SBSQ HOSP IP/OBS HIGH 50: CPT | Mod: ,,, | Performed by: PSYCHIATRY & NEUROLOGY

## 2018-04-01 PROCEDURE — 85025 COMPLETE CBC W/AUTO DIFF WBC: CPT

## 2018-04-01 PROCEDURE — 11000001 HC ACUTE MED/SURG PRIVATE ROOM

## 2018-04-01 PROCEDURE — 84100 ASSAY OF PHOSPHORUS: CPT

## 2018-04-01 PROCEDURE — 36415 COLL VENOUS BLD VENIPUNCTURE: CPT

## 2018-04-01 PROCEDURE — 80053 COMPREHEN METABOLIC PANEL: CPT

## 2018-04-01 PROCEDURE — 63600175 PHARM REV CODE 636 W HCPCS: Performed by: HOSPITALIST

## 2018-04-01 PROCEDURE — 63600175 PHARM REV CODE 636 W HCPCS: Performed by: STUDENT IN AN ORGANIZED HEALTH CARE EDUCATION/TRAINING PROGRAM

## 2018-04-01 PROCEDURE — 25000003 PHARM REV CODE 250: Performed by: STUDENT IN AN ORGANIZED HEALTH CARE EDUCATION/TRAINING PROGRAM

## 2018-04-01 PROCEDURE — 25000003 PHARM REV CODE 250: Performed by: NURSE PRACTITIONER

## 2018-04-01 PROCEDURE — 25000003 PHARM REV CODE 250: Performed by: PHYSICIAN ASSISTANT

## 2018-04-01 PROCEDURE — 83735 ASSAY OF MAGNESIUM: CPT

## 2018-04-01 PROCEDURE — 25000003 PHARM REV CODE 250: Performed by: HOSPITALIST

## 2018-04-01 PROCEDURE — C9113 INJ PANTOPRAZOLE SODIUM, VIA: HCPCS | Performed by: HOSPITALIST

## 2018-04-01 PROCEDURE — 99232 SBSQ HOSP IP/OBS MODERATE 35: CPT | Mod: GC,,, | Performed by: HOSPITALIST

## 2018-04-01 RX ORDER — CEFAZOLIN SODIUM 1 G/3ML
1 INJECTION, POWDER, FOR SOLUTION INTRAMUSCULAR; INTRAVENOUS ONCE AS NEEDED
Status: DISCONTINUED | OUTPATIENT
Start: 2018-04-02 | End: 2018-04-02

## 2018-04-01 RX ORDER — METOPROLOL TARTRATE 50 MG/1
50 TABLET ORAL 2 TIMES DAILY
Status: DISCONTINUED | OUTPATIENT
Start: 2018-04-01 | End: 2018-04-03

## 2018-04-01 RX ADMIN — PANTOPRAZOLE SODIUM 40 MG: 40 INJECTION, POWDER, FOR SOLUTION INTRAVENOUS at 09:04

## 2018-04-01 RX ADMIN — STANDARDIZED SENNA CONCENTRATE AND DOCUSATE SODIUM 1 TABLET: 8.6; 5 TABLET, FILM COATED ORAL at 08:04

## 2018-04-01 RX ADMIN — LISINOPRIL 10 MG: 10 TABLET ORAL at 08:04

## 2018-04-01 RX ADMIN — MINERAL SUPPLEMENT IRON 300 MG / 5 ML STRENGTH LIQUID 100 PER BOX UNFLAVORED 300 MG: at 08:04

## 2018-04-01 RX ADMIN — Medication 10 ML: at 08:04

## 2018-04-01 RX ADMIN — METOPROLOL TARTRATE 25 MG: 25 TABLET ORAL at 08:04

## 2018-04-01 RX ADMIN — POLYETHYLENE GLYCOL 3350 17 G: 17 POWDER, FOR SOLUTION ORAL at 08:04

## 2018-04-01 RX ADMIN — OXYCODONE HYDROCHLORIDE 10 MG: 5 TABLET ORAL at 05:04

## 2018-04-01 RX ADMIN — OXYCODONE HYDROCHLORIDE 10 MG: 5 TABLET ORAL at 09:04

## 2018-04-01 RX ADMIN — METOPROLOL TARTRATE 50 MG: 50 TABLET ORAL at 09:04

## 2018-04-01 RX ADMIN — OXYCODONE HYDROCHLORIDE 10 MG: 5 TABLET ORAL at 12:04

## 2018-04-01 RX ADMIN — HEPARIN SODIUM 5000 UNITS: 5000 INJECTION, SOLUTION INTRAVENOUS; SUBCUTANEOUS at 05:04

## 2018-04-01 RX ADMIN — Medication 100 MG: at 08:04

## 2018-04-01 RX ADMIN — VITAMIN C 1 TABLET: TAB at 08:04

## 2018-04-01 RX ADMIN — FOLIC ACID 1 MG: 1 TABLET ORAL at 08:04

## 2018-04-01 RX ADMIN — METOPROLOL TARTRATE 50 MG: 50 TABLET ORAL at 12:04

## 2018-04-01 RX ADMIN — MINERAL SUPPLEMENT IRON 300 MG / 5 ML STRENGTH LIQUID 100 PER BOX UNFLAVORED 300 MG: at 09:04

## 2018-04-01 RX ADMIN — HEPARIN SODIUM 5000 UNITS: 5000 INJECTION, SOLUTION INTRAVENOUS; SUBCUTANEOUS at 01:04

## 2018-04-01 NOTE — PROGRESS NOTES
Plan of care reviewed with patient. Right mitten and wrist restraints still in use.  Tube feedings to be held after MN for PEG placement tomorrow.

## 2018-04-01 NOTE — ASSESSMENT & PLAN NOTE
- GI consulted for PEG tube placement  - Tentatively planned for tomorrow or Tuesday.  -Will make NPO and hold evening DVT ppx in case of procedure tomorrow.

## 2018-04-01 NOTE — PROGRESS NOTES
[facility]  Vascular Neurology  Comprehensive Stroke Center  Progress Note    Assessment/Plan:     * Embolic stroke involving right middle cerebral artery    Pt is a 79 y/o male with medical history of HTN presented to OSH in [place] ([facility]) with left-sided weakness, dysarthria, anemia, and acute pancreatitis. Patient had evidence of left subacute cerebellar infarct and right cerebral infarct on head CT. He was out of the window for tPA and was sent to [facility] emergently for CTA Stroke Multiphase and possible endovascular intervention. CTA stroke multiphase with no LVO, no intervention planned. MRI here with  bilateral frontal, parietal, occipital, cerebellar infarcts L>R. Image suggestive of acute extensive acute embolic stroke. Likely cardioembolic, documented afib in chart. ECHO shows EF 15-20% concerning for stress induced cardiomyopathy.     4/1/18 No acute neuro changes. Medical management per primary team. Possible PEG tomorrow or Tuesday.     Antithrombotics for secondary stroke prevention: Being held as patient with significant anemia and cause known; r/o bleed. If ruled out, start anticoagulation (any DOAC or warfarin is reasonable from stroke standpoint for secondary stroke prevention).     Statins for secondary stroke prevention and hyperlipidemia, if present: Statins: Atorvastatin- 40 mg daily LDL = 70     Aggressive risk factor modification: HTN,  hypercoagulable state with pancreatitis      Rehab efforts: PT/OT/SLP to evaluate and treat -recommended SNF. Awaiting PEG, still NPO    Diagnostics ordered/pending: none     VTE prophylaxis:  SCDs    BP parameters: Infarct: No intervention, SBP <180 if primary team deems appropriate           Acute encephalopathy    - Multifactorial; likely 2/2 to CVA, pancreatitis, and possible infectious cause        Atrial fibrillation with RVR    Stroke risk factor. Likely etiology of multiple embolic infarcts   Recommend anticoagulation when  appropriate; patient requires PEG and has had anemia requiring transfusions        Dysphagia as late effect of cerebrovascular accident (CVA)    Currently on TF. Awaiting peg.        Inguinal hernia of right side without obstruction or gangrene    Pending US per primary service.         Cytotoxic cerebral edema    Secondary to ischemic stroke  Noted on imaging         Chronic systolic HF (heart failure)    Most recent ECHO shows EF 15-20%  Suggestive of stress induced cardiomyopathy         Takotsubo cardiomyopathy    Noted on ECHO         Aphasia as late effect of cerebrovascular accident    Aggressive ST        Anemia    - Hemoglobin 5.8 at OSH, 6.4 here after 1UPRBC at OSH upon admission   - 2U PRBC transfused here.  - H/h waxes and wanes. Stable today 30.1/8.6   - Workup and management per primary team  - GI consulted, no apparent bleeding         Acute pancreatitis    - Lipase >1000 initially with imaging findings of peripancreatic fluid collection at tail from OSH.  - Continue IVF and management per primary team  - Resolved per primary team.         Essential hypertension    Stroke risk factor   Non compliant with medication             3/24 Patient's hemoglobin was 6.4 on arrival (s/p 1U PRBC on transport); transfused additional 2U PRBC; repeat hemoglobin 8.8. Of note, patient's WBC on arrival here significant for leukocytosis of 36. MRI demonstrable for bilateral frontal, parietal, occipital, cerebellar infarcts L>R. Cause likely cardio-embolic.   3/30/18 No new imaging or neurologic changes. Patient's ECHo was concerning for a stress induced cardiomyopathy, EF 15-20%. Patient likely needs PEG tube, still NPO. Disposition pending SNF. Still not anticoagulated at this time due to anemia, but primary team plans on starting soon when they feel H/H stable and appropriate.   3/31: awaiting peg tube. Hb dropped overnight from 8.4>>7.4. GI consulted and recommended further evaluation of inflammatory changes of the  pancreatic tail, recommending EUS or MRCP in future with PBS follow up outpatient. US ordered for inguinal hernia.     STROKE DOCUMENTATION   Acute Stroke Times   Last Known Normal Date: 03/22/18  Last Known Normal Time: 2100  Symptom Onset Date: 03/22/18  Symptom Onset Time: 2100  Stroke Team Called Date: 03/23/18  Stroke Team Called Time: 1555  Stroke Team Arrival Date: 03/23/18  Stroke Team Arrival Time: 1555  CT Interpretation Time: 1610  Decision to Treat Time for IR:  (Not a candidate)    NIH Scale:  1a. Level Of Consciousness: 1-->Not alert: but arousable by minor stimulation to obey, answer, or respond  1b. LOC Questions: 1-->Answers one question correctly  1c. LOC Commands: 2-->Performs neither task correctly  2. Best Gaze: 1-->Partial gaze palsy: gaze is abnormal in one or both eyes, but forced deviation or total gaze paresis is not present  3. Visual: 0-->No visual loss  4. Facial Palsy: 2-->Partial paralysis (total or near-total paralysis of lower face)  5a. Motor Arm, Left: 4-->No movement  5b. Motor Arm, Right: 2-->Some effort against gravity: limb cannot get to or maintain (if cued) 90 (or 45) degrees, drifts down to bed, but has some effort against gravity  6a. Motor Leg, Left: 4-->No movement  6b. Motor Leg, Right: 3-->No effort against gravity: leg falls to bed immediately  7. Limb Ataxia: 0-->Absent  8. Sensory: 0-->Normal: no sensory loss  9. Best Language: 2-->Severe aphasia: all communication is through fragmentary expression: great need for inference, questioning, and guessing by the listener. Range of information that can be exchanged is limited: listener carries burden of. . . (see row details)  10. Dysarthria: 2-->Severe dysarthria: patients speech is so slurred as to be unintelligible in the absence of or out of proportion to any dysphasia, or is mute/anarthric  11. Extinction and Inattention (formerly Neglect): 0-->No abnormality  Total (NIH Stroke Scale): 24       Modified Saline Score:  1  [place] Coma Scale:    ABCD2 Score:    [id number] Score:6  HAS -BLED Score:3  ICH Score:   Hunt & Connors Classification:      Hemorrhagic change of an Ischemic Stroke: Does this patient have an ischemic stroke with hemorrhagic changes? No     Neurologic Chief Complaint: L sided weakness, dysphagia, dysarthria, confusion    Subjective:     Interval History: Patient is seen for follow-up neurological assessment and treatment recommendations:     Patient remains confused and has RUE restrained on exam and not alert as yesterday. Patient is NPO and has NG Tube for nutrition, awaiting PEG tube.  Still not anticoagulated at this time due to anemia. Disposition pending SNF per primary service.    HPI, Past Medical, Family, and Social History remains the same as documented in the initial encounter.     Review of Systems   Constitutional: Negative for fever.   HENT: Negative for congestion.    Eyes: Negative for redness.   Respiratory: Negative for cough and shortness of breath.    Cardiovascular: Negative for chest pain.   Genitourinary: Negative for dysuria and hematuria.   Skin: Negative for color change and pallor.   Neurological: Positive for facial asymmetry, speech difficulty and weakness.   Psychiatric/Behavioral: Positive for confusion. Negative for behavioral problems.   Positive for anemia.   Positive for dysphagia.    Scheduled Meds:   B-complex with vitamin C  1 tablet Per NG tube Daily    ferrous sulfate  300 mg Per NG tube BID    folic acid  1 mg Per NG tube Daily    heparin (porcine)  5,000 Units Subcutaneous Q8H    lisinopril  10 mg Oral Daily    metoprolol tartrate  25 mg Per NG tube TID    multivitamin liquid no.118  10 mL Per NG tube Daily    pantoprazole 40 mg in dextrose 5 % 100 mL infusion (ready to mix system)  40 mg Intravenous BID    polyethylene glycol  17 g Per NG tube Daily    senna-docusate 8.6-50 mg  1 tablet Per NG tube Daily    thiamine  100 mg Per NG tube Daily     Continuous  Infusions:  PRN Meds:acetaminophen, dextrose 50%, glucagon (human recombinant), insulin aspart U-100, ondansetron, oxyCODONE    Objective:     Vital Signs (Most Recent):  Temp: 97.8 °F (36.6 °C) (04/01/18 0849)  Pulse: 77 (04/01/18 0849)  Resp: 18 (04/01/18 0849)  BP: (!) 156/73 (04/01/18 0849)  SpO2: 100 % (04/01/18 0849)  BP Location: Right arm    Vital Signs Range (Last 24H):  Temp:  [97.1 °F (36.2 °C)-98.5 °F (36.9 °C)]   Pulse:  [68-80]   Resp:  [16-18]   BP: (142-175)/(67-82)   SpO2:  [98 %-100 %]   BP Location: Right arm    Physical Exam  Vital signs: reviewed above.   Constitutional: well-developed, no acute distress  Cardiovascular: regular rate and rhythm  Resp: normal respirations, not labored, no accessory muscles used  Abdomen: soft, non-distended, not tender to palpation  Pulses: palpable distal pulses   Skin: warm, dry and intact, no rashes  Neurological  GCS: Motor: 6/Verbal: 4/Eyes: 4 GCS Total: 14  Mental Status: drowsy, awakens to voice. Oriented to self only  Not following commands  today  Dysarthric and aphasic; tangential speech   Poor attention span  Head: normocephalic, atraumatic       Visual Fields: unable to accurately assess       EOM (CN III, IV, VI): unable to accurately assess       Pupils (CN II, III): PERRL         Facial Sensation (CN V): Normal       Facial Movement (CN VII): Lower facial weakness on the Left  LUE flaccid paralysis 0/5  LLE 1/5  RUE 3/5 (patient in restraint)  RLE 3/5 limb drift present     Condom cath in place.  NG tube in place      Laboratory:  CMP:   Recent Labs  Lab 04/01/18  0546   CALCIUM 9.7   ALBUMIN 2.3*   PROT 6.2      K 3.8   CO2 24   *   BUN 25*   CREATININE 0.8   ALKPHOS 66   ALT 20   AST 39   BILITOT 0.4     CBC:   Recent Labs  Lab 04/01/18  0546   WBC 12.12   RBC 4.09*   HGB 8.6*   HCT 30.1*      MCV 74*   MCH 21.0*   MCHC 28.6*     Lipid Panel: No results for input(s): CHOL, LDLCALC, HDL, TRIG in the last 168 hours.  Coagulation:  No results for input(s): PT, INR, APTT in the last 168 hours.  Platelet Aggregation Study: No results for input(s): PLTAGG, PLTAGINTERP, PLTAGREGLACO, ADPPLTAGGREG in the last 168 hours.  Hgb A1C: No results for input(s): HGBA1C in the last 168 hours.  TSH:   Recent Labs  Lab 03/26/18  0743   TSH 0.996       Diagnostic Results     Brain Imaging   MRI Brain without Contrast Impression 3/24/18     Acute infarcts involving the frontal, parietal, and occipital lobes with involvement of the cerebellum, left greater than right.  These were seen to some extent on prior CTA.  No hemorrhagic conversion at this time.  The findings are concerning for extensive acute embolic stroke.    Moderate chronic microvascular ischemic changes.                        ECHO 3/24/18:   CONCLUSIONS     1 - Consider Stress induced CM - Takotsubo CM.     2 - Eccentric LVH with severely depressed left ventricular systolic function (EF 15-20%).     3 - Normal RV size with moderately depressed right ventricular systolic function .     4 - Mild mitral regurgitation.     5 - Mild tricuspid regurgitation.     6 - Pulmonary hypertension. The estimated PA systolic pressure is 61 mmHg.     7 - Increased central venous pressure.     8 - No prior echo in our system          Vel Ramsey PA-C  Comprehensive Stroke Center  Department of Vascular Neurology   [facility]

## 2018-04-01 NOTE — PROGRESS NOTES
16-beat run of ventricular tachycardia noted on telemetry.  Pt asymptomatic, VSS.  MD Navarro notified. No new orders.

## 2018-04-01 NOTE — PT/OT/SLP EVAL
"Speech Language Pathology Evaluation  Cognitive Communication    Patient Name:  Zion Hines   MRN:  [id number]   1107/[id number] A    Admitting Diagnosis: Embolic stroke involving right middle cerebral artery    Recommendations:     Recommendations:                General Recommendations:  Speech/language therapy  Diet recommendations:  NPO, NPO   Aspiration Precautions: Strict aspiration precautions   General Precautions: Standard, aspiration, fall, NPO  Communication strategies:  go to room if call light pushed    History:     Past Medical History:   Diagnosis Date    Alcohol abuse 3/29/2018    Atrial fibrillation with RVR 3/27/2018    Embolic stroke involving right middle cerebral artery 3/24/2018    Essential hypertension 3/23/2018       History reviewed. No pertinent surgical history.      Subjective     "I don't feel good."     Pain/Comfort:  · Pain Rating 1: other (see comments) (Response to indicate pain severity unappreciated)  · Location 1: abdomen  · Pain Addressed 1: Nurse notified  · Pain Rating Post-Intervention 1: other (see comments) (Response to indicate pain severity unappreciated)    Objective:   Cognitive Status:    · Given phonemic cueing, pt oriented to self. Despite max cueing, unable to orient to place.      Receptive Language:   · Despite multiple repetitions and verbal prompting to respond provided, response unappreciated across basic y/n q's x3  · Despite model, multiple repetitions, and tactile and verbal prompting provided, attempt to follow basic 1-step directions across trials x3 unappreciated     Pragmatics:    · Perseveration  · Distractible   · Impaired topic initiation, conversational turn taking/ maintenance/ repair    Expressive Language:  · During rote speech task to count to 10, pt generated 0/10 ind'ly, 1/10 given visual cueing, and 10/10 given model+visual cueing.   · Despite model, pt named 0/3 common objects during confrontational naming task   · Despite multiple repetitions and " verbal prompting to respond provided, response unappreciated across responsive naming q's x3      Motor Speech:  · Unable to determine if pt presenting with dysarthria vs speech effected by lethargy and report of pain     Treatment:   Pt asleep upon entry, easily awakened with light verbal and tactile stimulation. However frequent verbal and tactile prompting req'd to remain awake as pt lethargic throughout session. HOB raised. Education provided re: role of SLP and SLP POC. However concern remains for pt's complete understanding 2/2 lethargy and cognitive-linguistic deficits as evidenced by results of evaluation as documented above.     Assessment:   Zion Hines is a 78 y.o. male with an SLP diagnosis of cognitive-linguistic impairment.     Goals:    SLP Goals        Problem: SLP Goal    Goal Priority Disciplines Outcome   SLP Goal     SLP Ongoing (interventions implemented as appropriate)   Description:  Speech Language Pathology   Goal expected to be met by 4/8  1. Patient will answer orientation questions x4 given mod A.   2. Given model, patient will follow 1 step directions with 90% acc.   3. Given repetitions x2 max, patient will answer simple y/n questions with 90% acc.  4. Given phonemic cuing, patient will complete simple naming tasks with 80% acc.   5. Given verbal and visual cueing, patient will complete rote speech tasks with 80% acc.                                Plan:   · Patient to be seen:  3 x/week   · Plan of Care expires:  04/30/18  · Plan of Care reviewed with:  patient   · SLP Follow-Up:  Yes       Discharge recommendations:  Discharge Facility/Level Of Care Needs: nursing facility, skilled     Time Tracking:   SLP Treatment Date:   04/01/18  Speech Start Time:  0801  Speech Stop Time:  0816     Speech Total Time (min):  15 min    Billable Minutes: Demetra 15     YANNI Marie, CCC-SLP  [phone]  4/1/2018

## 2018-04-01 NOTE — SUBJECTIVE & OBJECTIVE
Neurologic Chief Complaint: L sided weakness, dysphagia, dysarthria, confusion    Subjective:     Interval History: Patient is seen for follow-up neurological assessment and treatment recommendations:     Patient remains confused and has RUE restrained on exam and not alert as yesterday. Patient is NPO and has NG Tube for nutrition, awaiting PEG tube.  Still not anticoagulated at this time due to anemia. Disposition pending SNF per primary service.    HPI, Past Medical, Family, and Social History remains the same as documented in the initial encounter.     Review of Systems   Constitutional: Negative for fever.   HENT: Negative for congestion.    Eyes: Negative for redness.   Respiratory: Negative for cough and shortness of breath.    Cardiovascular: Negative for chest pain.   Genitourinary: Negative for dysuria and hematuria.   Skin: Negative for color change and pallor.   Neurological: Positive for facial asymmetry, speech difficulty and weakness.   Psychiatric/Behavioral: Positive for confusion. Negative for behavioral problems.   Positive for anemia.   Positive for dysphagia.    Scheduled Meds:   B-complex with vitamin C  1 tablet Per NG tube Daily    ferrous sulfate  300 mg Per NG tube BID    folic acid  1 mg Per NG tube Daily    heparin (porcine)  5,000 Units Subcutaneous Q8H    lisinopril  10 mg Oral Daily    metoprolol tartrate  25 mg Per NG tube TID    multivitamin liquid no.118  10 mL Per NG tube Daily    pantoprazole 40 mg in dextrose 5 % 100 mL infusion (ready to mix system)  40 mg Intravenous BID    polyethylene glycol  17 g Per NG tube Daily    senna-docusate 8.6-50 mg  1 tablet Per NG tube Daily    thiamine  100 mg Per NG tube Daily     Continuous Infusions:  PRN Meds:acetaminophen, dextrose 50%, glucagon (human recombinant), insulin aspart U-100, ondansetron, oxyCODONE    Objective:     Vital Signs (Most Recent):  Temp: 97.8 °F (36.6 °C) (04/01/18 0849)  Pulse: 77 (04/01/18 0849)  Resp: 18  (04/01/18 0849)  BP: (!) 156/73 (04/01/18 0849)  SpO2: 100 % (04/01/18 0849)  BP Location: Right arm    Vital Signs Range (Last 24H):  Temp:  [97.1 °F (36.2 °C)-98.5 °F (36.9 °C)]   Pulse:  [68-80]   Resp:  [16-18]   BP: (142-175)/(67-82)   SpO2:  [98 %-100 %]   BP Location: Right arm    Physical Exam  Vital signs: reviewed above.   Constitutional: well-developed, no acute distress  Cardiovascular: regular rate and rhythm  Resp: normal respirations, not labored, no accessory muscles used  Abdomen: soft, non-distended, not tender to palpation  Pulses: palpable distal pulses   Skin: warm, dry and intact, no rashes  Neurological  GCS: Motor: 6/Verbal: 4/Eyes: 4 GCS Total: 14  Mental Status: drowsy, awakens to voice. Oriented to self only  Not following commands today  Dysarthric and aphasic; tangential speech   Poor attention span  Head: normocephalic, atraumatic       Visual Fields: unable to accurately assess       EOM (CN III, IV, VI): unable to accurately assess       Pupils (CN II, III): PERRL         Facial Sensation (CN V): Normal       Facial Movement (CN VII): Lower facial weakness on the Left  LUE flaccid paralysis 0/5  LLE 1/5  RUE 3/5 (patient in restraint)  RLE 3/5 limb drift present     Stratton in place.  NG tube in place      Laboratory:  CMP:   Recent Labs  Lab 04/01/18  0546   CALCIUM 9.7   ALBUMIN 2.3*   PROT 6.2      K 3.8   CO2 24   *   BUN 25*   CREATININE 0.8   ALKPHOS 66   ALT 20   AST 39   BILITOT 0.4     CBC:   Recent Labs  Lab 04/01/18  0546   WBC 12.12   RBC 4.09*   HGB 8.6*   HCT 30.1*      MCV 74*   MCH 21.0*   MCHC 28.6*     Lipid Panel: No results for input(s): CHOL, LDLCALC, HDL, TRIG in the last 168 hours.  Coagulation: No results for input(s): PT, INR, APTT in the last 168 hours.  Platelet Aggregation Study: No results for input(s): PLTAGG, PLTAGINTERP, PLTAGREGLACO, ADPPLTAGGREG in the last 168 hours.  Hgb A1C: No results for input(s): HGBA1C in the last 168  hours.  TSH:   Recent Labs  Lab 03/26/18  0743   TSH 0.996       Diagnostic Results     Brain Imaging   MRI Brain without Contrast Impression 3/24/18     Acute infarcts involving the frontal, parietal, and occipital lobes with involvement of the cerebellum, left greater than right.  These were seen to some extent on prior CTA.  No hemorrhagic conversion at this time.  The findings are concerning for extensive acute embolic stroke.    Moderate chronic microvascular ischemic changes.                        ECHO 3/24/18:   CONCLUSIONS     1 - Consider Stress induced CM - Takotsubo CM.     2 - Eccentric LVH with severely depressed left ventricular systolic function (EF 15-20%).     3 - Normal RV size with moderately depressed right ventricular systolic function .     4 - Mild mitral regurgitation.     5 - Mild tricuspid regurgitation.     6 - Pulmonary hypertension. The estimated PA systolic pressure is 61 mmHg.     7 - Increased central venous pressure.     8 - No prior echo in our system

## 2018-04-01 NOTE — TREATMENT PLAN
Treatment Plan  04/01/2018  1:35 PM    Plan for PEG tube placement tomorrow  NPO after midnight  Rubi hold any DVT prophylaxis 12 hours prior to the procedure  Please order Cefazolin 1g IV x 1 (Vancomycin 1g IV if PCN allergic) on call for the procedure    Cammie Carter M.D.  Gastroenterology Fellow, PGY-IV  Pager: [phone]  [facility]

## 2018-04-01 NOTE — SUBJECTIVE & OBJECTIVE
Interval History: NAEON. Patient complaining of a cough this morning, but otherwise no further medical complaints. Does not respond to questions of orientation this morning, not following commands. Plan for PEG tomorrow or Tuesday.     Review of Systems   Unable to perform ROS: Mental status change     Objective:     Vital Signs (Most Recent):  Temp: 97.7 °F (36.5 °C) (04/01/18 0500)  Pulse: 78 (04/01/18 0500)  Resp: 18 (04/01/18 0500)  BP: (!) 157/75 (04/01/18 0500)  SpO2: 98 % (04/01/18 0500) Vital Signs (24h Range):  Temp:  [97.1 °F (36.2 °C)-98.5 °F (36.9 °C)] 97.7 °F (36.5 °C)  Pulse:  [68-80] 78  Resp:  [16-18] 18  SpO2:  [98 %] 98 %  BP: (142-175)/(67-82) 157/75     Weight: 67.6 kg (149 lb 1.6 oz)  Body mass index is 22.68 kg/m².    Intake/Output Summary (Last 24 hours) at 04/01/18 0805  Last data filed at 04/01/18 0516   Gross per 24 hour   Intake             2200 ml   Output              700 ml   Net             1500 ml      Physical Exam   Constitutional: He appears well-developed.   HENT:   Head: Atraumatic.   Eyes: Pupils are equal, round, and reactive to light.   Patient with rightward gaze preference  Arcus senilis bilaterally   Neck: No JVD present.   Cardiovascular: Normal rate, regular rhythm, S1 normal, S2 normal and intact distal pulses.    Pulses:       Radial pulses are 2+ on the right side, and 2+ on the left side.        Dorsalis pedis pulses are 1+ on the right side, and 1+ on the left side.   Pulmonary/Chest: Effort normal and breath sounds normal.   Abdominal: Soft. Bowel sounds are normal. He exhibits no distension. There is tenderness in the suprapubic area and left lower quadrant. There is no guarding.       Genitourinary:   Genitourinary Comments: Condom catheter in place   Musculoskeletal: He exhibits no edema.   Neurological: He is alert. He exhibits abnormal muscle tone. GCS eye subscore is 4. GCS verbal subscore is 3. GCS motor subscore is 6.   Expressive and receptive aphasia. GALLITO  weak - 2/5 strength in Left hand     Skin: Skin is warm. No rash noted.   Psychiatric: His speech is slurred.   Nursing note and vitals reviewed.      Significant Labs:   Recent Results (from the past 24 hour(s))   POCT glucose    Collection Time: 03/31/18 11:07 AM   Result Value Ref Range    POCT Glucose 126 (H) 70 - 110 mg/dL   POCT glucose    Collection Time: 03/31/18  4:12 PM   Result Value Ref Range    POCT Glucose 128 (H) 70 - 110 mg/dL   POCT TB Skin Test Read    Collection Time: 03/31/18  7:06 PM   Result Value Ref Range    TB Induration 48 - 72 hr read 0 0   POCT glucose    Collection Time: 03/31/18 10:29 PM   Result Value Ref Range    POCT Glucose 98 70 - 110 mg/dL   Comprehensive metabolic panel    Collection Time: 04/01/18  5:46 AM   Result Value Ref Range    Sodium 142 136 - 145 mmol/L    Potassium 3.8 3.5 - 5.1 mmol/L    Chloride 112 (H) 95 - 110 mmol/L    CO2 24 23 - 29 mmol/L    Glucose 115 (H) 70 - 110 mg/dL    BUN, Bld 25 (H) 8 - 23 mg/dL    Creatinine 0.8 0.5 - 1.4 mg/dL    Calcium 9.7 8.7 - 10.5 mg/dL    Total Protein 6.2 6.0 - 8.4 g/dL    Albumin 2.3 (L) 3.5 - 5.2 g/dL    Total Bilirubin 0.4 0.1 - 1.0 mg/dL    Alkaline Phosphatase 66 55 - 135 U/L    AST 39 10 - 40 U/L    ALT 20 10 - 44 U/L    Anion Gap 6 (L) 8 - 16 mmol/L    eGFR if African American >60.0 >60 mL/min/1.73 m^2    eGFR if non African American >60.0 >60 mL/min/1.73 m^2   CBC auto differential    Collection Time: 04/01/18  5:46 AM   Result Value Ref Range    WBC 12.12 3.90 - 12.70 K/uL    RBC 4.09 (L) 4.60 - 6.20 M/uL    Hemoglobin 8.6 (L) 14.0 - 18.0 g/dL    Hematocrit 30.1 (L) 40.0 - 54.0 %    MCV 74 (L) 82 - 98 fL    MCH 21.0 (L) 27.0 - 31.0 pg    MCHC 28.6 (L) 32.0 - 36.0 g/dL    RDW 31.6 (H) 11.5 - 14.5 %    Platelets 281 150 - 350 K/uL    MPV SEE COMMENT 9.2 - 12.9 fL   Magnesium    Collection Time: 04/01/18  5:46 AM   Result Value Ref Range    Magnesium 2.0 1.6 - 2.6 mg/dL   Phosphorus    Collection Time: 04/01/18  5:46 AM    Result Value Ref Range    Phosphorus 2.8 2.7 - 4.5 mg/dL     Significant Imaging: I have reviewed all pertinent imaging results/findings within the past 24 hours.

## 2018-04-01 NOTE — ASSESSMENT & PLAN NOTE
- feels fluid filled on exam  - US ordered consistent with fluid filled channel in inguinal canals, findings unspecific.

## 2018-04-01 NOTE — PROGRESS NOTES
[facility] Hospital Medicine  Progress Note    Patient Name: Zion Hines  MRN: [id number]  Patient Class: IP- Inpatient   Admission Date: 3/23/2018  Length of Stay: 9 days  Attending Physician: Mica Mckeon MD  Primary Care Provider: Primary Doctor [facility] Medicine Team: [facility] HOSP MED 2 Bahman Arroyo MD    Subjective:     Principal Problem:Embolic stroke involving right middle cerebral artery    HPI:  79 yo M with PMHx HTN known to be non-compliant with medications and remote hx of anemia presents to [facility] ED 18 as a transfer from [facility].  Patient is a poor historian with no family at bedside, hx largely obtained from OSH records.  He presented to OSH originally with symptoms of weakness and fatigue, his daughter brought him to ED thinking he was likely anemic again.  It was noted that patient had some dysarthria and aphasia (unspecified expressive vs receptive) with L-sided weakness.  He was out of the window for tPA and was sent to [facility] emergently for CTA Stroke Multiphase and possible endovascular intervention. CTA stroke multiphase with no LVO, no intervention planned.  Of note, patient is anemic per OSH labs with a Hgb of 5.8 g/dL and likely has pancreatitis with lipase 3738, WBC count of 15.8 k/uL, and CT abdomen showing peripancreatic fluid around the tail.  He was transfused a unit of PRBCs prior to transfer.  Labs were otherwise largely unremarkable.  On presentation to [facility] ED, he is able to move both BLE but complains of pain in BLE.  Not moving LUE but is intact to noxious stimuli.  Has a R gaze preference that is not overcome by VOR testing and complaint of neck pain with moving head side-to-side.  Otherwise has mild abdominal distension with mild hypogastric tenderness to palpation. HTN to SBP 200s.  Of note, patient's wife  recently and family expressed concern in outpatient ED that patient has not been eating much for the past month but deny EtOH  use.    Hospital Course:  03/23/18:  Admission to Neuro ICU 2/2 concern for stroke with multiple concerns for patient to become unstable--pancreatitis, severe anemia, HTN.  03/24: has had no interval development of multiorgan involvement form pancreatitis, drop in hemoglobin secondary to erosive gastritis, transfused and on protonix bid  03/25: Patient on dilt drip for a-fib. Repeat EKG shows NSR.  3/26: Discontinuing diltiazem drip today. Will continue monitor HR. Continue IV fluids 50 cc/hr for pancreatitis. Repeat lipase.   3/28: NAEON. VSSA. No overt signs of bleeding. Neuro exam unchanged from prior documented exams. Continues to not follow commands, continues to answer questions inappropriately. Remains effectively hemiplegic on left. No further ICU needs. S/D today to hospital medicine after d/w vascular neurology.  03/29/2018 VSS, no signs of blood loss in stool. Loose stools on examination. NG tube replaced and xray confirmed in duodenum - retracted. Will make definitive decision on anticoagulation 3/30. Furthering discussions with family regarding definitive feeding  03/30/2018 VSS, no signs of blood loss on ZENIA. NG tube replaced after patient pulled O/N. Confirmed placement.Family desires PEG tube placed. GI consulted for placement on Monday.   03/31/2018 VSS. Drop in Hb from 8.1 to 7.4 overnight. TF continued. Holding anticoagulation due to worsening anemia. GI recommendations for further evaluation of inflammatory changes of the pancreatic tail, recommending EUS or MRCP in future with PBS follow up outpatient. New fluid filled hernia noted on exam - US ordered    Interval History: NAEON. Patient complaining of a cough this morning, but otherwise no further medical complaints. Does not respond to questions of orientation this morning, not following commands. Plan for PEG tomorrow or Tuesday.     Review of Systems   Unable to perform ROS: Mental status change     Objective:     Vital Signs (Most  Recent):  Temp: 97.7 °F (36.5 °C) (04/01/18 0500)  Pulse: 78 (04/01/18 0500)  Resp: 18 (04/01/18 0500)  BP: (!) 157/75 (04/01/18 0500)  SpO2: 98 % (04/01/18 0500) Vital Signs (24h Range):  Temp:  [97.1 °F (36.2 °C)-98.5 °F (36.9 °C)] 97.7 °F (36.5 °C)  Pulse:  [68-80] 78  Resp:  [16-18] 18  SpO2:  [98 %] 98 %  BP: (142-175)/(67-82) 157/75     Weight: 67.6 kg (149 lb 1.6 oz)  Body mass index is 22.68 kg/m².    Intake/Output Summary (Last 24 hours) at 04/01/18 0805  Last data filed at 04/01/18 0516   Gross per 24 hour   Intake             2200 ml   Output              700 ml   Net             1500 ml      Physical Exam   Constitutional: He appears well-developed.   HENT:   Head: Atraumatic.   Eyes: Pupils are equal, round, and reactive to light.   Patient with rightward gaze preference  Arcus senilis bilaterally   Neck: No JVD present.   Cardiovascular: Normal rate, regular rhythm, S1 normal, S2 normal and intact distal pulses.    Pulses:       Radial pulses are 2+ on the right side, and 2+ on the left side.        Dorsalis pedis pulses are 1+ on the right side, and 1+ on the left side.   Pulmonary/Chest: Effort normal and breath sounds normal.   Abdominal: Soft. Bowel sounds are normal. He exhibits no distension. There is tenderness in the suprapubic area and left lower quadrant. There is no guarding.       Genitourinary:   Genitourinary Comments: Condom catheter in place   Musculoskeletal: He exhibits no edema.   Neurological: He is alert. He exhibits abnormal muscle tone. GCS eye subscore is 4. GCS verbal subscore is 3. GCS motor subscore is 6.   Expressive and receptive aphasia. LUE weak - 2/5 strength in Left hand     Skin: Skin is warm. No rash noted.   Psychiatric: His speech is slurred.   Nursing note and vitals reviewed.      Significant Labs:   Recent Results (from the past 24 hour(s))   POCT glucose    Collection Time: 03/31/18 11:07 AM   Result Value Ref Range    POCT Glucose 126 (H) 70 - 110 mg/dL   POCT  glucose    Collection Time: 03/31/18  4:12 PM   Result Value Ref Range    POCT Glucose 128 (H) 70 - 110 mg/dL   POCT TB Skin Test Read    Collection Time: 03/31/18  7:06 PM   Result Value Ref Range    TB Induration 48 - 72 hr read 0 0   POCT glucose    Collection Time: 03/31/18 10:29 PM   Result Value Ref Range    POCT Glucose 98 70 - 110 mg/dL   Comprehensive metabolic panel    Collection Time: 04/01/18  5:46 AM   Result Value Ref Range    Sodium 142 136 - 145 mmol/L    Potassium 3.8 3.5 - 5.1 mmol/L    Chloride 112 (H) 95 - 110 mmol/L    CO2 24 23 - 29 mmol/L    Glucose 115 (H) 70 - 110 mg/dL    BUN, Bld 25 (H) 8 - 23 mg/dL    Creatinine 0.8 0.5 - 1.4 mg/dL    Calcium 9.7 8.7 - 10.5 mg/dL    Total Protein 6.2 6.0 - 8.4 g/dL    Albumin 2.3 (L) 3.5 - 5.2 g/dL    Total Bilirubin 0.4 0.1 - 1.0 mg/dL    Alkaline Phosphatase 66 55 - 135 U/L    AST 39 10 - 40 U/L    ALT 20 10 - 44 U/L    Anion Gap 6 (L) 8 - 16 mmol/L    eGFR if African American >60.0 >60 mL/min/1.73 m^2    eGFR if non African American >60.0 >60 mL/min/1.73 m^2   CBC auto differential    Collection Time: 04/01/18  5:46 AM   Result Value Ref Range    WBC 12.12 3.90 - 12.70 K/uL    RBC 4.09 (L) 4.60 - 6.20 M/uL    Hemoglobin 8.6 (L) 14.0 - 18.0 g/dL    Hematocrit 30.1 (L) 40.0 - 54.0 %    MCV 74 (L) 82 - 98 fL    MCH 21.0 (L) 27.0 - 31.0 pg    MCHC 28.6 (L) 32.0 - 36.0 g/dL    RDW 31.6 (H) 11.5 - 14.5 %    Platelets 281 150 - 350 K/uL    MPV SEE COMMENT 9.2 - 12.9 fL   Magnesium    Collection Time: 04/01/18  5:46 AM   Result Value Ref Range    Magnesium 2.0 1.6 - 2.6 mg/dL   Phosphorus    Collection Time: 04/01/18  5:46 AM   Result Value Ref Range    Phosphorus 2.8 2.7 - 4.5 mg/dL     Significant Imaging: I have reviewed all pertinent imaging results/findings within the past 24 hours.    Assessment/Plan:      * Embolic stroke involving right middle cerebral artery    - 03/23/18 CTA Stroke Multiphase with multiple non-occlusive thrombi noted  - MRI brain w/o  show multiple infarcts without evidence of hemorrhagic conversion.  - Given recent in-hospital atrial fibrillation and takotsubo's CM suspicion for embolic stroke, in addition 2D Echo showed severely depressed LVEF 15-20% with evidence of stress CM  - PT/OT reordered   - patient with repeat drop in H/H but the 3/29 increase was likely outlier  - no active blood loss - needs NOAC but continued with anemia, will hold until anemia improves          Inguinal hernia of right side without obstruction or gangrene    - feels fluid filled on exam  - US ordered consistent with fluid filled channel in inguinal canals, findings unspecific.         Oropharyngeal dysphagia    - GI consulted for PEG tube placement  - Tentatively planned for tomorrow or Tuesday.  -Will make NPO and hold evening DVT ppx in case of procedure tomorrow.           Cytotoxic cerebral edema              Chronic systolic HF (heart failure)              NSVT (nonsustained ventricular tachycardia)    - patient with daily episodes of NSVT  - remains on metoprolol for Afib w RVR  - unable to obtain ROS if symptomatic during episodes  - on telemetry - will monitor          Takotsubo cardiomyopathy    - pt with loss of wife 2/2018, now with dilated cardiomyopathy EF 15-20percent and imaging suggestive of takotsubo CM  - will need repeat echo to evaluate improvement  - embolic episode may have been exacerbated by new onset CM   - elevated ePAP 61          Alcohol abuse    - unable to obtain alcohol history from patient          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 Tentatively planned for tomorrow or Tuesday.     1. Continue Isosource 1.5 @ goal rate 50mL/hr.   - Provides 1800kcals, 82g protein and 917mL free water.      2. If more concentrated formula warranted (EF 45%), recommend Nutren 2.0  @ goal rate 40mL/hr.   - Provides 1920kcals, 81g protein and 664mL free water.   - Hold for residuals >500mL.      RD to monitor.            Aphasia as late effect of cerebrovascular accident    - seems both receptive and expressive  - patient perseverating about drinking water          Iron deficiency anemia    - see microcyctic anemia        Atrial fibrillation with RVR    - Patient noted to be in Afib with RVR 3/25  - In NSR this AM with rate of 70s-90s, multiple short runs of NSVT  - Would recommend anticoaulation - If anticoagulation is initiated for Afib, Eliquis is $8.35/month--Eliquis was shown to have similar GIB risk as warfarin in [facility]; other DOACs shown to have increased risk compared to warfarin in major Afib studies  - cardiac monitoring  - Currently on metoprolol 25mg TID for rate control               Anemia    - has iron def anemia, continue iron replacement  - unknown baseline hb, no melena or bloody bowel movements reported while IP  - no known history of GIB or varices  - Hb decreased   - ZENIA negative for black/red stool  - will evaluate further        Acute pancreatitis    RESOLVED  - Lipase at OSH 3738 w/ leukocytosis, elevated Ca, peripancreatic fluid w/o gallstones on CT abdomen  - U/S also unremarkable.  - Lipase and amylase have trended down appropriately.   - No increase in LFTs or synthetic function.  - Tolerating tube feeds           Essential hypertension     - SBP goal <180, prn labetalol ordered  - Has not required PRNS  - Now on metoprolol 25mg TID   - has had multiple short runs of NSVT - patient unable to provide review if symptomatic          Acute encephalopathy    - Likely 2/2 sequelae of embolic stroke            VTE Risk Mitigation         Ordered     heparin (porcine) injection 5,000 Units  Every 8 hours     Route:  Subcutaneous        03/27/18 2094        Dispo: Will hold TF tonight for possible PEG tomorrow.     Bahman Arroyo MD  Department of Hospital Medicine    [facility]

## 2018-04-01 NOTE — ASSESSMENT & PLAN NOTE
Pt is a 77 y/o male with medical history of HTN presented to OSH in [place] ([facility]) with left-sided weakness, dysarthria, anemia, and acute pancreatitis. Patient had evidence of left subacute cerebellar infarct and right cerebral infarct on head CT. He was out of the window for tPA and was sent to [facility] emergently for CTA Stroke Multiphase and possible endovascular intervention. CTA stroke multiphase with no LVO, no intervention planned. MRI here with  bilateral frontal, parietal, occipital, cerebellar infarcts L>R. Image suggestive of acute extensive acute embolic stroke. Likely cardioembolic, documented afib in chart. ECHO shows EF 15-20% concerning for stress induced cardiomyopathy.     4/1/18 No acute neuro changes. Medical management per primary team.     Antithrombotics for secondary stroke prevention: Being held as patient with significant anemia and cause known; r/o bleed. If ruled out, start anticoagulation (any DOAC or warfarin is reasonable from stroke standpoint for secondary stroke prevention).     Statins for secondary stroke prevention and hyperlipidemia, if present: Statins: Atorvastatin- 40 mg daily LDL = 70     Aggressive risk factor modification: HTN,  hypercoagulable state with pancreatitis      Rehab efforts: PT/OT/SLP to evaluate and treat -recommended SNF. Awaiting PEG, still NPO    Diagnostics ordered/pending: none     VTE prophylaxis:  SCDs    BP parameters: Infarct: No intervention, SBP <180 if primary team deems appropriate

## 2018-04-01 NOTE — PLAN OF CARE
Problem: SLP Goal  Goal: SLP Goal  Speech Language Pathology   Goal expected to be met by 4/8  1. Patient will answer orientation questions x4 given mod A.   2. Given model, patient will follow 1 step directions with 90% acc.   3. Given repetitions x2 max, patient will answer simple y/n questions with 90% acc.  4. Given phonemic cuing, patient will complete simple naming tasks with 80% acc.   5. Given verbal and visual cueing, patient will complete rote speech tasks with 80% acc.              Outcome: Ongoing (interventions implemented as appropriate)  Speech/ language/ cognitive re-evaluation complete, remarkable for severely impaired cognitive linguistic skills. Should team deem swallowing re-evaluation and treatment warranted, please place appropriate order set.     YANNI Marie, CCC-SLP  [phone]  4/1/2018

## 2018-04-01 NOTE — ASSESSMENT & PLAN NOTE
- family desires PEG tube placement - GI consulted for placement. Tentatively planned for tomorrow or Tuesday.     1. Continue Isosource 1.5 @ goal rate 50mL/hr.   - Provides 1800kcals, 82g protein and 917mL free water.      2. If more concentrated formula warranted (EF 45%), recommend Nutren 2.0 @ goal rate 40mL/hr.   - Provides 1920kcals, 81g protein and 664mL free water.   - Hold for residuals >500mL.      RD to monitor.

## 2018-04-01 NOTE — ASSESSMENT & PLAN NOTE
- Hemoglobin 5.8 at OSH, 6.4 here after 1UPRBC at OSH upon admission   - 2U PRBC transfused here.  - H/h waxes and wanes. Stable today 30.1/8.6   - Workup and management per primary team  - GI consulted, no apparent bleeding

## 2018-04-02 LAB
ALBUMIN SERPL BCP-MCNC: 2.5 G/DL
ALP SERPL-CCNC: 62 U/L
ALT SERPL W/O P-5'-P-CCNC: 30 U/L
ANION GAP SERPL CALC-SCNC: 10 MMOL/L
ANISOCYTOSIS BLD QL SMEAR: ABNORMAL
AST SERPL-CCNC: 53 U/L
BASOPHILS # BLD AUTO: 0.03 K/UL
BASOPHILS NFR BLD: 0.2 %
BILIRUB SERPL-MCNC: 0.5 MG/DL
BUN SERPL-MCNC: 25 MG/DL
BURR CELLS BLD QL SMEAR: ABNORMAL
CALCIUM SERPL-MCNC: 10 MG/DL
CHLORIDE SERPL-SCNC: 109 MMOL/L
CO2 SERPL-SCNC: 21 MMOL/L
CREAT SERPL-MCNC: 0.8 MG/DL
DIFFERENTIAL METHOD: ABNORMAL
EOSINOPHIL # BLD AUTO: 0.5 K/UL
EOSINOPHIL NFR BLD: 3.5 %
ERYTHROCYTE [DISTWIDTH] IN BLOOD BY AUTOMATED COUNT: 31.4 %
EST. GFR  (AFRICAN AMERICAN): >60 ML/MIN/1.73 M^2
EST. GFR  (NON AFRICAN AMERICAN): >60 ML/MIN/1.73 M^2
GLUCOSE SERPL-MCNC: 83 MG/DL
HCT VFR BLD AUTO: 31.7 %
HGB BLD-MCNC: 9 G/DL
HYPOCHROMIA BLD QL SMEAR: ABNORMAL
IMM GRANULOCYTES # BLD AUTO: 0.26 K/UL
IMM GRANULOCYTES NFR BLD AUTO: 1.7 %
INR PPP: 1
LYMPHOCYTES # BLD AUTO: 1.5 K/UL
LYMPHOCYTES NFR BLD: 9.9 %
MAGNESIUM SERPL-MCNC: 2.2 MG/DL
MCH RBC QN AUTO: 20.8 PG
MCHC RBC AUTO-ENTMCNC: 28.4 G/DL
MCV RBC AUTO: 73 FL
MONOCYTES # BLD AUTO: 1.8 K/UL
MONOCYTES NFR BLD: 11.5 %
NEUTROPHILS # BLD AUTO: 11.1 K/UL
NEUTROPHILS NFR BLD: 73.2 %
NRBC BLD-RTO: 0 /100 WBC
PHOSPHATE SERPL-MCNC: 2.9 MG/DL
PLATELET # BLD AUTO: 294 K/UL
PLATELET BLD QL SMEAR: ABNORMAL
PMV BLD AUTO: ABNORMAL FL
POCT GLUCOSE: 136 MG/DL (ref 70–110)
POCT GLUCOSE: 140 MG/DL (ref 70–110)
POCT GLUCOSE: 190 MG/DL (ref 70–110)
POCT GLUCOSE: 94 MG/DL (ref 70–110)
POIKILOCYTOSIS BLD QL SMEAR: SLIGHT
POLYCHROMASIA BLD QL SMEAR: ABNORMAL
POTASSIUM SERPL-SCNC: 4.8 MMOL/L
PROT SERPL-MCNC: 7.2 G/DL
PROTHROMBIN TIME: 10.5 SEC
RBC # BLD AUTO: 4.32 M/UL
SCHISTOCYTES BLD QL SMEAR: PRESENT
SODIUM SERPL-SCNC: 140 MMOL/L
WBC # BLD AUTO: 15.2 K/UL

## 2018-04-02 PROCEDURE — 99232 SBSQ HOSP IP/OBS MODERATE 35: CPT | Mod: GC,,, | Performed by: HOSPITALIST

## 2018-04-02 PROCEDURE — 36415 COLL VENOUS BLD VENIPUNCTURE: CPT

## 2018-04-02 PROCEDURE — 25000003 PHARM REV CODE 250: Performed by: STUDENT IN AN ORGANIZED HEALTH CARE EDUCATION/TRAINING PROGRAM

## 2018-04-02 PROCEDURE — 25000003 PHARM REV CODE 250: Performed by: NURSE PRACTITIONER

## 2018-04-02 PROCEDURE — 63600175 PHARM REV CODE 636 W HCPCS: Performed by: HOSPITALIST

## 2018-04-02 PROCEDURE — 63600175 PHARM REV CODE 636 W HCPCS: Performed by: STUDENT IN AN ORGANIZED HEALTH CARE EDUCATION/TRAINING PROGRAM

## 2018-04-02 PROCEDURE — 84100 ASSAY OF PHOSPHORUS: CPT

## 2018-04-02 PROCEDURE — 11000001 HC ACUTE MED/SURG PRIVATE ROOM

## 2018-04-02 PROCEDURE — 83735 ASSAY OF MAGNESIUM: CPT

## 2018-04-02 PROCEDURE — 80053 COMPREHEN METABOLIC PANEL: CPT

## 2018-04-02 PROCEDURE — 85610 PROTHROMBIN TIME: CPT

## 2018-04-02 PROCEDURE — 25000003 PHARM REV CODE 250: Performed by: HOSPITALIST

## 2018-04-02 PROCEDURE — 92610 EVALUATE SWALLOWING FUNCTION: CPT

## 2018-04-02 PROCEDURE — 85025 COMPLETE CBC W/AUTO DIFF WBC: CPT

## 2018-04-02 PROCEDURE — C9113 INJ PANTOPRAZOLE SODIUM, VIA: HCPCS | Performed by: HOSPITALIST

## 2018-04-02 PROCEDURE — 25000003 PHARM REV CODE 250: Performed by: PHYSICIAN ASSISTANT

## 2018-04-02 RX ORDER — CEFAZOLIN SODIUM 1 G/3ML
1 INJECTION, POWDER, FOR SOLUTION INTRAMUSCULAR; INTRAVENOUS ONCE AS NEEDED
Status: ACTIVE | OUTPATIENT
Start: 2018-04-03 | End: 2018-04-03

## 2018-04-02 RX ORDER — LISINOPRIL 20 MG/1
20 TABLET ORAL DAILY
Status: DISCONTINUED | OUTPATIENT
Start: 2018-04-02 | End: 2018-04-03

## 2018-04-02 RX ORDER — CEFAZOLIN SODIUM 1 G/3ML
1 INJECTION, POWDER, FOR SOLUTION INTRAMUSCULAR; INTRAVENOUS ONCE AS NEEDED
Status: DISCONTINUED | OUTPATIENT
Start: 2018-04-04 | End: 2018-04-02

## 2018-04-02 RX ORDER — PANTOPRAZOLE SODIUM 40 MG/1
40 FOR SUSPENSION ORAL 2 TIMES DAILY
Status: DISCONTINUED | OUTPATIENT
Start: 2018-04-02 | End: 2018-04-03

## 2018-04-02 RX ORDER — ENOXAPARIN SODIUM 100 MG/ML
40 INJECTION SUBCUTANEOUS EVERY 24 HOURS
Status: COMPLETED | OUTPATIENT
Start: 2018-04-02 | End: 2018-04-02

## 2018-04-02 RX ADMIN — PANTOPRAZOLE SODIUM 40 MG: 40 INJECTION, POWDER, FOR SOLUTION INTRAVENOUS at 07:04

## 2018-04-02 RX ADMIN — Medication 100 MG: at 02:04

## 2018-04-02 RX ADMIN — STANDARDIZED SENNA CONCENTRATE AND DOCUSATE SODIUM 1 TABLET: 8.6; 5 TABLET, FILM COATED ORAL at 02:04

## 2018-04-02 RX ADMIN — OXYCODONE HYDROCHLORIDE 10 MG: 5 TABLET ORAL at 09:04

## 2018-04-02 RX ADMIN — Medication 10 ML: at 02:04

## 2018-04-02 RX ADMIN — METOPROLOL TARTRATE 50 MG: 50 TABLET ORAL at 09:04

## 2018-04-02 RX ADMIN — LISINOPRIL 20 MG: 20 TABLET ORAL at 02:04

## 2018-04-02 RX ADMIN — POLYETHYLENE GLYCOL 3350 17 G: 17 POWDER, FOR SOLUTION ORAL at 02:04

## 2018-04-02 RX ADMIN — VITAMIN C 1 TABLET: TAB at 02:04

## 2018-04-02 RX ADMIN — MINERAL SUPPLEMENT IRON 300 MG / 5 ML STRENGTH LIQUID 100 PER BOX UNFLAVORED 300 MG: at 09:04

## 2018-04-02 RX ADMIN — PANTOPRAZOLE SODIUM 40 MG: 40 GRANULE, DELAYED RELEASE ORAL at 09:04

## 2018-04-02 RX ADMIN — METOPROLOL TARTRATE 50 MG: 50 TABLET ORAL at 02:04

## 2018-04-02 RX ADMIN — MINERAL SUPPLEMENT IRON 300 MG / 5 ML STRENGTH LIQUID 100 PER BOX UNFLAVORED 300 MG: at 02:04

## 2018-04-02 RX ADMIN — FOLIC ACID 1 MG: 1 TABLET ORAL at 02:04

## 2018-04-02 RX ADMIN — ENOXAPARIN SODIUM 40 MG: 100 INJECTION SUBCUTANEOUS at 05:04

## 2018-04-02 NOTE — ASSESSMENT & PLAN NOTE
- SBP goal <180, prn labetalol ordered  - Has not required PRNS  - Now on metoprolol 50mg BID  - Lisinopril 20mg QD  - has had multiple short runs of NSVT - patient unable to provide review if symptomatic

## 2018-04-02 NOTE — ASSESSMENT & PLAN NOTE
- pt with loss of wife 2/2018, now with dilated cardiomyopathy EF 15-20 percent and imaging suggestive of takotsubo CM  - will need repeat echo to evaluate improvement  - embolic episode may have been exacerbated by new onset CM   - elevated ePAP 61

## 2018-04-02 NOTE — PT/OT/SLP EVAL
"Speech Language Pathology Evaluation  Bedside Swallow    Patient Name:  Zion Hines   MRN:  [id number]  Admitting Diagnosis: Embolic stroke involving right middle cerebral artery    Recommendations:                 General Recommendations:  Dysphagia therapy and Speech/language therapy  Diet recommendations:  NPO, NPO   Aspiration Precautions: Strict aspiration precautions   General Precautions: Standard, aspiration, fall, NPO  Communication strategies:  none    History:     Past Medical History:   Diagnosis Date    Alcohol abuse 3/29/2018    Atrial fibrillation with RVR 3/27/2018    Embolic stroke involving right middle cerebral artery 3/24/2018    Essential hypertension 3/23/2018       History reviewed. No pertinent surgical history.    Social History: Patient lives with [facility].        Subjective     "water"  Patient goals: did not state    Pain/Comfort:  · Pain Rating 1: 0/10  · Pain Rating Post-Intervention 1: 0/10    Objective:     Oral Musculature Evaluation  · Oral Musculature: unable to assess due to poor participation/comprehension (mild left facial droop at rest)  · Volitional Cough: not elicited  · Volitional Swallow: not elicited  · Voice Prior to PO Intake: mildly wet intermittently    Bedside Swallow Eval:   Consistencies Assessed:  · Thin liquids teaspoon x2 trials  · Nectar thick liquids teaspoon x2 trials     Oral Phase:   · Slow oral transit time    Pharyngeal Phase:   · delayed swallow initation  · wet vocal quality after swallow   · Delayed cough following 1 of 2 teaspoons of water  · Limited po intake given due to peg tube placement    Compensatory Strategies  ·     Treatment:     Assessment:     Zion Hines is a 78 y.o. male with an SLP diagnosis of Dysphagia, Dysarthria and Cognitive-Linguistic Impairment.  He presents with s/s of aspiration.  Mod barium swallow study recommended for therapeutic trials.    Goals:    SLP Goals        Problem: SLP Goal    Goal Priority Disciplines Outcome   SLP " Goal     SLP Ongoing (interventions implemented as appropriate)   Description:  Speech Language Pathology   Goal expected to be met by 4/8  1. Patient will answer orientation questions x4 given mod A.   2. Given model, patient will follow 1 step directions with 90% acc.   3. Given repetitions x2 max, patient will answer simple y/n questions with 90% acc.  4. Given phonemic cuing, patient will complete simple naming tasks with 80% acc.   5. Given verbal and visual cueing, patient will complete rote speech tasks with 80% acc.    6.  Participate in ongoing assesment of swallow                               Plan:     · Patient to be seen:  5 x/week   · Plan of Care expires:  04/30/18  · Plan of Care reviewed with:  patient   · SLP Follow-Up:  Yes       Discharge recommendations:  nursing facility, skilled       Time Tracking:     SLP Treatment Date:   04/02/18  Speech Start Time:  1100  Speech Stop Time:  1108     Speech Total Time (min):  8 min    Billable Minutes: Eval Swallow and Oral Function 8    Corine Guerrero MA, CCC-SLP  04/02/2018

## 2018-04-02 NOTE — ASSESSMENT & PLAN NOTE
- unknown if this is chronic issue or if apical ballooning and depressed EF is secondary to emotional event and passing of wife or if due to neurological event with stroke and cytotoxic edema and ICU stay as both are known to cause stress cardiomyopathy  - will need f/u echo to evaluate EF and recovery

## 2018-04-02 NOTE — ASSESSMENT & PLAN NOTE
- 03/23/18 CTA Stroke Multiphase with multiple non-occlusive thrombi noted  - MRI brain w/o show multiple infarcts without evidence of hemorrhagic conversion.  - Given recent in-hospital atrial fibrillation and takotsubo's CM suspicion for embolic stroke, in addition 2D Echo showed severely depressed LVEF 15-20% with evidence of stress CM  - PT/OT reordered   - no active blood loss - needs NOAC but continued with anemia, will hold until anemia improves

## 2018-04-02 NOTE — PROGRESS NOTES
PEG placement moved to Wednesday.  US left arm done.  Modified barium ordered.  Pt remains restless and disoriented.

## 2018-04-02 NOTE — ASSESSMENT & PLAN NOTE
- has iron def anemia, continue iron replacement  - unknown baseline hb, no melena or bloody bowel movements reported while IP  - no known history of GIB or varices  - Hb 9, on iron replacement via NG  - ZENIA negative for black/red stool

## 2018-04-02 NOTE — ASSESSMENT & PLAN NOTE
- family desires PEG tube placement - GI consulted for placement. PEG placement 4/2. TF on hold for procedure    1. Continue Isosource 1.5 @ goal rate 50mL/hr.   - Provides 1800kcals, 82g protein and 917mL free water.      2. If more concentrated formula warranted (EF 45%), recommend Nutren 2.0 @ goal rate 40mL/hr.   - Provides 1920kcals, 81g protein and 664mL free water.   - Hold for residuals >500mL.      RD to monitor.

## 2018-04-02 NOTE — SUBJECTIVE & OBJECTIVE
Interval History: see hospital course    Review of Systems   Unable to perform ROS: Mental status change     Objective:     Vital Signs (Most Recent):  Temp: 97.3 °F (36.3 °C) (04/02/18 0420)  Pulse: 77 (04/02/18 0420)  Resp: 18 (04/02/18 0420)  BP: (!) 177/89 (04/02/18 0420)  SpO2: 95 % (04/02/18 0420) Vital Signs (24h Range):  Temp:  [97 °F (36.1 °C)-98.3 °F (36.8 °C)] 97.3 °F (36.3 °C)  Pulse:  [70-82] 77  Resp:  [18] 18  SpO2:  [95 %-100 %] 95 %  BP: (156-188)/(73-94) 177/89     Weight: 67.6 kg (149 lb 1.6 oz)  Body mass index is 22.68 kg/m².    Intake/Output Summary (Last 24 hours) at 04/02/18 0704  Last data filed at 04/02/18 0400   Gross per 24 hour   Intake             1050 ml   Output              750 ml   Net              300 ml      Physical Exam   Constitutional: He appears well-developed.   HENT:   Head: Atraumatic.   Eyes: Pupils are equal, round, and reactive to light.   Patient with rightward gaze preference  Arcus senilis bilaterally   Neck: No JVD present.   Cardiovascular: Normal rate, regular rhythm, S1 normal, S2 normal and intact distal pulses.    Pulses:       Radial pulses are 2+ on the right side, and 2+ on the left side.        Dorsalis pedis pulses are 1+ on the right side, and 1+ on the left side.   Pulmonary/Chest: Effort normal and breath sounds normal.   Abdominal: Soft. Bowel sounds are normal. He exhibits no distension. There is tenderness in the suprapubic area and left lower quadrant. There is no guarding.       Genitourinary:   Genitourinary Comments: Condom catheter in place   Musculoskeletal: He exhibits no edema.   Neurological: He is alert. He exhibits abnormal muscle tone. GCS eye subscore is 4. GCS verbal subscore is 3. GCS motor subscore is 6.   Only oriented to person and is aware that he is in a hospital. Expressive and receptive aphasia. LUE weak - 2/5 strength in Left hand     Skin: Skin is warm. No rash noted.   Psychiatric: His speech is slurred.   Nursing note and  vitals reviewed.      Significant Labs:   Recent Results (from the past 24 hour(s))   POCT glucose    Collection Time: 04/01/18 12:26 PM   Result Value Ref Range    POCT Glucose 130 (H) 70 - 110 mg/dL   POCT glucose    Collection Time: 04/01/18  6:01 PM   Result Value Ref Range    POCT Glucose 140 (H) 70 - 110 mg/dL   POCT glucose    Collection Time: 04/01/18  9:55 PM   Result Value Ref Range    POCT Glucose 127 (H) 70 - 110 mg/dL   Comprehensive metabolic panel    Collection Time: 04/02/18  4:37 AM   Result Value Ref Range    Sodium 140 136 - 145 mmol/L    Potassium 4.8 3.5 - 5.1 mmol/L    Chloride 109 95 - 110 mmol/L    CO2 21 (L) 23 - 29 mmol/L    Glucose 83 70 - 110 mg/dL    BUN, Bld 25 (H) 8 - 23 mg/dL    Creatinine 0.8 0.5 - 1.4 mg/dL    Calcium 10.0 8.7 - 10.5 mg/dL    Total Protein 7.2 6.0 - 8.4 g/dL    Albumin 2.5 (L) 3.5 - 5.2 g/dL    Total Bilirubin 0.5 0.1 - 1.0 mg/dL    Alkaline Phosphatase 62 55 - 135 U/L    AST 53 (H) 10 - 40 U/L    ALT 30 10 - 44 U/L    Anion Gap 10 8 - 16 mmol/L    eGFR if African American >60.0 >60 mL/min/1.73 m^2    eGFR if non African American >60.0 >60 mL/min/1.73 m^2   CBC auto differential    Collection Time: 04/02/18  4:37 AM   Result Value Ref Range    WBC 15.20 (H) 3.90 - 12.70 K/uL    RBC 4.32 (L) 4.60 - 6.20 M/uL    Hemoglobin 9.0 (L) 14.0 - 18.0 g/dL    Hematocrit 31.7 (L) 40.0 - 54.0 %    MCV 73 (L) 82 - 98 fL    MCH 20.8 (L) 27.0 - 31.0 pg    MCHC 28.4 (L) 32.0 - 36.0 g/dL    RDW 31.4 (H) 11.5 - 14.5 %    Platelets 294 150 - 350 K/uL    MPV SEE COMMENT 9.2 - 12.9 fL    Immature Granulocytes 1.7 (H) 0.0 - 0.5 %    Gran # (ANC) 11.1 (H) 1.8 - 7.7 K/uL    Immature Grans (Abs) 0.26 (H) 0.00 - 0.04 K/uL    Lymph # 1.5 1.0 - 4.8 K/uL    Mono # 1.8 (H) 0.3 - 1.0 K/uL    Eos # 0.5 0.0 - 0.5 K/uL    Baso # 0.03 0.00 - 0.20 K/uL    nRBC 0 0 /100 WBC    Gran% 73.2 (H) 38.0 - 73.0 %    Lymph% 9.9 (L) 18.0 - 48.0 %    Mono% 11.5 4.0 - 15.0 %    Eosinophil% 3.5 0.0 - 8.0 %     Basophil% 0.2 0.0 - 1.9 %    Platelet Estimate Appears normal     Aniso Moderate     Poik Slight     Poly Occasional     Hypo Moderate     [place] Cells Occasional     Schistocytes Present     Differential Method Automated    Magnesium    Collection Time: 04/02/18  4:37 AM   Result Value Ref Range    Magnesium 2.2 1.6 - 2.6 mg/dL   Phosphorus    Collection Time: 04/02/18  4:37 AM   Result Value Ref Range    Phosphorus 2.9 2.7 - 4.5 mg/dL   Protime-INR    Collection Time: 04/02/18  4:37 AM   Result Value Ref Range    Prothrombin Time 10.5 9.0 - 12.5 sec    INR 1.0 0.8 - 1.2         Significant Imaging: I have reviewed all pertinent imaging results/findings within the past 24 hours.

## 2018-04-02 NOTE — ASSESSMENT & PLAN NOTE
- GI consulted for PEG tube placement  - PEG today   - NPO for procedure today, resume DVT prophylaxis post procedure

## 2018-04-02 NOTE — PLAN OF CARE
Pt to have PEG placed today, Saroj to speak with family about SNF choices at d/c in [facility]. Sw to follow. Saroj spoke with Malgorzata ([phone]) daughter for Pt. Only choice is [facility] at this time, Sw encouraged 2 more choices by dght such as [facility] or [facility], dght refused both at this time. Sw to follow with SNF referral and medical stability. Carmelita with [facility] returned Saroj's call, Carmelita at lunch at , left  for her to return Saroj's call.

## 2018-04-02 NOTE — ASSESSMENT & PLAN NOTE
- Patient noted to be in Afib with RVR 3/25  - In NSR this AM with rate of 70s-90s, short runs of NSVT  - Would recommend anticoaulation - If anticoagulation is initiated for Afib, Eliquis is $8.35/month--Eliquis was shown to have similar GIB risk as warfarin in [facility]; other DOACs shown to have increased risk compared to warfarin in major Afib studies  - cardiac monitoring  - Currently on metoprolol 50mg BID for rate control

## 2018-04-02 NOTE — PROGRESS NOTES
[facility] Hospital Medicine  Progress Note    Patient Name: Zion Hines  MRN: [id number]  Patient Class: IP- Inpatient   Admission Date: 3/23/2018  Length of Stay: 10 days  Attending Physician: Mica Mckeon MD  Primary Care Provider: Primary Doctor [facility] Medicine Team: [facility] MED 2 Riley Milner MD    Subjective:     Principal Problem:Embolic stroke involving right middle cerebral artery    HPI:  77 yo M with PMHx HTN known to be non-compliant with medications and remote hx of anemia presents to [facility] ED 18 as a transfer from [facility].  Patient is a poor historian with no family at bedside, hx largely obtained from OSH records.  He presented to OSH originally with symptoms of weakness and fatigue, his daughter brought him to ED thinking he was likely anemic again.  It was noted that patient had some dysarthria and aphasia (unspecified expressive vs receptive) with L-sided weakness.  He was out of the window for tPA and was sent to [facility] emergently for CTA Stroke Multiphase and possible endovascular intervention. CTA stroke multiphase with no LVO, no intervention planned.  Of note, patient is anemic per OSH labs with a Hgb of 5.8 g/dL and likely has pancreatitis with lipase 3738, WBC count of 15.8 k/uL, and CT abdomen showing peripancreatic fluid around the tail.  He was transfused a unit of PRBCs prior to transfer.  Labs were otherwise largely unremarkable.  On presentation to [facility] ED, he is able to move both BLE but complains of pain in BLE.  Not moving LUE but is intact to noxious stimuli.  Has a R gaze preference that is not overcome by VOR testing and complaint of neck pain with moving head side-to-side.  Otherwise has mild abdominal distension with mild hypogastric tenderness to palpation. HTN to SBP 200s.  Of note, patient's wife  recently and family expressed concern in outpatient ED that patient has not been eating much for the past month but deny EtOH  use.    Hospital Course:  03/23/18:  Admission to Neuro ICU 2/2 concern for stroke with multiple concerns for patient to become unstable--pancreatitis, severe anemia, HTN.  03/24: has had no interval development of multiorgan involvement form pancreatitis, drop in hemoglobin secondary to erosive gastritis, transfused and on protonix bid  03/25: Patient on dilt drip for a-fib. Repeat EKG shows NSR.  3/26: Discontinuing diltiazem drip today. Will continue monitor HR. Continue IV fluids 50 cc/hr for pancreatitis. Repeat lipase.   3/28: NAEON. VSSA. No overt signs of bleeding. Neuro exam unchanged from prior documented exams. Continues to not follow commands, continues to answer questions inappropriately. Remains effectively hemiplegic on left. No further ICU needs. S/D today to hospital medicine after d/w vascular neurology.  03/29/2018 VSS, no signs of blood loss in stool. Loose stools on examination. NG tube replaced and xray confirmed in duodenum - retracted. Will make definitive decision on anticoagulation 3/30. Furthering discussions with family regarding definitive feeding  03/30/2018 VSS, no signs of blood loss on ZENIA. NG tube replaced after patient pulled O/N. Confirmed placement.Family desires PEG tube placed. GI consulted for placement on Monday.   03/31/2018 VSS. Drop in Hb from 8.1 to 7.4 overnight. TF continued. Holding anticoagulation due to worsening anemia. GI recommendations for further evaluation of inflammatory changes of the pancreatic tail, recommending EUS or MRCP in future with PBS follow up outpatient. New fluid filled hernia noted on exam - US ordered  04/02/2018 PEG placement today. WBC 15. Hb improving. Metoprolol changes to 50mg BID, Lisinopril increased 20 mg QD     Interval History: see hospital course    Review of Systems   Unable to perform ROS: Mental status change     Objective:     Vital Signs (Most Recent):  Temp: 97.3 °F (36.3 °C) (04/02/18 0420)  Pulse: 77 (04/02/18 0420)  Resp: 18  (04/02/18 0420)  BP: (!) 177/89 (04/02/18 0420)  SpO2: 95 % (04/02/18 0420) Vital Signs (24h Range):  Temp:  [97 °F (36.1 °C)-98.3 °F (36.8 °C)] 97.3 °F (36.3 °C)  Pulse:  [70-82] 77  Resp:  [18] 18  SpO2:  [95 %-100 %] 95 %  BP: (156-188)/(73-94) 177/89     Weight: 67.6 kg (149 lb 1.6 oz)  Body mass index is 22.68 kg/m².    Intake/Output Summary (Last 24 hours) at 04/02/18 0704  Last data filed at 04/02/18 0400   Gross per 24 hour   Intake             1050 ml   Output              750 ml   Net              300 ml      Physical Exam   Constitutional: He appears well-developed.   HENT:   Head: Atraumatic.   Eyes: Pupils are equal, round, and reactive to light.   Patient with rightward gaze preference  Arcus senilis bilaterally   Neck: No JVD present.   Cardiovascular: Normal rate, regular rhythm, S1 normal, S2 normal and intact distal pulses.    Pulses:       Radial pulses are 2+ on the right side, and 2+ on the left side.        Dorsalis pedis pulses are 1+ on the right side, and 1+ on the left side.   Pulmonary/Chest: Effort normal and breath sounds normal.   Abdominal: Soft. Bowel sounds are normal. He exhibits no distension. There is tenderness in the suprapubic area and left lower quadrant. There is no guarding.       Genitourinary:   Genitourinary Comments: Condom catheter in place   Musculoskeletal: He exhibits no edema.   Neurological: He is alert. He exhibits abnormal muscle tone. GCS eye subscore is 4. GCS verbal subscore is 3. GCS motor subscore is 6.   Only oriented to person and is aware that he is in a hospital. Expressive and receptive aphasia. LUE weak - 2/5 strength in Left hand     Skin: Skin is warm. No rash noted.   Psychiatric: His speech is slurred.   Nursing note and vitals reviewed.      Significant Labs:   Recent Results (from the past 24 hour(s))   POCT glucose    Collection Time: 04/01/18 12:26 PM   Result Value Ref Range    POCT Glucose 130 (H) 70 - 110 mg/dL   POCT glucose    Collection  Time: 04/01/18  6:01 PM   Result Value Ref Range    POCT Glucose 140 (H) 70 - 110 mg/dL   POCT glucose    Collection Time: 04/01/18  9:55 PM   Result Value Ref Range    POCT Glucose 127 (H) 70 - 110 mg/dL   Comprehensive metabolic panel    Collection Time: 04/02/18  4:37 AM   Result Value Ref Range    Sodium 140 136 - 145 mmol/L    Potassium 4.8 3.5 - 5.1 mmol/L    Chloride 109 95 - 110 mmol/L    CO2 21 (L) 23 - 29 mmol/L    Glucose 83 70 - 110 mg/dL    BUN, Bld 25 (H) 8 - 23 mg/dL    Creatinine 0.8 0.5 - 1.4 mg/dL    Calcium 10.0 8.7 - 10.5 mg/dL    Total Protein 7.2 6.0 - 8.4 g/dL    Albumin 2.5 (L) 3.5 - 5.2 g/dL    Total Bilirubin 0.5 0.1 - 1.0 mg/dL    Alkaline Phosphatase 62 55 - 135 U/L    AST 53 (H) 10 - 40 U/L    ALT 30 10 - 44 U/L    Anion Gap 10 8 - 16 mmol/L    eGFR if African American >60.0 >60 mL/min/1.73 m^2    eGFR if non African American >60.0 >60 mL/min/1.73 m^2   CBC auto differential    Collection Time: 04/02/18  4:37 AM   Result Value Ref Range    WBC 15.20 (H) 3.90 - 12.70 K/uL    RBC 4.32 (L) 4.60 - 6.20 M/uL    Hemoglobin 9.0 (L) 14.0 - 18.0 g/dL    Hematocrit 31.7 (L) 40.0 - 54.0 %    MCV 73 (L) 82 - 98 fL    MCH 20.8 (L) 27.0 - 31.0 pg    MCHC 28.4 (L) 32.0 - 36.0 g/dL    RDW 31.4 (H) 11.5 - 14.5 %    Platelets 294 150 - 350 K/uL    MPV SEE COMMENT 9.2 - 12.9 fL    Immature Granulocytes 1.7 (H) 0.0 - 0.5 %    Gran # (ANC) 11.1 (H) 1.8 - 7.7 K/uL    Immature Grans (Abs) 0.26 (H) 0.00 - 0.04 K/uL    Lymph # 1.5 1.0 - 4.8 K/uL    Mono # 1.8 (H) 0.3 - 1.0 K/uL    Eos # 0.5 0.0 - 0.5 K/uL    Baso # 0.03 0.00 - 0.20 K/uL    nRBC 0 0 /100 WBC    Gran% 73.2 (H) 38.0 - 73.0 %    Lymph% 9.9 (L) 18.0 - 48.0 %    Mono% 11.5 4.0 - 15.0 %    Eosinophil% 3.5 0.0 - 8.0 %    Basophil% 0.2 0.0 - 1.9 %    Platelet Estimate Appears normal     Aniso Moderate     Poik Slight     Poly Occasional     Hypo Moderate     Joe Cells Occasional     Schistocytes Present     Differential Method Automated    Magnesium     Collection Time: 04/02/18  4:37 AM   Result Value Ref Range    Magnesium 2.2 1.6 - 2.6 mg/dL   Phosphorus    Collection Time: 04/02/18  4:37 AM   Result Value Ref Range    Phosphorus 2.9 2.7 - 4.5 mg/dL   Protime-INR    Collection Time: 04/02/18  4:37 AM   Result Value Ref Range    Prothrombin Time 10.5 9.0 - 12.5 sec    INR 1.0 0.8 - 1.2         Significant Imaging: I have reviewed all pertinent imaging results/findings within the past 24 hours.    Assessment/Plan:      * Embolic stroke involving right middle cerebral artery    - 03/23/18 CTA Stroke Multiphase with multiple non-occlusive thrombi noted  - MRI brain w/o show multiple infarcts without evidence of hemorrhagic conversion.  - Given recent in-hospital atrial fibrillation and takotsubo's CM suspicion for embolic stroke, in addition 2D Echo showed severely depressed LVEF 15-20% with evidence of stress CM  - PT/OT reordered   - no active blood loss - needs NOAC but continued with anemia, will hold until anemia improves          Inguinal hernia of right side without obstruction or gangrene    - feels fluid filled on exam  - US ordered consistent with fluid filled channel in inguinal canals, findings unspecific.         Oropharyngeal dysphagia    - GI consulted for PEG tube placement  - PEG today   - NPO for procedure today, resume DVT prophylaxis post procedure          Cytotoxic cerebral edema              Chronic systolic HF (heart failure)    - unknown if this is chronic issue or if apical ballooning and depressed EF is secondary to emotional event and passing of wife or if due to neurological event with stroke and cytotoxic edema and ICU stay as both are known to cause stress cardiomyopathy  - will need f/u echo to evaluate EF and recovery           NSVT (nonsustained ventricular tachycardia)    - patient with daily episodes of NSVT  - remains on metoprolol for Afib w RVR  - unable to obtain ROS if symptomatic during episodes  - on telemetry - will  monitor          Takotsubo cardiomyopathy    - pt with loss of wife 2/2018, now with dilated cardiomyopathy EF 15-20 percent and imaging suggestive of takotsubo CM  - will need repeat echo to evaluate improvement  - embolic episode may have been exacerbated by new onset CM   - elevated ePAP 61          Alcohol abuse    - unable to obtain alcohol history from patient          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 PEG placement 4/2. TF on hold for procedure    1. Continue Isosource 1.5 @ goal rate 50mL/hr.   - Provides 1800kcals, 82g protein and 917mL free water.      2. If more concentrated formula warranted (EF 45%), recommend Nutren 2.0 @ goal rate 40mL/hr.   - Provides 1920kcals, 81g protein and 664mL free water.   - Hold for residuals >500mL.      RD to monitor.            Aphasia as late effect of cerebrovascular accident    - seems both receptive and expressive  - patient perseverating about drinking water          Iron deficiency anemia    - see microcyctic anemia        Atrial fibrillation with RVR    - Patient noted to be in Afib with RVR 3/25  - In NSR this AM with rate of 70s-90s, short runs of NSVT  - Would recommend anticoaulation - If anticoagulation is initiated for Afib, Eliquis is $8.35/month--Eliquis was shown to have similar GIB risk as warfarin in [facility]; other DOACs shown to have increased risk compared to warfarin in major Afib studies  - cardiac monitoring  - Currently on metoprolol 50mg BID for rate control               Anemia    - has iron def anemia, continue iron replacement  - unknown baseline hb, no melena or bloody bowel movements reported while IP  - no known history of GIB or varices  - Hb 9, on iron replacement via NG  - ZENIA negative for black/red stool        Acute pancreatitis    RESOLVED  - slight bump in  LFTs today, will follow         Essential hypertension     - SBP goal <180, prn labetalol ordered  - Has not required PRNS  - Now on metoprolol 50mg BID  - Lisinopril 20mg QD  - has had multiple short runs of NSVT - patient unable to provide review if symptomatic          Acute encephalopathy    - Likely 2/2 sequelae of embolic stroke            VTE Risk Mitigation     None              Riley Milner MD  Department of Hospital Medicine   [facility]

## 2018-04-02 NOTE — TREATMENT PLAN
Treatment Plan  04/02/2018      PEG was cancelled today at family request to repeat swallow eval.  Patient failed swallow.    Plan for PEG tube placement tomorrow  NPO after midnight ; hold TF after midnight  [place] hold any DVT prophylaxis 12 hours prior to the procedure  Please order Cefazolin 1g IV x 1 (Vancomycin 1g IV if PCN allergic) on call for the procedure    Franki Stephen MD  Gastroenterology Fellow (PGY-V)  Pager: [phone]

## 2018-04-02 NOTE — PLAN OF CARE
Problem: SLP Goal  Goal: SLP Goal  Speech Language Pathology   Goal expected to be met by 4/8  1. Patient will answer orientation questions x4 given mod A.   2. Given model, patient will follow 1 step directions with 90% acc.   3. Given repetitions x2 max, patient will answer simple y/n questions with 90% acc.  4. Given phonemic cuing, patient will complete simple naming tasks with 80% acc.   5. Given verbal and visual cueing, patient will complete rote speech tasks with 80% acc.               Outcome: Ongoing (interventions implemented as appropriate)  Mod barium swallow study would be needed prior to po intake  Corine Guerrero MA/[facility]-SLP  Speech Language Pathologist  Pager [phone]  4/2/2018

## 2018-04-02 NOTE — PT/OT/SLP PROGRESS
Occupational Therapy      Patient Name:  Zion Hines   MRN:  [id number]    Patient not seen today secondary to patient being off unit in ultrasound  . Will follow-up next treatment session.    FRANCISCO Almeida  4/2/2018

## 2018-04-02 NOTE — CARE UPDATE
I spoke with Ms. Malgorzata Hines (sister) about the PEG tube placement again today and she acknowledged understanding but declined to give consent and stated that we must speak with her sister Oumar Zurita (number on sticky) for consent. I have attempted to reach Oumar Zurita and she does not answer the phone. I believe that Mr. Hines will soon be able to provide consent given is slow but persistent improvement.

## 2018-04-03 ENCOUNTER — ANESTHESIA (OUTPATIENT)
Dept: ENDOSCOPY | Facility: HOSPITAL | Age: 78
DRG: 064 | End: 2018-04-03
Payer: MEDICARE

## 2018-04-03 ENCOUNTER — ANESTHESIA EVENT (OUTPATIENT)
Dept: ENDOSCOPY | Facility: HOSPITAL | Age: 78
DRG: 064 | End: 2018-04-03
Payer: MEDICARE

## 2018-04-03 PROBLEM — R47.01 APHASIA: Status: ACTIVE | Noted: 2018-03-29

## 2018-04-03 PROBLEM — R47.01 APHASIA: Status: ACTIVE | Noted: 2018-04-03

## 2018-04-03 LAB
ALBUMIN SERPL BCP-MCNC: 2.4 G/DL
ALP SERPL-CCNC: 67 U/L
ALT SERPL W/O P-5'-P-CCNC: 30 U/L
ANION GAP SERPL CALC-SCNC: 9 MMOL/L
ANISOCYTOSIS BLD QL SMEAR: SLIGHT
AST SERPL-CCNC: 45 U/L
BASO STIPL BLD QL SMEAR: ABNORMAL
BASOPHILS # BLD AUTO: 0.02 K/UL
BASOPHILS NFR BLD: 0.1 %
BILIRUB SERPL-MCNC: 0.5 MG/DL
BUN SERPL-MCNC: 23 MG/DL
CALCIUM SERPL-MCNC: 10.1 MG/DL
CHLORIDE SERPL-SCNC: 106 MMOL/L
CO2 SERPL-SCNC: 22 MMOL/L
CREAT SERPL-MCNC: 0.8 MG/DL
DIFFERENTIAL METHOD: ABNORMAL
EOSINOPHIL # BLD AUTO: 0.4 K/UL
EOSINOPHIL NFR BLD: 2.7 %
ERYTHROCYTE [DISTWIDTH] IN BLOOD BY AUTOMATED COUNT: 31.7 %
EST. GFR  (AFRICAN AMERICAN): >60 ML/MIN/1.73 M^2
EST. GFR  (NON AFRICAN AMERICAN): >60 ML/MIN/1.73 M^2
GIANT PLATELETS BLD QL SMEAR: PRESENT
GLUCOSE SERPL-MCNC: 101 MG/DL
HCT VFR BLD AUTO: 29.8 %
HGB BLD-MCNC: 8.3 G/DL
IMM GRANULOCYTES # BLD AUTO: 0.25 K/UL
IMM GRANULOCYTES NFR BLD AUTO: 1.7 %
LYMPHOCYTES # BLD AUTO: 1.4 K/UL
LYMPHOCYTES NFR BLD: 9.4 %
MAGNESIUM SERPL-MCNC: 2 MG/DL
MCH RBC QN AUTO: 20.5 PG
MCHC RBC AUTO-ENTMCNC: 27.9 G/DL
MCV RBC AUTO: 74 FL
MONOCYTES # BLD AUTO: 1.7 K/UL
MONOCYTES NFR BLD: 12 %
NEUTROPHILS # BLD AUTO: 10.6 K/UL
NEUTROPHILS NFR BLD: 74.1 %
NRBC BLD-RTO: 0 /100 WBC
PHOSPHATE SERPL-MCNC: 2.9 MG/DL
PLATELET # BLD AUTO: 304 K/UL
PLATELET BLD QL SMEAR: ABNORMAL
PMV BLD AUTO: ABNORMAL FL
POCT GLUCOSE: 104 MG/DL (ref 70–110)
POCT GLUCOSE: 105 MG/DL (ref 70–110)
POCT GLUCOSE: 110 MG/DL (ref 70–110)
POCT GLUCOSE: 93 MG/DL (ref 70–110)
POLYCHROMASIA BLD QL SMEAR: ABNORMAL
POTASSIUM SERPL-SCNC: 4.2 MMOL/L
PROT SERPL-MCNC: 6.9 G/DL
RBC # BLD AUTO: 4.05 M/UL
SODIUM SERPL-SCNC: 137 MMOL/L
WBC # BLD AUTO: 14.29 K/UL

## 2018-04-03 PROCEDURE — 37000008 HC ANESTHESIA 1ST 15 MINUTES: Performed by: INTERNAL MEDICINE

## 2018-04-03 PROCEDURE — 85025 COMPLETE CBC W/AUTO DIFF WBC: CPT

## 2018-04-03 PROCEDURE — 63600175 PHARM REV CODE 636 W HCPCS: Performed by: NURSE ANESTHETIST, CERTIFIED REGISTERED

## 2018-04-03 PROCEDURE — D9220A PRA ANESTHESIA: Mod: CRNA,,, | Performed by: NURSE ANESTHETIST, CERTIFIED REGISTERED

## 2018-04-03 PROCEDURE — 25000003 PHARM REV CODE 250: Performed by: STUDENT IN AN ORGANIZED HEALTH CARE EDUCATION/TRAINING PROGRAM

## 2018-04-03 PROCEDURE — 92611 MOTION FLUOROSCOPY/SWALLOW: CPT

## 2018-04-03 PROCEDURE — 83735 ASSAY OF MAGNESIUM: CPT

## 2018-04-03 PROCEDURE — D9220A PRA ANESTHESIA: Mod: ANES,,, | Performed by: ANESTHESIOLOGY

## 2018-04-03 PROCEDURE — 63600175 PHARM REV CODE 636 W HCPCS: Performed by: STUDENT IN AN ORGANIZED HEALTH CARE EDUCATION/TRAINING PROGRAM

## 2018-04-03 PROCEDURE — 43246 EGD PLACE GASTROSTOMY TUBE: CPT | Mod: GC,,, | Performed by: INTERNAL MEDICINE

## 2018-04-03 PROCEDURE — 97110 THERAPEUTIC EXERCISES: CPT

## 2018-04-03 PROCEDURE — 36415 COLL VENOUS BLD VENIPUNCTURE: CPT

## 2018-04-03 PROCEDURE — 43246 EGD PLACE GASTROSTOMY TUBE: CPT | Performed by: INTERNAL MEDICINE

## 2018-04-03 PROCEDURE — 99232 SBSQ HOSP IP/OBS MODERATE 35: CPT | Mod: ,,, | Performed by: HOSPITALIST

## 2018-04-03 PROCEDURE — 25000003 PHARM REV CODE 250: Performed by: NURSE ANESTHETIST, CERTIFIED REGISTERED

## 2018-04-03 PROCEDURE — 37000009 HC ANESTHESIA EA ADD 15 MINS: Performed by: INTERNAL MEDICINE

## 2018-04-03 PROCEDURE — 27201018 HC KIT, PEG (ANY): Performed by: INTERNAL MEDICINE

## 2018-04-03 PROCEDURE — 11000001 HC ACUTE MED/SURG PRIVATE ROOM

## 2018-04-03 PROCEDURE — 84100 ASSAY OF PHOSPHORUS: CPT

## 2018-04-03 PROCEDURE — 0DH68UZ INSERTION OF FEEDING DEVICE INTO STOMACH, VIA NATURAL OR ARTIFICIAL OPENING ENDOSCOPIC: ICD-10-PCS | Performed by: INTERNAL MEDICINE

## 2018-04-03 PROCEDURE — 99233 SBSQ HOSP IP/OBS HIGH 50: CPT | Mod: ,,, | Performed by: PSYCHIATRY & NEUROLOGY

## 2018-04-03 PROCEDURE — 25000003 PHARM REV CODE 250: Performed by: NURSE PRACTITIONER

## 2018-04-03 PROCEDURE — 80053 COMPREHEN METABOLIC PANEL: CPT

## 2018-04-03 PROCEDURE — 97530 THERAPEUTIC ACTIVITIES: CPT

## 2018-04-03 RX ORDER — AMOXICILLIN 250 MG
1 CAPSULE ORAL DAILY
Status: DISCONTINUED | OUTPATIENT
Start: 2018-04-04 | End: 2018-04-11 | Stop reason: HOSPADM

## 2018-04-03 RX ORDER — THIAMINE HCL 100 MG
100 TABLET ORAL DAILY
Status: DISCONTINUED | OUTPATIENT
Start: 2018-04-04 | End: 2018-04-11 | Stop reason: HOSPADM

## 2018-04-03 RX ORDER — LISINOPRIL 20 MG/1
20 TABLET ORAL DAILY
Status: DISCONTINUED | OUTPATIENT
Start: 2018-04-04 | End: 2018-04-11 | Stop reason: HOSPADM

## 2018-04-03 RX ORDER — AMLODIPINE BESYLATE 5 MG/1
5 TABLET ORAL DAILY
Status: DISCONTINUED | OUTPATIENT
Start: 2018-04-04 | End: 2018-04-11 | Stop reason: HOSPADM

## 2018-04-03 RX ORDER — FOLIC ACID 1 MG/1
1 TABLET ORAL DAILY
Status: DISCONTINUED | OUTPATIENT
Start: 2018-04-04 | End: 2018-04-11 | Stop reason: HOSPADM

## 2018-04-03 RX ORDER — B-COMPLEX WITH VITAMIN C
1 TABLET ORAL DAILY
Status: DISCONTINUED | OUTPATIENT
Start: 2018-04-04 | End: 2018-04-11 | Stop reason: HOSPADM

## 2018-04-03 RX ORDER — PANTOPRAZOLE SODIUM 40 MG/1
40 FOR SUSPENSION ORAL 2 TIMES DAILY
Status: DISCONTINUED | OUTPATIENT
Start: 2018-04-03 | End: 2018-04-11 | Stop reason: HOSPADM

## 2018-04-03 RX ORDER — FERROUS SULFATE 300 MG/5ML
300 LIQUID (ML) ORAL 2 TIMES DAILY
Status: DISCONTINUED | OUTPATIENT
Start: 2018-04-03 | End: 2018-04-11 | Stop reason: HOSPADM

## 2018-04-03 RX ORDER — ENOXAPARIN SODIUM 100 MG/ML
40 INJECTION SUBCUTANEOUS EVERY 24 HOURS
Status: COMPLETED | OUTPATIENT
Start: 2018-04-03 | End: 2018-04-03

## 2018-04-03 RX ORDER — AMLODIPINE BESYLATE 5 MG/1
5 TABLET ORAL DAILY
Status: DISCONTINUED | OUTPATIENT
Start: 2018-04-03 | End: 2018-04-03

## 2018-04-03 RX ORDER — CEFAZOLIN SODIUM 1 G/3ML
INJECTION, POWDER, FOR SOLUTION INTRAMUSCULAR; INTRAVENOUS
Status: DISCONTINUED | OUTPATIENT
Start: 2018-04-03 | End: 2018-04-03

## 2018-04-03 RX ORDER — ETOMIDATE 2 MG/ML
INJECTION INTRAVENOUS
Status: DISCONTINUED | OUTPATIENT
Start: 2018-04-03 | End: 2018-04-03

## 2018-04-03 RX ORDER — SODIUM CHLORIDE 9 MG/ML
INJECTION, SOLUTION INTRAVENOUS CONTINUOUS PRN
Status: DISCONTINUED | OUTPATIENT
Start: 2018-04-03 | End: 2018-04-03

## 2018-04-03 RX ORDER — METOPROLOL TARTRATE 50 MG/1
50 TABLET ORAL 2 TIMES DAILY
Status: DISCONTINUED | OUTPATIENT
Start: 2018-04-03 | End: 2018-04-11 | Stop reason: HOSPADM

## 2018-04-03 RX ORDER — POLYETHYLENE GLYCOL 3350 17 G/17G
17 POWDER, FOR SOLUTION ORAL DAILY
Status: DISCONTINUED | OUTPATIENT
Start: 2018-04-04 | End: 2018-04-11 | Stop reason: HOSPADM

## 2018-04-03 RX ADMIN — ETOMIDATE 10 MG: 2 INJECTION, SOLUTION INTRAVENOUS at 03:04

## 2018-04-03 RX ADMIN — ETOMIDATE 10 MG: 2 INJECTION, SOLUTION INTRAVENOUS at 02:04

## 2018-04-03 RX ADMIN — AMLODIPINE BESYLATE 5 MG: 5 TABLET ORAL at 09:04

## 2018-04-03 RX ADMIN — SODIUM CHLORIDE: 0.9 INJECTION, SOLUTION INTRAVENOUS at 02:04

## 2018-04-03 RX ADMIN — MINERAL SUPPLEMENT IRON 300 MG / 5 ML STRENGTH LIQUID 100 PER BOX UNFLAVORED 300 MG: at 09:04

## 2018-04-03 RX ADMIN — ENOXAPARIN SODIUM 40 MG: 100 INJECTION SUBCUTANEOUS at 05:04

## 2018-04-03 RX ADMIN — CEFAZOLIN 1 G: 1 INJECTION, POWDER, FOR SOLUTION INTRAVENOUS at 02:04

## 2018-04-03 RX ADMIN — LISINOPRIL 20 MG: 20 TABLET ORAL at 09:04

## 2018-04-03 RX ADMIN — ETOMIDATE 5 MG: 2 INJECTION, SOLUTION INTRAVENOUS at 03:04

## 2018-04-03 RX ADMIN — OXYCODONE HYDROCHLORIDE 10 MG: 5 TABLET ORAL at 09:04

## 2018-04-03 RX ADMIN — ETOMIDATE 5 MG: 2 INJECTION, SOLUTION INTRAVENOUS at 02:04

## 2018-04-03 RX ADMIN — PANTOPRAZOLE SODIUM 40 MG: 40 GRANULE, DELAYED RELEASE ORAL at 09:04

## 2018-04-03 RX ADMIN — METOPROLOL TARTRATE 50 MG: 50 TABLET, FILM COATED ORAL at 09:04

## 2018-04-03 RX ADMIN — METOPROLOL TARTRATE 50 MG: 50 TABLET ORAL at 09:04

## 2018-04-03 NOTE — ASSESSMENT & PLAN NOTE
- SBP goal <180, prn labetalol ordered  - Has not required PRNS  - Now on metoprolol 50mg BID  - Lisinopril 20mg QD  - Norvasc 5mg QD  - has had multiple short runs of NSVT - patient unable to provide review if symptomatic

## 2018-04-03 NOTE — PT/OT/SLP PROGRESS
"Occupational Therapy   Treatment    Name: Zion Hines  MRN: [id number]  Admitting Diagnosis:  Embolic stroke involving right middle cerebral artery  Day of Surgery    Recommendations:     Discharge Recommendations: nursing facility, skilled    Subjective   "I want [facility]."  Pt constantly talking throughout session to OT & at random (at times appeared to be responding to stimuli not present ? Hallucinating).  Notified RN of concerns that pt was hallucinating.  Communicated with: RN prior to session.  Pain/Comfort:  · Pain Rating 1: 0/10  · Pain Rating Post-Intervention 1: 0/10    Patients cultural, spiritual, Buddhist conflicts given the current situation: no    Objective:     Patient found with: bed alarm, restraints, telemetry, NG tube, Condom Catheter    General Precautions: Standard, aspiration, fall, NPO (cardiac)     Occupational Performance:    Bed Mobility:    · Patient completed Rolling/Turning to Right with total assistance  · Patient completed Scooting/Bridging with total assistance forward on EOB & to the right along EOB  · Patient completed Supine to Sit with total assistance  · Patient completed Sit to Supine with total assistance     Activities of Daily Living:  · Grooming: total assistance seated EOB  · UE dressing: Total (A)    Patient left supine with all lines intact, call button in reach, bed alarm on, restraints reapplied at end of session, RN notified, RN present and white board updated.    [facility] 6 Click:  AMPA Total Score: 6    Treatment & Education:  Provided gentle cervical rotation to the left while supine in bed due to pt with head always to the right.  Pt at end of session visually tracked to the left however otherwise with eyes directed to the right throughout session.  Provided LUE weight bearing to facilitate approximation of left shoulder joint while seated EOB.  Pt required Min-Mod (A) with postural control while seated EOB.  Provided verbal & physical cues to facilitate " postural control while seated EOB.  Provided PROM to LUE in all planes x 10 reps each while supine.  Pt potentially hallucinating during session with at times only minimal redirectability therefore notified RN.  Education:    Assessment:     Zion Hines is a 78 y.o. male with a medical diagnosis of Embolic stroke involving right middle cerebral artery.  He presents with fair participation and motivation.  Pt continues to be at high fall risk.  Pt with good alertness on this date.  Performance deficits affecting function are weakness, impaired endurance, impaired self care skills, impaired functional mobilty, impaired balance, visual deficits, impaired cognition, decreased coordination, decreased safety awareness, decreased lower extremity function, decreased upper extremity function.      Rehab Prognosis:  fair; patient would benefit from acute skilled OT services to address these deficits and reach maximum level of function.       Plan:     Patient to be seen 4 x/week to address the above listed problems via self-care/home management, therapeutic activities, therapeutic exercises, neuromuscular re-education, cognitive retraining, sensory integration  · Plan of Care Expires: 18  · Plan of Care Reviewed with: patient    This Plan of care has been discussed with the patient who was involved in its development and understands and is in agreement with the identified goals and treatment plan    GOALS:    Occupational Therapy Goals        Problem: Occupational Therapy Goal    Goal Priority Disciplines Outcome Interventions   Occupational Therapy Goal     OT, PT/OT Ongoing (interventions implemented as appropriate)    Description:  Goals to be met by:  18    Patient will increase functional independence with ADLs by performin. Supine to sit with Moderate Assistance. - not met  2. Roll to R with Min A. - not met  3. Roll to L with Min A. - not met  4. Grooming while supine with HOB elevated with Minimal  Assistance. - not met  5. UE Dressing with Moderate Assistance. - not met  6. Pt will perform grasp/release 2/3 functional items (towel, cup, tissue box) per UE crossing midline with Min A while seated EOB. - not met  7. Pt will perform functional sitting at EOB x5 min with stand by Assist. - not met  8. Transfers with Moderate assistance.                             Time Tracking:     OT Date of Treatment: 04/03/18  OT Start Time: 1135  OT Stop Time: 1201  OT Total Time (min): 26 min    Billable Minutes:Therapeutic Activity 10  Therapeutic Exercise 16    FRANCISCO Quintero  4/3/2018

## 2018-04-03 NOTE — PLAN OF CARE
Problem: SLP Goal  Goal: SLP Goal  Speech Language Pathology   Goal expected to be met by 4/8  1. Patient will answer orientation questions x4 given mod A.   2. Given model, patient will follow 1 step directions with 90% acc.   3. Given repetitions x2 max, patient will answer simple y/n questions with 90% acc.  4. Given phonemic cuing, patient will complete simple naming tasks with 80% acc.   5. Given verbal and visual cueing, patient will complete rote speech tasks with 80% acc.    6.  Participate in ongoing assesment of swallow              Modified barium swallow study completed. Please see note for details.   Odette Marcelino CCC-SLP  4/3/2018

## 2018-04-03 NOTE — PROGRESS NOTES
Spoke to erasmo with Endo, Dr. Muro stated that there is no order to put in on when to use the PEG tube. Wait 4 hours then able to use for water and meds only until tomorrow.   **Read MD recommendation note.**

## 2018-04-03 NOTE — PROGRESS NOTES
[facility] Hospital Medicine  Progress Note    Patient Name: Zion Hines  MRN: [id number]  Patient Class: IP- Inpatient   Admission Date: 3/23/2018  Length of Stay: 11 days  Attending Physician: Tess Andrews MD  Primary Care Provider: Primary Doctor [facility] Medicine Team: [facility] HOSP MED 2 Riley Milner MD    Subjective:     Principal Problem:Embolic stroke involving right middle cerebral artery    HPI:  79 yo M with PMHx HTN known to be non-compliant with medications and remote hx of anemia presents to [facility] ED 18 as a transfer from [facility].  Patient is a poor historian with no family at bedside, hx largely obtained from OSH records.  He presented to OSH originally with symptoms of weakness and fatigue, his daughter brought him to ED thinking he was likely anemic again.  It was noted that patient had some dysarthria and aphasia (unspecified expressive vs receptive) with L-sided weakness.  He was out of the window for tPA and was sent to [facility] emergently for CTA Stroke Multiphase and possible endovascular intervention. CTA stroke multiphase with no LVO, no intervention planned.  Of note, patient is anemic per OSH labs with a Hgb of 5.8 g/dL and likely has pancreatitis with lipase 3738, WBC count of 15.8 k/uL, and CT abdomen showing peripancreatic fluid around the tail.  He was transfused a unit of PRBCs prior to transfer.  Labs were otherwise largely unremarkable.  On presentation to [facility] ED, he is able to move both BLE but complains of pain in BLE.  Not moving LUE but is intact to noxious stimuli.  Has a R gaze preference that is not overcome by VOR testing and complaint of neck pain with moving head side-to-side.  Otherwise has mild abdominal distension with mild hypogastric tenderness to palpation. HTN to SBP 200s.  Of note, patient's wife  recently and family expressed concern in outpatient ED that patient has not been eating much for the past month but deny  EtOH use.    Hospital Course:  03/23/18:  Admission to Neuro ICU 2/2 concern for stroke with multiple concerns for patient to become unstable--pancreatitis, severe anemia, HTN.  03/24: has had no interval development of multiorgan involvement form pancreatitis, drop in hemoglobin secondary to erosive gastritis, transfused and on protonix bid  03/25: Patient on dilt drip for a-fib. Repeat EKG shows NSR.  3/26: Discontinuing diltiazem drip today. Will continue monitor HR. Continue IV fluids 50 cc/hr for pancreatitis. Repeat lipase.   3/28: NAEON. VSSA. No overt signs of bleeding. Neuro exam unchanged from prior documented exams. Continues to not follow commands, continues to answer questions inappropriately. Remains effectively hemiplegic on left. No further ICU needs. S/D today to hospital medicine after d/w vascular neurology.  03/29/2018 VSS, no signs of blood loss in stool. Loose stools on examination. NG tube replaced and xray confirmed in duodenum - retracted. Will make definitive decision on anticoagulation 3/30. Furthering discussions with family regarding definitive feeding  03/30/2018 VSS, no signs of blood loss on ZENIA. NG tube replaced after patient pulled O/N. Confirmed placement.Family desires PEG tube placed. GI consulted for placement on Monday.   03/31/2018 VSS. Drop in Hb from 8.1 to 7.4 overnight. TF continued. Holding anticoagulation due to worsening anemia. GI recommendations for further evaluation of inflammatory changes of the pancreatic tail, recommending EUS or MRCP in future with PBS follow up outpatient. New fluid filled hernia noted on exam - US ordered  04/02/2018 PEG placement today. WBC 15. Hb improving. Metoprolol changes to 50mg BID, Lisinopril increased 20 mg QD.   04/03/2018 PEG placement scheduled for today. Leukocytosis stable. Hb stable. Added Norvasc for BP control today. Will monitor. Unable to contact any family members since yesterday afternoon.     Interval History: see  hospital course    Review of Systems   Unable to perform ROS: Mental status change     Objective:     Vital Signs (Most Recent):  Temp: 98 °F (36.7 °C) (04/03/18 1203)  Pulse: 75 (04/03/18 1203)  Resp: 16 (04/03/18 1203)  BP: 135/83 (04/03/18 1203)  SpO2: 99 % (04/03/18 1203) Vital Signs (24h Range):  Temp:  [97.4 °F (36.3 °C)-98.3 °F (36.8 °C)] 98 °F (36.7 °C)  Pulse:  [74-96] 75  Resp:  [16-18] 16  SpO2:  [97 %-99 %] 99 %  BP: (135-194)/(83-90) 135/83     Weight: 66.4 kg (146 lb 6.2 oz)  Body mass index is 22.26 kg/m².    Intake/Output Summary (Last 24 hours) at 04/03/18 1217  Last data filed at 04/03/18 0600   Gross per 24 hour   Intake              100 ml   Output             1200 ml   Net            -1100 ml      Physical Exam   Constitutional: He appears well-developed.   HENT:   Head: Atraumatic.   Eyes: Pupils are equal, round, and reactive to light.   Patient with rightward gaze preference  Arcus senilis bilaterally   Neck: No JVD present.   Cardiovascular: Normal rate, regular rhythm, S1 normal, S2 normal and intact distal pulses.    Pulses:       Radial pulses are 2+ on the right side, and 2+ on the left side.        Dorsalis pedis pulses are 1+ on the right side, and 1+ on the left side.   Pulmonary/Chest: Effort normal and breath sounds normal.   Abdominal: Soft. Bowel sounds are normal. He exhibits no distension. There is tenderness in the suprapubic area and left lower quadrant. There is no guarding.       Genitourinary:   Genitourinary Comments: Condom catheter in place   Musculoskeletal: He exhibits no edema.   Neurological: He is alert. He exhibits abnormal muscle tone. GCS eye subscore is 4. GCS verbal subscore is 3. GCS motor subscore is 6.   Only oriented to person and is aware that he is in a hospital. Expressive and receptive aphasia. LUE weak - 2/5 strength in Left hand     Skin: Skin is warm. No rash noted.   Psychiatric: His speech is slurred.   Nursing note and vitals  reviewed.      Significant Labs:   Recent Results (from the past 24 hour(s))   POCT glucose    Collection Time: 04/02/18  5:14 PM   Result Value Ref Range    POCT Glucose 136 (H) 70 - 110 mg/dL   POCT glucose    Collection Time: 04/02/18 10:01 PM   Result Value Ref Range    POCT Glucose 190 (H) 70 - 110 mg/dL   Comprehensive metabolic panel    Collection Time: 04/03/18  4:31 AM   Result Value Ref Range    Sodium 137 136 - 145 mmol/L    Potassium 4.2 3.5 - 5.1 mmol/L    Chloride 106 95 - 110 mmol/L    CO2 22 (L) 23 - 29 mmol/L    Glucose 101 70 - 110 mg/dL    BUN, Bld 23 8 - 23 mg/dL    Creatinine 0.8 0.5 - 1.4 mg/dL    Calcium 10.1 8.7 - 10.5 mg/dL    Total Protein 6.9 6.0 - 8.4 g/dL    Albumin 2.4 (L) 3.5 - 5.2 g/dL    Total Bilirubin 0.5 0.1 - 1.0 mg/dL    Alkaline Phosphatase 67 55 - 135 U/L    AST 45 (H) 10 - 40 U/L    ALT 30 10 - 44 U/L    Anion Gap 9 8 - 16 mmol/L    eGFR if African American >60.0 >60 mL/min/1.73 m^2    eGFR if non African American >60.0 >60 mL/min/1.73 m^2   CBC auto differential    Collection Time: 04/03/18  4:31 AM   Result Value Ref Range    WBC 14.29 (H) 3.90 - 12.70 K/uL    RBC 4.05 (L) 4.60 - 6.20 M/uL    Hemoglobin 8.3 (L) 14.0 - 18.0 g/dL    Hematocrit 29.8 (L) 40.0 - 54.0 %    MCV 74 (L) 82 - 98 fL    MCH 20.5 (L) 27.0 - 31.0 pg    MCHC 27.9 (L) 32.0 - 36.0 g/dL    RDW 31.7 (H) 11.5 - 14.5 %    Platelets 304 150 - 350 K/uL    MPV SEE COMMENT 9.2 - 12.9 fL    Immature Granulocytes 1.7 (H) 0.0 - 0.5 %    Gran # (ANC) 10.6 (H) 1.8 - 7.7 K/uL    Immature Grans (Abs) 0.25 (H) 0.00 - 0.04 K/uL    Lymph # 1.4 1.0 - 4.8 K/uL    Mono # 1.7 (H) 0.3 - 1.0 K/uL    Eos # 0.4 0.0 - 0.5 K/uL    Baso # 0.02 0.00 - 0.20 K/uL    nRBC 0 0 /100 WBC    Gran% 74.1 (H) 38.0 - 73.0 %    Lymph% 9.4 (L) 18.0 - 48.0 %    Mono% 12.0 4.0 - 15.0 %    Eosinophil% 2.7 0.0 - 8.0 %    Basophil% 0.1 0.0 - 1.9 %    Platelet Estimate Appears normal     Aniso Slight     Poly Occasional     Basophilic Stippling Occasional      Large/Giant Platelets Present     Differential Method Automated    Magnesium    Collection Time: 04/03/18  4:31 AM   Result Value Ref Range    Magnesium 2.0 1.6 - 2.6 mg/dL   Phosphorus    Collection Time: 04/03/18  4:31 AM   Result Value Ref Range    Phosphorus 2.9 2.7 - 4.5 mg/dL   POCT glucose    Collection Time: 04/03/18  5:12 AM   Result Value Ref Range    POCT Glucose 110 70 - 110 mg/dL         Significant Imaging: I have reviewed all pertinent imaging results/findings within the past 24 hours.    Assessment/Plan:      * Embolic stroke involving right middle cerebral artery    - 03/23/18 CTA Stroke Multiphase with multiple non-occlusive thrombi noted  - MRI brain w/o show multiple infarcts without evidence of hemorrhagic conversion.  - Given recent in-hospital atrial fibrillation and takotsubo's CM suspicion for embolic stroke, in addition 2D Echo showed severely depressed LVEF 15-20% with evidence of stress CM  - PT/OT reordered   - no active blood loss - needs NOAC but continued with anemia, will hold until anemia improves          Inguinal hernia of right side without obstruction or gangrene    - feels fluid filled on exam  - US ordered consistent with fluid filled channel in inguinal canals, findings unspecific.         Oropharyngeal dysphagia    - GI consulted for PEG tube placement  - PEG today   - NPO for procedure today, resume DVT prophylaxis post procedure  - modified barium swallow failed - had contrast suctioned from oral cavity           Cytotoxic cerebral edema              Chronic systolic HF (heart failure)    - unknown if this is chronic issue or if apical ballooning and depressed EF is secondary to emotional event and passing of wife or if due to neurological event with stroke and cytotoxic edema and ICU stay as both are known to cause stress cardiomyopathy  - will need f/u echo to evaluate EF and recovery           NSVT (nonsustained ventricular tachycardia)    - patient with daily  episodes of NSVT  - remains on metoprolol for Afib w RVR  - unable to obtain ROS if symptomatic during episodes  - on telemetry - will monitor          Takotsubo cardiomyopathy    - pt with loss of wife 2/2018, now with dilated cardiomyopathy EF 15-20 percent and imaging suggestive of takotsubo CM  - will need repeat echo to evaluate improvement  - embolic episode may have been exacerbated by new onset CM   - elevated ePAP 61          Alcohol abuse    - unable to obtain alcohol history from patient          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 PEG placement 4/3. TF on hold for procedure    1. Continue Isosource 1.5 @ goal rate 50mL/hr.   - Provides 1800kcals, 82g protein and 917mL free water.      2. If more concentrated formula warranted (EF 45%), recommend Nutren 2.0 @ goal rate 40mL/hr.   - Provides 1920kcals, 81g protein and 664mL free water.   - Hold for residuals >500mL.      RD to monitor.            Aphasia as late effect of cerebrovascular accident    -both receptive and expressive            Iron deficiency anemia    - see microcyctic anemia        Atrial fibrillation with RVR    - Patient noted to be in Afib with RVR 3/25  - In NSR this AM with rate of 70s-90s, short runs of NSVT  - Would recommend anticoaulation - If anticoagulation is initiated for Afib, Eliquis is $8.35/month--Eliquis was shown to have similar GIB risk as warfarin in [facility]; other DOACs shown to have increased risk compared to warfarin in major Afib studies  - cardiac monitoring  - Currently on metoprolol 50mg BID for rate control               Anemia    - has iron def anemia, continue iron replacement  - unknown baseline hb, no melena or bloody bowel movements reported while IP  - no known history of GIB or varices  - Hb 9, on iron replacement via NG  - ZENIA  negative for black/red stool        Acute pancreatitis    RESOLVED  - slight bump in LFTs today, will follow         Essential hypertension     - SBP goal <180, prn labetalol ordered  - Has not required PRNS  - Now on metoprolol 50mg BID  - Lisinopril 20mg QD  - Norvasc 5mg QD  - has had multiple short runs of NSVT - patient unable to provide review if symptomatic          Acute encephalopathy    - Likely 2/2 sequelae of embolic stroke            VTE Risk Mitigation     None              Riley Milner MD  Department of Hospital Medicine   [facility]

## 2018-04-03 NOTE — ASSESSMENT & PLAN NOTE
- family desires PEG tube placement - GI consulted for placement. PEG placement 4/3. TF on hold for procedure    1. Continue Isosource 1.5 @ goal rate 50mL/hr.   - Provides 1800kcals, 82g protein and 917mL free water.      2. If more concentrated formula warranted (EF 45%), recommend Nutren 2.0 @ goal rate 40mL/hr.   - Provides 1920kcals, 81g protein and 664mL free water.   - Hold for residuals >500mL.      RD to monitor.

## 2018-04-03 NOTE — ANESTHESIA PREPROCEDURE EVALUATION
04/03/2018  Zion Hines is a 78 y.o., male.    Anesthesia Evaluation    I have reviewed the Patient Summary Reports.     I have reviewed the Medications.     Review of Systems  Hematology/Oncology:         -- Anemia:   Cardiovascular:   Takotsubo cardiomyopathy    CONCLUSIONS     1 - Consider Stress induced CM - Takotsubo CM.     2 - Eccentric LVH with severely depressed left ventricular systolic function (EF 15-20%).     3 - Normal RV size with moderately depressed right ventricular systolic function .     4 - Mild mitral regurgitation.     5 - Mild tricuspid regurgitation.     6 - Pulmonary hypertension. The estimated PA systolic pressure is 61 mmHg.     7 - Increased central venous pressure.     8 - No prior echo in our system.    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         Physical Exam   Airway/Jaw/Neck:  Airway Findings: Mouth Opening: Normal Tongue: Normal  General Airway Assessment: Adult, Good  TM Distance: Normal, at least 6 cm       Chest/Lungs:  Chest/Lungs Findings: Normal Respiratory Rate         Mental Status:  Mental Status Findings:         Anesthesia Plan  Type of Anesthesia, risks & benefits discussed:  Anesthesia Type:  general  Patient's Preference: General  Intra-op Monitoring Plan: standard ASA monitors  Intra-op Monitoring Plan Comments:   Post Op Pain Control Plan: per primary service following discharge from PACU  Post Op Pain Control Plan Comments: Per primary service  Induction:   IV  Beta Blocker:  Patient is not currently on a Beta-Blocker (No further documentation required).       Informed Consent: Patient understands risks and agrees  with Anesthesia plan.  Questions answered. Anesthesia consent signed with patient.  ASA Score: 4     Day of Surgery Review of History & Physical:    H&P update referred to the surgeon.         Ready For Surgery From Anesthesia Perspective.

## 2018-04-03 NOTE — ASSESSMENT & PLAN NOTE
- GI consulted for PEG tube placement  - PEG today   - NPO for procedure today, resume DVT prophylaxis post procedure  - modified barium swallow failed - had contrast suctioned from oral cavity

## 2018-04-03 NOTE — TRANSFER OF CARE
"Anesthesia Transfer of Care Note    Patient: Zion Hines    Procedure(s) Performed: Procedure(s) (LRB):  PLACEMENT-TUBE-PEG (N/A)    Patient location: PACU    Anesthesia Type: general    Transport from OR: Transported from OR on 2-3 L/min O2 by NC with adequate spontaneous ventilation    Post pain: adequate analgesia    Post assessment: no apparent anesthetic complications and tolerated procedure well    Post vital signs: stable    Level of consciousness: responds to stimulation    Nausea/Vomiting: no nausea/vomiting    Complications: none    Transfer of care protocol was followed      Last vitals:   Visit Vitals  BP (!) 141/97 (BP Location: Left leg, Patient Position: Lying)   Pulse 75   Temp 36.7 °C (98.1 °F) (Skin)   Resp 17   Ht 5' 7.99" (1.727 m)   Wt 66.4 kg (146 lb 6.2 oz)   SpO2 99%   BMI 22.26 kg/m²     "

## 2018-04-03 NOTE — NURSING TRANSFER
Nursing Transfer Note      4/3/2018     Transfer To: 110[address]    Transfer via stretcher    Transfer with cardiac monitoring    Transported by pct    Medicines sent: na    Chart send with patient: Yes    Notified: na    Patient reassessed at: 1540 4/3/18  (date, time)    Upon arrival to floor: bed in lowest position    Report called to floor nurse

## 2018-04-03 NOTE — PROCEDURES
Modified Barium Swallow    Patient Name:  Zion Hines   MRN:  [id number]      Recommendations:     Recommendations:                General Recommendations:  Dysphagia therapy and Speech/language therapy  Diet recommendations:  NPO, NPO   Aspiration Precautions: Continue alternate means of nutrition and Strict aspiration precautions   General Precautions: Standard, aspiration, fall, NPO      Referral     Reason for Referral  Patient was referred for a Modified Barium Swallow Study to assess the efficiency of his/her swallow function, rule out aspiration and make recommendations regarding safe dietary consistencies, effective compensatory strategies, and safe eating environment.     Diagnosis: Embolic stroke involving right middle cerebral artery       History:     Past Medical History:   Diagnosis Date    Alcohol abuse 3/29/2018    Atrial fibrillation with RVR 3/27/2018    Embolic stroke involving right middle cerebral artery 3/24/2018    Essential hypertension 3/23/2018       Objective:     Current Respiratory Status: 04/03/18    Alert: yes    Cooperative: yes    Follows Directions: no    Visualization  · Patient was seen in the lateral view  · NG tube observed in place    Oral Peripheral Examination  · Oral Musculature: unable to assess due to poor participation/comprehension  · Volitional Cough: not elicited  · Volitional Swallow: not elicited  · Voice Prior to PO Intake: mildly wet intermittently    Consistencies Assessed  · Thin x1 tsp   · Puree x1 tsp    Oral Preparation/Oral Phase  · no oral manipulation noted   · Oral holding of all boluses   · SLP removed boluses from oral cavity with suction    Pharyngeal Phase   No pharyngeal swallow triggered despite max tactile and verbal cues.     Cervical Esophageal Phase  · N/A    Assessment:     Impressions  ·   Patient demonstrates severe Oral dysphaga characterized by oral holding with no pharyngeal swallow initiated. SLP removed bolus from oral cavity via  suction..     Prognosis: Guarded    Barriers:  · Cognitive status    Plan  Continue ongoing swallow assessment at bedside    Education  Results were discussed with patient. No evidence of learning noted.    Goals:    SLP Goals        Problem: SLP Goal    Goal Priority Disciplines Outcome   SLP Goal     SLP Ongoing (interventions implemented as appropriate)   Description:  Speech Language Pathology   Goal expected to be met by 4/8  1. Patient will answer orientation questions x4 given mod A.   2. Given model, patient will follow 1 step directions with 90% acc.   3. Given repetitions x2 max, patient will answer simple y/n questions with 90% acc.  4. Given phonemic cuing, patient will complete simple naming tasks with 80% acc.   5. Given verbal and visual cueing, patient will complete rote speech tasks with 80% acc.    6.  Participate in ongoing assesment of swallow                               Plan:   · Patient to be seen:  Therapy Frequency: 5 x/week   · Plan of Care expires:  04/30/18  · Plan of Care reviewed with:  patient        Discharge recommendations:  nursing facility, skilled       Time Tracking:   SLP Treatment Date:   04/03/18  Speech Start Time:  0850  Speech Stop Time:  0933     Speech Total Time (min):  43 min    Odette Marcelino CCC-SLP  04/03/2018

## 2018-04-03 NOTE — INTERVAL H&P NOTE
Pre-Procedure H and P Addendum    Patient seen and examined.  History and exam unchanged from prior history and physical.      Procedure: EGD with PEG   Indication: Oropharyngeal dysphagia; anemia  ASA Class: per anesthesiology  Airway: normal  Neck Mobility: full range of motion  Mallampatti score: per anesthesia  History of anesthesia problems: no  Family history of anesthesia problems: no  Anesthesia Plan: MAC    Anesthesia/Surgery risks, benefits and alternative options discussed and understood by patient/family.          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

## 2018-04-03 NOTE — PT/OT/SLP PROGRESS
Physical Therapy      Patient Name:  Zion Hines   MRN:  [id number]    Patient not seen today secondary to pt in care of nursing first attempt, and pt off floor at apt/procedure second attempt. Will follow-up at next scheduled visit per PT POC.    Florencia Perry, PTA

## 2018-04-03 NOTE — SUBJECTIVE & OBJECTIVE
Interval History: see hospital course    Review of Systems   Unable to perform ROS: Mental status change     Objective:     Vital Signs (Most Recent):  Temp: 98 °F (36.7 °C) (04/03/18 1203)  Pulse: 75 (04/03/18 1203)  Resp: 16 (04/03/18 1203)  BP: 135/83 (04/03/18 1203)  SpO2: 99 % (04/03/18 1203) Vital Signs (24h Range):  Temp:  [97.4 °F (36.3 °C)-98.3 °F (36.8 °C)] 98 °F (36.7 °C)  Pulse:  [74-96] 75  Resp:  [16-18] 16  SpO2:  [97 %-99 %] 99 %  BP: (135-194)/(83-90) 135/83     Weight: 66.4 kg (146 lb 6.2 oz)  Body mass index is 22.26 kg/m².    Intake/Output Summary (Last 24 hours) at 04/03/18 1217  Last data filed at 04/03/18 0600   Gross per 24 hour   Intake              100 ml   Output             1200 ml   Net            -1100 ml      Physical Exam   Constitutional: He appears well-developed.   HENT:   Head: Atraumatic.   Eyes: Pupils are equal, round, and reactive to light.   Patient with rightward gaze preference  Arcus senilis bilaterally   Neck: No JVD present.   Cardiovascular: Normal rate, regular rhythm, S1 normal, S2 normal and intact distal pulses.    Pulses:       Radial pulses are 2+ on the right side, and 2+ on the left side.        Dorsalis pedis pulses are 1+ on the right side, and 1+ on the left side.   Pulmonary/Chest: Effort normal and breath sounds normal.   Abdominal: Soft. Bowel sounds are normal. He exhibits no distension. There is tenderness in the suprapubic area and left lower quadrant. There is no guarding.       Genitourinary:   Genitourinary Comments: Condom catheter in place   Musculoskeletal: He exhibits no edema.   Neurological: He is alert. He exhibits abnormal muscle tone. GCS eye subscore is 4. GCS verbal subscore is 3. GCS motor subscore is 6.   Only oriented to person and is aware that he is in a hospital. Expressive and receptive aphasia. LUE weak - 2/5 strength in Left hand     Skin: Skin is warm. No rash noted.   Psychiatric: His speech is slurred.   Nursing note and vitals  reviewed.      Significant Labs:   Recent Results (from the past 24 hour(s))   POCT glucose    Collection Time: 04/02/18  5:14 PM   Result Value Ref Range    POCT Glucose 136 (H) 70 - 110 mg/dL   POCT glucose    Collection Time: 04/02/18 10:01 PM   Result Value Ref Range    POCT Glucose 190 (H) 70 - 110 mg/dL   Comprehensive metabolic panel    Collection Time: 04/03/18  4:31 AM   Result Value Ref Range    Sodium 137 136 - 145 mmol/L    Potassium 4.2 3.5 - 5.1 mmol/L    Chloride 106 95 - 110 mmol/L    CO2 22 (L) 23 - 29 mmol/L    Glucose 101 70 - 110 mg/dL    BUN, Bld 23 8 - 23 mg/dL    Creatinine 0.8 0.5 - 1.4 mg/dL    Calcium 10.1 8.7 - 10.5 mg/dL    Total Protein 6.9 6.0 - 8.4 g/dL    Albumin 2.4 (L) 3.5 - 5.2 g/dL    Total Bilirubin 0.5 0.1 - 1.0 mg/dL    Alkaline Phosphatase 67 55 - 135 U/L    AST 45 (H) 10 - 40 U/L    ALT 30 10 - 44 U/L    Anion Gap 9 8 - 16 mmol/L    eGFR if African American >60.0 >60 mL/min/1.73 m^2    eGFR if non African American >60.0 >60 mL/min/1.73 m^2   CBC auto differential    Collection Time: 04/03/18  4:31 AM   Result Value Ref Range    WBC 14.29 (H) 3.90 - 12.70 K/uL    RBC 4.05 (L) 4.60 - 6.20 M/uL    Hemoglobin 8.3 (L) 14.0 - 18.0 g/dL    Hematocrit 29.8 (L) 40.0 - 54.0 %    MCV 74 (L) 82 - 98 fL    MCH 20.5 (L) 27.0 - 31.0 pg    MCHC 27.9 (L) 32.0 - 36.0 g/dL    RDW 31.7 (H) 11.5 - 14.5 %    Platelets 304 150 - 350 K/uL    MPV SEE COMMENT 9.2 - 12.9 fL    Immature Granulocytes 1.7 (H) 0.0 - 0.5 %    Gran # (ANC) 10.6 (H) 1.8 - 7.7 K/uL    Immature Grans (Abs) 0.25 (H) 0.00 - 0.04 K/uL    Lymph # 1.4 1.0 - 4.8 K/uL    Mono # 1.7 (H) 0.3 - 1.0 K/uL    Eos # 0.4 0.0 - 0.5 K/uL    Baso # 0.02 0.00 - 0.20 K/uL    nRBC 0 0 /100 WBC    Gran% 74.1 (H) 38.0 - 73.0 %    Lymph% 9.4 (L) 18.0 - 48.0 %    Mono% 12.0 4.0 - 15.0 %    Eosinophil% 2.7 0.0 - 8.0 %    Basophil% 0.1 0.0 - 1.9 %    Platelet Estimate Appears normal     Aniso Slight     Poly Occasional     Basophilic Stippling Occasional      Large/Giant Platelets Present     Differential Method Automated    Magnesium    Collection Time: 04/03/18  4:31 AM   Result Value Ref Range    Magnesium 2.0 1.6 - 2.6 mg/dL   Phosphorus    Collection Time: 04/03/18  4:31 AM   Result Value Ref Range    Phosphorus 2.9 2.7 - 4.5 mg/dL   POCT glucose    Collection Time: 04/03/18  5:12 AM   Result Value Ref Range    POCT Glucose 110 70 - 110 mg/dL         Significant Imaging: I have reviewed all pertinent imaging results/findings within the past 24 hours.

## 2018-04-03 NOTE — PLAN OF CARE
"Sw spoke with Carmelita at [facility], "Carmelita will need updates from Pt once PEG is placed and therapy works with pt. Sw to follow and will encourage dght to provide more SNF choices in Pt's area that family are comfortable with.   "

## 2018-04-03 NOTE — PLAN OF CARE
04/03/18 1447   Discharge Reassessment   Assessment Type Discharge Planning Reassessment   Do you have any problems affording any of your prescribed medications? TBD   Discharge Plan A Skilled Nursing Facility   Can the patient answer the patient profile reliably? No, cognitively impaired   Describe the patient's ability to walk at the present time. Major restrictions/daily assistance from another person   How often would a person be available to care for the patient? Infrequently   During the past month, has the patient often been bothered by feeling down, depressed or hopeless? (Unable to assess)   During the past month, has the patient often been bothered by little interest or pleasure in doing things? (Unable to assess)

## 2018-04-03 NOTE — H&P (VIEW-ONLY)
[facility]  Gastroenterology  Consult Note    Patient Name: Zino Hines  MRN: [id number]  Admission Date: 3/23/2018  Hospital Length of Stay: 7 days  Code Status: Full Code   Attending Provider: Mica Mckeon MD   Consulting Provider: Abebe Arriaga MD  Primary Care Physician: Primary Doctor No  Principal Problem:Embolic stroke involving right middle cerebral artery    Inpatient consult to Gastroenterology  Consult performed by: ABEBE ARRIAGA  Consult ordered by: DIANE MEDRANO        Subjective:     HPI:  78 year old male with a history of HTN currently admitted for a right MCA stroke on who GI is being consulted for PEG placement in the setting of dysphagia.    History obtained from primary team's documentation.   79 yo M with PMHx HTN known to be non-compliant with medications and remote hx of anemia presents to [facility] ED 03/23/18 as a transfer from [facility].  Patient is a poor historian with no family at bedside, hx largely obtained from OSH records.  He presented to OSH originally with symptoms of weakness and fatigue, his daughter brought him to ED thinking he was likely anemic again.  It was noted that patient had some dysarthria and aphasia (unspecified expressive vs receptive) with L-sided weakness.  He was out of the window for tPA and was sent to [facility] emergently for CTA Stroke Multiphase and possible endovascular intervention. CTA stroke multiphase with no LVO, no intervention planned.  Of note, patient is anemic per OSH labs with a Hgb of 5.8 g/dL and likely has pancreatitis with lipase 3738, WBC count of 15.8 k/uL, and CT abdomen showing peripancreatic fluid around the tail.  He was transfused a unit of PRBCs prior to transfer.  Labs were otherwise largely unremarkable.  On presentation to [facility] ED, he is able to move both BLE but complains of pain in BLE.  Not moving LUE but is intact to noxious stimuli.  Has a R gaze preference that is not overcome by VOR testing and  complaint of neck pain with moving head side-to-side.  Otherwise has mild abdominal distension with mild hypogastric tenderness to palpation. HTN to SBP 200s.  Of note, patient's wife  recently and family expressed concern in outpatient ED that patient has not been eating much for the past month but deny EtOH use.     Hospital Course:  18:  Admission to Neuro ICU 2/2 concern for stroke with multiple concerns for patient to become unstable--pancreatitis, severe anemia, HTN.  : has had no interval development of multiorgan involvement form pancreatitis, drop in hemoglobin secondary to erosive gastritis, transfused and on protonix bid  : Patient on dilt drip for a-fib. Repeat EKG shows NSR.  3/26: Discontinuing diltiazem drip today. Will continue monitor HR. Continue IV fluids 50 cc/hr for pancreatitis. Repeat lipase.   3/28: NAEON. VSSA. No overt signs of bleeding. Neuro exam unchanged from prior documented exams. Continues to not follow commands, continues to answer questions inappropriately. Remains effectively hemiplegic on left. No further ICU needs. S/D today to hospital medicine after d/w vascular neurology.  2018 VSS, no signs of blood loss in stool. Loose stools on examination. NG tube replaced and xray confirmed in duodenum - retracted. Will make definitive decision on anticoagulation 3/30. Furthering discussions with family regarding definitive feeding  2018 VSS, no signs of blood loss on ZENAI. NG tube replaced after patient pulled O/N. Confirmed placement.Family desires PEG tube placed. GI consulted for placement on Monday.     Interval History:  Unable to obtain any information from patient this morning.   Will need to speak to family for consent and to explain procedure.    Past Medical History:   Diagnosis Date    Alcohol abuse 3/29/2018    Atrial fibrillation with RVR 3/27/2018    Embolic stroke involving right middle cerebral artery 3/24/2018    Essential hypertension  3/23/2018       History reviewed. No pertinent surgical history.    Review of patient's allergies indicates:  Not on File  Family History     None        Social History Main Topics    Smoking status: Unknown If Ever Smoked    Smokeless tobacco: Not on file    Alcohol use Not on file    Drug use: Unknown    Sexual activity: Not on file     Review of Systems   Unable to perform ROS: Mental status change     Objective:     Vital Signs (Most Recent):  Temp: 97.8 °F (36.6 °C) (03/30/18 1120)  Pulse: 95 (03/30/18 1120)  Resp: 18 (03/30/18 1120)  BP: (!) 180/96 (03/30/18 1120)  SpO2: 97 % (03/30/18 1120) Vital Signs (24h Range):  Temp:  [97.4 °F (36.3 °C)-98.3 °F (36.8 °C)] 97.8 °F (36.6 °C)  Pulse:  [76-96] 95  Resp:  [16-18] 18  SpO2:  [96 %-98 %] 97 %  BP: (154-180)/(81-96) 180/96     Weight: 71.5 kg (157 lb 10.1 oz) (03/28/18 0400)  Body mass index is 23.97 kg/m².      Intake/Output Summary (Last 24 hours) at 03/30/18 1252  Last data filed at 03/30/18 1100   Gross per 24 hour   Intake              300 ml   Output                0 ml   Net              300 ml       Lines/Drains/Airways     Drain            Male External Urinary Catheter 03/28/18 0705 Medium 2 days         NG/OG Tube 03/30/18 0700 Right mouth less than 1 day          Peripheral Intravenous Line                 Peripheral IV - Single Lumen 03/26/18 1120 Left Antecubital 4 days         Peripheral IV - Single Lumen 03/27/18 1505 Right Forearm 2 days                Physical Exam   Constitutional: No distress.   HENT:   Head: Normocephalic and atraumatic.   Eyes: No scleral icterus.   Neck: Normal range of motion.   Cardiovascular: Normal rate and regular rhythm.    Pulmonary/Chest: Effort normal and breath sounds normal.   Abdominal: Soft. Bowel sounds are normal. He exhibits no distension. There is no tenderness.   No prior surgical scars   Musculoskeletal: He exhibits no edema.   Neurological:   Unable to assess orientation  Unable to move LUE and LLE  extremity   Skin: He is not diaphoretic.       Significant Labs:  CBC:   Recent Labs  Lab 03/29/18  0404 03/30/18  0613   WBC 14.89* 13.02*   HGB 9.4* 8.1*   HCT 33.8* 29.4*    274     CMP:   Recent Labs  Lab 03/30/18  0613   *   CALCIUM 9.7   ALBUMIN 2.3*   PROT 6.2   *   K 3.4*   CO2 23   *   BUN 26*   CREATININE 0.8   ALKPHOS 60   ALT 15   AST 21   BILITOT 0.6     Coagulation: No results for input(s): PT, INR, APTT in the last 48 hours.    Significant Imaging:  Imaging results within the past 24 hours have been reviewed.    Assessment/Plan:     Dysphagia as late effect of cerebrovascular accident (CVA)    78 year old male with a history of HTN currently admitted for a right MCA stroke on who GI is being consulted for PEG placement in the setting of dysphagia.    On our exam patient exhibits trouble with his speech as well as left sided paralysis. Although PEG seems warranted would recommend that patient continued to work with Speech therapy.    Only on ASA no additional AC with no abdominal scar and mild WBC in the setting of no fevers.    Recommendations:  --Continue TF via NGT  --Speech therapy for continued therapy after stroke  --Tentative PEG Monday vs Tuesday (will keep team updated)        Acute pancreatitis    Acute pancreatitis in the setting of no reported alcohol use, visualized stones (although US here was days after initial diagnosis), normal Tg, or significant hypercalcemia.     Recommendations:  Needs outpatient follow up with [facility] for possible EUS vs MRCP        Microcytic anemia    Microcytic anemia with no overt signs of bleeding at this time    Recommendations:  Monitor daily CBC  Monitor BM for melena or BRBPR  Increase PPI to PPI IV BID in the interm            Thank you for your consult. I will follow-up with patient. Please contact us if you have any additional questions.    Cammie Carter M.D.  Gastroenterology Fellow, PGY-IV  Pager: [phone]  [facility]  [place]

## 2018-04-03 NOTE — PROVATION PATIENT INSTRUCTIONS
Discharge Summary/Instructions after an Endoscopic Procedure  Patient Name: Zion Hines  Patient MRN: [id number]  Patient YOB: 1940 Tuesday, April 03, 2018  Aram Muro MD  RESTRICTIONS:  During your procedure today, you received medications for sedation.  These   medications may affect your judgment, balance and coordination.  Therefore,   for 24 hours, you have the following restrictions:   - DO NOT drive a car, operate machinery, make legal/financial decisions,   sign important papers or drink alcohol.    ACTIVITY:  The following day: return to full activity including work, except no heavy   lifting, straining or running for 3 days if polyps were removed.  DIET:  Eat and drink normally unless instructed otherwise.     TREATMENT FOR COMMON SIDE EFFECTS:  - Mild abdominal pain, nausea, belching, bloating or excessive gas:  rest,   eat lightly and use a heating pad.  - Sore Throat: treat with throat lozenges and/or gargle with warm salt   water.  - Because air was used during the procedure, expelling large amounts of air   from your rectum or belching is normal.  - If a bowel prep was taken, you may not have a bowel movement for 1-3 days.    This is normal.  SYMPTOMS TO WATCH FOR AND REPORT TO YOUR PHYSICIAN:  1. Abdominal pain or bloating, other than gas cramps.  2. Chest pain.  3. Back pain.  4. Signs of infection such as: chills or fever occurring within 24 hours   after the procedure.  5. Rectal bleeding, which would show as bright red, maroon, or black stools.   (A tablespoon of blood from the rectum is not serious, especially if   hemorrhoids are present.)  6. Vomiting.  7. Weakness or dizziness.  GO DIRECTLY TO THE NEAREST EMERGENCY ROOM IF YOU HAVE ANY OF THE FOLLOWING:      Difficulty breathing              Chills and/or fever over 101 F   Persistent vomiting and/or vomiting blood   Severe abdominal pain   Severe chest pain   Black, tarry stools   Bleeding- more than one tablespoon   Any  other symptom or condition that you feel may need urgent attention  Your doctor recommends these additional instructions:  If any biopsies were taken, your doctors clinic will contact you in 1 to 2   weeks with any results.  - Return patient to hospital [facility] for ongoing care.   - NPO.   - Continue present medications.   - Can use PEG tube for water and meds in 4 hours provided there is no pain   or bleeding around the site.  GI fellow will re-assess in the morning to   approve for feeding use.  For questions, problems or results please call your physician - Aram Muro MD at Work:  [phone].  [facility], EMERGENCY ROOM PHONE NUMBER: [phone]  IF A COMPLICATION OR EMERGENCY SITUATION ARISES AND YOU ARE UNABLE TO REACH   YOUR PHYSICIAN - GO DIRECTLY TO THE EMERGENCY ROOM.  Aram Muro MD  4/3/2018 3:29:02 PM  This report has been verified and signed electronically.

## 2018-04-03 NOTE — ANESTHESIA POSTPROCEDURE EVALUATION
"Anesthesia Post Evaluation    Patient: Zion Hines    Procedure(s) Performed: Procedure(s) (LRB):  PLACEMENT-TUBE-PEG (N/A)    Final Anesthesia Type: general  Patient location during evaluation: PACU  Patient participation: No - Unable to Participate, Coma/Other Inability to Communicate  Level of consciousness: awake  Post-procedure vital signs: reviewed and stable  Pain management: adequate  Airway patency: patent  PONV status at discharge: No PONV  Anesthetic complications: no      Cardiovascular status: hemodynamically stable  Respiratory status: unassisted  Hydration status: euvolemic  Follow-up not needed.        Visit Vitals  BP (!) 159/84 (BP Location: Right arm, Patient Position: Lying)   Pulse 76   Temp 36.4 °C (97.6 °F) (Oral)   Resp 16   Ht 5' 7.99" (1.727 m)   Wt 66.4 kg (146 lb 6.2 oz)   SpO2 97%   BMI 22.26 kg/m²       Pain/Marla Score: Pain Assessment Performed: Yes (4/3/2018  3:54 PM)  Presence of Pain: non-verbal indicators absent (4/3/2018  3:54 PM)  Pain Rating Prior to Med Admin: 9 (4/2/2018  9:49 PM)  Pain Rating Post Med Admin: 1 (4/2/2018 10:50 PM)  Marla Score: 9 (4/3/2018  3:30 PM)      "

## 2018-04-03 NOTE — PLAN OF CARE
Problem: Occupational Therapy Goal  Goal: Occupational Therapy Goal  Goals to be met by:  18    Patient will increase functional independence with ADLs by performin. Supine to sit with Moderate Assistance. - not met  2. Roll to R with Min A. - not met  3. Roll to L with Min A. - not met  4. Grooming while supine with HOB elevated with Minimal Assistance. - not met  5. UE Dressing with Moderate Assistance. - not met  6. Pt will perform grasp/release 2/3 functional items (towel, cup, tissue box) per UE crossing midline with Min A while seated EOB. - not met  7. Pt will perform functional sitting at EOB x5 min with stand by Assist. - not met  8. Transfers with Moderate assistance.            Outcome: Ongoing (interventions implemented as appropriate)  Goals remain appropriate

## 2018-04-03 NOTE — TREATMENT PLAN
GI Post-Procedure Note    18 F PEG tube placed without any immediate complications. External bumper at 2 cm keiry.  Nothing per PEG x 4hrs then meds and water overnight.    Will see patient tomorrow for post PEG check    Franki Stephen MD  Gastroenterology Fellow (PGY-V)  Pager: [phone]

## 2018-04-04 LAB
ALBUMIN SERPL BCP-MCNC: 2.3 G/DL
ALP SERPL-CCNC: 63 U/L
ALT SERPL W/O P-5'-P-CCNC: 28 U/L
ANION GAP SERPL CALC-SCNC: 11 MMOL/L
ANISOCYTOSIS BLD QL SMEAR: ABNORMAL
AST SERPL-CCNC: 45 U/L
BASOPHILS # BLD AUTO: 0.03 K/UL
BASOPHILS NFR BLD: 0.2 %
BILIRUB SERPL-MCNC: 0.5 MG/DL
BUN SERPL-MCNC: 21 MG/DL
BURR CELLS BLD QL SMEAR: ABNORMAL
CALCIUM SERPL-MCNC: 9.9 MG/DL
CHLORIDE SERPL-SCNC: 108 MMOL/L
CO2 SERPL-SCNC: 18 MMOL/L
CREAT SERPL-MCNC: 0.8 MG/DL
DIFFERENTIAL METHOD: ABNORMAL
EOSINOPHIL # BLD AUTO: 0.4 K/UL
EOSINOPHIL NFR BLD: 2.7 %
ERYTHROCYTE [DISTWIDTH] IN BLOOD BY AUTOMATED COUNT: 32.1 %
EST. GFR  (AFRICAN AMERICAN): >60 ML/MIN/1.73 M^2
EST. GFR  (NON AFRICAN AMERICAN): >60 ML/MIN/1.73 M^2
GLUCOSE SERPL-MCNC: 67 MG/DL
HCT VFR BLD AUTO: 30.5 %
HGB BLD-MCNC: 8.4 G/DL
HYPOCHROMIA BLD QL SMEAR: ABNORMAL
IMM GRANULOCYTES # BLD AUTO: 0.28 K/UL
IMM GRANULOCYTES NFR BLD AUTO: 2.1 %
LYMPHOCYTES # BLD AUTO: 1.2 K/UL
LYMPHOCYTES NFR BLD: 9 %
MAGNESIUM SERPL-MCNC: 2.1 MG/DL
MCH RBC QN AUTO: 20.7 PG
MCHC RBC AUTO-ENTMCNC: 27.5 G/DL
MCV RBC AUTO: 75 FL
MONOCYTES # BLD AUTO: 1.5 K/UL
MONOCYTES NFR BLD: 11 %
NEUTROPHILS # BLD AUTO: 9.9 K/UL
NEUTROPHILS NFR BLD: 75 %
NRBC BLD-RTO: 0 /100 WBC
OVALOCYTES BLD QL SMEAR: ABNORMAL
PHOSPHATE SERPL-MCNC: 2.9 MG/DL
PLATELET # BLD AUTO: 272 K/UL
PMV BLD AUTO: ABNORMAL FL
POCT GLUCOSE: 92 MG/DL (ref 70–110)
POIKILOCYTOSIS BLD QL SMEAR: SLIGHT
POLYCHROMASIA BLD QL SMEAR: ABNORMAL
POTASSIUM SERPL-SCNC: 4.4 MMOL/L
PROT SERPL-MCNC: 6.4 G/DL
RBC # BLD AUTO: 4.06 M/UL
SCHISTOCYTES BLD QL SMEAR: ABNORMAL
SODIUM SERPL-SCNC: 137 MMOL/L
TARGETS BLD QL SMEAR: ABNORMAL
WBC # BLD AUTO: 13.15 K/UL

## 2018-04-04 PROCEDURE — 97112 NEUROMUSCULAR REEDUCATION: CPT

## 2018-04-04 PROCEDURE — 83735 ASSAY OF MAGNESIUM: CPT

## 2018-04-04 PROCEDURE — 92526 ORAL FUNCTION THERAPY: CPT

## 2018-04-04 PROCEDURE — 25000003 PHARM REV CODE 250: Performed by: STUDENT IN AN ORGANIZED HEALTH CARE EDUCATION/TRAINING PROGRAM

## 2018-04-04 PROCEDURE — 80053 COMPREHEN METABOLIC PANEL: CPT

## 2018-04-04 PROCEDURE — 92507 TX SP LANG VOICE COMM INDIV: CPT

## 2018-04-04 PROCEDURE — 85025 COMPLETE CBC W/AUTO DIFF WBC: CPT

## 2018-04-04 PROCEDURE — 84100 ASSAY OF PHOSPHORUS: CPT

## 2018-04-04 PROCEDURE — 99232 SBSQ HOSP IP/OBS MODERATE 35: CPT | Mod: ,,, | Performed by: HOSPITALIST

## 2018-04-04 PROCEDURE — 11000001 HC ACUTE MED/SURG PRIVATE ROOM

## 2018-04-04 PROCEDURE — 97530 THERAPEUTIC ACTIVITIES: CPT

## 2018-04-04 PROCEDURE — 36415 COLL VENOUS BLD VENIPUNCTURE: CPT

## 2018-04-04 PROCEDURE — 97535 SELF CARE MNGMENT TRAINING: CPT

## 2018-04-04 RX ORDER — ENOXAPARIN SODIUM 100 MG/ML
40 INJECTION SUBCUTANEOUS DAILY
Status: DISCONTINUED | OUTPATIENT
Start: 2018-04-04 | End: 2018-04-04

## 2018-04-04 RX ADMIN — MINERAL SUPPLEMENT IRON 300 MG / 5 ML STRENGTH LIQUID 100 PER BOX UNFLAVORED 300 MG: at 08:04

## 2018-04-04 RX ADMIN — METOPROLOL TARTRATE 50 MG: 50 TABLET, FILM COATED ORAL at 08:04

## 2018-04-04 RX ADMIN — PANTOPRAZOLE SODIUM 40 MG: 40 GRANULE, DELAYED RELEASE ORAL at 08:04

## 2018-04-04 RX ADMIN — APIXABAN 5 MG: 5 TABLET, FILM COATED ORAL at 11:04

## 2018-04-04 RX ADMIN — APIXABAN 5 MG: 5 TABLET, FILM COATED ORAL at 08:04

## 2018-04-04 NOTE — ASSESSMENT & PLAN NOTE
- Patient noted to be in Afib with RVR 3/25  - In NSR this AM with rate of 70s-90s, short runs of NSVT  - Would recommend anticoaulation - If anticoagulation is initiated for Afib, Eliquis is $8.35/month--Eliquis was shown to have similar GIB risk as warfarin in [facility]; other DOACs shown to have increased risk compared to warfarin in major Afib studies - is 80 but Cr <1.5 and weight >60kg so 5mg BID dosing   - cardiac monitoring  - Currently on metoprolol 50mg BID for rate control

## 2018-04-04 NOTE — ASSESSMENT & PLAN NOTE
Stroke risk factor  Likely etiology of multiple embolic infarcts   Recommend anticoagulation when appropriate; pt with PEG placement 4/3 and has had anemia requiring transfusions

## 2018-04-04 NOTE — SUBJECTIVE & OBJECTIVE
Interval History: see hospital course    Review of Systems   Unable to perform ROS: Mental status change     Objective:     Vital Signs (Most Recent):  Temp: 98.5 °F (36.9 °C) (04/04/18 0921)  Pulse: 86 (04/04/18 1100)  Resp: 20 (04/04/18 0921)  BP: (!) 179/81 (04/04/18 0921)  SpO2: 98 % (04/04/18 0921) Vital Signs (24h Range):  Temp:  [97.6 °F (36.4 °C)-98.5 °F (36.9 °C)] 98.5 °F (36.9 °C)  Pulse:  [68-86] 86  Resp:  [16-25] 20  SpO2:  [95 %-100 %] 98 %  BP: (141-179)/(71-97) 179/81     Weight: 66.4 kg (146 lb 6.2 oz)  Body mass index is 22.26 kg/m².    Intake/Output Summary (Last 24 hours) at 04/04/18 1230  Last data filed at 04/03/18 1541   Gross per 24 hour   Intake              900 ml   Output              450 ml   Net              450 ml      Physical Exam   Constitutional: He is oriented to person, place, and time. He appears well-developed.   HENT:   Head: Atraumatic.   Eyes: Pupils are equal, round, and reactive to light.   Patient with rightward gaze preference  Arcus senilis bilaterally   Neck: No JVD present.   Cardiovascular: Normal rate, regular rhythm, S1 normal, S2 normal and intact distal pulses.    Pulses:       Radial pulses are 2+ on the right side, and 2+ on the left side.        Dorsalis pedis pulses are 1+ on the right side, and 1+ on the left side.   Pulmonary/Chest: Effort normal and breath sounds normal.   Abdominal: Soft. Bowel sounds are normal. He exhibits no distension. There is tenderness in the suprapubic area and left lower quadrant. There is no guarding.       Genitourinary:   Genitourinary Comments: Condom catheter in place   Musculoskeletal: He exhibits no edema.   Neurological: He is alert and oriented to person, place, and time. He exhibits abnormal muscle tone. GCS eye subscore is 4. GCS verbal subscore is 3. GCS motor subscore is 6.   Only oriented to person and is aware that he is in a hospital in [place], he is able to acknowledge that he had a stroke. He states all of  his family member's names appropriately. He expresses desire to participate in PT. Expressive and receptive aphasia. LUE weak - 2/5 strength in Left hand     Skin: Skin is warm. No rash noted.   Psychiatric: His speech is slurred.   Nursing note and vitals reviewed.      Significant Labs:   Recent Results (from the past 24 hour(s))   POCT glucose    Collection Time: 04/03/18 12:33 PM   Result Value Ref Range    POCT Glucose 105 70 - 110 mg/dL   POCT glucose    Collection Time: 04/03/18  5:29 PM   Result Value Ref Range    POCT Glucose 104 70 - 110 mg/dL   POCT glucose    Collection Time: 04/03/18  9:38 PM   Result Value Ref Range    POCT Glucose 93 70 - 110 mg/dL   Comprehensive metabolic panel    Collection Time: 04/04/18  4:55 AM   Result Value Ref Range    Sodium 137 136 - 145 mmol/L    Potassium 4.4 3.5 - 5.1 mmol/L    Chloride 108 95 - 110 mmol/L    CO2 18 (L) 23 - 29 mmol/L    Glucose 67 (L) 70 - 110 mg/dL    BUN, Bld 21 8 - 23 mg/dL    Creatinine 0.8 0.5 - 1.4 mg/dL    Calcium 9.9 8.7 - 10.5 mg/dL    Total Protein 6.4 6.0 - 8.4 g/dL    Albumin 2.3 (L) 3.5 - 5.2 g/dL    Total Bilirubin 0.5 0.1 - 1.0 mg/dL    Alkaline Phosphatase 63 55 - 135 U/L    AST 45 (H) 10 - 40 U/L    ALT 28 10 - 44 U/L    Anion Gap 11 8 - 16 mmol/L    eGFR if African American >60.0 >60 mL/min/1.73 m^2    eGFR if non African American >60.0 >60 mL/min/1.73 m^2   CBC auto differential    Collection Time: 04/04/18  4:55 AM   Result Value Ref Range    WBC 13.15 (H) 3.90 - 12.70 K/uL    RBC 4.06 (L) 4.60 - 6.20 M/uL    Hemoglobin 8.4 (L) 14.0 - 18.0 g/dL    Hematocrit 30.5 (L) 40.0 - 54.0 %    MCV 75 (L) 82 - 98 fL    MCH 20.7 (L) 27.0 - 31.0 pg    MCHC 27.5 (L) 32.0 - 36.0 g/dL    RDW 32.1 (H) 11.5 - 14.5 %    Platelets 272 150 - 350 K/uL    MPV SEE COMMENT 9.2 - 12.9 fL    Immature Granulocytes 2.1 (H) 0.0 - 0.5 %    Gran # (ANC) 9.9 (H) 1.8 - 7.7 K/uL    Immature Grans (Abs) 0.28 (H) 0.00 - 0.04 K/uL    Lymph # 1.2 1.0 - 4.8 K/uL    Mono #  1.5 (H) 0.3 - 1.0 K/uL    Eos # 0.4 0.0 - 0.5 K/uL    Baso # 0.03 0.00 - 0.20 K/uL    nRBC 0 0 /100 WBC    Gran% 75.0 (H) 38.0 - 73.0 %    Lymph% 9.0 (L) 18.0 - 48.0 %    Mono% 11.0 4.0 - 15.0 %    Eosinophil% 2.7 0.0 - 8.0 %    Basophil% 0.2 0.0 - 1.9 %    Aniso Moderate     Poik Slight     Poly Occasional     Hypo Occasional     Ovalocytes Occasional     Target Cells Occasional     Joe Cells Occasional     Fragmented Cells Occasional     Differential Method Automated    Magnesium    Collection Time: 04/04/18  4:55 AM   Result Value Ref Range    Magnesium 2.1 1.6 - 2.6 mg/dL   Phosphorus    Collection Time: 04/04/18  4:55 AM   Result Value Ref Range    Phosphorus 2.9 2.7 - 4.5 mg/dL         Significant Imaging: I have reviewed all pertinent imaging results/findings within the past 24 hours.

## 2018-04-04 NOTE — ASSESSMENT & PLAN NOTE
Stroke risk factor   Non compliant with medication at home  Can be more conservative with BP parameters as pt now out of acute stroke phase  On Lisinopril and Amlodipine per primary

## 2018-04-04 NOTE — SUBJECTIVE & OBJECTIVE
Neurologic Chief Complaint: L sided weakness, dysphagia, dysarthria, confusion    Subjective:     Interval History: Patient is seen for follow-up neurological assessment and treatment recommendations:     PEG today. No DVT in LUE. Hb stable. Primary team working on improved BP control, started Lisinopril. On Metoprolol for NSVT. Concern for dellerium.     HPI, Past Medical, Family, and Social History remains the same as documented in the initial encounter.     Review of Systems   Constitutional: Negative for diaphoresis and fatigue.   HENT: Positive for trouble swallowing. Negative for nosebleeds.    Eyes: Positive for visual disturbance. Negative for discharge.   Neurological: Positive for facial asymmetry, speech difficulty and weakness.   Psychiatric/Behavioral: Positive for confusion. Negative for behavioral problems.        Scheduled Meds:   [START ON 4/4/2018] amLODIPine  5 mg Per G Tube Daily    [START ON 4/4/2018] B-complex with vitamin C  1 tablet Per G Tube Daily    ferrous sulfate  300 mg Per G Tube BID    [START ON 4/4/2018] folic acid  1 mg Per G Tube Daily    [START ON 4/4/2018] lisinopril  20 mg Per G Tube Daily    metoprolol tartrate  50 mg Per G Tube BID    [START ON 4/4/2018] multivitamin liquid no.118  10 mL Per G Tube Daily    pantoprazole  40 mg Per G Tube BID    [START ON 4/4/2018] polyethylene glycol  17 g Per G Tube Daily    [START ON 4/4/2018] senna-docusate 8.6-50 mg  1 tablet Per G Tube Daily    [START ON 4/4/2018] thiamine  100 mg Per G Tube Daily     Continuous Infusions:  PRN Meds:acetaminophen, ceFAZolin (ANCEF) IVPB, dextrose 50%, glucagon (human recombinant), insulin aspart U-100, ondansetron, oxyCODONE, varibar pudding Ba Sulfate, varibar thin liquid ba sulfate    Objective:     Vital Signs (Most Recent):  Temp: 98.2 °F (36.8 °C) (04/03/18 1919)  Pulse: 75 (04/03/18 1919)  Resp: 18 (04/03/18 1919)  BP: (!) 149/75 (04/03/18 1919)  SpO2: 98 % (04/03/18 1919)  BP Location:  Right arm    Vital Signs Range (Last 24H):  Temp:  [97.6 °F (36.4 °C)-98.4 °F (36.9 °C)]   Pulse:  [72-96]   Resp:  [16-25]   BP: (135-194)/(71-97)   SpO2:  [97 %-100 %]   BP Location: Right arm    Physical Exam   Constitutional: He appears well-developed and well-nourished. No distress.   HENT:   Head: Normocephalic and atraumatic.   Eyes: Conjunctivae are normal. No scleral icterus.   Cardiovascular: Normal rate.    Pulmonary/Chest: Effort normal. No respiratory distress.   Musculoskeletal: He exhibits no edema or deformity.   Neurological: He is alert. A cranial nerve deficit is present.   Skin: Skin is warm and dry.   Psychiatric: He is slowed. Cognition and memory are impaired.     Neurological Exam:   LOC: alert  Attention Span: poor  Language: Aphasic  Articulation: Severe dysarthria  Orientation: Not oriented to person, place, and time  EOM (CN III, IV, VI): Gaze preference Right  Facial Movement (CN VII): L facial droop  Motor: Arm left  Normal 0/5  Leg left  Normal 0/5  Arm right  Normal 3/5  Leg right Normal 2/5  Cebellar: No evidence of appendicular or axial ataxia  Sensation: Intact to light touch, temperature    Laboratory:  CMP:     Recent Labs  Lab 04/03/18  0431   CALCIUM 10.1   ALBUMIN 2.4*   PROT 6.9      K 4.2   CO2 22*      BUN 23   CREATININE 0.8   ALKPHOS 67   ALT 30   AST 45*   BILITOT 0.5     CBC:     Recent Labs  Lab 04/03/18  0431   WBC 14.29*   RBC 4.05*   HGB 8.3*   HCT 29.8*      MCV 74*   MCH 20.5*   MCHC 27.9*     Lipid Panel: No results for input(s): CHOL, LDLCALC, HDL, TRIG in the last 168 hours.  Coagulation:     Recent Labs  Lab 04/02/18  0437   INR 1.0     Platelet Aggregation Study: No results for input(s): PLTAGG, PLTAGINTERP, PLTAGREGLACO, ADPPLTAGGREG in the last 168 hours.  Hgb A1C: No results for input(s): HGBA1C in the last 168 hours.  TSH: No results for input(s): TSH in the last 168 hours.    Diagnostic Results     Brain Imaging     MRI Brain without  Contrast Impression 3/24/18  Acute infarcts involving the frontal, parietal, and occipital lobes with involvement of the cerebellum, left greater than right.  These were seen to some extent on prior CTA.  No hemorrhagic conversion at this time.  The findings are concerning for extensive acute embolic stroke.  Moderate chronic microvascular ischemic changes.                      CTA Multiphase 3/23/18  Multifocal areas of abnormal decreased attenuation involving the left cerebellum, medulla, mike, and posterior right frontal cortex concerning for acute or subacute infarcts.  No significant mass effect, midline shift, or acute intracranial hemorrhage.  Generalized cerebral volume loss with a significant degree of chronic microvascular ischemic change, more prominent on the left.  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  Multifocal filling defects seen within the origin of the right subclavian artery, right common carotid, and left carotid bulb compatible with nonocclusive thrombi.  Additional smaller mural thrombi are seen along aortic arch.        ECHO 3/24/18:   CONCLUSIONS     1 - Consider Stress induced CM - Takotsubo CM.     2 - Eccentric LVH with severely depressed left ventricular systolic function (EF 15-20%).     3 - Normal RV size with moderately depressed right ventricular systolic function .     4 - Mild mitral regurgitation.     5 - Mild tricuspid regurgitation.     6 - Pulmonary hypertension. The estimated PA systolic pressure is 61 mmHg.     7 - Increased central venous pressure.     8 - No prior echo in our system

## 2018-04-04 NOTE — ASSESSMENT & PLAN NOTE
Pt on iron replacement  - No melena or bloody bowel movements reported while IP  Hb 8.3  Management per primary - Antithrombotics currently held

## 2018-04-04 NOTE — PT/OT/SLP PROGRESS
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    Pain/Comfort:  · Pain Rating 1: 0/10    Objective:     Has the patient been evaluated by SLP for swallowing?   Yes  Keep patient NPO? Yes   Current Respiratory Status: room air      Pt seen at bedside for ongoing assessment of swallowing function and cognitive tx.  Oral care provided to pt. Pt exhibited some oral hypersensitivity resulting in mild gagging and coughing during oral care. Coughing noted to be wet and somewhat productive, but pt did not expectorate.  Following oral care, pt accepted PO trials of ice chip x 1 and 1/3 tsp thin water x 1. Wet cough present after both trials and after a delay.  No further PO trials given due to high risks of aspiration.  Pt was oriented to person. When asked to recall place, pt perseverated on stating his name despite cues for redirection and binary choices.  When SLP oriented pt to type of place/hospital then asked to name the hospital, pt stated "[facility]." pt was unable to utilize an external aid to orient to [facility].  Pt was not oriented to month or year. Unable to utilize white board as an external aid due to visual spatial and attention deficits.  Pt exhibited right gaze preference.  He did not follow verbal or visual cues to visual scan past midline or towards white " board on wall across from pt.    Assessment:     Zion Hines is a 78 y.o. male with an SLP diagnosis of Aphasia, Dysphagia, Dysarthria, Cognitive-Linguistic Impairment and Visio-Spatial Impairment.      Goals:    SLP Goals        Problem: SLP Goal    Goal Priority Disciplines Outcome   SLP Goal     SLP Ongoing (interventions implemented as appropriate)   Description:  Speech Language Pathology   Goal expected to be met by 4/8  1. Patient will answer orientation questions x4 given mod A.   2. Given model, patient will follow 1 step directions with 90% acc.   3. Given repetitions x2 max, patient will answer simple y/n questions with 90% acc.  4. Given phonemic cuing, patient will complete simple naming tasks with 80% acc.   5. Given verbal and visual cueing, patient will complete rote speech tasks with 80% acc.    6.  Participate in ongoing assesment of swallow                               Plan:     · Patient to be seen:  5 x/week   · Plan of Care expires:  04/30/18  · Plan of Care reviewed with:  patient   · SLP Follow-Up:  Yes       Discharge recommendations:  nursing facility, skilled     Time Tracking:     SLP Treatment Date:   04/04/18  Speech Start Time:  1044  Speech Stop Time:  1107     Speech Total Time (min):  23 min    Billable Minutes: Speech Therapy Individual 13 and Treatment Swallowing Dysfunction 10    YANNI Palumbo, CHIQUI-SLP  04/04/2018     YANNI Palumbo, CCC-SLP  Speech Language Pathologist  [phone]  4/4/2018

## 2018-04-04 NOTE — TREATMENT PLAN
GI Follow-up Note    PEG site examined. Bumper at 2.5cm keiry at skin level. Bumper rotates 360 degrees with ease. Abdomen is soft , there is mild TTP near insertion site, within proportion of pain for incision / tube insertion.    May start using PEG for feeding. Be advised to elevate head end of bed to 30 degrees or more while using PEG.   Please consult nutrition for tube feed goals and recommendations.    Thank you for allowing us to participate in the care of Zion Hines. Please do not hesitate to reconsult us with questions.    Franki Stephen MD  Gastroenterology Fellow (PGY-V)  Pager: [phone]

## 2018-04-04 NOTE — PLAN OF CARE
Problem: Occupational Therapy Goal  Goal: Occupational Therapy Goal  Goals to be met by:  18    Patient will increase functional independence with ADLs by performin. Supine to sit with Moderate Assistance. - not met  2. Roll to R with Min A. - not met  3. Roll to L with Min A. - not met  4. Grooming while supine with HOB elevated with Minimal Assistance. - not met  5. UE Dressing with Moderate Assistance. - not met  6. Pt will perform grasp/release 2/3 functional items (towel, cup, tissue box) per UE crossing midline with Min A while seated EOB. - not met  7. Pt will perform functional sitting at EOB x5 min with stand by Assist. - not met  8. Transfers with Moderate assistance.            Outcome: Ongoing (interventions implemented as appropriate)  RAMON Purvis/NAMITA      2018

## 2018-04-04 NOTE — ASSESSMENT & PLAN NOTE
- 03/23/18 CTA Stroke Multiphase with multiple non-occlusive thrombi noted  - MRI brain w/o show multiple infarcts without evidence of hemorrhagic conversion.  - Given recent in-hospital atrial fibrillation and takotsubo's CM suspicion for embolic stroke, in addition 2D Echo showed severely depressed LVEF 15-20% with evidence of stress CM  - PT/OT reordered - PT has not seen here since 3/29, paged multiple times due to concern   - start eliquis 5mg BID

## 2018-04-04 NOTE — PROGRESS NOTES
Patient's chart was reviewed by a stroke team provider.  Patient with no new issues.  No new neuro symptoms.  H&H stable, but remains low.  Will need anticoagulation once clinically stable.     Pending diagnostics to follow up on include:None    For other recommendations please see our previous note completed on: 4/3/2018.    There are no new recommendations at this time. Will continue to follow. Discussed patient with Dr. Dawson. Please contact stroke team for any questions or concerns.     Zoila Seals, NP-C  Vascular Neurology  [phone]

## 2018-04-04 NOTE — PT/OT/SLP PROGRESS
Occupational Therapy   Treatment    Name: Zion Hines  MRN: [id number]  Admitting Diagnosis:  Embolic stroke involving right middle cerebral artery  1 Day Post-Op    Recommendations:     Discharge Recommendations: nursing facility, skilled  Discharge Equipment Recommendations:   (TBD)  Barriers to discharge:       Subjective     Communicated with: nurse prior to session.  Pain/Comfort:  · Pain Rating 1: 0/10  · Pain Rating Post-Intervention 1: 0/10    Patients cultural, spiritual, Religion conflicts given the current situation: no    Objective:     Patient found with:      General Precautions: Standard, aspiration, fall, NPO   Orthopedic Precautions:N/A   Braces: N/A     Occupational Performance:    Bed Mobility:    · Patient completed Rolling/Turning to Left with  total assistance  · Patient completed Supine to Sit with total assistance  · Patient completed Sit to Supine with total assistance     Functional Mobility/Transfers:  · Patient completed Sit <> Stand Transfer with total assistance  with  no assistive device   · Functional Mobility: Not attempted this session.    Activities of Daily Living:  · Grooming: total assistance attempted sitting EOB  · UB Dressing: total assistance assist all aspects    Patient left HOB elevated with all lines intact, call button in reach, restraints reapplied at end of session and nurse notified    [facility] 6 Click:  [facility] Total Score: 6    Treatment & Education:  Pt worked on static and dynamic sitting balance sitting EOB with (L) UE positioned in wt bearing and facilitation provided to decrease drift to (L) of midline and for Pt to maintain midline positioning.   PROM performed to (L) UE 10 reps all planes.  Education:    Assessment:     Zion Hines is a 78 y.o. male with a medical diagnosis of Embolic stroke involving right middle cerebral artery.  He presents with performance deficits affecting function are weakness, impaired endurance, impaired self care skills, impaired  functional mobilty, gait instability, impaired balance, impaired cognition, decreased coordination, decreased upper extremity function, decreased lower extremity function, decreased safety awareness, abnormal tone, decreased ROM, impaired coordination, impaired fine motor.      Rehab Prognosis:  Fair; patient would benefit from acute skilled OT services to address these deficits and reach maximum level of function.       Plan:     Patient to be seen 4 x/week to address the above listed problems via self-care/home management, therapeutic activities, therapeutic exercises, neuromuscular re-education  · Plan of Care Expires: 18  · Plan of Care Reviewed with: patient    This Plan of care has been discussed with the patient who was involved in its development and understands and is in agreement with the identified goals and treatment plan    GOALS:    Occupational Therapy Goals        Problem: Occupational Therapy Goal    Goal Priority Disciplines Outcome Interventions   Occupational Therapy Goal     OT, PT/OT Ongoing (interventions implemented as appropriate)    Description:  Goals to be met by:  18    Patient will increase functional independence with ADLs by performin. Supine to sit with Moderate Assistance. - not met  2. Roll to R with Min A. - not met  3. Roll to L with Min A. - not met  4. Grooming while supine with HOB elevated with Minimal Assistance. - not met  5. UE Dressing with Moderate Assistance. - not met  6. Pt will perform grasp/release 2/3 functional items (towel, cup, tissue box) per UE crossing midline with Min A while seated EOB. - not met  7. Pt will perform functional sitting at EOB x5 min with stand by Assist. - not met  8. Transfers with Moderate assistance.                             Time Tracking:     OT Date of Treatment: 18  OT Start Time: 1130  OT Stop Time: 1154  OT Total Time (min): 24 min    Billable Minutes:Self Care/Home Management 10  Neuromuscular Re-education  14    Daniel Antonio, OTR/NAMITA  4/4/2018

## 2018-04-04 NOTE — PLAN OF CARE
Problem: Patient Care Overview  Goal: Plan of Care Review    Recommendations     Recommendation/Intervention:   1. Continue current TF order of Isosource at 50 ml/hr.   -Bolus recommendations: 5 cans Isosource daily. This will provide 1875 kcal, 85 gm pro, and 955 ml free water. An additional 950 ml free water will be needed for normal hydration, or per MD.   2. RD following.     Goals: TF to meet >/=85% EEN/EPN  Nutrition Goal Status: new, goal met

## 2018-04-04 NOTE — ASSESSMENT & PLAN NOTE
- Lipase >1000 initially with imaging findings of peripancreatic fluid collection at tail from OSH.  - Resolved per primary team

## 2018-04-04 NOTE — PROGRESS NOTES
[facility] Hospital Medicine  Progress Note    Patient Name: iZon Hines  MRN: [id number]  Patient Class: IP- Inpatient   Admission Date: 3/23/2018  Length of Stay: 12 days  Attending Physician: Tess Andrews MD  Primary Care Provider: Primary Doctor [facility] Medicine Team: [facility] 2 Riley Milner MD    Subjective:     Principal Problem:Embolic stroke involving right middle cerebral artery    HPI:  79 yo M with PMHx HTN known to be non-compliant with medications and remote hx of anemia presents to [facility] 18 as a transfer from [facility].  Patient is a poor historian with no family at bedside, hx largely obtained from OSH records.  He presented to OSH originally with symptoms of weakness and fatigue, his daughter brought him to ED thinking he was likely anemic again.  It was noted that patient had some dysarthria and aphasia (unspecified expressive vs receptive) with L-sided weakness.  He was out of the window for tPA and was [place] [facility] [place] for CTA Stroke Multiphase and possible endovascular intervention. CTA stroke multiphase with no LVO, no intervention planned.  Of note, patient is anemic per OSH labs with a Hgb of 5.8 g/dL and likely has pancreatitis with lipase 3738, WBC count of 15.8 k/uL, and CT abdomen showing peripancreatic fluid around the tail.  He was transfused a unit of PRBCs prior to transfer.  Labs were otherwise largely unremarkable.  On presentation to [facility] [place], he is able to move both BLE but complains of pain in BLE.  Not moving LUE but is intact to noxious stimuli.  Has a R gaze preference that is not overcome by VOR testing and complaint of neck pain with moving head side-to-side.  Otherwise has mild abdominal distension with mild hypogastric tenderness to palpation. HTN to SBP 200s.  Of note, patient's wife  recently and family expressed concern in outpatient ED that patient has not been eating much for the past month but deny  EtOH use.    Hospital Course:  03/23/18:  Admission to Neuro ICU 2/2 concern for stroke with multiple concerns for patient to become unstable--pancreatitis, severe anemia, HTN.  03/24: has had no interval development of multiorgan involvement form pancreatitis, drop in hemoglobin secondary to erosive gastritis, transfused and on protonix bid  03/25: Patient on dilt drip for a-fib. Repeat EKG shows NSR.  3/26: Discontinuing diltiazem drip today. Will continue monitor HR. Continue IV fluids 50 cc/hr for pancreatitis. Repeat lipase.   3/28: NAEON. VSSA. No overt signs of bleeding. Neuro exam unchanged from prior documented exams. Continues to not follow commands, continues to answer questions inappropriately. Remains effectively hemiplegic on left. No further ICU needs. S/D today to hospital medicine after d/w vascular neurology.  03/29/2018 VSS, no signs of blood loss in stool. Loose stools on examination. NG tube replaced and xray confirmed in duodenum - retracted. Will make definitive decision on anticoagulation 3/30. Furthering discussions with family regarding definitive feeding  03/30/2018 VSS, no signs of blood loss on ZENIA. NG tube replaced after patient pulled O/N. Confirmed placement.Family desires PEG tube placed. GI consulted for placement on Monday.   03/31/2018 VSS. Drop in Hb from 8.1 to 7.4 overnight. TF continued. Holding anticoagulation due to worsening anemia. GI recommendations for further evaluation of inflammatory changes of the pancreatic tail, recommending EUS or MRCP in future with PBS follow up outpatient. New fluid filled hernia noted on exam - US ordered  04/02/2018 PEG placement today. WBC 15. Hb improving. Metoprolol changes to 50mg BID, Lisinopril increased 20 mg QD.   04/03/2018 PEG placement scheduled for today. Leukocytosis stable. Hb stable. Added Norvasc for BP control today. Will monitor. Unable to contact any family members since yesterday afternoon.   04/04/2018 PEG in place, OK to  use for TF, Meds etc. Leukocytosis stable. Hb stable - so signs of bleeding, will start Eliquis for his anticoagulation post stroke.     Interval History: see hospital course    Review of Systems   Unable to perform ROS: Mental status change     Objective:     Vital Signs (Most Recent):  Temp: 98.5 °F (36.9 °C) (04/04/18 0921)  Pulse: 86 (04/04/18 1100)  Resp: 20 (04/04/18 0921)  BP: (!) 179/81 (04/04/18 0921)  SpO2: 98 % (04/04/18 0921) Vital Signs (24h Range):  Temp:  [97.6 °F (36.4 °C)-98.5 °F (36.9 °C)] 98.5 °F (36.9 °C)  Pulse:  [68-86] 86  Resp:  [16-25] 20  SpO2:  [95 %-100 %] 98 %  BP: (141-179)/(71-97) 179/81     Weight: 66.4 kg (146 lb 6.2 oz)  Body mass index is 22.26 kg/m².    Intake/Output Summary (Last 24 hours) at 04/04/18 1230  Last data filed at 04/03/18 1541   Gross per 24 hour   Intake              900 ml   Output              450 ml   Net              450 ml      Physical Exam   Constitutional: He is oriented to person, place, and time. He appears well-developed.   HENT:   Head: Atraumatic.   Eyes: Pupils are equal, round, and reactive to light.   Patient with rightward gaze preference  Arcus senilis bilaterally   Neck: No JVD present.   Cardiovascular: Normal rate, regular rhythm, S1 normal, S2 normal and intact distal pulses.    Pulses:       Radial pulses are 2+ on the right side, and 2+ on the left side.        Dorsalis pedis pulses are 1+ on the right side, and 1+ on the left side.   Pulmonary/Chest: Effort normal and breath sounds normal.   Abdominal: Soft. Bowel sounds are normal. He exhibits no distension. There is tenderness in the suprapubic area and left lower quadrant. There is no guarding.       Genitourinary:   Genitourinary Comments: Condom catheter in place   Musculoskeletal: He exhibits no edema.   Neurological: He is alert and oriented to person, place, and time. He exhibits abnormal muscle tone. GCS eye subscore is 4. GCS verbal subscore is 3. GCS motor subscore is 6.   Only  oriented to person and is aware that he is in a hospital in [place], he is able to acknowledge that he had a stroke. He states all of his family member's names appropriately. He expresses desire to participate in PT. Expressive and receptive aphasia. LUE weak - 2/5 strength in Left hand     Skin: Skin is warm. No rash noted.   Psychiatric: His speech is slurred.   Nursing note and vitals reviewed.      Significant Labs:   Recent Results (from the past 24 hour(s))   POCT glucose    Collection Time: 04/03/18 12:33 PM   Result Value Ref Range    POCT Glucose 105 70 - 110 mg/dL   POCT glucose    Collection Time: 04/03/18  5:29 PM   Result Value Ref Range    POCT Glucose 104 70 - 110 mg/dL   POCT glucose    Collection Time: 04/03/18  9:38 PM   Result Value Ref Range    POCT Glucose 93 70 - 110 mg/dL   Comprehensive metabolic panel    Collection Time: 04/04/18  4:55 AM   Result Value Ref Range    Sodium 137 136 - 145 mmol/L    Potassium 4.4 3.5 - 5.1 mmol/L    Chloride 108 95 - 110 mmol/L    CO2 18 (L) 23 - 29 mmol/L    Glucose 67 (L) 70 - 110 mg/dL    BUN, Bld 21 8 - 23 mg/dL    Creatinine 0.8 0.5 - 1.4 mg/dL    Calcium 9.9 8.7 - 10.5 mg/dL    Total Protein 6.4 6.0 - 8.4 g/dL    Albumin 2.3 (L) 3.5 - 5.2 g/dL    Total Bilirubin 0.5 0.1 - 1.0 mg/dL    Alkaline Phosphatase 63 55 - 135 U/L    AST 45 (H) 10 - 40 U/L    ALT 28 10 - 44 U/L    Anion Gap 11 8 - 16 mmol/L    eGFR if African American >60.0 >60 mL/min/1.73 m^2    eGFR if non African American >60.0 >60 mL/min/1.73 m^2   CBC auto differential    Collection Time: 04/04/18  4:55 AM   Result Value Ref Range    WBC 13.15 (H) 3.90 - 12.70 K/uL    RBC 4.06 (L) 4.60 - 6.20 M/uL    Hemoglobin 8.4 (L) 14.0 - 18.0 g/dL    Hematocrit 30.5 (L) 40.0 - 54.0 %    MCV 75 (L) 82 - 98 fL    MCH 20.7 (L) 27.0 - 31.0 pg    MCHC 27.5 (L) 32.0 - 36.0 g/dL    RDW 32.1 (H) 11.5 - 14.5 %    Platelets 272 150 - 350 K/uL    MPV SEE COMMENT 9.2 - 12.9 fL    Immature Granulocytes 2.1 (H) 0.0  - 0.5 %    Gran # (ANC) 9.9 (H) 1.8 - 7.7 K/uL    Immature Grans (Abs) 0.28 (H) 0.00 - 0.04 K/uL    Lymph # 1.2 1.0 - 4.8 K/uL    Mono # 1.5 (H) 0.3 - 1.0 K/uL    Eos # 0.4 0.0 - 0.5 K/uL    Baso # 0.03 0.00 - 0.20 K/uL    nRBC 0 0 /100 WBC    Gran% 75.0 (H) 38.0 - 73.0 %    Lymph% 9.0 (L) 18.0 - 48.0 %    Mono% 11.0 4.0 - 15.0 %    Eosinophil% 2.7 0.0 - 8.0 %    Basophil% 0.2 0.0 - 1.9 %    Aniso Moderate     Poik Slight     Poly Occasional     Hypo Occasional     Ovalocytes Occasional     Target Cells Occasional     Joe Cells Occasional     Fragmented Cells Occasional     Differential Method Automated    Magnesium    Collection Time: 04/04/18  4:55 AM   Result Value Ref Range    Magnesium 2.1 1.6 - 2.6 mg/dL   Phosphorus    Collection Time: 04/04/18  4:55 AM   Result Value Ref Range    Phosphorus 2.9 2.7 - 4.5 mg/dL         Significant Imaging: I have reviewed all pertinent imaging results/findings within the past 24 hours.    Assessment/Plan:      * Embolic stroke involving right middle cerebral artery    - 03/23/18 CTA Stroke Multiphase with multiple non-occlusive thrombi noted  - MRI brain w/o show multiple infarcts without evidence of hemorrhagic conversion.  - Given recent in-hospital atrial fibrillation and takotsubo's CM suspicion for embolic stroke, in addition 2D Echo showed severely depressed LVEF 15-20% with evidence of stress CM  - PT/OT reordered - PT has not seen here since 3/29, paged multiple times due to concern   - start eliquis 5mg BID          Inguinal hernia of right side without obstruction or gangrene    - feels fluid filled on exam  - US ordered consistent with fluid filled channel in inguinal canals, findings unspecific.         Oropharyngeal dysphagia    - GI consulted for PEG tube placement  - PEG today   - NPO for procedure today, resume DVT prophylaxis post procedure  - modified barium swallow failed - had contrast suctioned from oral cavity           Cytotoxic cerebral edema               Chronic systolic HF (heart failure)    - unknown if this is chronic issue or if apical ballooning and depressed EF is secondary to emotional event and passing of wife or if due to neurological event with stroke and cytotoxic edema and ICU stay as both are known to cause stress cardiomyopathy  - will need f/u echo to evaluate EF and recovery           NSVT (nonsustained ventricular tachycardia)    - patient with daily episodes of NSVT  - remains on metoprolol for Afib w RVR  - unable to obtain ROS if symptomatic during episodes  - on telemetry - will monitor          Takotsubo cardiomyopathy    - pt with loss of wife 2/2018, now with dilated cardiomyopathy EF 15-20 percent and imaging suggestive of takotsubo CM  - will need repeat echo to evaluate improvement  - embolic episode may have been exacerbated by new onset CM   - elevated ePAP 61          Alcohol abuse    - unable to obtain alcohol history from patient          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              Aphasia    -both receptive and expressive - improving          Iron deficiency anemia    - see microcyctic anemia        Atrial fibrillation with RVR    - Patient noted to be in Afib with RVR 3/25  - In NSR this AM with rate of 70s-90s, short runs of NSVT  - Would recommend anticoaulation - If anticoagulation is initiated for Afib, Eliquis is $8.35/month--Eliquis was shown to have similar GIB risk as warfarin in [facility]; other DOACs shown to have increased risk compared to warfarin in major Afib studies - is 80 but Cr <1.5 and weight >60kg so 5mg BID dosing   - cardiac monitoring  - Currently on metoprolol 50mg BID for rate control               Anemia    - has iron def anemia, continue iron replacement  - unknown baseline hb, no melena or bloody bowel movements reported while IP  - no known history of GIB or varices  - Hb 9, on iron replacement via NG  - ZENIA negative for black/red stool        Acute  pancreatitis    RESOLVED  - LFTs stable        Essential hypertension     - SBP goal <180, prn labetalol ordered  - Has not required PRNS  - Now on metoprolol 50mg BID   - Lisinopril 20mg QD  - Norvasc 5mg QD  - has had multiple short runs of NSVT - patient unable to provide review if symptomatic          Acute encephalopathy    - Likely 2/2 sequelae of embolic stroke  - improving day over day            VTE Risk Mitigation         Ordered     apixaban tablet 5 mg  2 times daily     Route:  Oral        04/04/18 1027              Riley Milner MD  Department of Hospital Medicine   [facility]

## 2018-04-04 NOTE — PLAN OF CARE
Problem: Physical Therapy Goal  Goal: Physical Therapy Goal  Goals to be met by: 18     Patient will increase functional independence with mobility by performin. Supine to sit with Maximum Assistance  2. Sit to supine with Maximum Assistance  3. Rolling to Left and Right with Maximum Assistance.  4. Sitting at edge of bed x 10 minutes with Minimal Assistance maintaining midline orientation and using at least 1 UE support for balance.   5.  Sit to stand with Maximum Assistance.   6. Bed to chair transfer with Maximum Assistance using most appropriate AD.    Goals remain appropriate. Continue with PT POC.

## 2018-04-04 NOTE — PROGRESS NOTES
" [facility]  Adult Nutrition  Progress Note    SUMMARY       Recommendations    Recommendation/Intervention:   1. Continue current TF order of Isosource at 50 ml/hr.   -Bolus recommendations: 5 cans Isosource daily. This will provide 1875 kcal, 85 gm pro, and 955 ml free water. An additional 950 ml free water will be needed for normal hydration, or per MD.   2. RD following.    Goals: TF to meet >/=85% EEN/EPN  Nutrition Goal Status: new, goal met  Communication of RD Recs:  (POC)    Reason for Assessment    Reason for Assessment: RD follow-up  Diagnosis: stroke/CVA  Relevant Medical History: HTN    General Information Comments: Pt s/p PEG placement on 4/3. Okay to use for TF per MD note.    Nutrition Discharge Planning: Adequate nutrition via TF    Nutrition Risk Screen    Nutrition Risk Screen: dysphagia or difficulty swallowing    Nutrition/Diet History    Food Preferences: N/a  Do you have any cultural, spiritual, [employer] conflicts, given your current situation?: none stated  Factors Affecting Nutritional Intake: difficulty/impaired swallowing, impaired cognitive status/motor control, NPO    Anthropometrics    Temp: 98.5 °F (36.9 °C)  Height Method: Stated (per daughter )  Height: 5' 7.99" (172.7 cm)  Height (inches): 67.99 in  Weight Method: Bed Scale  Weight: 66.4 kg (146 lb 6.2 oz)  Weight (lb): 146.39 lb  Ideal Body Weight (IBW), Male: 153.94 lb  % Ideal Body Weight, Male (lb): 95.1 lb  BMI (Calculated): 22.3  BMI Grade: 18.5-24.9 - normal       Lab/Procedures/Meds    Pertinent Labs Reviewed: reviewed  Pertinent Labs Comments: Glu 67, POCT Glu , Alb 2.3  Pertinent Medications Reviewed: reviewed  Pertinent Medications Comments: vit B complex with vit C, Fe, folic acid, MVI, pantoprazole, polyethylene glycol, senna docusate, thiamien    Physical Findings/Assessment    Overall Physical Appearance: lethargic, listlessness  Tubes: gastrostomy tube  Oral/Mouth Cavity: tooth/teeth " missing  Skin: intact    Estimated/Assessed Needs    Weight Used For Calorie Calculations: 70.4 kg (155 lb 3.3 oz) (Dosing weight)  Energy Calorie Requirements (kcal): 1693  Energy Need Method: [place]-St Jeor (x 1.25 (PAL))  Protein Requirements: 70-85 gm (1.0-1.2 gm/kg)  Weight Used For Protein Calculations: 70.4 kg (155 lb 3.3 oz) (dosing weight)     Fluid Need Method: RDA Method (1 ml/kcal or per MD)  RDA Method (mL): 1693       Nutrition Prescription Ordered    Current Diet Order: NPO  Nutrition Order Comments: TF held  Current Nutrition Support Formula Ordered: Isosource 1.5  Current Nutrition Support Rate Ordered: 50 (ml)  Current Nutrition Support Frequency Ordered: mL/hr    Evaluation of Received Nutrient/Fluid Intake    Enteral Calories (kcal): 1800  Enteral Protein (gm): 82  Enteral (Free Water) Fluid (mL): 917  Free Water Flush Fluid (mL):  (None ordered)  % Kcal Needs: 106  % Protein Needs: 100  IV Fluid (mL): 900  Energy Calories Required: meeting needs  Protein Required: meeting needs  Fluid Required:  (per MD)  Comments: LBM 4/2  % Intake of Estimated Energy Needs: 75 - 100 %  % Meal Intake: NPO    Nutrition Risk    Level of Risk/Frequency of Follow-up:  (f/u 1x/week)     Assessment and Plan    Oropharyngeal dysphagia    Contributing Nutrition Diagnosis  Inadequate energy intake     Related to (etiology):   NPO     Signs and Symptoms (as evidenced by):   Pt meeting 0% EEN/EPN     Interventions/Recommendations (treatment strategy):  See recs     Nutrition Diagnosis Status:   Improving             Monitor and Evaluation    Food and Nutrient Intake: enteral nutrition intake  Food and Nutrient Adminstration: enteral and parenteral nutrition administration  Knowledge/Beliefs/Attitudes: food and nutrition knowledge/skill  Anthropometric Measurements: weight, weight change, body mass index  Biochemical Data, Medical Tests and Procedures: electrolyte and renal panel, inflammatory profile, lipid profile,  gastrointestinal profile, glucose/endocrine profile  Nutrition-Focused Physical Findings: overall appearance     Nutrition Follow-Up    RD Follow-up?: Yes

## 2018-04-04 NOTE — PLAN OF CARE
Problem: SLP Goal  Goal: SLP Goal  Speech Language Pathology   Goal expected to be met by 4/8  1. Patient will answer orientation questions x4 given mod A.   2. Given model, patient will follow 1 step directions with 90% acc.   3. Given repetitions x2 max, patient will answer simple y/n questions with 90% acc.  4. Given phonemic cuing, patient will complete simple naming tasks with 80% acc.   5. Given verbal and visual cueing, patient will complete rote speech tasks with 80% acc.    6.  Participate in ongoing assesment of swallow              Outcome: Ongoing (interventions implemented as appropriate)  Pt seen for ongoing assessment of swallowing function and cognitive tx. PEG placed on 4/3. Continued recs for NPO.  YANNI Palumbo, CCC-SLP  Speech Language Pathologist  [phone]  4/4/2018

## 2018-04-04 NOTE — PT/OT/SLP PROGRESS
Physical Therapy Treatment    Patient Name:  Zion Hines   MRN:  [id number]    Recommendations:     Discharge Recommendations:  nursing facility, skilled   Discharge Equipment Recommendations:  (TBD pending progress)   Barriers to discharge: Inaccessible home and Decreased caregiver support    Assessment:     Zion Hines is a 78 y.o. male admitted with a medical diagnosis of Embolic stroke involving right middle cerebral artery.  He presents with the following impairments/functional limitations:  weakness, impaired endurance, impaired self care skills, impaired functional mobilty, impaired balance, impaired cognition, decreased coordination, decreased safety awareness, decreased lower extremity function, decreased upper extremity function. Pt fair to treatment session, and will continue to benefit from PT services to improve all deficits noted above. Continue with PT POC as indicated.     Rehab Prognosis:  good; patient would benefit from acute skilled PT services to address these deficits and reach maximum level of function.      Recent Surgery: Procedure(s) (LRB):  PLACEMENT-TUBE-PEG (N/A) 1 Day Post-Op    Plan:     During this hospitalization, patient to be seen 4 x/week to address the above listed problems via gait training, therapeutic activities, therapeutic exercises, neuromuscular re-education  · Plan of Care Expires:  04/28/18   Plan of Care Reviewed with: patient    Subjective     Communicated with nursing prior to session.  Patient found supine upon PT entry to room, agreeable to treatment.      Chief Complaint: none stated  Patient comments/goals: none stated  Pain/Comfort:  · Pain Rating 1: 0/10  · Pain Rating Post-Intervention 1: 0/10    Patients cultural, spiritual, Buddhism conflicts given the current situation: none stated    Objective:     Patient found with:  (lines intact; soft wrist restraint to RUE))     General Precautions: Standard, aspiration, fall, NPO   Orthopedic Precautions:N/A   Braces:  N/A     Functional Mobility:  · Bed Mobility:  Scooting: total assistance  · Supine to Sit: total assistance  · Sit to Supine: total assistance  · Transfers:  Sit to Stand:  maximal assistance and of 2 persons with no AD      AM-PAC 6 CLICK MOBILITY  Turning over in bed (including adjusting bedclothes, sheets and blankets)?: 2  Sitting down on and standing up from a chair with arms (e.g., wheelchair, bedside commode, etc.): 2  Moving from lying on back to sitting on the side of the bed?: 2  Moving to and from a bed to a chair (including a wheelchair)?: 1  Need to walk in hospital room?: 1  Climbing 3-5 steps with a railing?: 1  Total Score: 9       Therapeutic Activities and Exercises:   -Pt sat EOB ~15 min. Pt fluctuated b/w min-mod A 2* to posterior lean.    Patient left supine with all lines intact, call button in reach, bed alarm on, restraints reapplied at end of session and son-in-law present..    GOALS:    Physical Therapy Goals        Problem: Physical Therapy Goal    Goal Priority Disciplines Outcome Goal Variances Interventions   Physical Therapy Goal     PT/OT, PT      Description:  Goals to be met by: 18     Patient will increase functional independence with mobility by performin. Supine to sit with Maximum Assistance  2. Sit to supine with Maximum Assistance  3. Rolling to Left and Right with Maximum Assistance.  4. Sitting at edge of bed x 10 minutes with Minimal Assistance maintaining midline orientation and using at least 1 UE support for balance.   5.  Sit to stand with Maximum Assistance.   6. Bed to chair transfer with Maximum Assistance using most appropriate AD.                     Time Tracking:     PT Received On: 18  PT Start Time: 1400     PT Stop Time: 1426  PT Total Time (min): 26 min     Billable Minutes: Therapeutic Activity 26    Treatment Type: Treatment  PT/PTA: PTA     PTA Visit Number: 1     Florencia Perry PTA  2018

## 2018-04-04 NOTE — ASSESSMENT & PLAN NOTE
Contributing Nutrition Diagnosis  Inadequate energy intake     Related to (etiology):   NPO     Signs and Symptoms (as evidenced by):   Pt meeting 0% EEN/EPN     Interventions/Recommendations (treatment strategy):  See recs     Nutrition Diagnosis Status:   Improving

## 2018-04-04 NOTE — ASSESSMENT & PLAN NOTE
79 y/o male with medical history of HTN presented to OSH in [place] ([facility]) with left-sided weakness, dysarthria, anemia, and acute pancreatitis. Pt had evidence of left subacute cerebellar infarct and right cerebral infarct on head CT. He was out of the window for tPA and was sent to [facility] emerg[place] for CTA Stroke Multiphase and possible endovascular intervention. CTA stroke multiphase with no LVO, no intervention planned. MRI here with bilateral frontal, parietal, occipital, cerebellar infarcts L>R.   Image suggestive of acute extensive acute embolic stroke. Likely cardioembolic, documented afib in chart. ECHO shows EF 15-20% concerning for stress induced cardiomyopathy.     Pt s/p PEG. Continuing to monitor Hb to determine if anemia stable to start anticoagulation for AFib.    Neuro exam stable; pt encephalopathic, concern for delerium. Recommend precautions if not already in place.    Antithrombotics for secondary stroke prevention: Being held as patient with significant anemia and cause unknown; r/o bleed. If ruled out, start anticoagulation (any DOAC or warfarin is reasonable from stroke standpoint for secondary stroke prevention).   Statins for secondary stroke prevention and hyperlipidemia, if present: Statins: Atorvastatin- 40 mg daily LDL = 70   Aggressive risk factor modification: HTN, hypercoagulable state with pancreatitis   Rehab efforts: PT/OT/SLP to evaluate and treat - Rec SNF  Diagnostics ordered/pending: none   VTE prophylaxis: SCDs  BP parameters: Can start progression toward more conservative BP parameters per primary team

## 2018-04-04 NOTE — PROGRESS NOTES
[facility]  Vascular Neurology  Comprehensive Stroke Center  Progress Note    Assessment/Plan:     * Embolic stroke involving right middle cerebral artery    77 y/o male with medical history of HTN presented to OSH in [place] ([facility]) with left-sided weakness, dysarthria, anemia, and acute pancreatitis. Pt had evidence of left subacute cerebellar infarct and right cerebral infarct on head CT. He was out of the window for tPA and was sent to [facility] emergently for CTA Stroke Multiphase and possible endovascular intervention. CTA stroke multiphase with no LVO, no intervention planned. MRI here with bilateral frontal, parietal, occipital, cerebellar infarcts L>R.   Image suggestive of acute extensive acute embolic stroke. Likely cardioembolic, documented afib in chart. ECHO shows EF 15-20% concerning for stress induced cardiomyopathy.     Pt s/p PEG. Continuing to monitor Hb to determine if anemia stable to start anticoagulation for AFib.    Neuro exam stable; pt encephalopathic, concern for delerium. Recommend precautions if not already in place.    Antithrombotics for secondary stroke prevention: Being held as patient with significant anemia and cause unknown; r/o bleed. If ruled out, start anticoagulation (any DOAC or warfarin is reasonable from stroke standpoint for secondary stroke prevention).   Statins for secondary stroke prevention and hyperlipidemia, if present: Statins: Atorvastatin- 40 mg daily LDL = 70   Aggressive risk factor modification: HTN, hypercoagulable state with pancreatitis   Rehab efforts: PT/OT/SLP to evaluate and treat - Rec SNF  Diagnostics ordered/pending: none   VTE prophylaxis: SCDs  BP parameters: Can start progression toward more conservative BP parameters per primary team        Acute encephalopathy    - Multifactorial; likely 2/2 stroke, pancreatitis, possible infectious cause        Oropharyngeal dysphagia    NPO per speech  PEG 4/3/17        Cytotoxic cerebral  edema    2/2 ischemic stroke  Noted on imaging         NSVT (nonsustained ventricular tachycardia)    On Metoprolol 50mg BID  Telemetry  Per primary        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    Result of stroke  Aggressive PT/OT        Aphasia    Aggressive SLP        Iron deficiency anemia    Pt on iron replacement  - No melena or bloody bowel movements reported while IP  Hb 8.3  Management per primary - Antithrombotics currently held        Chronic systolic HF (heart failure)    Most recent ECHO shows EF 15-20%  Suggestive of stress-induced cardiomyopathy   Per primary        Takotsubo cardiomyopathy    Noted on ECHO         Atrial fibrillation with RVR    Stroke risk factor  Likely etiology of multiple embolic infarcts   Recommend anticoagulation when appropriate; pt with PEG placement 4/3 and has had anemia requiring transfusions        Essential hypertension    Stroke risk factor   Non compliant with medication at home  Can be more conservative with BP parameters as pt now out of acute stroke phase  On Lisinopril and Amlodipine per primary        Inguinal hernia of right side without obstruction or gangrene    US consistent with fluid-filled channel in inguinal canals, findings unspecific  Per primary        Acute pancreatitis    - Lipase >1000 initially with imaging findings of peripancreatic fluid collection at tail from OSH.  - Resolved per primary team        Anemia    - Hemoglobin 5.8 at OSH, 6.4 here after 1UPRBC at OSH upon admission   - 2U PRBC transfused here.  - H/h waxes and wanes. Stable today 30.1/8.6   - Workup and management per primary team  - GI consulted, no apparent bleeding              3/24 Patient's hemoglobin was 6.4 on arrival (s/p 1U PRBC on transport); transfused additional 2U PRBC; repeat hemoglobin 8.8. Of note, patient's WBC on arrival here significant for leukocytosis of 36. MRI demonstrable for bilateral frontal, parietal, occipital, cerebellar  infarcts L>R. Cause likely cardio-embolic.   3/30/18 No new imaging or neurologic changes. Patient's ECHo was concerning for a stress induced cardiomyopathy, EF 15-20%. Patient likely needs PEG tube, still NPO. Disposition pending SNF. Still not anticoagulated at this time due to anemia, but primary team plans on starting soon when they feel H/H stable and appropriate.   3/31: awaiting peg tube. Hb dropped overnight from 8.4>>7.4. GI consulted and recommended further evaluation of inflammatory changes of the pancreatic tail, recommending EUS or MRCP in future with PBS follow up outpatient. US ordered for inguinal hernia.   4/1/18 No acute neuro changes. Medical management per primary team. Plan for PEG tomorrow or Tuesday.   4/3/18: PEG today. No DVT in LUE. Hb stable. Primary team working on improved BP control, started Lisinopril. On Metoprolol for NSVT. Concern for dellerium.     STROKE DOCUMENTATION   Acute Stroke Times   Last Known Normal Date: 03/22/18  Last Known Normal Time: 2100  Symptom Onset Date: 03/22/18  Symptom Onset Time: 2100  Stroke Team Called Date: 03/23/18  Stroke Team Called Time: 1555  Stroke Team Arrival Date: 03/23/18  Stroke Team Arrival Time: 1555  CT Interpretation Time: 1610  Decision to Treat Time for IR:  (Not a candidate)    NIH Scale:  1a. Level Of Consciousness: 0-->Alert: keenly responsive  1b. LOC Questions: 2-->Answers neither question correctly  1c. LOC Commands: 1-->Performs one task correctly  2. Best Gaze: 2-->Forced deviation, or total gaze paresis not overcome by the oculocephalic maneuver  3. Visual: 0-->No visual loss  4. Facial Palsy: 2-->Partial paralysis (total or near-total paralysis of lower face)  5a. Motor Arm, Left: 4-->No movement  5b. Motor Arm, Right: 2-->Some effort against gravity: limb cannot get to or maintain (if cued) 90 (or 45) degrees, drifts down to bed, but has some effort against gravity  6a. Motor Leg, Left: 4-->No movement  6b. Motor Leg, Right:  3-->No effort against gravity: leg falls to bed immediately  7. Limb Ataxia: 0-->Absent  8. Sensory: 0-->Normal: no sensory loss  9. Best Language: 2-->Severe aphasia: all communication is through fragmentary expression: great need for inference, questioning, and guessing by the listener. Range of information that can be exchanged is limited: listener carries burden of. . . (see row details)  10. Dysarthria: 2-->Severe dysarthria: patients speech is so slurred as to be unintelligible in the absence of or out of proportion to any dysphasia, or is mute/anarthric  11. Extinction and Inattention (formerly Neglect): 1-->Visual, tactile, auditory, spatial, or personal inattention or extinction to bilateral simultaneous stimulation in one of the sensory modalities  Total (NIH Stroke Scale): 25       Modified Isabel Score: 1  Huddleston Coma Scale:    ABCD2 Score:    [id number] Score:6  HAS -BLED Score:3  ICH Score:   Hunt & Connors Classification:      Hemorrhagic change of an Ischemic Stroke: Does this patient have an ischemic stroke with hemorrhagic changes? No     Neurologic Chief Complaint: L sided weakness, dysphagia, dysarthria, confusion    Subjective:     Interval History: Patient is seen for follow-up neurological assessment and treatment recommendations:     PEG today. No DVT in LUE. Hb stable. Primary team working on improved BP control, started Lisinopril. On Metoprolol for NSVT. Concern for dellerium.     HPI, Past Medical, Family, and Social History remains the same as documented in the initial encounter.     Review of Systems   Constitutional: Negative for diaphoresis and fatigue.   HENT: Positive for trouble swallowing. Negative for nosebleeds.    Eyes: Positive for visual disturbance. Negative for discharge.   Neurological: Positive for facial asymmetry, speech difficulty and weakness.   Psychiatric/Behavioral: Positive for confusion. Negative for behavioral problems.        Scheduled Meds:   [START ON  4/4/2018] amLODIPine  5 mg Per G Tube Daily    [START ON 4/4/2018] B-complex with vitamin C  1 tablet Per G Tube Daily    ferrous sulfate  300 mg Per G Tube BID    [START ON 4/4/2018] folic acid  1 mg Per G Tube Daily    [START ON 4/4/2018] lisinopril  20 mg Per G Tube Daily    metoprolol tartrate  50 mg Per G Tube BID    [START ON 4/4/2018] multivitamin liquid no.118  10 mL Per G Tube Daily    pantoprazole  40 mg Per G Tube BID    [START ON 4/4/2018] polyethylene glycol  17 g Per G Tube Daily    [START ON 4/4/2018] senna-docusate 8.6-50 mg  1 tablet Per G Tube Daily    [START ON 4/4/2018] thiamine  100 mg Per G Tube Daily     Continuous Infusions:  PRN Meds:acetaminophen, ceFAZolin (ANCEF) IVPB, dextrose 50%, glucagon (human recombinant), insulin aspart U-100, ondansetron, oxyCODONE, varibar pudding Ba Sulfate, varibar thin liquid ba sulfate    Objective:     Vital Signs (Most Recent):  Temp: 98.2 °F (36.8 °C) (04/03/18 1919)  Pulse: 75 (04/03/18 1919)  Resp: 18 (04/03/18 1919)  BP: (!) 149/75 (04/03/18 1919)  SpO2: 98 % (04/03/18 1919)  BP Location: Right arm    Vital Signs Range (Last 24H):  Temp:  [97.6 °F (36.4 °C)-98.4 °F (36.9 °C)]   Pulse:  [72-96]   Resp:  [16-25]   BP: (135-194)/(71-97)   SpO2:  [97 %-100 %]   BP Location: Right arm    Physical Exam   Constitutional: He appears well-developed and well-nourished. No distress.   HENT:   Head: Normocephalic and atraumatic.   Eyes: Conjunctivae are normal. No scleral icterus.   Cardiovascular: Normal rate.    Pulmonary/Chest: Effort normal. No respiratory distress.   Musculoskeletal: He exhibits no edema or deformity.   Neurological: He is alert. A cranial nerve deficit is present.   Skin: Skin is warm and dry.   Psychiatric: He is slowed. Cognition and memory are impaired.     Neurological Exam:   LOC: alert  Attention Span: poor  Language: Aphasic  Articulation: Severe dysarthria  Orientation: Not oriented to person, place, and time  EOM (CN III,  IV, VI): Gaze preference Right  Facial Movement (CN VII): L facial droop  Motor: Arm left  Normal  0/5  Leg left  Normal  0/5  Arm right  Normal  3/5  Leg right Normal 2/5  Cebellar: No evidence of appendicular or axial ataxia  Sensation: Intact to light touch, temperature    Laboratory:  CMP:     Recent Labs  Lab 04/03/18  0431   CALCIUM 10.1   ALBUMIN 2.4*   PROT 6.9      K 4.2   CO2 22*      BUN 23   CREATININE 0.8   ALKPHOS 67   ALT 30   AST 45*   BILITOT 0.5     CBC:     Recent Labs  Lab 04/03/18  0431   WBC 14.29*   RBC 4.05*   HGB 8.3*   HCT 29.8*      MCV 74*   MCH 20.5*   MCHC 27.9*     Lipid Panel: No results for input(s): CHOL, LDLCALC, HDL, TRIG in the last 168 hours.  Coagulation:     Recent Labs  Lab 04/02/18  0437   INR 1.0     Platelet Aggregation Study: No results for input(s): PLTAGG, PLTAGINTERP, PLTAGREGLACO, ADPPLTAGGREG in the last 168 hours.  Hgb A1C: No results for input(s): HGBA1C in the last 168 hours.  TSH: No results for input(s): TSH in the last 168 hours.    Diagnostic Results     Brain Imaging     MRI Brain without Contrast Impression 3/24/18  Acute infarcts involving the frontal, parietal, and occipital lobes with involvement of the cerebellum, left greater than right.  These were seen to some extent on prior CTA.  No hemorrhagic conversion at this time.  The findings are concerning for extensive acute embolic stroke.  Moderate chronic microvascular ischemic changes.                      CTA Multiphase 3/23/18  Multifocal areas of abnormal decreased attenuation involving the left cerebellum, medulla, mike, and posterior right frontal cortex concerning for acute or subacute infarcts.  No significant mass effect, midline shift, or acute intracranial hemorrhage.  Generalized cerebral volume loss with a significant degree of chronic microvascular ischemic change, more prominent on the left.  Complete occlusion of the V4 segment of the left vertebral artery with slow  flow in the distal V3 and V4 segments.  Multifocal filling defects seen within the origin of the right subclavian artery, right common carotid, and left carotid bulb compatible with nonocclusive thrombi.  Additional smaller mural thrombi are seen along aortic arch.        ECHO 3/24/18:   CONCLUSIONS     1 - Consider Stress induced CM - Takotsubo CM.     2 - Eccentric LVH with severely depressed left ventricular systolic function (EF 15-20%).     3 - Normal RV size with moderately depressed right ventricular systolic function .     4 - Mild mitral regurgitation.     5 - Mild tricuspid regurgitation.     6 - Pulmonary hypertension. The estimated PA systolic pressure is 61 mmHg.     7 - Increased central venous pressure.     8 - No prior echo in our system       Ana Horvath PA-C  Comprehensive Stroke Center  Department of Vascular Neurology   [facility]

## 2018-04-05 PROBLEM — K85.90 ACUTE PANCREATITIS: Status: RESOLVED | Noted: 2018-03-24 | Resolved: 2018-04-05

## 2018-04-05 LAB
ALBUMIN SERPL BCP-MCNC: 2.3 G/DL
ALP SERPL-CCNC: 60 U/L
ALT SERPL W/O P-5'-P-CCNC: 25 U/L
ANION GAP SERPL CALC-SCNC: 8 MMOL/L
AST SERPL-CCNC: 38 U/L
BASOPHILS # BLD AUTO: 0.02 K/UL
BASOPHILS NFR BLD: 0.2 %
BILIRUB SERPL-MCNC: 0.5 MG/DL
BUN SERPL-MCNC: 23 MG/DL
CALCIUM SERPL-MCNC: 9.8 MG/DL
CHLORIDE SERPL-SCNC: 108 MMOL/L
CO2 SERPL-SCNC: 22 MMOL/L
CREAT SERPL-MCNC: 0.9 MG/DL
DIFFERENTIAL METHOD: ABNORMAL
EOSINOPHIL # BLD AUTO: 0.3 K/UL
EOSINOPHIL NFR BLD: 2.8 %
ERYTHROCYTE [DISTWIDTH] IN BLOOD BY AUTOMATED COUNT: 31.9 %
EST. GFR  (AFRICAN AMERICAN): >60 ML/MIN/1.73 M^2
EST. GFR  (NON AFRICAN AMERICAN): >60 ML/MIN/1.73 M^2
GLUCOSE SERPL-MCNC: 117 MG/DL
HCT VFR BLD AUTO: 27.7 %
HGB BLD-MCNC: 8 G/DL
IMM GRANULOCYTES # BLD AUTO: 0.21 K/UL
IMM GRANULOCYTES NFR BLD AUTO: 1.7 %
LYMPHOCYTES # BLD AUTO: 1.1 K/UL
LYMPHOCYTES NFR BLD: 9.1 %
MAGNESIUM SERPL-MCNC: 2 MG/DL
MCH RBC QN AUTO: 21.1 PG
MCHC RBC AUTO-ENTMCNC: 28.9 G/DL
MCV RBC AUTO: 73 FL
MONOCYTES # BLD AUTO: 1.4 K/UL
MONOCYTES NFR BLD: 11.7 %
NEUTROPHILS # BLD AUTO: 9.1 K/UL
NEUTROPHILS NFR BLD: 74.5 %
NRBC BLD-RTO: 0 /100 WBC
PHOSPHATE SERPL-MCNC: 2.6 MG/DL
PLATELET # BLD AUTO: 385 K/UL
PMV BLD AUTO: ABNORMAL FL
POCT GLUCOSE: 153 MG/DL (ref 70–110)
POCT GLUCOSE: 161 MG/DL (ref 70–110)
POCT GLUCOSE: 164 MG/DL (ref 70–110)
POTASSIUM SERPL-SCNC: 3.7 MMOL/L
PROT SERPL-MCNC: 6.4 G/DL
RBC # BLD AUTO: 3.8 M/UL
SODIUM SERPL-SCNC: 138 MMOL/L
WBC # BLD AUTO: 12.18 K/UL

## 2018-04-05 PROCEDURE — 36415 COLL VENOUS BLD VENIPUNCTURE: CPT

## 2018-04-05 PROCEDURE — 99233 SBSQ HOSP IP/OBS HIGH 50: CPT | Mod: ,,, | Performed by: PSYCHIATRY & NEUROLOGY

## 2018-04-05 PROCEDURE — 11000001 HC ACUTE MED/SURG PRIVATE ROOM

## 2018-04-05 PROCEDURE — 84100 ASSAY OF PHOSPHORUS: CPT

## 2018-04-05 PROCEDURE — 92507 TX SP LANG VOICE COMM INDIV: CPT

## 2018-04-05 PROCEDURE — 85025 COMPLETE CBC W/AUTO DIFF WBC: CPT

## 2018-04-05 PROCEDURE — 99231 SBSQ HOSP IP/OBS SF/LOW 25: CPT | Mod: ,,, | Performed by: HOSPITALIST

## 2018-04-05 PROCEDURE — 83735 ASSAY OF MAGNESIUM: CPT

## 2018-04-05 PROCEDURE — 80053 COMPREHEN METABOLIC PANEL: CPT

## 2018-04-05 PROCEDURE — 92526 ORAL FUNCTION THERAPY: CPT

## 2018-04-05 PROCEDURE — 63600175 PHARM REV CODE 636 W HCPCS: Performed by: PSYCHIATRY & NEUROLOGY

## 2018-04-05 PROCEDURE — 97110 THERAPEUTIC EXERCISES: CPT

## 2018-04-05 PROCEDURE — 25000003 PHARM REV CODE 250: Performed by: STUDENT IN AN ORGANIZED HEALTH CARE EDUCATION/TRAINING PROGRAM

## 2018-04-05 RX ORDER — HYDRALAZINE HYDROCHLORIDE 50 MG/1
50 TABLET, FILM COATED ORAL EVERY 8 HOURS PRN
Status: DISCONTINUED | OUTPATIENT
Start: 2018-04-05 | End: 2018-04-11 | Stop reason: HOSPADM

## 2018-04-05 RX ADMIN — MINERAL SUPPLEMENT IRON 300 MG / 5 ML STRENGTH LIQUID 100 PER BOX UNFLAVORED 300 MG: at 10:04

## 2018-04-05 RX ADMIN — INSULIN ASPART 2 UNITS: 100 INJECTION, SOLUTION INTRAVENOUS; SUBCUTANEOUS at 05:04

## 2018-04-05 RX ADMIN — MINERAL SUPPLEMENT IRON 300 MG / 5 ML STRENGTH LIQUID 100 PER BOX UNFLAVORED 300 MG: at 09:04

## 2018-04-05 RX ADMIN — Medication 100 MG: at 10:04

## 2018-04-05 RX ADMIN — METOPROLOL TARTRATE 50 MG: 50 TABLET, FILM COATED ORAL at 09:04

## 2018-04-05 RX ADMIN — METOPROLOL TARTRATE 50 MG: 50 TABLET, FILM COATED ORAL at 10:04

## 2018-04-05 RX ADMIN — PANTOPRAZOLE SODIUM 40 MG: 40 GRANULE, DELAYED RELEASE ORAL at 09:04

## 2018-04-05 RX ADMIN — Medication 10 ML: at 10:04

## 2018-04-05 RX ADMIN — STANDARDIZED SENNA CONCENTRATE AND DOCUSATE SODIUM 1 TABLET: 8.6; 5 TABLET, FILM COATED ORAL at 10:04

## 2018-04-05 RX ADMIN — APIXABAN 5 MG: 5 TABLET, FILM COATED ORAL at 10:04

## 2018-04-05 RX ADMIN — VITAMIN C 1 TABLET: TAB at 10:04

## 2018-04-05 RX ADMIN — PANTOPRAZOLE SODIUM 40 MG: 40 GRANULE, DELAYED RELEASE ORAL at 10:04

## 2018-04-05 RX ADMIN — LISINOPRIL 20 MG: 20 TABLET ORAL at 10:04

## 2018-04-05 RX ADMIN — APIXABAN 5 MG: 5 TABLET, FILM COATED ORAL at 09:04

## 2018-04-05 RX ADMIN — POLYETHYLENE GLYCOL 3350 17 G: 17 POWDER, FOR SOLUTION ORAL at 10:04

## 2018-04-05 RX ADMIN — AMLODIPINE BESYLATE 5 MG: 5 TABLET ORAL at 10:04

## 2018-04-05 RX ADMIN — FOLIC ACID 1 MG: 1 TABLET ORAL at 10:04

## 2018-04-05 NOTE — ASSESSMENT & PLAN NOTE
79 y/o male with medical history of HTN presented to OSH in [place] ([facility]) with left-sided weakness, dysarthria, anemia, and acute pancreatitis. Pt had evidence of left subacute cerebellar infarct and right cerebral infarct on head CT. He was out of the window for tPA and was sent to [facility] [place] for CTA Stroke Multiphase and possible endovascular intervention. CTA stroke multiphase with no LVO, no intervention planned. MRI here with bilateral frontal, parietal, occipital, cerebellar infarcts L>R.   Image suggestive of acute extensive acute embolic stroke. Likely cardioembolic, documented afib in chart. ECHO shows EF 15-20% concerning for stress induced cardiomyopathy.  Anticoagulation resumed.  No new neuro symptoms.  Continues with significant deficits.    Antithrombotics for secondary stroke prevention: Apixaban 5mg  Statins for secondary stroke prevention and hyperlipidemia, if present: Statins: Atorvastatin- 40 mg daily LDL = 70   Aggressive risk factor modification: HTN, hypercoagulable state with pancreatitis   Rehab efforts: PT/OT/SLP to evaluate and treat - Rec SNF  Diagnostics ordered/pending: none   VTE prophylaxis: SCDs  BP parameters: resume home meds to normalize blood pressure

## 2018-04-05 NOTE — ASSESSMENT & PLAN NOTE
Stable, awaiting placement.    - Likely 2/2 sequelae of embolic stroke  - improving day over day

## 2018-04-05 NOTE — PT/OT/SLP PROGRESS
"Speech Language Pathology Treatment    Patient Name:  Zion Hines   MRN:  [id number]   1107/1107 A    Admitting Diagnosis: Embolic stroke involving right middle cerebral artery    Recommendations:                 General Recommendations:  Dysphagia therapy and Speech/language therapy  Diet recommendations:  NPO, Liquid Diet Level: NPO   Aspiration Precautions: Strict aspiration precautions   General Precautions: Standard, aspiration, NPO, fall  Communication strategies:  go to room if call light pushed    Subjective     "My stomach hurts."     Pain/Comfort:  · Pain Rating 1: other (see comments) (Pt did not indicate severity of pain)  · Location 1: abdomen  · Pain Addressed 1: Distraction  · Pain Rating Post-Intervention 1: other (see comments) (Pt did not indicate severity of pain)    Objective:     Has the patient been evaluated by SLP for swallowing?   Yes  Keep patient NPO? Yes   Current Respiratory Status: room air      Pt awake and alert upon entry. HOB raised. Throughout session, intelligibility of pt's speech variable as intermittent unintelligibility observed. Pt answered 7/10 basic y/n q's re: self and immediate environment. On trials x2, pt response unappreciated. Pt completed 0/2 confrontational naming trials ind'ly and 1/2 given model. He ind'ly named 5/5 colors of objects for which he was presented. Despite tsp tip touching pt's lower labial surface across multiple attempts to offer him 1/3 tsp pureed apple sauce, attempt to strip bolus from spoon unappreciated. Pt provided with 1/3 tsp water x2 and 3/4 tsp x1. Via cervical palpation, delayed initiation of swallow observed. However timeliness improved as trials progressed. No overt clinical signs of aspiration observed with 1/3 tsp x2. However wet vocal quality noted following 3/4 tsp x1. No further PO trials provided this date. Education provided re: ongoing SLP POC. However concern remains for pt's complete understanding 2/2 cognitive-linguistic " deficits as documented above. White board current.       Assessment:     Zion Hines is a 78 y.o. male with an SLP diagnosis of dysphagia and cognitive-linguistic impairments.     Goals:    SLP Goals        Problem: SLP Goal    Goal Priority Disciplines Outcome   SLP Goal     SLP Ongoing (interventions implemented as appropriate)   Description:  Speech Language Pathology   Goal expected to be met by 4/8  1. Patient will answer orientation questions x4 given mod A.   2. Given model, patient will follow 1 step directions with 90% acc.   3. Given repetitions x2 max, patient will answer simple y/n questions with 90% acc.  4. Given phonemic cuing, patient will complete simple naming tasks with 80% acc.   5. Given verbal and visual cueing, patient will complete rote speech tasks with 80% acc.    6.  Participate in ongoing assesment of swallow                               Plan:     · Patient to be seen:  3 x/week   · Plan of Care expires:  04/30/18  · Plan of Care reviewed with:  patient   · SLP Follow-Up:  Yes       Discharge recommendations:  nursing facility, skilled     Time Tracking:     SLP Treatment Date:   04/05/18  Speech Start Time:  1033  Speech Stop Time:  1050     Speech Total Time (min):  17 min    Billable Minutes: Speech Therapy Individual 8 and Treatment Swallowing Dysfunction 9    YANNI Marie, CCC-SLP  [phone]  4/5/2018

## 2018-04-05 NOTE — ASSESSMENT & PLAN NOTE
-Stroke risk factor   -Non compliant with medication at home  -resume home meds to normalize blood pressure

## 2018-04-05 NOTE — ASSESSMENT & PLAN NOTE
-Most recent ECHO shows EF 15-20%  -Suggestive of stress-induced cardiomyopathy   -Management per primary team

## 2018-04-05 NOTE — ASSESSMENT & PLAN NOTE
Pending placement, Stable   - 03/23/18 CTA Stroke Multiphase with multiple non-occlusive thrombi noted  - MRI brain w/o show multiple infarcts without evidence of hemorrhagic conversion.  - Given recent in-hospital atrial fibrillation and takotsubo's CM suspicion for embolic stroke, in addition 2D Echo showed severely depressed LVEF 15-20% with evidence of stress CM  - PT/OT reordered - PT has not seen here since 3/29, paged multiple times due to concern   - Continueeliquis 5mg BID

## 2018-04-05 NOTE — ASSESSMENT & PLAN NOTE
Now in sinu, rate control and apixaban.    - Patient noted to be in Afib with RVR 3/25  - In NSR this AM with rate of 70s-90s, short runs of NSVT  - Would recommend anticoaulation - If anticoagulation is initiated for Afib, Eliquis is $8.35/month--Eliquis was shown to have similar GIB risk as warfarin in [facility]; other DOACs shown to have increased risk compared to warfarin in major Afib studies - is 80 but Cr <1.5 and weight >60kg so 5mg BID dosing   - cardiac monitoring  - Currently on metoprolol 50mg BID for rate control

## 2018-04-05 NOTE — SUBJECTIVE & OBJECTIVE
Interval History: No acute events overnight.  Patient pulling at peg tube overnight with elevated BP > 180s.  Alert to person/ somewhat place.      Review of Systems   Unable to perform ROS: Mental status change     Objective:     Vital Signs (Most Recent):  Temp: 98.5 °F (36.9 °C) (04/05/18 1323)  Pulse: 73 (04/05/18 1323)  Resp: 18 (04/05/18 1323)  BP: (!) 150/76 (04/05/18 1323)  SpO2: 98 % (04/05/18 1323) Vital Signs (24h Range):  Temp:  [97.2 °F (36.2 °C)-98.7 °F (37.1 °C)] 98.5 °F (36.9 °C)  Pulse:  [71-96] 73  Resp:  [16-18] 18  SpO2:  [96 %-99 %] 98 %  BP: (138-194)/(73-93) 150/76     Weight: 64.8 kg (142 lb 13.7 oz)  Body mass index is 21.73 kg/m².    Intake/Output Summary (Last 24 hours) at 04/05/18 1440  Last data filed at 04/05/18 0800   Gross per 24 hour   Intake              100 ml   Output              225 ml   Net             -125 ml      Physical Exam   Constitutional: He is oriented to person, place, and time. He appears well-developed.   HENT:   Head: Atraumatic.   Eyes: Pupils are equal, round, and reactive to light.   Patient with rightward gaze preference  Arcus senilis bilaterally   Neck: No JVD present.   Cardiovascular: Normal rate, regular rhythm, S1 normal, S2 normal and intact distal pulses.    Pulses:       Radial pulses are 2+ on the right side, and 2+ on the left side.        Dorsalis pedis pulses are 1+ on the right side, and 1+ on the left side.   Pulmonary/Chest: Effort normal and breath sounds normal.   Abdominal: Soft. Bowel sounds are normal. He exhibits no distension. There is tenderness in the suprapubic area and left lower quadrant. There is no guarding.       Genitourinary:   Genitourinary Comments: Condom catheter in place   Musculoskeletal: He exhibits no edema.   Neurological: He is alert and oriented to person, place, and time. He exhibits abnormal muscle tone. GCS eye subscore is 4. GCS verbal subscore is 3. GCS motor subscore is 6.   Only oriented to person and is aware  that he is in a hospital in [place], he is able to acknowledge that he had a stroke. He states all of his family member's names appropriately. He expresses desire to participate in PT. Expressive and receptive aphasia. LUE weak - 2/5 strength in Left hand     Skin: Skin is warm. No rash noted.   Psychiatric: His speech is slurred.   Nursing note and vitals reviewed.      Significant Labs:   Recent Results (from the past 24 hour(s))   POCT glucose    Collection Time: 04/04/18  8:35 PM   Result Value Ref Range    POCT Glucose 92 70 - 110 mg/dL   Comprehensive metabolic panel    Collection Time: 04/05/18  3:33 AM   Result Value Ref Range    Sodium 138 136 - 145 mmol/L    Potassium 3.7 3.5 - 5.1 mmol/L    Chloride 108 95 - 110 mmol/L    CO2 22 (L) 23 - 29 mmol/L    Glucose 117 (H) 70 - 110 mg/dL    BUN, Bld 23 8 - 23 mg/dL    Creatinine 0.9 0.5 - 1.4 mg/dL    Calcium 9.8 8.7 - 10.5 mg/dL    Total Protein 6.4 6.0 - 8.4 g/dL    Albumin 2.3 (L) 3.5 - 5.2 g/dL    Total Bilirubin 0.5 0.1 - 1.0 mg/dL    Alkaline Phosphatase 60 55 - 135 U/L    AST 38 10 - 40 U/L    ALT 25 10 - 44 U/L    Anion Gap 8 8 - 16 mmol/L    eGFR if African American >60.0 >60 mL/min/1.73 m^2    eGFR if non African American >60.0 >60 mL/min/1.73 m^2   CBC auto differential    Collection Time: 04/05/18  3:33 AM   Result Value Ref Range    WBC 12.18 3.90 - 12.70 K/uL    RBC 3.80 (L) 4.60 - 6.20 M/uL    Hemoglobin 8.0 (L) 14.0 - 18.0 g/dL    Hematocrit 27.7 (L) 40.0 - 54.0 %    MCV 73 (L) 82 - 98 fL    MCH 21.1 (L) 27.0 - 31.0 pg    MCHC 28.9 (L) 32.0 - 36.0 g/dL    RDW 31.9 (H) 11.5 - 14.5 %    Platelets 385 (H) 150 - 350 K/uL    MPV SEE COMMENT 9.2 - 12.9 fL    Immature Granulocytes 1.7 (H) 0.0 - 0.5 %    Gran # (ANC) 9.1 (H) 1.8 - 7.7 K/uL    Immature Grans (Abs) 0.21 (H) 0.00 - 0.04 K/uL    Lymph # 1.1 1.0 - 4.8 K/uL    Mono # 1.4 (H) 0.3 - 1.0 K/uL    Eos # 0.3 0.0 - 0.5 K/uL    Baso # 0.02 0.00 - 0.20 K/uL    nRBC 0 0 /100 WBC    Gran% 74.5 (H)  38.0 - 73.0 %    Lymph% 9.1 (L) 18.0 - 48.0 %    Mono% 11.7 4.0 - 15.0 %    Eosinophil% 2.8 0.0 - 8.0 %    Basophil% 0.2 0.0 - 1.9 %    Differential Method Automated    Magnesium    Collection Time: 04/05/18  3:33 AM   Result Value Ref Range    Magnesium 2.0 1.6 - 2.6 mg/dL   Phosphorus    Collection Time: 04/05/18  3:33 AM   Result Value Ref Range    Phosphorus 2.6 (L) 2.7 - 4.5 mg/dL   POCT glucose    Collection Time: 04/05/18  5:46 AM   Result Value Ref Range    POCT Glucose 164 (H) 70 - 110 mg/dL   POCT glucose    Collection Time: 04/05/18  1:22 PM   Result Value Ref Range    POCT Glucose 161 (H) 70 - 110 mg/dL         Significant Imaging: I have reviewed all pertinent imaging results/findings within the past 24 hours.

## 2018-04-05 NOTE — PLAN OF CARE
Problem: Physical Therapy Goal  Goal: Physical Therapy Goal  Goals to be met by: 18     Patient will increase functional independence with mobility by performin. Supine to sit with Maximum Assistance  2. Sit to supine with Maximum Assistance  3. Rolling to Left and Right with Maximum Assistance.  4. Sitting at edge of bed x 10 minutes with Minimal Assistance maintaining midline orientation and using at least 1 UE support for balance.   5.  Sit to stand with Maximum Assistance.   6. Bed to chair transfer with Maximum Assistance using most appropriate AD.    Goals remain appropriate continue with PT POC as indicated.

## 2018-04-05 NOTE — PT/OT/SLP PROGRESS
"Physical Therapy Treatment    Patient Name:  Zion Hines   MRN:  [id number]    Recommendations:     Discharge Recommendations:  nursing facility, skilled   Discharge Equipment Recommendations:  (TBD)   Barriers to discharge: Inaccessible home and Decreased caregiver support    Assessment:     Zion Hines is a 78 y.o. male admitted with a medical diagnosis of Embolic stroke involving right middle cerebral artery.  He presents with the following impairments/functional limitations:  weakness, impaired endurance, impaired self care skills, impaired functional mobilty, gait instability, impaired balance, impaired cognition, decreased coordination, decreased upper extremity function, decreased lower extremity function, decreased safety awareness, abnormal tone, decreased ROM, impaired coordination, impaired fine motor. Pt limited on this date secondary to pt being agitated. Pt refused sitting EOB, but agreed to supine B LE therex to improve overall B LE ROM, strength, and endurance. Pt tolerated B LE therex fairly well, and will continue to benefit from PT services at this time to improve all deficits noted above.     Rehab Prognosis:  good; patient would benefit from acute skilled PT services to address these deficits and reach maximum level of function.      Recent Surgery: Procedure(s) (LRB):  PLACEMENT-TUBE-PEG (N/A) 2 Days Post-Op    Plan:     During this hospitalization, patient to be seen 4 x/week to address the above listed problems via gait training, therapeutic activities, therapeutic exercises, neuromuscular re-education  · Plan of Care Expires:  04/28/18   Plan of Care Reviewed with: patient    Subjective     Communicated with nursing prior to session.  Patient found supine upon PT entry to room, agreeable to treatment.      Chief Complaint: none stated  Patient comments/goals: "These people lost their mind."  Pain/Comfort:  · Pain Rating 1:  (pt did not rate)  · Location - Side 1: Left  · Location 1: leg  · Pain " Addressed 1: Reposition, Distraction, Cessation of Activity  · Pain Rating Post-Intervention 1:  (pt did not rate)    Patients cultural, spiritual, Hoahaoism conflicts given the current situation: none stated    Objective:     Patient found with: bed alarm (lines intact;  soft wrist restraint to RUE)     General Precautions: Standard, aspiration, NPO, fall   Orthopedic Precautions:N/A   Braces: N/A     Functional Mobility:  · Not performed 2* to pt safety.       AM-PAC 6 CLICK MOBILITY  Turning over in bed (including adjusting bedclothes, sheets and blankets)?: 2  Sitting down on and standing up from a chair with arms (e.g., wheelchair, bedside commode, etc.): 2  Moving from lying on back to sitting on the side of the bed?: 2  Moving to and from a bed to a chair (including a wheelchair)?: 1  Need to walk in hospital room?: 1  Climbing 3-5 steps with a railing?: 1  Total Score: 9       Therapeutic Activities and Exercises:   -Supine B LE Therex x15 reps with assistance: AP, Hip Abd/Add, SAQ, and Heel Slides    Patient left supine with all lines intact, call button in reach, bed alarm on and restraint to RUE intact.    GOALS:    Physical Therapy Goals        Problem: Physical Therapy Goal    Goal Priority Disciplines Outcome Goal Variances Interventions   Physical Therapy Goal     PT/OT, PT      Description:  Goals to be met by: 18     Patient will increase functional independence with mobility by performin. Supine to sit with Maximum Assistance  2. Sit to supine with Maximum Assistance  3. Rolling to Left and Right with Maximum Assistance.  4. Sitting at edge of bed x 10 minutes with Minimal Assistance maintaining midline orientation and using at least 1 UE support for balance.   5.  Sit to stand with Maximum Assistance.   6. Bed to chair transfer with Maximum Assistance using most appropriate AD.                     Time Tracking:     PT Received On: 18  PT Start Time: 920     PT Stop Time:  0930  PT Total Time (min): 10 min     Billable Minutes: Therapeutic Exercise 10    Treatment Type: Treatment  PT/PTA: PTA     PTA Visit Number: 2     Florencia Perry PTA  04/05/2018

## 2018-04-05 NOTE — PROGRESS NOTES
[facility] Hospital Medicine  Progress Note    Patient Name: Zion Hines  MRN: [id number]  Patient Class: IP- Inpatient   Admission Date: 3/23/2018  Length of Stay: 13 days  Attending Physician: Tess Andrews MD  Primary Care Provider: Primary Doctor [facility] Medicine Team: [facility] HOSP MED 2 Jeffrey Carvalho MD    Subjective:     Principal Problem:Embolic stroke involving right middle cerebral artery    HPI:  77 yo M with PMHx HTN known to be non-compliant with medications and remote hx of anemia presents to [facility] ED 18 as a transfer from [facility].  Patient is a poor historian with no family at bedside, hx largely obtained from OSH records.  He presented to OSH originally with symptoms of weakness and fatigue, his daughter brought him to ED thinking he was likely anemic again.  It was noted that patient had some dysarthria and aphasia (unspecified expressive vs receptive) with L-sided weakness.  He was out of the window for tPA and was sent to [facility] emergently for CTA Stroke Multiphase and possible endovascular intervention. CTA stroke multiphase with no LVO, no intervention planned.  Of note, patient is anemic per OSH labs with a Hgb of 5.8 g/dL and likely has pancreatitis with lipase 3738, WBC count of 15.8 k/uL, and CT abdomen showing peripancreatic fluid around the tail.  He was transfused a unit of PRBCs prior to transfer.  Labs were otherwise largely unremarkable.  On presentation to [facility] ED, he is able to move both BLE but complains of pain in BLE.  Not moving LUE but is intact to noxious stimuli.  Has a R gaze preference that is not overcome by VOR testing and complaint of neck pain with moving head side-to-side.  Otherwise has mild abdominal distension with mild hypogastric tenderness to palpation. HTN to SBP 200s.  Of note, patient's wife  recently and family expressed concern in outpatient ED that patient has not been eating much for the past month but deny  EtOH use.    Hospital Course:  03/23/18:  Admission to Neuro ICU 2/2 concern for stroke with multiple concerns for patient to become unstable--pancreatitis, severe anemia, HTN.  03/24: has had no interval development of multiorgan involvement form pancreatitis, drop in hemoglobin secondary to erosive gastritis, transfused and on protonix bid  03/25: Patient on dilt drip for a-fib. Repeat EKG shows NSR.  3/26: Discontinuing diltiazem drip today. Will continue monitor HR. Continue IV fluids 50 cc/hr for pancreatitis. Repeat lipase.   3/28: NAEON. VSSA. No overt signs of bleeding. Neuro exam unchanged from prior documented exams. Continues to not follow commands, continues to answer questions inappropriately. Remains effectively hemiplegic on left. No further ICU needs. S/D today to hospital medicine after d/w vascular neurology.  03/29/2018 VSS, no signs of blood loss in stool. Loose stools on examination. NG tube replaced and xray confirmed in duodenum - retracted. Will make definitive decision on anticoagulation 3/30. Furthering discussions with family regarding definitive feeding  03/30/2018 VSS, no signs of blood loss on ZENIA. NG tube replaced after patient pulled O/N. Confirmed placement.Family desires PEG tube placed. GI consulted for placement on Monday.   03/31/2018 VSS. Drop in Hb from 8.1 to 7.4 overnight. TF continued. Holding anticoagulation due to worsening anemia. GI recommendations for further evaluation of inflammatory changes of the pancreatic tail, recommending EUS or MRCP in future with PBS follow up outpatient. New fluid filled hernia noted on exam - US ordered  04/02/2018 PEG placement today. WBC 15. Hb improving. Metoprolol changes to 50mg BID, Lisinopril increased 20 mg QD.   04/03/2018 PEG placement scheduled for today. Leukocytosis stable. Hb stable. Added Norvasc for BP control today. Will monitor. Unable to contact any family members since yesterday afternoon.   04/04/2018 PEG in place, OK to  use for TF, Meds etc. Leukocytosis stable. Hb stable - so signs of bleeding, will start Eliquis for his anticoagulation post stroke.     Interval History: No acute events overnight.  Patient pulling at peg tube overnight with elevated BP > 180s.  Alert to person/ somewhat place.      Review of Systems   Unable to perform ROS: Mental status change     Objective:     Vital Signs (Most Recent):  Temp: 98.5 °F (36.9 °C) (04/05/18 1323)  Pulse: 73 (04/05/18 1323)  Resp: 18 (04/05/18 1323)  BP: (!) 150/76 (04/05/18 1323)  SpO2: 98 % (04/05/18 1323) Vital Signs (24h Range):  Temp:  [97.2 °F (36.2 °C)-98.7 °F (37.1 °C)] 98.5 °F (36.9 °C)  Pulse:  [71-96] 73  Resp:  [16-18] 18  SpO2:  [96 %-99 %] 98 %  BP: (138-194)/(73-93) 150/76     Weight: 64.8 kg (142 lb 13.7 oz)  Body mass index is 21.73 kg/m².    Intake/Output Summary (Last 24 hours) at 04/05/18 1440  Last data filed at 04/05/18 0800   Gross per 24 hour   Intake              100 ml   Output              225 ml   Net             -125 ml      Physical Exam   Constitutional: He is oriented to person, place, and time. He appears well-developed.   HENT:   Head: Atraumatic.   Eyes: Pupils are equal, round, and reactive to light.   Patient with rightward gaze preference  Arcus senilis bilaterally   Neck: No JVD present.   Cardiovascular: Normal rate, regular rhythm, S1 normal, S2 normal and intact distal pulses.    Pulses:       Radial pulses are 2+ on the right side, and 2+ on the left side.        Dorsalis pedis pulses are 1+ on the right side, and 1+ on the left side.   Pulmonary/Chest: Effort normal and breath sounds normal.   Abdominal: Soft. Bowel sounds are normal. He exhibits no distension. There is tenderness in the suprapubic area and left lower quadrant. There is no guarding.       Genitourinary:   Genitourinary Comments: Condom catheter in place   Musculoskeletal: He exhibits no edema.   Neurological: He is alert and oriented to person, place, and time. He  exhibits abnormal muscle tone. GCS eye subscore is 4. GCS verbal subscore is 3. GCS motor subscore is 6.   Only oriented to person and is aware that he is in a hospital in [place], he is able to acknowledge that he had a stroke. He states all of his family member's names appropriately. He expresses desire to participate in PT. Expressive and receptive aphasia. LUE weak - 2/5 strength in Left hand     Skin: Skin is warm. No rash noted.   Psychiatric: His speech is slurred.   Nursing note and vitals reviewed.      Significant Labs:   Recent Results (from the past 24 hour(s))   POCT glucose    Collection Time: 04/04/18  8:35 PM   Result Value Ref Range    POCT Glucose 92 70 - 110 mg/dL   Comprehensive metabolic panel    Collection Time: 04/05/18  3:33 AM   Result Value Ref Range    Sodium 138 136 - 145 mmol/L    Potassium 3.7 3.5 - 5.1 mmol/L    Chloride 108 95 - 110 mmol/L    CO2 22 (L) 23 - 29 mmol/L    Glucose 117 (H) 70 - 110 mg/dL    BUN, Bld 23 8 - 23 mg/dL    Creatinine 0.9 0.5 - 1.4 mg/dL    Calcium 9.8 8.7 - 10.5 mg/dL    Total Protein 6.4 6.0 - 8.4 g/dL    Albumin 2.3 (L) 3.5 - 5.2 g/dL    Total Bilirubin 0.5 0.1 - 1.0 mg/dL    Alkaline Phosphatase 60 55 - 135 U/L    AST 38 10 - 40 U/L    ALT 25 10 - 44 U/L    Anion Gap 8 8 - 16 mmol/L    eGFR if African American >60.0 >60 mL/min/1.73 m^2    eGFR if non African American >60.0 >60 mL/min/1.73 m^2   CBC auto differential    Collection Time: 04/05/18  3:33 AM   Result Value Ref Range    WBC 12.18 3.90 - 12.70 K/uL    RBC 3.80 (L) 4.60 - 6.20 M/uL    Hemoglobin 8.0 (L) 14.0 - 18.0 g/dL    Hematocrit 27.7 (L) 40.0 - 54.0 %    MCV 73 (L) 82 - 98 fL    MCH 21.1 (L) 27.0 - 31.0 pg    MCHC 28.9 (L) 32.0 - 36.0 g/dL    RDW 31.9 (H) 11.5 - 14.5 %    Platelets 385 (H) 150 - 350 K/uL    MPV SEE COMMENT 9.2 - 12.9 fL    Immature Granulocytes 1.7 (H) 0.0 - 0.5 %    Gran # (ANC) 9.1 (H) 1.8 - 7.7 K/uL    Immature Grans (Abs) 0.21 (H) 0.00 - 0.04 K/uL    Lymph # 1.1 1.0 -  4.8 K/uL    Mono # 1.4 (H) 0.3 - 1.0 K/uL    Eos # 0.3 0.0 - 0.5 K/uL    Baso # 0.02 0.00 - 0.20 K/uL    nRBC 0 0 /100 WBC    Gran% 74.5 (H) 38.0 - 73.0 %    Lymph% 9.1 (L) 18.0 - 48.0 %    Mono% 11.7 4.0 - 15.0 %    Eosinophil% 2.8 0.0 - 8.0 %    Basophil% 0.2 0.0 - 1.9 %    Differential Method Automated    Magnesium    Collection Time: 04/05/18  3:33 AM   Result Value Ref Range    Magnesium 2.0 1.6 - 2.6 mg/dL   Phosphorus    Collection Time: 04/05/18  3:33 AM   Result Value Ref Range    Phosphorus 2.6 (L) 2.7 - 4.5 mg/dL   POCT glucose    Collection Time: 04/05/18  5:46 AM   Result Value Ref Range    POCT Glucose 164 (H) 70 - 110 mg/dL   POCT glucose    Collection Time: 04/05/18  1:22 PM   Result Value Ref Range    POCT Glucose 161 (H) 70 - 110 mg/dL         Significant Imaging: I have reviewed all pertinent imaging results/findings within the past 24 hours.    Assessment/Plan:      * Embolic stroke involving right middle cerebral artery    Pending placement, Stable   - 03/23/18 CTA Stroke Multiphase with multiple non-occlusive thrombi noted  - MRI brain w/o show multiple infarcts without evidence of hemorrhagic conversion.  - Given recent in-hospital atrial fibrillation and takotsubo's CM suspicion for embolic stroke, in addition 2D Echo showed severely depressed LVEF 15-20% with evidence of stress CM  - PT/OT reordered - PT has not seen here since 3/29, paged multiple times due to concern   - Continueeliquis 5mg BID          Acute encephalopathy    Stable, awaiting placement.    - Likely 2/2 sequelae of embolic stroke  - improving day over day          Atrial fibrillation    Now in sinu, rate control and apixaban.    - Patient noted to be in Afib with RVR 3/25  - In NSR this AM with rate of 70s-90s, short runs of NSVT  - Would recommend anticoaulation - If anticoagulation is initiated for Afib, Eliquis is $8.35/month--Eliquis was shown to have similar GIB risk as warfarin in [facility]; other DOACs shown to have  increased risk compared to warfarin in major Afib studies - is 80 but Cr <1.5 and weight >60kg so 5mg BID dosing   - cardiac monitoring  - Currently on metoprolol 50mg BID for rate control               Chronic systolic HF (heart failure)    - unknown if this is chronic issue or if apical ballooning and depressed EF is secondary to emotional event and passing of wife or if due to neurological event with stroke and cytotoxic edema and ICU stay as both are known to cause stress cardiomyopathy  - will need f/u echo to evaluate EF and recovery           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              Essential hypertension     - SBP goal <180, prn labetalol ordered  - Has not required PRNS  - Now on metoprolol 50mg BID   - Lisinopril 20mg QD  - Norvasc 5mg QD  - has had multiple short runs of NSVT - patient unable to provide review if symptomatic          Takotsubo cardiomyopathy    - pt with loss of wife 2/2018, now with dilated cardiomyopathy EF 15-20 percent and imaging suggestive of takotsubo CM  - will need repeat echo to evaluate improvement  - embolic episode may have been exacerbated by new onset CM   - elevated ePAP 61          Inguinal hernia of right side without obstruction or gangrene    - feels fluid filled on exam  - US ordered consistent with fluid filled channel in inguinal canals, findings unspecific.         Oropharyngeal dysphagia    S/p peg tube           Cytotoxic cerebral edema              NSVT (nonsustained ventricular tachycardia)    - patient with daily episodes of NSVT  - remains on metoprolol for Afib w RVR  - unable to obtain ROS if symptomatic during episodes  - on telemetry - will monitor          Alcohol abuse    - unable to obtain alcohol history from patient          Aphasia    -both receptive and expressive - improving          Iron deficiency anemia    - see microcyctic anemia        Anemia    - has iron def anemia, continue iron replacement  -  unknown baseline hb, no melena or bloody bowel movements reported while IP  - no known history of GIB or varices  - Hb 9, on iron replacement via NG  - ZENIA negative for black/red stool          VTE Risk Mitigation         Ordered     apixaban tablet 5 mg  2 times daily     Route:  Oral        04/04/18 1027          Dispo: Awaiting placement     Jeffrey Carvalho MD  Department of Hospital Medicine   [facility]

## 2018-04-05 NOTE — ASSESSMENT & PLAN NOTE
-US consistent with fluid-filled channel in inguinal canals, findings unspecific  -Management per primary team

## 2018-04-05 NOTE — PLAN OF CARE
Problem: SLP Goal  Goal: SLP Goal  Speech Language Pathology   Goal expected to be met by 4/8  1. Patient will answer orientation questions x4 given mod A.   2. Given model, patient will follow 1 step directions with 90% acc.   3. Given repetitions x2 max, patient will answer simple y/n questions with 90% acc.  4. Given phonemic cuing, patient will complete simple naming tasks with 80% acc.   5. Given verbal and visual cueing, patient will complete rote speech tasks with 80% acc.    6.  Participate in ongoing assesment of swallow              Outcome: Ongoing (interventions implemented as appropriate)  Recommend ongoing NPO with cont'd PEG for all nutrition, hydration, medication.     YANNI Marie, CCC-SLP  [phone]  4/5/2018

## 2018-04-05 NOTE — PROGRESS NOTES
[facility]  Vascular Neurology  Comprehensive Stroke Center  Progress Note    Assessment/Plan:     * Embolic stroke involving right middle cerebral artery    77 y/o male with medical history of HTN presented to OSH in [place] ([facility]) with left-sided weakness, dysarthria, anemia, and acute pancreatitis. Pt had evidence of left subacute cerebellar infarct and right cerebral infarct on head CT. He was out of the window for tPA and was sent to [facility] emergently for CTA Stroke Multiphase and possible endovascular intervention. CTA stroke multiphase with no LVO, no intervention planned. MRI here with bilateral frontal, parietal, occipital, cerebellar infarcts L>R.   Image suggestive of acute extensive acute embolic stroke. Likely cardioembolic, documented afib in chart. ECHO shows EF 15-20% concerning for stress induced cardiomyopathy.  Anticoagulation resumed.  No new neuro symptoms.  Continues with significant deficits.    Antithrombotics for secondary stroke prevention: Apixaban 5mg  Statins for secondary stroke prevention and hyperlipidemia, if present: Statins: Atorvastatin- 40 mg daily LDL = 70   Aggressive risk factor modification: HTN, hypercoagulable state with pancreatitis   Rehab efforts: PT/OT/SLP to evaluate and treat - Rec SNF  Diagnostics ordered/pending: none   VTE prophylaxis: SCDs  BP parameters: resume home meds to normalize blood pressure        Inguinal hernia of right side without obstruction or gangrene    -US consistent with fluid-filled channel in inguinal canals, findings unspecific  -Management per primary team        Oropharyngeal dysphagia    -NPO per speech  -PEG 4/3/17  -TF initiated        Cytotoxic cerebral edema    -2/2 ischemic stroke  -Noted on imaging   -monitoring        Chronic systolic HF (heart failure)    -Most recent ECHO shows EF 15-20%  -Suggestive of stress-induced cardiomyopathy   -Management per primary team        NSVT (nonsustained ventricular  tachycardia)    -On Metoprolol 50mg BID  -Telemetry  -management per primary team        Takotsubo cardiomyopathy    -Noted on ECHO   -management per primary team        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  - H/h waxes and wanes. Stable today 30.1/8.6   - Workup and management per primary team  - GI consulted, no apparent bleeding         Acute pancreatitis    - Lipase >1000 initially with imaging findings of peripancreatic fluid collection at tail from OSH.  - Resolved per primary team        Essential hypertension    -Stroke risk factor   -Non compliant with medication at home  -resume home meds to normalize blood pressure        Acute encephalopathy    - Multifactorial; likely 2/2 stroke, pancreatitis, possible infectious cause  -improving             3/24 Patient's hemoglobin was 6.4 on arrival (s/p 1U PRBC on transport); transfused additional 2U PRBC; repeat hemoglobin 8.8. Of note, patient's WBC on arrival here significant for leukocytosis of 36. MRI demonstrable for bilateral frontal, parietal, occipital, cerebellar infarcts L>R. Cause likely cardio-embolic.   3/30/18 No new imaging or neurologic changes. Patient's ECHo was concerning for a stress induced cardiomyopathy, EF 15-20%. Patient likely needs PEG tube, still NPO. Disposition pending SNF. Still not anticoagulated at this time due to anemia, but primary team plans on starting soon when they feel H/H stable and appropriate.   3/31: awaiting peg tube. Hb dropped overnight from 8.4>>7.4. GI consulted and  recommended further evaluation of inflammatory changes of the pancreatic tail, recommending EUS or MRCP in future with PBS follow up outpatient. US ordered for inguinal hernia.   4/1/18 No acute neuro changes. Medical management per primary team. Plan for PEG tomorrow or Tuesday.   4/3/18: PEG today. No DVT in LUE. Hb stable. Primary team working on improved BP control, started Lisinopril. On Metoprolol for NSVT. Concern for dellerium.   4/5/18 TF initiated.  Leukocytosis stable.  Restarted Eliquis.  H&H remains low but stable.  No new symptoms.    STROKE DOCUMENTATION   Acute Stroke Times   Last Known Normal Date: 03/22/18  Last Known Normal Time: 2100  Symptom Onset Date: 03/22/18  Symptom Onset Time: 2100  Stroke Team Called Date: 03/23/18  Stroke Team Called Time: 1555  Stroke Team Arrival Date: 03/23/18  Stroke Team Arrival Time: 1555  CT Interpretation Time: 1610  Decision to Treat Time for IR:  (Not a candidate)    NIH Scale:  1a. Level Of Consciousness: 0-->Alert: keenly responsive  1b. LOC Questions: 2-->Answers neither question correctly  1c. LOC Commands: 1-->Performs one task correctly  2. Best Gaze: 1-->Partial gaze palsy: gaze is abnormal in one or both eyes, but forced deviation or total gaze paresis is not present  3. Visual: 0-->No visual loss  4. Facial Palsy: 2-->Partial paralysis (total or near-total paralysis of lower face)  5a. Motor Arm, Left: 4-->No movement  5b. Motor Arm, Right: 1-->Drift: limb holds 90 (or 45) degrees, but drifts down before full 10 secs: does not hit bed or other support  6a. Motor Leg, Left: 4-->No movement  6b. Motor Leg, Right: 3-->No effort against gravity: leg falls to bed immediately  7. Limb Ataxia: 0-->Absent  8. Sensory: 0-->Normal: no sensory loss  9. Best Language: 2-->Severe aphasia: all communication is through fragmentary expression: great need for inference, questioning, and guessing by the listener. Range of information that can be exchanged is limited:  listener carries burden of. . . (see row details)  10. Dysarthria: 2-->Severe dysarthria: patients speech is so slurred as to be unintelligible in the absence of or out of proportion to any dysphasia, or is mute/anarthric  11. Extinction and Inattention (formerly Neglect): 1-->Visual, tactile, auditory, spatial, or personal inattention or extinction to bilateral simultaneous stimulation in one of the sensory modalities  Total (NIH Stroke Scale): 23       Modified Isabel    Art Coma Scale:    ABCD2 Score:    [id number] Score:   HAS -BLED Score:   ICH Score:   Hunt & Connors Classification:      Hemorrhagic change of an Ischemic Stroke: Does this patient have an ischemic stroke with hemorrhagic changes? No     Neurologic Chief Complaint: L sided weakness, dysphagia, dysarthria, confusion    Subjective:     Interval History: Patient is seen for follow-up neurological assessment and treatment recommendations:   No acute issues or events overnight.  TF initiated.  Leukocytosis stable.  Restarted Eliquis.  H&H remains low but stable.  No new symptoms.    HPI, Past Medical, Family, and Social History remains the same as documented in the initial encounter.     Review of Systems   Constitutional: Negative for fever.   HENT: Positive for trouble swallowing. Negative for nosebleeds.    Eyes: Positive for visual disturbance.   Neurological: Positive for facial asymmetry, speech difficulty and weakness.   Psychiatric/Behavioral: Negative for agitation.        Scheduled Meds:   amLODIPine  5 mg Per G Tube Daily    apixaban  5 mg Oral BID    B-complex with vitamin C  1 tablet Per G Tube Daily    ferrous sulfate  300 mg Per G Tube BID    folic acid  1 mg Per G Tube Daily    lisinopril  20 mg Per G Tube Daily    metoprolol tartrate  50 mg Per G Tube BID    multivitamin liquid no.118  10 mL Per G Tube Daily    pantoprazole  40 mg Per G Tube BID    polyethylene glycol  17 g Per G Tube Daily    senna-docusate 8.6-50 mg   1 tablet Per G Tube Daily    thiamine  100 mg Per G Tube Daily     Continuous Infusions:  PRN Meds:acetaminophen, dextrose 50%, glucagon (human recombinant), hydrALAZINE, insulin aspart U-100, ondansetron, oxyCODONE, varibar pudding Ba Sulfate, varibar thin liquid ba sulfate    Objective:     Vital Signs (Most Recent):  Temp: 98.2 °F (36.8 °C) (04/05/18 0814)  Pulse: 84 (04/05/18 0814)  Resp: 16 (04/05/18 0814)  BP: (!) 157/80 (04/05/18 0814)  SpO2: 97 % (04/05/18 0814)  BP Location: Right arm    Vital Signs Range (Last 24H):  Temp:  [97.2 °F (36.2 °C)-98.2 °F (36.8 °C)]   Pulse:  [77-96]   Resp:  [16-18]   BP: (138-194)/(73-93)   SpO2:  [96 %-98 %]   BP Location: Right arm    Physical Exam   Constitutional: He appears well-developed and well-nourished. No distress.   Eyes: No scleral icterus.   Cardiovascular: Normal rate.    Pulmonary/Chest: Effort normal. No respiratory distress.   Musculoskeletal: He exhibits no edema or deformity.   Skin: Skin is warm and dry.     Neurological Exam:   LOC: alert  Attention Span: poor  Language: Aphasic  Articulation: Severe dysarthria  Orientation: Not oriented to person, place, and time  EOM (CN III, IV, VI): Gaze preference Right  Facial Movement (CN VII): L facial droop  Motor: Arm left  Normal 0/5  Leg left  Normal 0/5  Arm right  Normal 3/5  Leg right Normal 2/5  Sensation: Withdraws in all 4 extremities    Laboratory:  CMP:     Recent Labs  Lab 04/05/18  0333   CALCIUM 9.8   ALBUMIN 2.3*   PROT 6.4      K 3.7   CO2 22*      BUN 23   CREATININE 0.9   ALKPHOS 60   ALT 25   AST 38   BILITOT 0.5     CBC:     Recent Labs  Lab 04/05/18  0333   WBC 12.18   RBC 3.80*   HGB 8.0*   HCT 27.7*   *   MCV 73*   MCH 21.1*   MCHC 28.9*     Lipid Panel: No results for input(s): CHOL, LDLCALC, HDL, TRIG in the last 168 hours.  Coagulation:     Recent Labs  Lab 04/02/18  0437   INR 1.0     Platelet Aggregation Study: No results for input(s): PLTAGG, PLTAGINTERP,  PLTAGREGLACO, ADPPLTAGGREG in the last 168 hours.  Hgb A1C: No results for input(s): HGBA1C in the last 168 hours.  TSH: No results for input(s): TSH in the last 168 hours.    Diagnostic Results     Brain Imaging     MRI Brain without Contrast Impression 3/24/18  Acute infarcts involving the frontal, parietal, and occipital lobes with involvement of the cerebellum, left greater than right.  These were seen to some extent on prior CTA.  No hemorrhagic conversion at this time.  The findings are concerning for extensive acute embolic stroke.  Moderate chronic microvascular ischemic changes.                      CTA Multiphase 3/23/18  Multifocal areas of abnormal decreased attenuation involving the left cerebellum, medulla, mike, and posterior right frontal cortex concerning for acute or subacute infarcts.  No significant mass effect, midline shift, or acute intracranial hemorrhage.  Generalized cerebral volume loss with a significant degree of chronic microvascular ischemic change, more prominent on the left.  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  Multifocal filling defects seen within the origin of the right subclavian artery, right common carotid, and left carotid bulb compatible with nonocclusive thrombi.  Additional smaller mural thrombi are seen along aortic arch.        ECHO 3/24/18:   CONCLUSIONS     1 - Consider Stress induced CM - Takotsubo CM.     2 - Eccentric LVH with severely depressed left ventricular systolic function (EF 15-20%).     3 - Normal RV size with moderately depressed right ventricular systolic function .     4 - Mild mitral regurgitation.     5 - Mild tricuspid regurgitation.     6 - Pulmonary hypertension. The estimated PA systolic pressure is 61 mmHg.     7 - Increased central venous pressure.     8 - No prior echo in our system       Zoila Seals NP  Comprehensive Stroke Center  Department of Vascular  Neurology   [facility]

## 2018-04-05 NOTE — ASSESSMENT & PLAN NOTE
- Nonproliferative, mild -Stroke risk factor  -Likely etiology of multiple embolic infarcts  -Apixaban 5mg

## 2018-04-05 NOTE — SUBJECTIVE & OBJECTIVE
Neurologic Chief Complaint: L sided weakness, dysphagia, dysarthria, confusion    Subjective:     Interval History: Patient is seen for follow-up neurological assessment and treatment recommendations:   No acute issues or events overnight.  TF initiated.  Leukocytosis stable.  Restarted Eliquis.  H&H remains low but stable.  No new symptoms.    HPI, Past Medical, Family, and Social History remains the same as documented in the initial encounter.     Review of Systems   Constitutional: Negative for fever.   HENT: Positive for trouble swallowing. Negative for nosebleeds.    Eyes: Positive for visual disturbance.   Neurological: Positive for facial asymmetry, speech difficulty and weakness.   Psychiatric/Behavioral: Negative for agitation.        Scheduled Meds:   amLODIPine  5 mg Per G Tube Daily    apixaban  5 mg Oral BID    B-complex with vitamin C  1 tablet Per G Tube Daily    ferrous sulfate  300 mg Per G Tube BID    folic acid  1 mg Per G Tube Daily    lisinopril  20 mg Per G Tube Daily    metoprolol tartrate  50 mg Per G Tube BID    multivitamin liquid no.118  10 mL Per G Tube Daily    pantoprazole  40 mg Per G Tube BID    polyethylene glycol  17 g Per G Tube Daily    senna-docusate 8.6-50 mg  1 tablet Per G Tube Daily    thiamine  100 mg Per G Tube Daily     Continuous Infusions:  PRN Meds:acetaminophen, dextrose 50%, glucagon (human recombinant), hydrALAZINE, insulin aspart U-100, ondansetron, oxyCODONE, varibar pudding Ba Sulfate, varibar thin liquid ba sulfate    Objective:     Vital Signs (Most Recent):  Temp: 98.2 °F (36.8 °C) (04/05/18 0814)  Pulse: 84 (04/05/18 0814)  Resp: 16 (04/05/18 0814)  BP: (!) 157/80 (04/05/18 0814)  SpO2: 97 % (04/05/18 0814)  BP Location: Right arm    Vital Signs Range (Last 24H):  Temp:  [97.2 °F (36.2 °C)-98.2 °F (36.8 °C)]   Pulse:  [77-96]   Resp:  [16-18]   BP: (138-194)/(73-93)   SpO2:  [96 %-98 %]   BP Location: Right arm    Physical Exam   Constitutional: He  appears well-developed and well-nourished. No distress.   Eyes: No scleral icterus.   Cardiovascular: Normal rate.    Pulmonary/Chest: Effort normal. No respiratory distress.   Musculoskeletal: He exhibits no edema or deformity.   Skin: Skin is warm and dry.     Neurological Exam:   LOC: alert  Attention Span: poor  Language: Aphasic  Articulation: Severe dysarthria  Orientation: Not oriented to person, place, and time  EOM (CN III, IV, VI): Gaze preference Right  Facial Movement (CN VII): L facial droop  Motor: Arm left  Normal 0/5  Leg left  Normal 0/5  Arm right  Normal 3/5  Leg right Normal 2/5  Sensation: Withdraws in all 4 extremities    Laboratory:  CMP:     Recent Labs  Lab 04/05/18  0333   CALCIUM 9.8   ALBUMIN 2.3*   PROT 6.4      K 3.7   CO2 22*      BUN 23   CREATININE 0.9   ALKPHOS 60   ALT 25   AST 38   BILITOT 0.5     CBC:     Recent Labs  Lab 04/05/18  0333   WBC 12.18   RBC 3.80*   HGB 8.0*   HCT 27.7*   *   MCV 73*   MCH 21.1*   MCHC 28.9*     Lipid Panel: No results for input(s): CHOL, LDLCALC, HDL, TRIG in the last 168 hours.  Coagulation:     Recent Labs  Lab 04/02/18  0437   INR 1.0     Platelet Aggregation Study: No results for input(s): PLTAGG, PLTAGINTERP, PLTAGREGLACO, ADPPLTAGGREG in the last 168 hours.  Hgb A1C: No results for input(s): HGBA1C in the last 168 hours.  TSH: No results for input(s): TSH in the last 168 hours.    Diagnostic Results     Brain Imaging     MRI Brain without Contrast Impression 3/24/18  Acute infarcts involving the frontal, parietal, and occipital lobes with involvement of the cerebellum, left greater than right.  These were seen to some extent on prior CTA.  No hemorrhagic conversion at this time.  The findings are concerning for extensive acute embolic stroke.  Moderate chronic microvascular ischemic changes.                      CTA Multiphase 3/23/18  Multifocal areas of abnormal decreased attenuation involving the left cerebellum, medulla,  mike, and posterior right frontal cortex concerning for acute or subacute infarcts.  No significant mass effect, midline shift, or acute intracranial hemorrhage.  Generalized cerebral volume loss with a significant degree of chronic microvascular ischemic change, more prominent on the left.  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  Multifocal filling defects seen within the origin of the right subclavian artery, right common carotid, and left carotid bulb compatible with nonocclusive thrombi.  Additional smaller mural thrombi are seen along aortic arch.        ECHO 3/24/18:   CONCLUSIONS     1 - Consider Stress induced CM - Takotsubo CM.     2 - Eccentric LVH with severely depressed left ventricular systolic function (EF 15-20%).     3 - Normal RV size with moderately depressed right ventricular systolic function .     4 - Mild mitral regurgitation.     5 - Mild tricuspid regurgitation.     6 - Pulmonary hypertension. The estimated PA systolic pressure is 61 mmHg.     7 - Increased central venous pressure.     8 - No prior echo in our system

## 2018-04-06 LAB
ALBUMIN SERPL BCP-MCNC: 2.4 G/DL
ALP SERPL-CCNC: 58 U/L
ALT SERPL W/O P-5'-P-CCNC: 26 U/L
ANION GAP SERPL CALC-SCNC: 9 MMOL/L
ANISOCYTOSIS BLD QL SMEAR: SLIGHT
AST SERPL-CCNC: 44 U/L
BACTERIA #/AREA URNS AUTO: ABNORMAL /HPF
BASOPHILS # BLD AUTO: 0.02 K/UL
BASOPHILS NFR BLD: 0.1 %
BILIRUB SERPL-MCNC: 0.3 MG/DL
BILIRUB UR QL STRIP: NEGATIVE
BUN SERPL-MCNC: 22 MG/DL
BURR CELLS BLD QL SMEAR: ABNORMAL
CALCIUM SERPL-MCNC: 10.2 MG/DL
CHLORIDE SERPL-SCNC: 112 MMOL/L
CLARITY UR REFRACT.AUTO: ABNORMAL
CO2 SERPL-SCNC: 19 MMOL/L
COLOR UR AUTO: ABNORMAL
CREAT SERPL-MCNC: 0.8 MG/DL
DIFFERENTIAL METHOD: ABNORMAL
EOSINOPHIL # BLD AUTO: 0.3 K/UL
EOSINOPHIL NFR BLD: 2.2 %
ERYTHROCYTE [DISTWIDTH] IN BLOOD BY AUTOMATED COUNT: 32.3 %
EST. GFR  (AFRICAN AMERICAN): >60 ML/MIN/1.73 M^2
EST. GFR  (NON AFRICAN AMERICAN): >60 ML/MIN/1.73 M^2
GLUCOSE SERPL-MCNC: 142 MG/DL
GLUCOSE UR QL STRIP: NEGATIVE
HCT VFR BLD AUTO: 31.2 %
HGB BLD-MCNC: 8.5 G/DL
HGB UR QL STRIP: NEGATIVE
HYPOCHROMIA BLD QL SMEAR: ABNORMAL
IMM GRANULOCYTES # BLD AUTO: 0.19 K/UL
IMM GRANULOCYTES NFR BLD AUTO: 1.3 %
KETONES UR QL STRIP: NEGATIVE
LEUKOCYTE ESTERASE UR QL STRIP: NEGATIVE
LYMPHOCYTES # BLD AUTO: 1.2 K/UL
LYMPHOCYTES NFR BLD: 7.9 %
MAGNESIUM SERPL-MCNC: 2.1 MG/DL
MCH RBC QN AUTO: 21 PG
MCHC RBC AUTO-ENTMCNC: 27.2 G/DL
MCV RBC AUTO: 77 FL
MICROSCOPIC COMMENT: ABNORMAL
MONOCYTES # BLD AUTO: 1.5 K/UL
MONOCYTES NFR BLD: 9.9 %
NEUTROPHILS # BLD AUTO: 11.8 K/UL
NEUTROPHILS NFR BLD: 78.6 %
NITRITE UR QL STRIP: POSITIVE
NRBC BLD-RTO: 0 /100 WBC
OVALOCYTES BLD QL SMEAR: ABNORMAL
PH UR STRIP: 7 [PH] (ref 5–8)
PHOSPHATE SERPL-MCNC: 2 MG/DL
PLATELET # BLD AUTO: 438 K/UL
PMV BLD AUTO: ABNORMAL FL
POCT GLUCOSE: 138 MG/DL (ref 70–110)
POCT GLUCOSE: 141 MG/DL (ref 70–110)
POCT GLUCOSE: 158 MG/DL (ref 70–110)
POCT GLUCOSE: 165 MG/DL (ref 70–110)
POIKILOCYTOSIS BLD QL SMEAR: SLIGHT
POLYCHROMASIA BLD QL SMEAR: ABNORMAL
POTASSIUM SERPL-SCNC: 4.8 MMOL/L
PROT SERPL-MCNC: 6.7 G/DL
PROT UR QL STRIP: NEGATIVE
RBC # BLD AUTO: 4.05 M/UL
RBC #/AREA URNS AUTO: 6 /HPF (ref 0–4)
SODIUM SERPL-SCNC: 140 MMOL/L
SP GR UR STRIP: 1.01 (ref 1–1.03)
SPHEROCYTES BLD QL SMEAR: ABNORMAL
URN SPEC COLLECT METH UR: ABNORMAL
UROBILINOGEN UR STRIP-ACNC: ABNORMAL EU/DL
WBC # BLD AUTO: 15.03 K/UL

## 2018-04-06 PROCEDURE — 99232 SBSQ HOSP IP/OBS MODERATE 35: CPT | Mod: ,,, | Performed by: HOSPITALIST

## 2018-04-06 PROCEDURE — 63600175 PHARM REV CODE 636 W HCPCS: Performed by: PSYCHIATRY & NEUROLOGY

## 2018-04-06 PROCEDURE — 83735 ASSAY OF MAGNESIUM: CPT

## 2018-04-06 PROCEDURE — 87077 CULTURE AEROBIC IDENTIFY: CPT

## 2018-04-06 PROCEDURE — 11000001 HC ACUTE MED/SURG PRIVATE ROOM

## 2018-04-06 PROCEDURE — 25000003 PHARM REV CODE 250: Performed by: STUDENT IN AN ORGANIZED HEALTH CARE EDUCATION/TRAINING PROGRAM

## 2018-04-06 PROCEDURE — 85025 COMPLETE CBC W/AUTO DIFF WBC: CPT

## 2018-04-06 PROCEDURE — 87186 SC STD MICRODIL/AGAR DIL: CPT

## 2018-04-06 PROCEDURE — 80053 COMPREHEN METABOLIC PANEL: CPT

## 2018-04-06 PROCEDURE — 99233 SBSQ HOSP IP/OBS HIGH 50: CPT | Mod: ,,, | Performed by: PSYCHIATRY & NEUROLOGY

## 2018-04-06 PROCEDURE — 25000003 PHARM REV CODE 250: Performed by: HOSPITALIST

## 2018-04-06 PROCEDURE — 92507 TX SP LANG VOICE COMM INDIV: CPT

## 2018-04-06 PROCEDURE — 97112 NEUROMUSCULAR REEDUCATION: CPT

## 2018-04-06 PROCEDURE — 87088 URINE BACTERIA CULTURE: CPT

## 2018-04-06 PROCEDURE — 25000003 PHARM REV CODE 250: Performed by: NURSE PRACTITIONER

## 2018-04-06 PROCEDURE — 87086 URINE CULTURE/COLONY COUNT: CPT

## 2018-04-06 PROCEDURE — 81001 URINALYSIS AUTO W/SCOPE: CPT

## 2018-04-06 PROCEDURE — 36415 COLL VENOUS BLD VENIPUNCTURE: CPT

## 2018-04-06 PROCEDURE — 84100 ASSAY OF PHOSPHORUS: CPT

## 2018-04-06 RX ORDER — OXYCODONE HYDROCHLORIDE 5 MG/1
10 TABLET ORAL EVERY 6 HOURS PRN
Status: DISCONTINUED | OUTPATIENT
Start: 2018-04-06 | End: 2018-04-11 | Stop reason: HOSPADM

## 2018-04-06 RX ADMIN — MINERAL SUPPLEMENT IRON 300 MG / 5 ML STRENGTH LIQUID 100 PER BOX UNFLAVORED 300 MG: at 08:04

## 2018-04-06 RX ADMIN — MINERAL SUPPLEMENT IRON 300 MG / 5 ML STRENGTH LIQUID 100 PER BOX UNFLAVORED 300 MG: at 09:04

## 2018-04-06 RX ADMIN — HYDRALAZINE HYDROCHLORIDE 50 MG: 50 TABLET ORAL at 12:04

## 2018-04-06 RX ADMIN — LISINOPRIL 20 MG: 20 TABLET ORAL at 09:04

## 2018-04-06 RX ADMIN — PANTOPRAZOLE SODIUM 40 MG: 40 GRANULE, DELAYED RELEASE ORAL at 08:04

## 2018-04-06 RX ADMIN — INSULIN ASPART 1 UNITS: 100 INJECTION, SOLUTION INTRAVENOUS; SUBCUTANEOUS at 12:04

## 2018-04-06 RX ADMIN — OXYCODONE HYDROCHLORIDE 10 MG: 5 TABLET ORAL at 12:04

## 2018-04-06 RX ADMIN — METOPROLOL TARTRATE 50 MG: 50 TABLET, FILM COATED ORAL at 09:04

## 2018-04-06 RX ADMIN — OXYCODONE HYDROCHLORIDE 10 MG: 5 TABLET ORAL at 09:04

## 2018-04-06 RX ADMIN — METOPROLOL TARTRATE 50 MG: 50 TABLET, FILM COATED ORAL at 08:04

## 2018-04-06 RX ADMIN — AMLODIPINE BESYLATE 5 MG: 5 TABLET ORAL at 09:04

## 2018-04-06 RX ADMIN — VITAMIN C 1 TABLET: TAB at 09:04

## 2018-04-06 RX ADMIN — APIXABAN 5 MG: 5 TABLET, FILM COATED ORAL at 08:04

## 2018-04-06 RX ADMIN — PANTOPRAZOLE SODIUM 40 MG: 40 GRANULE, DELAYED RELEASE ORAL at 09:04

## 2018-04-06 RX ADMIN — OXYCODONE HYDROCHLORIDE 10 MG: 5 TABLET ORAL at 05:04

## 2018-04-06 RX ADMIN — Medication 10 ML: at 09:04

## 2018-04-06 RX ADMIN — Medication 100 MG: at 09:04

## 2018-04-06 RX ADMIN — APIXABAN 5 MG: 5 TABLET, FILM COATED ORAL at 09:04

## 2018-04-06 RX ADMIN — FOLIC ACID 1 MG: 1 TABLET ORAL at 09:04

## 2018-04-06 RX ADMIN — POLYETHYLENE GLYCOL 3350 17 G: 17 POWDER, FOR SOLUTION ORAL at 09:04

## 2018-04-06 RX ADMIN — STANDARDIZED SENNA CONCENTRATE AND DOCUSATE SODIUM 1 TABLET: 8.6; 5 TABLET, FILM COATED ORAL at 09:04

## 2018-04-06 NOTE — ASSESSMENT & PLAN NOTE
- Multifactorial; likely 2/2 stroke, pancreatitis, possible infectious cause  -WBC 15 today. Pending urine culture per primary team

## 2018-04-06 NOTE — SUBJECTIVE & OBJECTIVE
Interval History: please see hospital course    Review of Systems   Unable to perform ROS: Mental status change     Objective:     Vital Signs (Most Recent):  Temp: 98.4 °F (36.9 °C) (04/06/18 1113)  Pulse: 76 (04/06/18 1113)  Resp: 18 (04/06/18 1113)  BP: 127/84 (04/06/18 1113)  SpO2: 99 % (04/06/18 1113) Vital Signs (24h Range):  Temp:  [97 °F (36.1 °C)-99.5 °F (37.5 °C)] 98.4 °F (36.9 °C)  Pulse:  [70-87] 76  Resp:  [16-18] 18  SpO2:  [94 %-99 %] 99 %  BP: (127-196)/(76-92) 127/84     Weight: 64.8 kg (142 lb 13.7 oz)  Body mass index is 21.73 kg/m².    Intake/Output Summary (Last 24 hours) at 04/06/18 1125  Last data filed at 04/06/18 0612   Gross per 24 hour   Intake                0 ml   Output              850 ml   Net             -850 ml      Physical Exam   Constitutional: He is oriented to person, place, and time. He appears well-developed.   HENT:   Head: Atraumatic.   Eyes: Pupils are equal, round, and reactive to light.   Patient with rightward gaze preference  Arcus senilis bilaterally   Neck: No JVD present.   Cardiovascular: Normal rate, regular rhythm, S1 normal, S2 normal and intact distal pulses.    Pulses:       Radial pulses are 2+ on the right side, and 2+ on the left side.        Dorsalis pedis pulses are 1+ on the right side, and 1+ on the left side.   Pulmonary/Chest: Effort normal and breath sounds normal.   Abdominal: Soft. Bowel sounds are normal. He exhibits no distension. There is tenderness in the suprapubic area and left lower quadrant. There is no guarding.       Genitourinary:   Genitourinary Comments: Condom catheter not in place   Musculoskeletal: He exhibits no edema.   Neurological: He is alert and oriented to person, place, and time. He exhibits abnormal muscle tone. GCS eye subscore is 4. GCS verbal subscore is 3. GCS motor subscore is 6.   Only oriented to person. Expressive and receptive aphasia. LUE weak - 0/5 strength in Left hand     Skin: Skin is warm. No rash noted.    Psychiatric: His speech is slurred.   Nursing note and vitals reviewed.      Significant Labs:   Recent Results (from the past 24 hour(s))   POCT glucose    Collection Time: 04/05/18  1:22 PM   Result Value Ref Range    POCT Glucose 161 (H) 70 - 110 mg/dL   POCT glucose    Collection Time: 04/05/18  4:59 PM   Result Value Ref Range    POCT Glucose 153 (H) 70 - 110 mg/dL   POCT glucose    Collection Time: 04/06/18 12:10 AM   Result Value Ref Range    POCT Glucose 165 (H) 70 - 110 mg/dL   Comprehensive metabolic panel    Collection Time: 04/06/18  4:18 AM   Result Value Ref Range    Sodium 140 136 - 145 mmol/L    Potassium 4.8 3.5 - 5.1 mmol/L    Chloride 112 (H) 95 - 110 mmol/L    CO2 19 (L) 23 - 29 mmol/L    Glucose 142 (H) 70 - 110 mg/dL    BUN, Bld 22 8 - 23 mg/dL    Creatinine 0.8 0.5 - 1.4 mg/dL    Calcium 10.2 8.7 - 10.5 mg/dL    Total Protein 6.7 6.0 - 8.4 g/dL    Albumin 2.4 (L) 3.5 - 5.2 g/dL    Total Bilirubin 0.3 0.1 - 1.0 mg/dL    Alkaline Phosphatase 58 55 - 135 U/L    AST 44 (H) 10 - 40 U/L    ALT 26 10 - 44 U/L    Anion Gap 9 8 - 16 mmol/L    eGFR if African American >60.0 >60 mL/min/1.73 m^2    eGFR if non African American >60.0 >60 mL/min/1.73 m^2   CBC auto differential    Collection Time: 04/06/18  4:18 AM   Result Value Ref Range    WBC 15.03 (H) 3.90 - 12.70 K/uL    RBC 4.05 (L) 4.60 - 6.20 M/uL    Hemoglobin 8.5 (L) 14.0 - 18.0 g/dL    Hematocrit 31.2 (L) 40.0 - 54.0 %    MCV 77 (L) 82 - 98 fL    MCH 21.0 (L) 27.0 - 31.0 pg    MCHC 27.2 (L) 32.0 - 36.0 g/dL    RDW 32.3 (H) 11.5 - 14.5 %    Platelets 438 (H) 150 - 350 K/uL    MPV SEE COMMENT 9.2 - 12.9 fL    Immature Granulocytes 1.3 (H) 0.0 - 0.5 %    Gran # (ANC) 11.8 (H) 1.8 - 7.7 K/uL    Immature Grans (Abs) 0.19 (H) 0.00 - 0.04 K/uL    Lymph # 1.2 1.0 - 4.8 K/uL    Mono # 1.5 (H) 0.3 - 1.0 K/uL    Eos # 0.3 0.0 - 0.5 K/uL    Baso # 0.02 0.00 - 0.20 K/uL    nRBC 0 0 /100 WBC    Gran% 78.6 (H) 38.0 - 73.0 %    Lymph% 7.9 (L) 18.0 - 48.0 %     Mono% 9.9 4.0 - 15.0 %    Eosinophil% 2.2 0.0 - 8.0 %    Basophil% 0.1 0.0 - 1.9 %    [place]iso Slight     Poik Slight     Poly Occasional     Hypo Occasional     Ovalocytes Occasional     [place] Cells Occasional     Spherocytes Occasional     Differential Method Automated    Magnesium    Collection Time: 04/06/18  4:18 AM   Result Value Ref Range    Magnesium 2.1 1.6 - 2.6 mg/dL   Phosphorus    Collection Time: 04/06/18  4:18 AM   Result Value Ref Range    Phosphorus 2.0 (L) 2.7 - 4.5 mg/dL   POCT glucose    Collection Time: 04/06/18  6:08 AM   Result Value Ref Range    POCT Glucose 141 (H) 70 - 110 mg/dL         Significant Imaging: I have reviewed all pertinent imaging results/findings within the past 24 hours.

## 2018-04-06 NOTE — ASSESSMENT & PLAN NOTE
79 y/o male with medical history of HTN presented to OSH in [place] ([facility]) with left-sided weakness, dysarthria, anemia, and acute pancreatitis. Pt had evidence of left subacute cerebellar infarct and right cerebral infarct on head CT. He was out of the window for tPA and was sent to [facility] emergently for CTA Stroke Multiphase and possible endovascular intervention. CTA stroke multiphase with no LVO, no intervention planned. MRI here with bilateral frontal, parietal, occipital, cerebellar infarcts L>R.   Image suggestive of acute extensive acute embolic stroke. Likely cardioembolic, documented afib in chart. ECHO shows EF 15-20% concerning for stress induced cardiomyopathy.  Anticoagulation resumed.  No new neuro symptoms.  Continues with significant deficits.    Antithrombotics for secondary stroke prevention: Apixaban 5mg  Statins for secondary stroke prevention and hyperlipidemia, if present: Statins: Atorvastatin- 40 mg daily LDL = 70   Aggressive risk factor modification: HTN, hypercoagulable state with pancreatitis   Rehab efforts: PT/OT/SLP to evaluate and treat - Rec SNF  Diagnostics ordered/pending: none   VTE prophylaxis: SCDs  BP parameters: resume home meds to normalize blood pressure

## 2018-04-06 NOTE — ASSESSMENT & PLAN NOTE
Pending placement, Stable   - 03/23/18 CTA Stroke Multiphase with multiple non-occlusive thrombi noted  - MRI brain w/o show multiple infarcts without evidence of hemorrhagic conversion.  - Given recent in-hospital atrial fibrillation and takotsubo's CM suspicion for embolic stroke, in addition 2D Echo showed severely depressed LVEF 15-20% with evidence of stress CM  - PT/OT reordered - PT working with patient, recommending SNF - needs maximal assistance with all moves  - Continue eliquis 5mg BID - follow weight closely for need to change dosing

## 2018-04-06 NOTE — ASSESSMENT & PLAN NOTE
Stable, awaiting placement.    - Likely 2/2 sequelae of embolic stroke  - worse encephalopathy today, only able to answer his name appropriately, concern for delirium vs infectious vs IC etiology

## 2018-04-06 NOTE — PT/OT/SLP PROGRESS
Physical Therapy      Patient Name:  Zion Hines   MRN:  [id number]    Patient not seen today secondary to pt in care of nursing first attempt. Was unable to return for second visit. Will follow-up at next scheduled visit per PT POC.    Florencia Perry, FREDO

## 2018-04-06 NOTE — PROGRESS NOTES
[facility] Hospital Medicine  Progress Note    Patient Name: Zion Hines  MRN: [id number]  Patient Class: IP- Inpatient   Admission Date: 3/23/2018  Length of Stay: 14 days  Attending Physician: Tess Andrews MD  Primary Care Provider: Primary Doctor [facility] Medicine Team: [facility] 2 Riley Milner MD    Subjective:     Principal Problem:Embolic stroke involving right middle cerebral artery    HPI:  79 yo M with PMHx HTN known to be non-compliant with medications and remote hx of anemia presents to [facility] 18 as a transfer from [facility].  Patient is a poor historian with no family at bedside, hx largely obtained from OSH records.  He presented to OSH originally with symptoms of weakness and fatigue, his daughter brought him to ED thinking he was likely anemic again.  It was noted that patient had some dysarthria and aphasia (unspecified expressive vs receptive) with L-sided weakness.  He was out of the window for tPA and was sent to [facility] emergently for CTA Stroke Multiphase and possible endovascular intervention. CTA stroke multiphase with no LVO, no intervention planned.  Of note, patient is anemic per OSH labs with a Hgb of 5.8 g/dL and likely has pancreatitis with lipase 3738, WBC count of 15.8 k/uL, and CT abdomen showing peripancreatic fluid around the tail.  He was transfused a unit of PRBCs prior to transfer.  Labs were otherwise largely unremarkable.  On presentation to [facility], he is able to move both BLE but complains of pain in BLE.  Not moving LUE but is intact to noxious stimuli.  Has a R gaze preference that is not overcome by VOR testing and complaint of neck pain with moving head side-to-side.  Otherwise has mild abdominal distension with mild hypogastric tenderness to palpation. HTN to SBP 200s.  Of note, patient's wife  recently and family expressed concern in outpatient ED that patient has not been eating much for the past month but deny  EtOH use.    Hospital Course:  03/23/18:  Admission to Neuro ICU 2/2 concern for stroke with multiple concerns for patient to become unstable--pancreatitis, severe anemia, HTN.  03/24: has had no interval development of multiorgan involvement form pancreatitis, drop in hemoglobin secondary to erosive gastritis, transfused and on protonix bid  03/25: Patient on dilt drip for a-fib. Repeat EKG shows NSR.  3/26: Discontinuing diltiazem drip today. Will continue monitor HR. Continue IV fluids 50 cc/hr for pancreatitis. Repeat lipase.   3/28: NAEON. VSSA. No overt signs of bleeding. Neuro exam unchanged from prior documented exams. Continues to not follow commands, continues to answer questions inappropriately. Remains effectively hemiplegic on left. No further ICU needs. S/D today to hospital medicine after d/w vascular neurology.  03/29/2018 VSS, no signs of blood loss in stool. Loose stools on examination. NG tube replaced and xray confirmed in duodenum - retracted. Will make definitive decision on anticoagulation 3/30. Furthering discussions with family regarding definitive feeding  03/30/2018 VSS, no signs of blood loss on ZENIA. NG tube replaced after patient pulled O/N. Confirmed placement.Family desires PEG tube placed. GI consulted for placement on Monday.   03/31/2018 VSS. Drop in Hb from 8.1 to 7.4 overnight. TF continued. Holding anticoagulation due to worsening anemia. GI recommendations for further evaluation of inflammatory changes of the pancreatic tail, recommending EUS or MRCP in future with PBS follow up outpatient. New fluid filled hernia noted on exam - US ordered  04/02/2018 PEG placement today. WBC 15. Hb improving. Metoprolol changes to 50mg BID, Lisinopril increased 20 mg QD.   04/03/2018 PEG placement scheduled for today. Leukocytosis stable. Hb stable. Added Norvasc for BP control today. Will monitor. Unable to contact any family members since yesterday afternoon.   04/04/2018 PEG in place, OK to  use for TF, Meds etc. Leukocytosis stable. Hb stable - so signs of bleeding, will start Eliquis for his anticoagulation post stroke.   04/06/2018 patient continues on TF, interval increase in leukocyte count, hb stable, patient less responsive and less appropriate today, CXR with no edema or consolidation, CT Head non con for change in responsiveness    Interval History: please see hospital course    Review of Systems   Unable to perform ROS: Mental status change     Objective:     Vital Signs (Most Recent):  Temp: 98.4 °F (36.9 °C) (04/06/18 1113)  Pulse: 76 (04/06/18 1113)  Resp: 18 (04/06/18 1113)  BP: 127/84 (04/06/18 1113)  SpO2: 99 % (04/06/18 1113) Vital Signs (24h Range):  Temp:  [97 °F (36.1 °C)-99.5 °F (37.5 °C)] 98.4 °F (36.9 °C)  Pulse:  [70-87] 76  Resp:  [16-18] 18  SpO2:  [94 %-99 %] 99 %  BP: (127-196)/(76-92) 127/84     Weight: 64.8 kg (142 lb 13.7 oz)  Body mass index is 21.73 kg/m².    Intake/Output Summary (Last 24 hours) at 04/06/18 1125  Last data filed at 04/06/18 0612   Gross per 24 hour   Intake                0 ml   Output              850 ml   Net             -850 ml      Physical Exam   Constitutional: He is oriented to person, place, and time. He appears well-developed.   HENT:   Head: Atraumatic.   Eyes: Pupils are equal, round, and reactive to light.   Patient with rightward gaze preference  Arcus senilis bilaterally   Neck: No JVD present.   Cardiovascular: Normal rate, regular rhythm, S1 normal, S2 normal and intact distal pulses.    Pulses:       Radial pulses are 2+ on the right side, and 2+ on the left side.        Dorsalis pedis pulses are 1+ on the right side, and 1+ on the left side.   Pulmonary/Chest: Effort normal and breath sounds normal.   Abdominal: Soft. Bowel sounds are normal. He exhibits no distension. There is tenderness in the suprapubic area and left lower quadrant. There is no guarding.       Genitourinary:   Genitourinary Comments: Condom catheter not in place    Musculoskeletal: He exhibits no edema.   Neurological: He is alert and oriented to person, place, and time. He exhibits abnormal muscle tone. GCS eye subscore is 4. GCS verbal subscore is 3. GCS motor subscore is 6.   Only oriented to person. Expressive and receptive aphasia. LUE weak - 0/5 strength in Left hand     Skin: Skin is warm. No rash noted.   Psychiatric: His speech is slurred.   Nursing note and vitals reviewed.      Significant Labs:   Recent Results (from the past 24 hour(s))   POCT glucose    Collection Time: 04/05/18  1:22 PM   Result Value Ref Range    POCT Glucose 161 (H) 70 - 110 mg/dL   POCT glucose    Collection Time: 04/05/18  4:59 PM   Result Value Ref Range    POCT Glucose 153 (H) 70 - 110 mg/dL   POCT glucose    Collection Time: 04/06/18 12:10 AM   Result Value Ref Range    POCT Glucose 165 (H) 70 - 110 mg/dL   Comprehensive metabolic panel    Collection Time: 04/06/18  4:18 AM   Result Value Ref Range    Sodium 140 136 - 145 mmol/L    Potassium 4.8 3.5 - 5.1 mmol/L    Chloride 112 (H) 95 - 110 mmol/L    CO2 19 (L) 23 - 29 mmol/L    Glucose 142 (H) 70 - 110 mg/dL    BUN, Bld 22 8 - 23 mg/dL    Creatinine 0.8 0.5 - 1.4 mg/dL    Calcium 10.2 8.7 - 10.5 mg/dL    Total Protein 6.7 6.0 - 8.4 g/dL    Albumin 2.4 (L) 3.5 - 5.2 g/dL    Total Bilirubin 0.3 0.1 - 1.0 mg/dL    Alkaline Phosphatase 58 55 - 135 U/L    AST 44 (H) 10 - 40 U/L    ALT 26 10 - 44 U/L    Anion Gap 9 8 - 16 mmol/L    eGFR if African American >60.0 >60 mL/min/1.73 m^2    eGFR if non African American >60.0 >60 mL/min/1.73 m^2   CBC auto differential    Collection Time: 04/06/18  4:18 AM   Result Value Ref Range    WBC 15.03 (H) 3.90 - 12.70 K/uL    RBC 4.05 (L) 4.60 - 6.20 M/uL    Hemoglobin 8.5 (L) 14.0 - 18.0 g/dL    Hematocrit 31.2 (L) 40.0 - 54.0 %    MCV 77 (L) 82 - 98 fL    MCH 21.0 (L) 27.0 - 31.0 pg    MCHC 27.2 (L) 32.0 - 36.0 g/dL    RDW 32.3 (H) 11.5 - 14.5 %    Platelets 438 (H) 150 - 350 K/uL    MPV SEE COMMENT 9.2  - 12.9 fL    Immature Granulocytes 1.3 (H) 0.0 - 0.5 %    Gran # (ANC) 11.8 (H) 1.8 - 7.7 K/uL    Immature Grans (Abs) 0.19 (H) 0.00 - 0.04 K/uL    Lymph # 1.2 1.0 - 4.8 K/uL    Mono # 1.5 (H) 0.3 - 1.0 K/uL    Eos # 0.3 0.0 - 0.5 K/uL    Baso # 0.02 0.00 - 0.20 K/uL    nRBC 0 0 /100 WBC    Gran% 78.6 (H) 38.0 - 73.0 %    Lymph% 7.9 (L) 18.0 - 48.0 %    Mono% 9.9 4.0 - 15.0 %    Eosinophil% 2.2 0.0 - 8.0 %    Basophil% 0.1 0.0 - 1.9 %    Aniso Slight     Poik Slight     Poly Occasional     Hypo Occasional     Ovalocytes Occasional     Joe Cells Occasional     Spherocytes Occasional     Differential Method Automated    Magnesium    Collection Time: 04/06/18  4:18 AM   Result Value Ref Range    Magnesium 2.1 1.6 - 2.6 mg/dL   Phosphorus    Collection Time: 04/06/18  4:18 AM   Result Value Ref Range    Phosphorus 2.0 (L) 2.7 - 4.5 mg/dL   POCT glucose    Collection Time: 04/06/18  6:08 AM   Result Value Ref Range    POCT Glucose 141 (H) 70 - 110 mg/dL         Significant Imaging: I have reviewed all pertinent imaging results/findings within the past 24 hours.    Assessment/Plan:      * Embolic stroke involving right middle cerebral artery    Pending placement, Stable   - 03/23/18 CTA Stroke Multiphase with multiple non-occlusive thrombi noted  - MRI brain w/o show multiple infarcts without evidence of hemorrhagic conversion.  - Given recent in-hospital atrial fibrillation and takotsubo's CM suspicion for embolic stroke, in addition 2D Echo showed severely depressed LVEF 15-20% with evidence of stress CM  - PT/OT reordered - PT working with patient, recommending SNF - needs maximal assistance with all moves  - Continue eliquis 5mg BID - follow weight closely for need to change dosing          Inguinal hernia of right side without obstruction or gangrene    - feels fluid filled on exam  - US ordered consistent with fluid filled channel in inguinal canals, findings unspecific.         Oropharyngeal dysphagia    S/p peg  tube           Cytotoxic cerebral edema              Chronic systolic HF (heart failure)    - unknown if this is chronic issue or if apical ballooning and depressed EF is secondary to emotional event and passing of wife or if due to neurological event with stroke and cytotoxic edema and ICU stay as both are known to cause stress cardiomyopathy  - will need f/u echo to evaluate EF and recovery           NSVT (nonsustained ventricular tachycardia)    - patient with daily episodes of NSVT  - remains on metoprolol for Afib w RVR  - unable to obtain ROS if symptomatic during episodes  - on telemetry - will monitor          Takotsubo cardiomyopathy    - pt with loss of wife 2/2018, now with dilated cardiomyopathy EF 15-20 percent and imaging suggestive of takotsubo CM  - will need repeat echo to evaluate improvement  - embolic episode may have been exacerbated by new onset CM   - elevated ePAP 61          Alcohol abuse    - unable to obtain alcohol history from patient          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    - Patient noted to be in Afib with RVR 3/25  - In NSR this AM with rate of 70s-90s, short runs of NSVT  - Would recommend anticoaulation - If anticoagulation is initiated for Afib, Eliquis is $8.35/month--Eliquis was shown to have similar GIB risk as warfarin in [facility]; other DOACs shown to have increased risk compared to warfarin in major Afib studies - is 80 but Cr <1.5 and weight >60kg so 5mg BID dosing   - cardiac monitoring  - Currently on metoprolol 50mg BID for rate control               Anemia    - has iron def anemia, continue iron replacement  - unknown baseline hb, no melena or bloody bowel movements reported while IP  - no known history of GIB or  varices  - Hb 9, on iron replacement via NG  - ZENIA negative for black/red stool        Essential hypertension     - SBP goal <180, prn labetalol ordered  - Has not required PRNS  - Now on metoprolol 50mg BID   - Lisinopril 20mg QD  - Norvasc 5mg QD  - has had multiple short runs of NSVT - patient unable to provide review if symptomatic          Acute encephalopathy    Stable, awaiting placement.    - Likely 2/2 sequelae of embolic stroke  - worse encephalopathy today, only able to answer his name appropriately, concern for delirium vs infectious vs IC etiology  - check UA and CT head non con             VTE Risk Mitigation         Ordered     apixaban tablet 5 mg  2 times daily     Route:  Per G Tube        04/06/18 0820              Riley Milner MD  Department of Hospital Medicine   [facility]

## 2018-04-06 NOTE — SUBJECTIVE & OBJECTIVE
Interval History: please see hospital course    Review of Systems   Unable to perform ROS: Mental status change     Objective:     Vital Signs (Most Recent):  Temp: 98.4 °F (36.9 °C) (04/06/18 0800)  Pulse: 87 (04/06/18 0800)  Resp: 16 (04/06/18 0800)  BP: (!) 159/83 (04/06/18 0800)  SpO2: 97 % (04/06/18 0800) Vital Signs (24h Range):  Temp:  [97 °F (36.1 °C)-99.5 °F (37.5 °C)] 98.4 °F (36.9 °C)  Pulse:  [70-87] 87  Resp:  [16-18] 16  SpO2:  [94 %-99 %] 97 %  BP: (146-196)/(76-92) 159/83     Weight: 64.8 kg (142 lb 13.7 oz)  Body mass index is 21.73 kg/m².    Intake/Output Summary (Last 24 hours) at 04/06/18 0817  Last data filed at 04/06/18 0612   Gross per 24 hour   Intake                0 ml   Output              850 ml   Net             -850 ml      Physical Exam   Constitutional: He is oriented to person, place, and time. He appears well-developed.   HENT:   Head: Atraumatic.   Eyes: Pupils are equal, round, and reactive to light.   Patient with rightward gaze preference  Arcus senilis bilaterally   Neck: No JVD present.   Cardiovascular: Normal rate, regular rhythm, S1 normal, S2 normal and intact distal pulses.    Pulses:       Radial pulses are 2+ on the right side, and 2+ on the left side.        Dorsalis pedis pulses are 1+ on the right side, and 1+ on the left side.   Pulmonary/Chest: Effort normal and breath sounds normal.   Abdominal: Soft. Bowel sounds are normal. He exhibits no distension. There is tenderness in the suprapubic area and left lower quadrant. There is no guarding.       Genitourinary:   Genitourinary Comments: Condom catheter not in place   Musculoskeletal: He exhibits no edema.   Neurological: He is alert and oriented to person, place, and time. He exhibits abnormal muscle tone. GCS eye subscore is 4. GCS verbal subscore is 3. GCS motor subscore is 6.   Only oriented to person. Expressive and receptive aphasia. LUE weak - 0/5 strength in Left hand     Skin: Skin is warm. No rash noted.    Psychiatric: His speech is slurred.   Nursing note and vitals reviewed.      Significant Labs:   Recent Results (from the past 24 hour(s))   POCT glucose    Collection Time: 04/05/18  1:22 PM   Result Value Ref Range    POCT Glucose 161 (H) 70 - 110 mg/dL   POCT glucose    Collection Time: 04/05/18  4:59 PM   Result Value Ref Range    POCT Glucose 153 (H) 70 - 110 mg/dL   POCT glucose    Collection Time: 04/06/18 12:10 AM   Result Value Ref Range    POCT Glucose 165 (H) 70 - 110 mg/dL   Comprehensive metabolic panel    Collection Time: 04/06/18  4:18 AM   Result Value Ref Range    Sodium 140 136 - 145 mmol/L    Potassium 4.8 3.5 - 5.1 mmol/L    Chloride 112 (H) 95 - 110 mmol/L    CO2 19 (L) 23 - 29 mmol/L    Glucose 142 (H) 70 - 110 mg/dL    BUN, Bld 22 8 - 23 mg/dL    Creatinine 0.8 0.5 - 1.4 mg/dL    Calcium 10.2 8.7 - 10.5 mg/dL    Total Protein 6.7 6.0 - 8.4 g/dL    Albumin 2.4 (L) 3.5 - 5.2 g/dL    Total Bilirubin 0.3 0.1 - 1.0 mg/dL    Alkaline Phosphatase 58 55 - 135 U/L    AST 44 (H) 10 - 40 U/L    ALT 26 10 - 44 U/L    Anion Gap 9 8 - 16 mmol/L    eGFR if African American >60.0 >60 mL/min/1.73 m^2    eGFR if non African American >60.0 >60 mL/min/1.73 m^2   CBC auto differential    Collection Time: 04/06/18  4:18 AM   Result Value Ref Range    WBC 15.03 (H) 3.90 - 12.70 K/uL    RBC 4.05 (L) 4.60 - 6.20 M/uL    Hemoglobin 8.5 (L) 14.0 - 18.0 g/dL    Hematocrit 31.2 (L) 40.0 - 54.0 %    MCV 77 (L) 82 - 98 fL    MCH 21.0 (L) 27.0 - 31.0 pg    MCHC 27.2 (L) 32.0 - 36.0 g/dL    RDW 32.3 (H) 11.5 - 14.5 %    Platelets 438 (H) 150 - 350 K/uL    MPV SEE COMMENT 9.2 - 12.9 fL    Immature Granulocytes 1.3 (H) 0.0 - 0.5 %    Gran # (ANC) 11.8 (H) 1.8 - 7.7 K/uL    Immature Grans (Abs) 0.19 (H) 0.00 - 0.04 K/uL    Lymph # 1.2 1.0 - 4.8 K/uL    Mono # 1.5 (H) 0.3 - 1.0 K/uL    Eos # 0.3 0.0 - 0.5 K/uL    Baso # 0.02 0.00 - 0.20 K/uL    nRBC 0 0 /100 WBC    Gran% 78.6 (H) 38.0 - 73.0 %    Lymph% 7.9 (L) 18.0 - 48.0 %     Mono% 9.9 4.0 - 15.0 %    Eosinophil% 2.2 0.0 - 8.0 %    Basophil% 0.1 0.0 - 1.9 %    Aniso Slight     Poik Slight     Poly Occasional     Hypo Occasional     Ovalocytes Occasional     Leonard Cells Occasional     Spherocytes Occasional     Differential Method Automated    Magnesium    Collection Time: 04/06/18  4:18 AM   Result Value Ref Range    Magnesium 2.1 1.6 - 2.6 mg/dL   Phosphorus    Collection Time: 04/06/18  4:18 AM   Result Value Ref Range    Phosphorus 2.0 (L) 2.7 - 4.5 mg/dL   POCT glucose    Collection Time: 04/06/18  6:08 AM   Result Value Ref Range    POCT Glucose 141 (H) 70 - 110 mg/dL         Significant Imaging: I have reviewed all pertinent imaging results/findings within the past 24 hours.

## 2018-04-06 NOTE — PT/OT/SLP PROGRESS
Speech Language Pathology Treatment    Patient Name:  Zion Hines   MRN:  [id number]  Admitting Diagnosis: Embolic stroke involving right middle cerebral artery    Recommendations:                 General Recommendations:  Dysphagia therapy and Speech/language therapy  Diet recommendations:  NPO, Liquid Diet Level: NPO   Aspiration Precautions: Strict aspiration precautions   General Precautions: Standard, aspiration, fall  Communication strategies:  provide increased time to answer    Subjective       Patient goals: did not state  Pain/Comfort:  · Pain Rating 1: 0/10  · Pain Rating Post-Intervention 1: 0/10    Objective:     Has the patient been evaluated by SLP for swallowing?   Yes  Keep patient NPO? Yes   Current Respiratory Status: room air    Pt somnolent u dacia entering room with max cues needed to rouse. Throughout session, he did not remain alert requiring max cues.  Unintelligible vocalization eliicted in spontaneous contexts only with wet vocal quality noted.  Pt. Did not follow simple commands and did not produce differentiated response to simple yes/no questions.  No initiation of communication noted.  No po intake attempted.      Assessment:     Zion Hines is a 78 y.o. male with an SLP diagnosis of Dysphagia, Dysarthria and Cognitive-Linguistic Impairment.    Goals:    SLP Goals        Problem: SLP Goal    Goal Priority Disciplines Outcome   SLP Goal     SLP Ongoing (interventions implemented as appropriate)   Description:  Speech Language Pathology   Goal expected to be met by 4/8  1. Patient will answer orientation questions x4 given mod A.   2. Given model, patient will follow 1 step directions with 90% acc.   3. Given repetitions x2 max, patient will answer simple y/n questions with 90% acc.  4. Given phonemic cuing, patient will complete simple naming tasks with 80% acc.   5. Given verbal and visual cueing, patient will complete rote speech tasks with 80% acc.    6.  Participate in ongoing assesment  of swallow                               Plan:     · Patient to be seen:  3 x/week   · Plan of Care expires:  04/30/18  · Plan of Care reviewed with:  patient   · SLP Follow-Up:  Yes       Discharge recommendations:  nursing facility, skilled       Time Tracking:     SLP Treatment Date:   04/06/18  Speech Start Time:  1055  Speech Stop Time:  1105     Speech Total Time (min):  10 min    Billable Minutes: Speech Therapy Individual 10    Corine Guerrero MA, CCC-SLP  04/06/2018

## 2018-04-06 NOTE — PROGRESS NOTES
[facility]  Vascular Neurology  Comprehensive Stroke Center  Progress Note    Assessment/Plan:     * Embolic stroke involving right middle cerebral artery    79 y/o male with medical history of HTN presented to OSH in [place] ([facility]) with left-sided weakness, dysarthria, anemia, and acute pancreatitis. Pt had evidence of left subacute cerebellar infarct and right cerebral infarct on head CT. He was out of the window for tPA and was sent to [facility] emergently for CTA Stroke Multiphase and possible endovascular intervention. CTA stroke multiphase with no LVO, no intervention planned. MRI here with bilateral frontal, parietal, occipital, cerebellar infarcts L>R.   Image suggestive of acute extensive acute embolic stroke. Likely cardioembolic, documented afib in chart. ECHO shows EF 15-20% concerning for stress induced cardiomyopathy.  Anticoagulation resumed.  No new neuro symptoms.  Continues with significant deficits.    Antithrombotics for secondary stroke prevention: Apixaban 5mg  Statins for secondary stroke prevention and hyperlipidemia, if present: Statins: Atorvastatin- 40 mg daily LDL = 70   Aggressive risk factor modification: HTN, hypercoagulable state with pancreatitis   Rehab efforts: PT/OT/SLP to evaluate and treat - Rec SNF  Diagnostics ordered/pending: none   VTE prophylaxis: SCDs  BP parameters: resume home meds to normalize blood pressure        Cytotoxic cerebral edema    -2/2 ischemic stroke  -Noted on imaging   -monitoring        Inguinal hernia of right side without obstruction or gangrene    -US consistent with fluid-filled channel in inguinal canals, findings unspecific  -Management per primary team        Oropharyngeal dysphagia    -NPO per speech  -PEG 4/3/17  -TF initiated        Chronic systolic HF (heart failure)    -Most recent ECHO shows EF 15-20%  -Suggestive of stress-induced cardiomyopathy   -Management per primary team        NSVT (nonsustained ventricular  tachycardia)    -On Metoprolol 50mg BID  -Telemetry  -management per primary team        Takotsubo cardiomyopathy    -Noted on ECHO   -management per primary team        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  - H/h waxes and wanes. Stable today 30.1/8.6   - Workup and management per primary team  - GI consulted, no apparent bleeding         Essential hypertension    -Stroke risk factor   -Non compliant with medication at home  -resume home meds to normalize blood pressure        Acute encephalopathy    - Multifactorial; likely 2/2 stroke, pancreatitis, possible infectious cause  -WBC 15 today. Pending urine culture per primary team             3/24 Patient's hemoglobin was 6.4 on arrival (s/p 1U PRBC on transport); transfused additional 2U PRBC; repeat hemoglobin 8.8. Of note, patient's WBC on arrival here significant for leukocytosis of 36. MRI demonstrable for bilateral frontal, parietal, occipital, cerebellar infarcts L>R. Cause likely cardio-embolic.   3/30/18 No new imaging or neurologic changes. Patient's ECHo was concerning for a stress induced cardiomyopathy, EF 15-20%. Patient likely needs PEG tube, still NPO. Disposition pending SNF. Still not anticoagulated at this time due to anemia, but primary team plans on starting soon when they feel H/H stable and appropriate.   3/31: awaiting peg tube. Hb dropped overnight from 8.4>>7.4. GI consulted and recommended further evaluation of inflammatory changes of the pancreatic  tail, recommending EUS or MRCP in future with PBS follow up outpatient. US ordered for inguinal hernia.   4/1/18 No acute neuro changes. Medical management per primary team. Plan for PEG tomorrow or Tuesday.   4/3/18: PEG today. No DVT in LUE. Hb stable. Primary team working on improved BP control, started Lisinopril. On Metoprolol for NSVT. Concern for dellerium.   4/5/18 TF initiated.  Leukocytosis stable.  Restarted Eliquis.  H&H remains low but stable.  No new symptoms.  4/6/18: NAEON. Slight bump up again of WBC to 15 (12 yesterday). H/H stable.     STROKE DOCUMENTATION   Acute Stroke Times   Last Known Normal Date: 03/22/18  Last Known Normal Time: 2100  Symptom Onset Date: 03/22/18  Symptom Onset Time: 2100  Stroke Team Called Date: 03/23/18  Stroke Team Called Time: 1555  Stroke Team Arrival Date: 03/23/18  Stroke Team Arrival Time: 1555  CT Interpretation Time: 1610  Decision to Treat Time for IR:  (Not a candidate)    NIH Scale:  1a. Level Of Consciousness: 0-->Alert: keenly responsive  1b. LOC Questions: 2-->Answers neither question correctly  1c. LOC Commands: 2-->Performs neither task correctly  2. Best Gaze: 0-->Normal  3. Visual: 0-->No visual loss  4. Facial Palsy: 2-->Partial paralysis (total or near-total paralysis of lower face)  5a. Motor Arm, Left: 4-->No movement  5b. Motor Arm, Right: 1-->Drift: limb holds 90 (or 45) degrees, but drifts down before full 10 secs: does not hit bed or other support  6a. Motor Leg, Left: 4-->No movement  6b. Motor Leg, Right: 3-->No effort against gravity: leg falls to bed immediately  7. Limb Ataxia: 0-->Absent  8. Sensory: 0-->Normal: no sensory loss  9. Best Language: 3-->Mute, global aphasia: no usable speech or auditory comprehension  10. Dysarthria: 2-->Severe dysarthria: patients speech is so slurred as to be unintelligible in the absence of or out of proportion to any dysphasia, or is mute/anarthric  11. Extinction and Inattention (formerly Neglect):  0-->No abnormality  Total (NIH Stroke Scale): 23       Modified [place] Score: 1  Art Coma Scale:    ABCD2 Score:    [id number] Score:6  HAS -BLED Score:3  ICH Score:   Hunt & Connors Classification:      Hemorrhagic change of an Ischemic Stroke: Does this patient have an ischemic stroke with hemorrhagic changes? No     Neurologic Chief Complaint: L sided weakness, dysphagia, dysarthria, confusion    Subjective:     Interval History: Patient is seen for follow-up neurological assessment and treatment recommendations:     NAEON. Slight bump up again of WBC to 15 (12 yesterday). H/H stable.     HPI, Past Medical, Family, and Social History remains the same as documented in the initial encounter.     Review of Systems   Constitutional: Negative for fever.   HENT: Positive for trouble swallowing. Negative for nosebleeds.    Eyes: Positive for visual disturbance.   Neurological: Positive for facial asymmetry, speech difficulty and weakness.   Psychiatric/Behavioral: Negative for agitation.        Scheduled Meds:   amLODIPine  5 mg Per G Tube Daily    apixaban  5 mg Per G Tube BID    B-complex with vitamin C  1 tablet Per G Tube Daily    ferrous sulfate  300 mg Per G Tube BID    folic acid  1 mg Per G Tube Daily    lisinopril  20 mg Per G Tube Daily    metoprolol tartrate  50 mg Per G Tube BID    multivitamin liquid no.118  10 mL Per G Tube Daily    pantoprazole  40 mg Per G Tube BID    polyethylene glycol  17 g Per G Tube Daily    senna-docusate 8.6-50 mg  1 tablet Per G Tube Daily    thiamine  100 mg Per G Tube Daily     Continuous Infusions:  PRN Meds:acetaminophen, dextrose 50%, glucagon (human recombinant), hydrALAZINE, insulin aspart U-100, ondansetron, oxyCODONE, varibar pudding Ba Sulfate, varibar thin liquid ba sulfate    Objective:     Vital Signs (Most Recent):  Temp: 98.4 °F (36.9 °C) (04/06/18 0800)  Pulse: 87 (04/06/18 0800)  Resp: 16 (04/06/18 0800)  BP: (!) 159/83 (04/06/18 0800)  SpO2: 97 %  (04/06/18 0800)  BP Location: Left arm    Vital Signs Range (Last 24H):  Temp:  [97 °F (36.1 °C)-99.5 °F (37.5 °C)]   Pulse:  [70-87]   Resp:  [16-18]   BP: (146-196)/(76-92)   SpO2:  [94 %-99 %]   BP Location: Left arm    Physical Exam   Constitutional: He appears well-developed and well-nourished. No distress.   Eyes: No scleral icterus.   Cardiovascular: Normal rate.    Pulmonary/Chest: Effort normal. No respiratory distress.   Musculoskeletal: He exhibits no edema or deformity.   Skin: Skin is warm and dry.     Neurological Exam:   LOC: alert  Attention Span: poor  Language: Aphasic  Articulation: Severe dysarthria  Orientation: Not oriented to person, place, and time  EOM (CN III, IV, VI): Gaze preference Right  Facial Movement (CN VII): L facial droop  Motor: Arm left  Normal 0/5  Leg left  Normal 0/5  Arm right  Normal 3/5  Leg right Normal 2/5  Sensation: Withdraws in all 4 extremities    Laboratory:  CMP:     Recent Labs  Lab 04/06/18  0418   CALCIUM 10.2   ALBUMIN 2.4*   PROT 6.7      K 4.8   CO2 19*   *   BUN 22   CREATININE 0.8   ALKPHOS 58   ALT 26   AST 44*   BILITOT 0.3     CBC:     Recent Labs  Lab 04/06/18  0418   WBC 15.03*   RBC 4.05*   HGB 8.5*   HCT 31.2*   *   MCV 77*   MCH 21.0*   MCHC 27.2*     Lipid Panel: No results for input(s): CHOL, LDLCALC, HDL, TRIG in the last 168 hours.  Coagulation:     Recent Labs  Lab 04/02/18  0437   INR 1.0     Platelet Aggregation Study: No results for input(s): PLTAGG, PLTAGINTERP, PLTAGREGLACO, ADPPLTAGGREG in the last 168 hours.  Hgb A1C: No results for input(s): HGBA1C in the last 168 hours.  TSH: No results for input(s): TSH in the last 168 hours.    Diagnostic Results     Brain Imaging     MRI Brain without Contrast Impression 3/24/18  Acute infarcts involving the frontal, parietal, and occipital lobes with involvement of the cerebellum, left greater than right.  These were seen to some extent on prior CTA.  No hemorrhagic conversion at  this time.  The findings are concerning for extensive acute embolic stroke.  Moderate chronic microvascular ischemic changes.                      CTA Multiphase 3/23/18  Multifocal areas of abnormal decreased attenuation involving the left cerebellum, medulla, mike, and posterior right frontal cortex concerning for acute or subacute infarcts.  No significant mass effect, midline shift, or acute intracranial hemorrhage.  Generalized cerebral volume loss with a significant degree of chronic microvascular ischemic change, more prominent on the left.  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  Multifocal filling defects seen within the origin of the right subclavian artery, right common carotid, and left carotid bulb compatible with nonocclusive thrombi.  Additional smaller mural thrombi are seen along aortic arch.        ECHO 3/24/18:   CONCLUSIONS     1 - Consider Stress induced CM - Takotsubo CM.     2 - Eccentric LVH with severely depressed left ventricular systolic function (EF 15-20%).     3 - Normal RV size with moderately depressed right ventricular systolic function .     4 - Mild mitral regurgitation.     5 - Mild tricuspid regurgitation.     6 - Pulmonary hypertension. The estimated PA systolic pressure is 61 mmHg.     7 - Increased central venous pressure.     8 - No prior echo in our system       Sabrina Crenshaw PA-C  Comprehensive Stroke Center  Department of Vascular Neurology   [facility]

## 2018-04-06 NOTE — PLAN OF CARE
Problem: Occupational Therapy Goal  Goal: Occupational Therapy Goal  Goals to be met by:  18    Patient will increase functional independence with ADLs by performin. Supine to sit with Moderate Assistance. - not met  2. Roll to R with Min A. - not met  3. Roll to L with Min A. - not met  4. Grooming while supine with HOB elevated with Minimal Assistance. - not met  5. UE Dressing with Moderate Assistance. - not met  6. Pt will perform grasp/release 2/3 functional items (towel, cup, tissue box) per UE crossing midline with Min A while seated EOB. - not met  7. Pt will perform functional sitting at EOB x5 min with stand by Assist. - not met  8. Transfers with Moderate assistance.            Outcome: Ongoing (interventions implemented as appropriate)  Goals remain appropriate. Cont POC.    FRANCISCO Hart  2018  Pager: [phone]

## 2018-04-06 NOTE — ASSESSMENT & PLAN NOTE
Stable, awaiting placement.    - Likely 2/2 sequelae of embolic stroke  - worse encephalopathy today, only able to answer his name appropriately, concern for delirium vs infectious vs IC etiology  - check UA and CT head non con

## 2018-04-06 NOTE — PLAN OF CARE
Problem: SLP Goal  Goal: SLP Goal  Speech Language Pathology   Goal expected to be met by 4/8  1. Patient will answer orientation questions x4 given mod A.   2. Given model, patient will follow 1 step directions with 90% acc.   3. Given repetitions x2 max, patient will answer simple y/n questions with 90% acc.  4. Given phonemic cuing, patient will complete simple naming tasks with 80% acc.   5. Given verbal and visual cueing, patient will complete rote speech tasks with 80% acc.    6.  Participate in ongoing assesment of swallow              Outcome: Ongoing (interventions implemented as appropriate)  Limited participation in therapy today due to lethargy.    Corine Guerrero MA/[facility]-SLP  Speech Language Pathologist  Pager [phone]  4/6/2018

## 2018-04-06 NOTE — PLAN OF CARE
Patient with increased lethargy, CT scan ordered. LURDES/JUAN PABLO working on NH/SNF placement in [place].       04/06/18 1413   Discharge Reassessment   Assessment Type Discharge Planning Reassessment   Do you have any problems affording any of your prescribed medications? TBD   Discharge Plan A Skilled Nursing Facility   Can the patient answer the patient profile reliably? No, cognitively impaired   How does the patient rate their overall health at the present time? Fair   Describe the patient's ability to walk at the present time. Major restrictions/daily assistance from another person   During the past month, has the patient often been bothered by feeling down, depressed or hopeless? (Unable to assess)   During the past month, has the patient often been bothered by little interest or pleasure in doing things? (Unable to assess)

## 2018-04-06 NOTE — SUBJECTIVE & OBJECTIVE
Neurologic Chief Complaint: L sided weakness, dysphagia, dysarthria, confusion    Subjective:     Interval History: Patient is seen for follow-up neurological assessment and treatment recommendations:     NAEON. Slight bump up again of WBC to 15 (12 yesterday). H/H stable.     HPI, Past Medical, Family, and Social History remains the same as documented in the initial encounter.     Review of Systems   Constitutional: Negative for fever.   HENT: Positive for trouble swallowing. Negative for nosebleeds.    Eyes: Positive for visual disturbance.   Neurological: Positive for facial asymmetry, speech difficulty and weakness.   Psychiatric/Behavioral: Negative for agitation.        Scheduled Meds:   amLODIPine  5 mg Per G Tube Daily    apixaban  5 mg Per G Tube BID    B-complex with vitamin C  1 tablet Per G Tube Daily    ferrous sulfate  300 mg Per G Tube BID    folic acid  1 mg Per G Tube Daily    lisinopril  20 mg Per G Tube Daily    metoprolol tartrate  50 mg Per G Tube BID    multivitamin liquid no.118  10 mL Per G Tube Daily    pantoprazole  40 mg Per G Tube BID    polyethylene glycol  17 g Per G Tube Daily    senna-docusate 8.6-50 mg  1 tablet Per G Tube Daily    thiamine  100 mg Per G Tube Daily     Continuous Infusions:  PRN Meds:acetaminophen, dextrose 50%, glucagon (human recombinant), hydrALAZINE, insulin aspart U-100, ondansetron, oxyCODONE, varibar pudding Ba Sulfate, varibar thin liquid ba sulfate    Objective:     Vital Signs (Most Recent):  Temp: 98.4 °F (36.9 °C) (04/06/18 0800)  Pulse: 87 (04/06/18 0800)  Resp: 16 (04/06/18 0800)  BP: (!) 159/83 (04/06/18 0800)  SpO2: 97 % (04/06/18 0800)  BP Location: Left arm    Vital Signs Range (Last 24H):  Temp:  [97 °F (36.1 °C)-99.5 °F (37.5 °C)]   Pulse:  [70-87]   Resp:  [16-18]   BP: (146-196)/(76-92)   SpO2:  [94 %-99 %]   BP Location: Left arm    Physical Exam   Constitutional: He appears well-developed and well-nourished. No distress.   Eyes: No  scleral icterus.   Cardiovascular: Normal rate.    Pulmonary/Chest: Effort normal. No respiratory distress.   Musculoskeletal: He exhibits no edema or deformity.   Skin: Skin is warm and dry.     Neurological Exam:   LOC: alert  Attention Span: poor  Language: Aphasic  Articulation: Severe dysarthria  Orientation: Not oriented to person, place, and time  EOM (CN III, IV, VI): Gaze preference Right  Facial Movement (CN VII): L facial droop  Motor: Arm left  Normal 0/5  Leg left  Normal 0/5  Arm right  Normal 3/5  Leg right Normal 2/5  Sensation: Withdraws in all 4 extremities    Laboratory:  CMP:     Recent Labs  Lab 04/06/18  0418   CALCIUM 10.2   ALBUMIN 2.4*   PROT 6.7      K 4.8   CO2 19*   *   BUN 22   CREATININE 0.8   ALKPHOS 58   ALT 26   AST 44*   BILITOT 0.3     CBC:     Recent Labs  Lab 04/06/18  0418   WBC 15.03*   RBC 4.05*   HGB 8.5*   HCT 31.2*   *   MCV 77*   MCH 21.0*   MCHC 27.2*     Lipid Panel: No results for input(s): CHOL, LDLCALC, HDL, TRIG in the last 168 hours.  Coagulation:     Recent Labs  Lab 04/02/18  0437   INR 1.0     Platelet Aggregation Study: No results for input(s): PLTAGG, PLTAGINTERP, PLTAGREGLACO, ADPPLTAGGREG in the last 168 hours.  Hgb A1C: No results for input(s): HGBA1C in the last 168 hours.  TSH: No results for input(s): TSH in the last 168 hours.    Diagnostic Results     Brain Imaging     MRI Brain without Contrast Impression 3/24/18  Acute infarcts involving the frontal, parietal, and occipital lobes with involvement of the cerebellum, left greater than right.  These were seen to some extent on prior CTA.  No hemorrhagic conversion at this time.  The findings are concerning for extensive acute embolic stroke.  Moderate chronic microvascular ischemic changes.                      CTA Multiphase 3/23/18  Multifocal areas of abnormal decreased attenuation involving the left cerebellum, medulla, mike, and posterior right frontal cortex concerning for acute  or subacute infarcts.  No significant mass effect, midline shift, or acute intracranial hemorrhage.  Generalized cerebral volume loss with a significant degree of chronic microvascular ischemic change, more prominent on the left.  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  Multifocal filling defects seen within the origin of the right subclavian artery, right common carotid, and left carotid bulb compatible with nonocclusive thrombi.  Additional smaller mural thrombi are seen along aortic arch.        ECHO 3/24/18:   CONCLUSIONS     1 - Consider Stress induced CM - Takotsubo CM.     2 - Eccentric LVH with severely depressed left ventricular systolic function (EF 15-20%).     3 - Normal RV size with moderately depressed right ventricular systolic function .     4 - Mild mitral regurgitation.     5 - Mild tricuspid regurgitation.     6 - Pulmonary hypertension. The estimated PA systolic pressure is 61 mmHg.     7 - Increased central venous pressure.     8 - No prior echo in our system

## 2018-04-06 NOTE — PT/OT/SLP PROGRESS
Occupational Therapy   Treatment    Name: Zion Hines  MRN: [id number]  Admitting Diagnosis:  Embolic stroke involving right middle cerebral artery  3 Days Post-Op    Recommendations:     Discharge Recommendations: nursing facility, skilled  Discharge Equipment Recommendations:   (TBD)  Barriers to discharge:  None    Subjective     Communicated with: RN prior to session.  Pain/Comfort:  · Pain Rating 1: 0/10  · Location - Side 1: Left  · Location 1:  (leg)  · Pain Addressed 1: Reposition, Distraction, Cessation of Activity  · Pain Rating Post-Intervention 1:  (did not rate)    Patients cultural, spiritual, Voodoo conflicts given the current situation: none stated    Objective:     Patient found with: bed alarm, restraints    General Precautions: Standard, aspiration, fall, NPO   Orthopedic Precautions:N/A   Braces: N/A     Occupational Performance:    Bed Mobility:    · Patient completed Rolling/Turning to Right with total assistance  · Patient completed Scooting/Bridging with total assistance  · Patient completed Supine to Sit with total assistance  · Patient completed Sit to Supine with total assistance     Functional Mobility/Transfers:  · Patient completed Sit <> Stand Transfer with total assistance  with  no assistive device   · Functional Mobility: did not occur    Activities of Daily Living:  · Grooming: maximal assistance hand over hand, washcloth washing face EOB    Patient left HOB elevated with all lines intact, call button in reach and bed alarm on    AMPA 6 Click:  [facility] Total Score: 6    Treatment & Education:  Pt ed re OT role and POC. Pt performed R roll and supine to sit with Total A. Pt sat EOB with Mod A for balance, then progressed to Min A and ranged between Min A and Max A depending on RUE  on side rail; pt with improved balance when NOT using RUE on side rail, as he would push away to the L and require Max A to return to midline. Pt tolerated EOB sitting for ~13 minutes (with hand over  hand assistance for grooming, see above), but then c/o L LE pain and fatigue and required Total A to return to supine. Pt required Total A to scoot to HOB.  Education:    Assessment:     Zion Hines is a 78 y.o. male with a medical diagnosis of Embolic stroke involving right middle cerebral artery.  He presents with the following performance deficits affecting function are weakness, impaired endurance, impaired self care skills, impaired functional mobilty, gait instability, impaired balance, decreased upper extremity function, decreased lower extremity function, decreased ROM, decreased safety awareness, pain, impaired coordination.  Pt participates with encouragement and assistance, but was complaining of LLE pain with movement and WB. Pt would benefit from cont OT to improve sitting balance and use of UEs for fxnl performance of self care tasks.    Rehab Prognosis:  Good; patient would benefit from acute skilled OT services to address these deficits and reach maximum level of function.       Plan:     Patient to be seen 4 x/week to address the above listed problems via self-care/home management, therapeutic activities, therapeutic exercises, neuromuscular re-education  · Plan of Care Expires: 18  · Plan of Care Reviewed with: patient    This Plan of care has been discussed with the patient who was involved in its development and understands and is in agreement with the identified goals and treatment plan    GOALS:    Occupational Therapy Goals        Problem: Occupational Therapy Goal    Goal Priority Disciplines Outcome Interventions   Occupational Therapy Goal     OT, PT/OT Ongoing (interventions implemented as appropriate)    Description:  Goals to be met by:  18    Patient will increase functional independence with ADLs by performin. Supine to sit with Moderate Assistance. - not met  2. Roll to R with Min A. - not met  3. Roll to L with Min A. - not met  4. Grooming while supine with HOB  elevated with Minimal Assistance. - not met  5. UE Dressing with Moderate Assistance. - not met  6. Pt will perform grasp/release 2/3 functional items (towel, cup, tissue box) per UE crossing midline with Min A while seated EOB. - not met  7. Pt will perform functional sitting at EOB x5 min with stand by Assist. - not met  8. Transfers with Moderate assistance.                             Time Tracking:     OT Date of Treatment: 04/06/18  OT Start Time: 0745  OT Stop Time: 0753  OT Total Time (min): 8 min    Billable Minutes:Neuromuscular Re-education 8 minutes    FRANCISCO Hart  4/6/2018  Pager: [phone]

## 2018-04-06 NOTE — PLAN OF CARE
Covering  contacted Linda Whitehead for follow up at [phone].  Left message on voicemail for admissions.

## 2018-04-07 LAB
ACANTHOCYTES BLD QL SMEAR: PRESENT
ALBUMIN SERPL BCP-MCNC: 2.3 G/DL
ALP SERPL-CCNC: 54 U/L
ALT SERPL W/O P-5'-P-CCNC: 21 U/L
ANION GAP SERPL CALC-SCNC: 6 MMOL/L
ANISOCYTOSIS BLD QL SMEAR: SLIGHT
AST SERPL-CCNC: 32 U/L
BASO STIPL BLD QL SMEAR: ABNORMAL
BASOPHILS # BLD AUTO: 0.02 K/UL
BASOPHILS NFR BLD: 0.1 %
BILIRUB SERPL-MCNC: 0.3 MG/DL
BUN SERPL-MCNC: 28 MG/DL
CALCIUM SERPL-MCNC: 9.9 MG/DL
CHLORIDE SERPL-SCNC: 112 MMOL/L
CO2 SERPL-SCNC: 25 MMOL/L
CREAT SERPL-MCNC: 0.8 MG/DL
DACRYOCYTES BLD QL SMEAR: ABNORMAL
DIFFERENTIAL METHOD: ABNORMAL
EOSINOPHIL # BLD AUTO: 0.5 K/UL
EOSINOPHIL NFR BLD: 3.4 %
ERYTHROCYTE [DISTWIDTH] IN BLOOD BY AUTOMATED COUNT: 31.5 %
EST. GFR  (AFRICAN AMERICAN): >60 ML/MIN/1.73 M^2
EST. GFR  (NON AFRICAN AMERICAN): >60 ML/MIN/1.73 M^2
GLUCOSE SERPL-MCNC: 121 MG/DL
HCT VFR BLD AUTO: 28 %
HGB BLD-MCNC: 7.8 G/DL
HYPOCHROMIA BLD QL SMEAR: ABNORMAL
IMM GRANULOCYTES # BLD AUTO: 0.16 K/UL
IMM GRANULOCYTES NFR BLD AUTO: 1 %
LYMPHOCYTES # BLD AUTO: 1 K/UL
LYMPHOCYTES NFR BLD: 6.1 %
MAGNESIUM SERPL-MCNC: 2.7 MG/DL
MCH RBC QN AUTO: 21 PG
MCHC RBC AUTO-ENTMCNC: 27.9 G/DL
MCV RBC AUTO: 75 FL
MONOCYTES # BLD AUTO: 1.3 K/UL
MONOCYTES NFR BLD: 8 %
NEUTROPHILS # BLD AUTO: 12.7 K/UL
NEUTROPHILS NFR BLD: 81.4 %
NRBC BLD-RTO: 0 /100 WBC
OVALOCYTES BLD QL SMEAR: ABNORMAL
PHOSPHATE SERPL-MCNC: 2.7 MG/DL
PLATELET # BLD AUTO: 547 K/UL
PLATELET BLD QL SMEAR: ABNORMAL
PMV BLD AUTO: 10.3 FL
POCT GLUCOSE: 114 MG/DL (ref 70–110)
POCT GLUCOSE: 122 MG/DL (ref 70–110)
POCT GLUCOSE: 124 MG/DL (ref 70–110)
POIKILOCYTOSIS BLD QL SMEAR: ABNORMAL
POLYCHROMASIA BLD QL SMEAR: ABNORMAL
POTASSIUM SERPL-SCNC: 4.4 MMOL/L
PROT SERPL-MCNC: 6.4 G/DL
RBC # BLD AUTO: 3.72 M/UL
SCHISTOCYTES BLD QL SMEAR: ABNORMAL
SCHISTOCYTES BLD QL SMEAR: PRESENT
SODIUM SERPL-SCNC: 143 MMOL/L
SPHEROCYTES BLD QL SMEAR: ABNORMAL
TARGETS BLD QL SMEAR: ABNORMAL
WBC # BLD AUTO: 15.62 K/UL

## 2018-04-07 PROCEDURE — 83735 ASSAY OF MAGNESIUM: CPT

## 2018-04-07 PROCEDURE — 99231 SBSQ HOSP IP/OBS SF/LOW 25: CPT | Mod: ,,, | Performed by: HOSPITALIST

## 2018-04-07 PROCEDURE — 36415 COLL VENOUS BLD VENIPUNCTURE: CPT

## 2018-04-07 PROCEDURE — 25000003 PHARM REV CODE 250: Performed by: STUDENT IN AN ORGANIZED HEALTH CARE EDUCATION/TRAINING PROGRAM

## 2018-04-07 PROCEDURE — 80053 COMPREHEN METABOLIC PANEL: CPT

## 2018-04-07 PROCEDURE — 85025 COMPLETE CBC W/AUTO DIFF WBC: CPT

## 2018-04-07 PROCEDURE — 25000003 PHARM REV CODE 250: Performed by: HOSPITALIST

## 2018-04-07 PROCEDURE — 25000003 PHARM REV CODE 250: Performed by: ANESTHESIOLOGY

## 2018-04-07 PROCEDURE — 11000001 HC ACUTE MED/SURG PRIVATE ROOM

## 2018-04-07 PROCEDURE — 84100 ASSAY OF PHOSPHORUS: CPT

## 2018-04-07 RX ORDER — BISACODYL 10 MG
10 SUPPOSITORY, RECTAL RECTAL ONCE
Status: COMPLETED | OUTPATIENT
Start: 2018-04-07 | End: 2018-04-07

## 2018-04-07 RX ADMIN — OXYCODONE HYDROCHLORIDE 10 MG: 5 TABLET ORAL at 08:04

## 2018-04-07 RX ADMIN — AMLODIPINE BESYLATE 5 MG: 5 TABLET ORAL at 08:04

## 2018-04-07 RX ADMIN — ACETAMINOPHEN 650 MG: 325 TABLET ORAL at 03:04

## 2018-04-07 RX ADMIN — VITAMIN C 1 TABLET: TAB at 08:04

## 2018-04-07 RX ADMIN — Medication 100 MG: at 08:04

## 2018-04-07 RX ADMIN — BISACODYL 10 MG: 10 SUPPOSITORY RECTAL at 10:04

## 2018-04-07 RX ADMIN — PANTOPRAZOLE SODIUM 40 MG: 40 GRANULE, DELAYED RELEASE ORAL at 08:04

## 2018-04-07 RX ADMIN — METOPROLOL TARTRATE 50 MG: 50 TABLET, FILM COATED ORAL at 08:04

## 2018-04-07 RX ADMIN — APIXABAN 5 MG: 5 TABLET, FILM COATED ORAL at 08:04

## 2018-04-07 RX ADMIN — STANDARDIZED SENNA CONCENTRATE AND DOCUSATE SODIUM 1 TABLET: 8.6; 5 TABLET, FILM COATED ORAL at 08:04

## 2018-04-07 RX ADMIN — FOLIC ACID 1 MG: 1 TABLET ORAL at 08:04

## 2018-04-07 RX ADMIN — MINERAL SUPPLEMENT IRON 300 MG / 5 ML STRENGTH LIQUID 100 PER BOX UNFLAVORED 300 MG: at 08:04

## 2018-04-07 RX ADMIN — POLYETHYLENE GLYCOL 3350 17 G: 17 POWDER, FOR SOLUTION ORAL at 08:04

## 2018-04-07 RX ADMIN — LISINOPRIL 20 MG: 20 TABLET ORAL at 08:04

## 2018-04-07 RX ADMIN — Medication 10 ML: at 08:04

## 2018-04-07 RX ADMIN — OXYCODONE HYDROCHLORIDE 10 MG: 5 TABLET ORAL at 03:04

## 2018-04-07 NOTE — ASSESSMENT & PLAN NOTE
Stable, awaiting placement.    - Likely 2/2 sequelae of embolic stroke  - CT head and UA unrevealing

## 2018-04-07 NOTE — SUBJECTIVE & OBJECTIVE
Interval History: please see hospital course    Review of Systems   Unable to perform ROS: Mental status change   HENT:        Dry mouth      Objective:     Vital Signs (Most Recent):  Temp: 97.6 °F (36.4 °C) (04/07/18 0841)  Pulse: 86 (04/07/18 0841)  Resp: 18 (04/07/18 0841)  BP: 115/70 (04/07/18 0841)  SpO2: 96 % (04/07/18 0841) Vital Signs (24h Range):  Temp:  [97.6 °F (36.4 °C)-98.9 °F (37.2 °C)] 97.6 °F (36.4 °C)  Pulse:  [75-86] 86  Resp:  [18-20] 18  SpO2:  [96 %-99 %] 96 %  BP: (115-146)/(69-90) 115/70     Weight: 64.8 kg (142 lb 13.7 oz)  Body mass index is 21.73 kg/m².    Intake/Output Summary (Last 24 hours) at 04/07/18 1051  Last data filed at 04/06/18 2000   Gross per 24 hour   Intake              100 ml   Output                0 ml   Net              100 ml      Physical Exam   Constitutional: He is oriented to person, place, and time. He appears well-developed.   HENT:   Head: Atraumatic.   Eyes: Pupils are equal, round, and reactive to light.   Patient with rightward gaze preference  Arcus senilis bilaterally   Neck: No JVD present.   Cardiovascular: Normal rate, regular rhythm, S1 normal, S2 normal and intact distal pulses.    Pulses:       Radial pulses are 2+ on the right side, and 2+ on the left side.        Dorsalis pedis pulses are 1+ on the right side, and 1+ on the left side.   Pulmonary/Chest: Effort normal and breath sounds normal.   Abdominal: Soft. Bowel sounds are normal. He exhibits no distension. There is tenderness in the suprapubic area and left lower quadrant. There is no guarding.       Genitourinary:   Genitourinary Comments: Condom catheter in place   Musculoskeletal: He exhibits no edema.   Neurological: He is alert and oriented to person, place, and time. He exhibits abnormal muscle tone. GCS eye subscore is 4. GCS verbal subscore is 3. GCS motor subscore is 6.   Only oriented to person. Expressive and receptive aphasia. LUE weak - 0/5 strength in Left hand     Skin: Skin is  warm. No rash noted.   Psychiatric: His speech is slurred.   Nursing note and vitals reviewed.      Significant Labs:   Recent Results (from the past 24 hour(s))   POCT glucose    Collection Time: 04/06/18 12:24 PM   Result Value Ref Range    POCT Glucose 158 (H) 70 - 110 mg/dL   Urinalysis    Collection Time: 04/06/18  1:45 PM   Result Value Ref Range    Specimen UA Urine, Catheterized     Color, UA Katy Yellow, Straw, Katy    Appearance, UA Cloudy (A) Clear    pH, UA 7.0 5.0 - 8.0    Specific Gravity, UA 1.015 1.005 - 1.030    Protein, UA Negative Negative    Glucose, UA Negative Negative    Ketones, UA Negative Negative    Bilirubin (UA) Negative Negative    Occult Blood UA Negative Negative    Nitrite, UA Positive (A) Negative    Urobilinogen, UA 4.0-6.0 (A) <2.0 EU/dL    Leukocytes, UA Negative Negative   Urinalysis Microscopic    Collection Time: 04/06/18  1:45 PM   Result Value Ref Range    RBC, UA 6 (H) 0 - 4 /hpf    Bacteria, UA Rare None-Occ /hpf    Microscopic Comment SEE COMMENT    POCT glucose    Collection Time: 04/06/18  5:46 PM   Result Value Ref Range    POCT Glucose 138 (H) 70 - 110 mg/dL   POCT glucose    Collection Time: 04/06/18 11:19 PM   Result Value Ref Range    POCT Glucose 124 (H) 70 - 110 mg/dL   POCT glucose    Collection Time: 04/07/18  4:56 AM   Result Value Ref Range    POCT Glucose 122 (H) 70 - 110 mg/dL   Comprehensive metabolic panel    Collection Time: 04/07/18  5:07 AM   Result Value Ref Range    Sodium 143 136 - 145 mmol/L    Potassium 4.4 3.5 - 5.1 mmol/L    Chloride 112 (H) 95 - 110 mmol/L    CO2 25 23 - 29 mmol/L    Glucose 121 (H) 70 - 110 mg/dL    BUN, Bld 28 (H) 8 - 23 mg/dL    Creatinine 0.8 0.5 - 1.4 mg/dL    Calcium 9.9 8.7 - 10.5 mg/dL    Total Protein 6.4 6.0 - 8.4 g/dL    Albumin 2.3 (L) 3.5 - 5.2 g/dL    Total Bilirubin 0.3 0.1 - 1.0 mg/dL    Alkaline Phosphatase 54 (L) 55 - 135 U/L    AST 32 10 - 40 U/L    ALT 21 10 - 44 U/L    Anion Gap 6 (L) 8 - 16 mmol/L     eGFR if African American >60.0 >60 mL/min/1.73 m^2    eGFR if non African American >60.0 >60 mL/min/1.73 m^2   Magnesium    Collection Time: 04/07/18  5:07 AM   Result Value Ref Range    Magnesium 2.7 (H) 1.6 - 2.6 mg/dL   Phosphorus    Collection Time: 04/07/18  5:07 AM   Result Value Ref Range    Phosphorus 2.7 2.7 - 4.5 mg/dL   CBC auto differential    Collection Time: 04/07/18  5:08 AM   Result Value Ref Range    WBC 15.62 (H) 3.90 - 12.70 K/uL    RBC 3.72 (L) 4.60 - 6.20 M/uL    Hemoglobin 7.8 (L) 14.0 - 18.0 g/dL    Hematocrit 28.0 (L) 40.0 - 54.0 %    MCV 75 (L) 82 - 98 fL    MCH 21.0 (L) 27.0 - 31.0 pg    MCHC 27.9 (L) 32.0 - 36.0 g/dL    RDW 31.5 (H) 11.5 - 14.5 %    Platelets 547 (H) 150 - 350 K/uL    MPV 10.3 9.2 - 12.9 fL    Immature Granulocytes 1.0 (H) 0.0 - 0.5 %    Gran # (ANC) 12.7 (H) 1.8 - 7.7 K/uL    Immature Grans (Abs) 0.16 (H) 0.00 - 0.04 K/uL    Lymph # 1.0 1.0 - 4.8 K/uL    Mono # 1.3 (H) 0.3 - 1.0 K/uL    Eos # 0.5 0.0 - 0.5 K/uL    Baso # 0.02 0.00 - 0.20 K/uL    nRBC 0 0 /100 WBC    Gran% 81.4 (H) 38.0 - 73.0 %    Lymph% 6.1 (L) 18.0 - 48.0 %    Mono% 8.0 4.0 - 15.0 %    Eosinophil% 3.4 0.0 - 8.0 %    Basophil% 0.1 0.0 - 1.9 %    Platelet Estimate Increased (A)     Aniso Slight     Poik Moderate     Poly Occasional     Hypo Moderate     Ovalocytes Occasional     Target Cells Occasional     Tear Drop Cells Occasional     Spherocytes Occasional     Acanthocytes Present     Schistocytes Present     Basophilic Stippling Occasional     Fragmented Cells Occasional     Differential Method Automated          Significant Imaging: I have reviewed all pertinent imaging results/findings within the past 24 hours.

## 2018-04-07 NOTE — PROGRESS NOTES
[facility] Hospital Medicine  Progress Note    Patient Name: Zion Hines  MRN: [id number]  Patient Class: IP- Inpatient   Admission Date: 3/23/2018  Length of Stay: 15 days  Attending Physician: Tess Andrews MD  Primary Care Provider: [facility] Medicine Team: [facility] HOSP MED 2 Riley Milner MD    Subjective:     Principal Problem:Embolic stroke involving right middle cerebral artery    HPI:  79 yo M with PMHx HTN known to be non-compliant with medications and remote hx of anemia presents to [facility] ED 18 as a transfer from [facility].  Patient is a poor historian with no family at bedside, hx largely obtained from OSH records.  He presented to OSH originally with symptoms of weakness and fatigue, his daughter brought him to ED thinking he was likely anemic again.  It was noted that patient had some dysarthria and aphasia (unspecified expressive vs receptive) with L-sided weakness.  He was out of the window for tPA and was sent to [facility] emergently for CTA Stroke Multiphase and possible endovascular intervention. CTA stroke multiphase with no LVO, no intervention planned.  Of note, patient is anemic per OSH labs with a Hgb of 5.8 g/dL and likely has pancreatitis with lipase 3738, WBC count of 15.8 k/uL, and CT abdomen showing peripancreatic fluid around the tail.  He was transfused a unit of PRBCs prior to transfer.  Labs were otherwise largely unremarkable.  On presentation to [facility] ED, he is able to move both BLE but complains of pain in BLE.  Not moving LUE but is intact to noxious stimuli.  Has a R gaze preference that is not overcome by VOR testing and complaint of neck pain with moving head side-to-side.  Otherwise has mild abdominal distension with mild hypogastric tenderness to palpation. HTN to SBP 200s.  Of note, patient's wife  recently and family expressed concern in outpatient ED that patient has not been eating much for the past month but deny  EtOH use.    Hospital Course:  03/23/18:  Admission to Neuro ICU 2/2 concern for stroke with multiple concerns for patient to become unstable--pancreatitis, severe anemia, HTN.  03/24: has had no interval development of multiorgan involvement form pancreatitis, drop in hemoglobin secondary to erosive gastritis, transfused and on protonix bid  03/25: Patient on dilt drip for a-fib. Repeat EKG shows NSR.  3/26: Discontinuing diltiazem drip today. Will continue monitor HR. Continue IV fluids 50 cc/hr for pancreatitis. Repeat lipase.   3/28: NAEON. VSSA. No overt signs of bleeding. Neuro exam unchanged from prior documented exams. Continues to not follow commands, continues to answer questions inappropriately. Remains effectively hemiplegic on left. No further ICU needs. S/D today to hospital medicine after d/w vascular neurology.  03/29/2018 VSS, no signs of blood loss in stool. Loose stools on examination. NG tube replaced and xray confirmed in duodenum - retracted. Will make definitive decision on anticoagulation 3/30. Furthering discussions with family regarding definitive feeding  03/30/2018 VSS, no signs of blood loss on ZENIA. NG tube replaced after patient pulled O/N. Confirmed placement.Family desires PEG tube placed. GI consulted for placement on Monday.   03/31/2018 VSS. Drop in Hb from 8.1 to 7.4 overnight. TF continued. Holding anticoagulation due to worsening anemia. GI recommendations for further evaluation of inflammatory changes of the pancreatic tail, recommending EUS or MRCP in future with PBS follow up outpatient. New fluid filled hernia noted on exam - US ordered  04/02/2018 PEG placement today. WBC 15. Hb improving. Metoprolol changes to 50mg BID, Lisinopril increased 20 mg QD.   04/03/2018 PEG placement scheduled for today. Leukocytosis stable. Hb stable. Added Norvasc for BP control today. Will monitor. Unable to contact any family members since yesterday afternoon.   04/04/2018 PEG in place, OK to  use for TF, Meds etc. Leukocytosis stable. Hb stable - so signs of bleeding, will start Eliquis for his anticoagulation post stroke.   04/06/2018 patient continues on TF, interval increase in leukocyte count, hb stable, patient less responsive and less appropriate today, CXR with no edema or consolidation, CT Head non con for change in responsiveness  04/07/2018 patient returned to baseline mental status, will escalate bowel regime today due to no recorded BM for >1 week, leukocytosis and reactive thrombocytosis are concerning for infection but VSS - will monitor    Interval History: please see hospital course    Review of Systems   Unable to perform ROS: Mental status change   HENT:        Dry mouth      Objective:     Vital Signs (Most Recent):  Temp: 97.6 °F (36.4 °C) (04/07/18 0841)  Pulse: 86 (04/07/18 0841)  Resp: 18 (04/07/18 0841)  BP: 115/70 (04/07/18 0841)  SpO2: 96 % (04/07/18 0841) Vital Signs (24h Range):  Temp:  [97.6 °F (36.4 °C)-98.9 °F (37.2 °C)] 97.6 °F (36.4 °C)  Pulse:  [75-86] 86  Resp:  [18-20] 18  SpO2:  [96 %-99 %] 96 %  BP: (115-146)/(69-90) 115/70     Weight: 64.8 kg (142 lb 13.7 oz)  Body mass index is 21.73 kg/m².    Intake/Output Summary (Last 24 hours) at 04/07/18 1051  Last data filed at 04/06/18 2000   Gross per 24 hour   Intake              100 ml   Output                0 ml   Net              100 ml      Physical Exam   Constitutional: He is oriented to person, place, and time. He appears well-developed.   HENT:   Head: Atraumatic.   Eyes: Pupils are equal, round, and reactive to light.   Patient with rightward gaze preference  Arcus senilis bilaterally   Neck: No JVD present.   Cardiovascular: Normal rate, regular rhythm, S1 normal, S2 normal and intact distal pulses.    Pulses:       Radial pulses are 2+ on the right side, and 2+ on the left side.        Dorsalis pedis pulses are 1+ on the right side, and 1+ on the left side.   Pulmonary/Chest: Effort normal and breath sounds  normal.   Abdominal: Soft. Bowel sounds are normal. He exhibits no distension. There is tenderness in the suprapubic area and left lower quadrant. There is no guarding.       Genitourinary:   Genitourinary Comments: Condom catheter in place   Musculoskeletal: He exhibits no edema.   Neurological: He is alert and oriented to person, place, and time. He exhibits abnormal muscle tone. GCS eye subscore is 4. GCS verbal subscore is 3. GCS motor subscore is 6.   Only oriented to person. Expressive and receptive aphasia. LUE weak - 0/5 strength in Left hand     Skin: Skin is warm. No rash noted.   Psychiatric: His speech is slurred.   Nursing note and vitals reviewed.      Significant Labs:   Recent Results (from the past 24 hour(s))   POCT glucose    Collection Time: 04/06/18 12:24 PM   Result Value Ref Range    POCT Glucose 158 (H) 70 - 110 mg/dL   Urinalysis    Collection Time: 04/06/18  1:45 PM   Result Value Ref Range    Specimen UA Urine, Catheterized     Color, UA Katy Yellow, Straw, Katy    Appearance, UA Cloudy (A) Clear    pH, UA 7.0 5.0 - 8.0    Specific Gravity, UA 1.015 1.005 - 1.030    Protein, UA Negative Negative    Glucose, UA Negative Negative    Ketones, UA Negative Negative    Bilirubin (UA) Negative Negative    Occult Blood UA Negative Negative    Nitrite, UA Positive (A) Negative    Urobilinogen, UA 4.0-6.0 (A) <2.0 EU/dL    Leukocytes, UA Negative Negative   Urinalysis Microscopic    Collection Time: 04/06/18  1:45 PM   Result Value Ref Range    RBC, UA 6 (H) 0 - 4 /hpf    Bacteria, UA Rare None-Occ /hpf    Microscopic Comment SEE COMMENT    POCT glucose    Collection Time: 04/06/18  5:46 PM   Result Value Ref Range    POCT Glucose 138 (H) 70 - 110 mg/dL   POCT glucose    Collection Time: 04/06/18 11:19 PM   Result Value Ref Range    POCT Glucose 124 (H) 70 - 110 mg/dL   POCT glucose    Collection Time: 04/07/18  4:56 AM   Result Value Ref Range    POCT Glucose 122 (H) 70 - 110 mg/dL    Comprehensive metabolic panel    Collection Time: 04/07/18  5:07 AM   Result Value Ref Range    Sodium 143 136 - 145 mmol/L    Potassium 4.4 3.5 - 5.1 mmol/L    Chloride 112 (H) 95 - 110 mmol/L    CO2 25 23 - 29 mmol/L    Glucose 121 (H) 70 - 110 mg/dL    BUN, Bld 28 (H) 8 - 23 mg/dL    Creatinine 0.8 0.5 - 1.4 mg/dL    Calcium 9.9 8.7 - 10.5 mg/dL    Total Protein 6.4 6.0 - 8.4 g/dL    Albumin 2.3 (L) 3.5 - 5.2 g/dL    Total Bilirubin 0.3 0.1 - 1.0 mg/dL    Alkaline Phosphatase 54 (L) 55 - 135 U/L    AST 32 10 - 40 U/L    ALT 21 10 - 44 U/L    Anion Gap 6 (L) 8 - 16 mmol/L    eGFR if African American >60.0 >60 mL/min/1.73 m^2    eGFR if non African American >60.0 >60 mL/min/1.73 m^2   Magnesium    Collection Time: 04/07/18  5:07 AM   Result Value Ref Range    Magnesium 2.7 (H) 1.6 - 2.6 mg/dL   Phosphorus    Collection Time: 04/07/18  5:07 AM   Result Value Ref Range    Phosphorus 2.7 2.7 - 4.5 mg/dL   CBC auto differential    Collection Time: 04/07/18  5:08 AM   Result Value Ref Range    WBC 15.62 (H) 3.90 - 12.70 K/uL    RBC 3.72 (L) 4.60 - 6.20 M/uL    Hemoglobin 7.8 (L) 14.0 - 18.0 g/dL    Hematocrit 28.0 (L) 40.0 - 54.0 %    MCV 75 (L) 82 - 98 fL    MCH 21.0 (L) 27.0 - 31.0 pg    MCHC 27.9 (L) 32.0 - 36.0 g/dL    RDW 31.5 (H) 11.5 - 14.5 %    Platelets 547 (H) 150 - 350 K/uL    MPV 10.3 9.2 - 12.9 fL    Immature Granulocytes 1.0 (H) 0.0 - 0.5 %    Gran # (ANC) 12.7 (H) 1.8 - 7.7 K/uL    Immature Grans (Abs) 0.16 (H) 0.00 - 0.04 K/uL    Lymph # 1.0 1.0 - 4.8 K/uL    Mono # 1.3 (H) 0.3 - 1.0 K/uL    Eos # 0.5 0.0 - 0.5 K/uL    Baso # 0.02 0.00 - 0.20 K/uL    nRBC 0 0 /100 WBC    Gran% 81.4 (H) 38.0 - 73.0 %    Lymph% 6.1 (L) 18.0 - 48.0 %    Mono% 8.0 4.0 - 15.0 %    Eosinophil% 3.4 0.0 - 8.0 %    Basophil% 0.1 0.0 - 1.9 %    Platelet Estimate Increased (A)     Aniso Slight     Poik Moderate     Poly Occasional     Hypo Moderate     Ovalocytes Occasional     Target Cells Occasional     Tear Drop Cells  Occasional     Spherocytes Occasional     Acanthocytes Present     Schistocytes Present     Basophilic Stippling Occasional     Fragmented Cells Occasional     Differential Method Automated          Significant Imaging: I have reviewed all pertinent imaging results/findings within the past 24 hours.    Assessment/Plan:      * Embolic stroke involving right middle cerebral artery    Pending placement, Stable   - 03/23/18 CTA Stroke Multiphase with multiple non-occlusive thrombi noted  - MRI brain w/o show multiple infarcts without evidence of hemorrhagic conversion.  - Given recent in-hospital atrial fibrillation and takotsubo's CM suspicion for embolic stroke, in addition 2D Echo showed severely depressed LVEF 15-20% with evidence of stress CM  - PT/OT reordered - PT working with patient, recommending SNF - needs maximal assistance with all moves  - Continue eliquis 5mg BID - follow weight closely for need to change dosing          Inguinal hernia of right side without obstruction or gangrene    - feels fluid filled on exam  - US ordered consistent with fluid filled channel in inguinal canals, findings unspecific.         Oropharyngeal dysphagia    S/p peg tube           Cytotoxic cerebral edema              Chronic systolic HF (heart failure)    - unknown if this is chronic issue or if apical ballooning and depressed EF is secondary to emotional event and passing of wife or if due to neurological event with stroke and cytotoxic edema and ICU stay as both are known to cause stress cardiomyopathy  - will need f/u echo to evaluate EF and recovery           NSVT (nonsustained ventricular tachycardia)    - patient with daily episodes of NSVT  - remains on metoprolol for Afib w RVR  - unable to obtain ROS if symptomatic during episodes  - on telemetry - will monitor          Takotsubo cardiomyopathy    - pt with loss of wife 2/2018, now with dilated cardiomyopathy EF 15-20 percent and imaging suggestive of takotsubo  CM  - will need repeat echo to evaluate improvement  - embolic episode may have been exacerbated by new onset CM   - elevated ePAP 61          Alcohol abuse    - unable to obtain alcohol history from patient          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    - Patient noted to be in Afib with RVR 3/25  - In NSR this AM with rate of 70s-90s, short runs of NSVT  - Would recommend anticoaulation - If anticoagulation is initiated for Afib, Eliquis is $8.35/month--Eliquis was shown to have similar GIB risk as warfarin in [facility]; other DOACs shown to have increased risk compared to warfarin in major Afib studies - is 80 but Cr <1.5 and weight >60kg so 5mg BID dosing   - cardiac monitoring  - Currently on metoprolol 50mg BID for rate control               Anemia    - has iron def anemia, continue iron replacement  - unknown baseline hb, no melena or bloody bowel movements reported while IP  - no known history of GIB or varices  - Hb 9, on iron replacement via NG  - ZENIA negative for black/red stool        Essential hypertension     - SBP goal <180, prn labetalol ordered  - Has not required PRNS  - Now on metoprolol 50mg BID   - Lisinopril 20mg QD  - Norvasc 5mg QD  - has had multiple short runs of NSVT - patient unable to provide review if symptomatic          Acute encephalopathy    Stable, awaiting placement.    - Likely 2/2 sequelae of embolic stroke  - CT head and UA unrevealing              VTE Risk Mitigation         Ordered     apixaban tablet 5 mg  2 times daily     Route:  Per G Tube        04/06/18 0823              Riley Milner MD  Department of Hospital Medicine   [facility]

## 2018-04-08 PROBLEM — R82.71 BACTERIA IN URINE: Status: ACTIVE | Noted: 2018-04-08

## 2018-04-08 LAB
ALBUMIN SERPL BCP-MCNC: 2.5 G/DL
ALP SERPL-CCNC: 60 U/L
ALT SERPL W/O P-5'-P-CCNC: 26 U/L
ANION GAP SERPL CALC-SCNC: 6 MMOL/L
AST SERPL-CCNC: 38 U/L
BACTERIA UR CULT: NORMAL
BASOPHILS # BLD AUTO: 0.02 K/UL
BASOPHILS NFR BLD: 0.2 %
BILIRUB SERPL-MCNC: 0.3 MG/DL
BUN SERPL-MCNC: 31 MG/DL
CALCIUM SERPL-MCNC: 10.4 MG/DL
CHLORIDE SERPL-SCNC: 110 MMOL/L
CO2 SERPL-SCNC: 24 MMOL/L
CREAT SERPL-MCNC: 0.8 MG/DL
DIFFERENTIAL METHOD: ABNORMAL
EOSINOPHIL # BLD AUTO: 0.6 K/UL
EOSINOPHIL NFR BLD: 4.8 %
ERYTHROCYTE [DISTWIDTH] IN BLOOD BY AUTOMATED COUNT: ABNORMAL %
EST. GFR  (AFRICAN AMERICAN): >60 ML/MIN/1.73 M^2
EST. GFR  (NON AFRICAN AMERICAN): >60 ML/MIN/1.73 M^2
GLUCOSE SERPL-MCNC: 132 MG/DL
HCT VFR BLD AUTO: 27.4 %
HGB BLD-MCNC: 7.7 G/DL
IMM GRANULOCYTES # BLD AUTO: 0.16 K/UL
IMM GRANULOCYTES NFR BLD AUTO: 1.3 %
LYMPHOCYTES # BLD AUTO: 1.3 K/UL
LYMPHOCYTES NFR BLD: 10.7 %
MAGNESIUM SERPL-MCNC: 2.3 MG/DL
MCH RBC QN AUTO: 21.4 PG
MCHC RBC AUTO-ENTMCNC: 28.1 G/DL
MCV RBC AUTO: 76 FL
MONOCYTES # BLD AUTO: 1.1 K/UL
MONOCYTES NFR BLD: 9 %
NEUTROPHILS # BLD AUTO: 8.9 K/UL
NEUTROPHILS NFR BLD: 74 %
NRBC BLD-RTO: 0 /100 WBC
PHOSPHATE SERPL-MCNC: 2.9 MG/DL
PLATELET # BLD AUTO: 535 K/UL
PMV BLD AUTO: 10.7 FL
POCT GLUCOSE: 115 MG/DL (ref 70–110)
POCT GLUCOSE: 126 MG/DL (ref 70–110)
POCT GLUCOSE: 135 MG/DL (ref 70–110)
POCT GLUCOSE: 138 MG/DL (ref 70–110)
POCT GLUCOSE: 162 MG/DL (ref 70–110)
POTASSIUM SERPL-SCNC: 4.3 MMOL/L
PROT SERPL-MCNC: 6.7 G/DL
RBC # BLD AUTO: 3.6 M/UL
SODIUM SERPL-SCNC: 140 MMOL/L
WBC # BLD AUTO: 11.98 K/UL

## 2018-04-08 PROCEDURE — 97530 THERAPEUTIC ACTIVITIES: CPT

## 2018-04-08 PROCEDURE — 84100 ASSAY OF PHOSPHORUS: CPT

## 2018-04-08 PROCEDURE — 80053 COMPREHEN METABOLIC PANEL: CPT

## 2018-04-08 PROCEDURE — 99231 SBSQ HOSP IP/OBS SF/LOW 25: CPT | Mod: ,,, | Performed by: HOSPITALIST

## 2018-04-08 PROCEDURE — 25000003 PHARM REV CODE 250: Performed by: HOSPITALIST

## 2018-04-08 PROCEDURE — 97110 THERAPEUTIC EXERCISES: CPT

## 2018-04-08 PROCEDURE — 85025 COMPLETE CBC W/AUTO DIFF WBC: CPT

## 2018-04-08 PROCEDURE — 83735 ASSAY OF MAGNESIUM: CPT

## 2018-04-08 PROCEDURE — 25000003 PHARM REV CODE 250: Performed by: STUDENT IN AN ORGANIZED HEALTH CARE EDUCATION/TRAINING PROGRAM

## 2018-04-08 PROCEDURE — 11000001 HC ACUTE MED/SURG PRIVATE ROOM

## 2018-04-08 PROCEDURE — 36415 COLL VENOUS BLD VENIPUNCTURE: CPT

## 2018-04-08 RX ORDER — SODIUM CHLORIDE 9 MG/ML
INJECTION, SOLUTION INTRAVENOUS CONTINUOUS
Status: ACTIVE | OUTPATIENT
Start: 2018-04-08 | End: 2018-04-09

## 2018-04-08 RX ADMIN — OXYCODONE HYDROCHLORIDE 10 MG: 5 TABLET ORAL at 08:04

## 2018-04-08 RX ADMIN — METOPROLOL TARTRATE 50 MG: 50 TABLET, FILM COATED ORAL at 08:04

## 2018-04-08 RX ADMIN — MINERAL SUPPLEMENT IRON 300 MG / 5 ML STRENGTH LIQUID 100 PER BOX UNFLAVORED 300 MG: at 10:04

## 2018-04-08 RX ADMIN — PANTOPRAZOLE SODIUM 40 MG: 40 GRANULE, DELAYED RELEASE ORAL at 08:04

## 2018-04-08 RX ADMIN — Medication 100 MG: at 08:04

## 2018-04-08 RX ADMIN — APIXABAN 5 MG: 5 TABLET, FILM COATED ORAL at 10:04

## 2018-04-08 RX ADMIN — LISINOPRIL 20 MG: 20 TABLET ORAL at 08:04

## 2018-04-08 RX ADMIN — METOPROLOL TARTRATE 50 MG: 50 TABLET, FILM COATED ORAL at 10:04

## 2018-04-08 RX ADMIN — PANTOPRAZOLE SODIUM 40 MG: 40 GRANULE, DELAYED RELEASE ORAL at 10:04

## 2018-04-08 RX ADMIN — SODIUM CHLORIDE: 0.9 INJECTION, SOLUTION INTRAVENOUS at 10:04

## 2018-04-08 RX ADMIN — Medication 10 ML: at 08:04

## 2018-04-08 RX ADMIN — SODIUM PHOSPHATE, DIBASIC AND SODIUM PHOSPHATE, MONOBASIC 1 ENEMA: 7; 19 ENEMA RECTAL at 01:04

## 2018-04-08 RX ADMIN — VITAMIN C 1 TABLET: TAB at 08:04

## 2018-04-08 RX ADMIN — OXYCODONE HYDROCHLORIDE 10 MG: 5 TABLET ORAL at 03:04

## 2018-04-08 RX ADMIN — MINERAL SUPPLEMENT IRON 300 MG / 5 ML STRENGTH LIQUID 100 PER BOX UNFLAVORED 300 MG: at 08:04

## 2018-04-08 RX ADMIN — STANDARDIZED SENNA CONCENTRATE AND DOCUSATE SODIUM 1 TABLET: 8.6; 5 TABLET, FILM COATED ORAL at 08:04

## 2018-04-08 RX ADMIN — FOLIC ACID 1 MG: 1 TABLET ORAL at 08:04

## 2018-04-08 RX ADMIN — AMLODIPINE BESYLATE 5 MG: 5 TABLET ORAL at 08:04

## 2018-04-08 RX ADMIN — APIXABAN 5 MG: 5 TABLET, FILM COATED ORAL at 08:04

## 2018-04-08 RX ADMIN — POLYETHYLENE GLYCOL 3350 17 G: 17 POWDER, FOR SOLUTION ORAL at 08:04

## 2018-04-08 NOTE — PROGRESS NOTES
[facility] Hospital Medicine  Progress Note    Patient Name: Zion Hines  MRN: [id number]  Patient Class: IP- Inpatient   Admission Date: 3/23/2018  Length of Stay: 16 days  Attending Physician: Tess Andrews MD  Primary Care Provider: [facility] Medicine Team: [facility] MED 2 Riley Milner MD    Subjective:     Principal Problem:Embolic stroke involving right middle cerebral artery    HPI:  79 yo M with PMHx HTN known to be non-compliant with medications and remote hx of anemia presents to [facility] ED 18 as a transfer from [facility].  Patient is a poor historian with no family at bedside, hx largely obtained from OSH records.  He presented to OSH originally with symptoms of weakness and fatigue, his daughter brought him to ED thinking he was likely anemic again.  It was noted that patient had some dysarthria and aphasia (unspecified expressive vs receptive) with L-sided weakness.  He was out of the window for tPA and was sent to [facility] emergently for CTA Stroke Multiphase and possible endovascular intervention. CTA stroke multiphase with no LVO, no intervention planned.  Of note, patient is anemic per OSH labs with a Hgb of 5.8 g/dL and likely has pancreatitis with lipase 3738, WBC count of 15.8 k/uL, and CT abdomen showing peripancreatic fluid around the tail.  He was transfused a unit of PRBCs prior to transfer.  Labs were otherwise largely unremarkable.  On presentation to [facility] ED, he is able to move both BLE but complains of pain in BLE.  Not moving LUE but is intact to noxious stimuli.  Has a R gaze preference that is not overcome by VOR testing and complaint of neck pain with moving head side-to-side.  Otherwise has mild abdominal distension with mild hypogastric tenderness to palpation. HTN to SBP 200s.  Of note, patient's wife  recently and family expressed concern in outpatient ED that patient has not been eating much for the past month but deny  EtOH use.    Hospital Course:  03/23/18:  Admission to Neuro ICU 2/2 concern for stroke with multiple concerns for patient to become unstable--pancreatitis, severe anemia, HTN.  03/24: has had no interval development of multiorgan involvement form pancreatitis, drop in hemoglobin secondary to erosive gastritis, transfused and on protonix bid  03/25: Patient on dilt drip for a-fib. Repeat EKG shows NSR.  3/26: Discontinuing diltiazem drip today. Will continue monitor HR. Continue IV fluids 50 cc/hr for pancreatitis. Repeat lipase.   3/28: NAEON. VSSA. No overt signs of bleeding. Neuro exam unchanged from prior documented exams. Continues to not follow commands, continues to answer questions inappropriately. Remains effectively hemiplegic on left. No further ICU needs. S/D today to hospital medicine after d/w vascular neurology.  03/29/2018 VSS, no signs of blood loss in stool. Loose stools on examination. NG tube replaced and xray confirmed in duodenum - retracted. Will make definitive decision on anticoagulation 3/30. Furthering discussions with family regarding definitive feeding  03/30/2018 VSS, no signs of blood loss on ZENIA. NG tube replaced after patient pulled O/N. Confirmed placement.Family desires PEG tube placed. GI consulted for placement on Monday.   03/31/2018 VSS. Drop in Hb from 8.1 to 7.4 overnight. TF continued. Holding anticoagulation due to worsening anemia. GI recommendations for further evaluation of inflammatory changes of the pancreatic tail, recommending EUS or MRCP in future with PBS follow up outpatient. New fluid filled hernia noted on exam - US ordered  04/02/2018 PEG placement today. WBC 15. Hb improving. Metoprolol changes to 50mg BID, Lisinopril increased 20 mg QD.   04/03/2018 PEG placement scheduled for today. Leukocytosis stable. Hb stable. Added Norvasc for BP control today. Will monitor. Unable to contact any family members since yesterday afternoon.   04/04/2018 PEG in place, OK to  use for TF, Meds etc. Leukocytosis stable. Hb stable - so signs of bleeding, will start Eliquis for his anticoagulation post stroke.   04/06/2018 patient continues on TF, interval increase in leukocyte count, hb stable, patient less responsive and less appropriate today, CXR with no edema or consolidation, CT Head non con for change in responsiveness  04/07/2018 patient returned to baseline mental status, will escalate bowel regime today due to no recorded BM for >1 week, leukocytosis and reactive thrombocytosis are concerning for infection but VSS - will monitor  04/08/2018 patient at baseline mental state, escalating bowel regime today, leukocytosis resolved but urine culture growing gram neg non lactose fermeter at the moment, patient asymptomatic, will hold treating at this time    Interval History: please see hospital course    Review of Systems   Unable to perform ROS: Mental status change     Objective:     Vital Signs (Most Recent):  Temp: 97.9 °F (36.6 °C) (04/08/18 0744)  Pulse: 87 (04/08/18 0744)  Resp: 18 (04/08/18 0744)  BP: (!) 141/92 (04/08/18 0744)  SpO2: 99 % (04/08/18 0744) Vital Signs (24h Range):  Temp:  [97.9 °F (36.6 °C)-99 °F (37.2 °C)] 97.9 °F (36.6 °C)  Pulse:  [71-87] 87  Resp:  [18] 18  SpO2:  [97 %-99 %] 99 %  BP: (122-150)/(73-92) 141/92     Weight: 64.4 kg (141 lb 15.6 oz)  Body mass index is 21.59 kg/m².    Intake/Output Summary (Last 24 hours) at 04/08/18 1403  Last data filed at 04/07/18 2200   Gross per 24 hour   Intake              150 ml   Output                0 ml   Net              150 ml      Physical Exam   Constitutional: He is oriented to person, place, and time. He appears well-developed.   HENT:   Head: Atraumatic.   Eyes: Pupils are equal, round, and reactive to light.   Patient with rightward gaze preference  Arcus senilis bilaterally   Neck: No JVD present.   Cardiovascular: Normal rate, regular rhythm, S1 normal, S2 normal and intact distal pulses.    Pulses:        Radial pulses are 2+ on the right side, and 2+ on the left side.        Dorsalis pedis pulses are 1+ on the right side, and 1+ on the left side.   Pulmonary/Chest: Effort normal and breath sounds normal.   Abdominal: Soft. Bowel sounds are normal. He exhibits no distension. There is no tenderness. There is no guarding.       Genitourinary:   Genitourinary Comments: Condom catheter in place   Musculoskeletal: He exhibits no edema.   Neurological: He is alert and oriented to person, place, and time. He exhibits abnormal muscle tone. GCS eye subscore is 4. GCS verbal subscore is 3. GCS motor subscore is 6.   Only oriented to person. Expressive and receptive aphasia. LUE weak - 0/5 strength in Left hand     Skin: Skin is warm. No rash noted.   Psychiatric: His speech is slurred.   Nursing note and vitals reviewed.      Significant Labs:   Recent Results (from the past 24 hour(s))   POCT glucose    Collection Time: 04/08/18 12:04 AM   Result Value Ref Range    POCT Glucose 138 (H) 70 - 110 mg/dL   Comprehensive metabolic panel    Collection Time: 04/08/18  4:09 AM   Result Value Ref Range    Sodium 140 136 - 145 mmol/L    Potassium 4.3 3.5 - 5.1 mmol/L    Chloride 110 95 - 110 mmol/L    CO2 24 23 - 29 mmol/L    Glucose 132 (H) 70 - 110 mg/dL    BUN, Bld 31 (H) 8 - 23 mg/dL    Creatinine 0.8 0.5 - 1.4 mg/dL    Calcium 10.4 8.7 - 10.5 mg/dL    Total Protein 6.7 6.0 - 8.4 g/dL    Albumin 2.5 (L) 3.5 - 5.2 g/dL    Total Bilirubin 0.3 0.1 - 1.0 mg/dL    Alkaline Phosphatase 60 55 - 135 U/L    AST 38 10 - 40 U/L    ALT 26 10 - 44 U/L    Anion Gap 6 (L) 8 - 16 mmol/L    eGFR if African American >60.0 >60 mL/min/1.73 m^2    eGFR if non African American >60.0 >60 mL/min/1.73 m^2   CBC auto differential    Collection Time: 04/08/18  4:09 AM   Result Value Ref Range    WBC 11.98 3.90 - 12.70 K/uL    RBC 3.60 (L) 4.60 - 6.20 M/uL    Hemoglobin 7.7 (L) 14.0 - 18.0 g/dL    Hematocrit 27.4 (L) 40.0 - 54.0 %    MCV 76 (L) 82 - 98 fL     MCH 21.4 (L) 27.0 - 31.0 pg    MCHC 28.1 (L) 32.0 - 36.0 g/dL    RDW SEE COMMENT 11.5 - 14.5 %    Platelets 535 (H) 150 - 350 K/uL    MPV 10.7 9.2 - 12.9 fL    Immature Granulocytes 1.3 (H) 0.0 - 0.5 %    Gran # (ANC) 8.9 (H) 1.8 - 7.7 K/uL    Immature Grans (Abs) 0.16 (H) 0.00 - 0.04 K/uL    Lymph # 1.3 1.0 - 4.8 K/uL    Mono # 1.1 (H) 0.3 - 1.0 K/uL    Eos # 0.6 (H) 0.0 - 0.5 K/uL    Baso # 0.02 0.00 - 0.20 K/uL    nRBC 0 0 /100 WBC    Gran% 74.0 (H) 38.0 - 73.0 %    Lymph% 10.7 (L) 18.0 - 48.0 %    Mono% 9.0 4.0 - 15.0 %    Eosinophil% 4.8 0.0 - 8.0 %    Basophil% 0.2 0.0 - 1.9 %    Differential Method Automated    Magnesium    Collection Time: 04/08/18  4:09 AM   Result Value Ref Range    Magnesium 2.3 1.6 - 2.6 mg/dL   Phosphorus    Collection Time: 04/08/18  4:09 AM   Result Value Ref Range    Phosphorus 2.9 2.7 - 4.5 mg/dL   POCT glucose    Collection Time: 04/08/18  5:23 AM   Result Value Ref Range    POCT Glucose 135 (H) 70 - 110 mg/dL   POCT glucose    Collection Time: 04/08/18  1:11 PM   Result Value Ref Range    POCT Glucose 162 (H) 70 - 110 mg/dL         Significant Imaging: I have reviewed all pertinent imaging results/findings within the past 24 hours.    Assessment/Plan:      * Embolic stroke involving right middle cerebral artery    Pending placement, Stable   - 03/23/18 CTA Stroke Multiphase with multiple non-occlusive thrombi noted  - MRI brain w/o show multiple infarcts without evidence of hemorrhagic conversion.  - Given recent in-hospital atrial fibrillation and takotsubo's CM suspicion for embolic stroke, in addition 2D Echo showed severely depressed LVEF 15-20% with evidence of stress CM  - PT/OT reordered - PT working with patient, recommending SNF - needs maximal assistance with all moves  - Continue eliquis 5mg BID - follow weight closely for need to change dosing          Bacteria in urine    - asymptomatic - holding treatment at this time        Inguinal hernia of right side without  obstruction or gangrene    - feels fluid filled on exam  - US ordered consistent with fluid filled channel in inguinal canals, findings unspecific.         Oropharyngeal dysphagia    S/p peg tube           Cytotoxic cerebral edema              Chronic systolic HF (heart failure)    - unknown if this is chronic issue or if apical ballooning and depressed EF is secondary to emotional event and passing of wife or if due to neurological event with stroke and cytotoxic edema and ICU stay as both are known to cause stress cardiomyopathy  - will need f/u echo to evaluate EF and recovery           NSVT (nonsustained ventricular tachycardia)    - patient with daily episodes of NSVT  - remains on metoprolol for Afib w RVR  - unable to obtain ROS if symptomatic during episodes  - on telemetry - will monitor          Takotsubo cardiomyopathy    - pt with loss of wife 2/2018, now with dilated cardiomyopathy EF 15-20 percent and imaging suggestive of takotsubo CM  - will need repeat echo to evaluate improvement  - embolic episode may have been exacerbated by new onset CM   - elevated ePAP 61          Alcohol abuse    - unable to obtain alcohol history from patient          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    - Patient noted to be in Afib with RVR 3/25  - In NSR this AM with rate of 70s-90s, short runs of NSVT  - Would recommend anticoaulation - If anticoagulation is initiated for Afib, Eliquis is $8.35/month--Eliquis was shown to have similar GIB risk as warfarin in [facility]; other DOACs shown to have increased risk compared to warfarin in major Afib studies - is 80 but Cr <1.5 and weight >60kg so 5mg BID dosing   - cardiac monitoring  - Currently on metoprolol 50mg BID for rate  control               Anemia    - has iron def anemia, continue iron replacement  - unknown baseline hb, no melena or bloody bowel movements reported while IP  - no known history of GIB or varices  - Hb 9, on iron replacement via NG  - ZENIA negative for black/red stool        Essential hypertension     - SBP goal <180, prn labetalol ordered  - Has not required PRNS  - Now on metoprolol 50mg BID   - Lisinopril 20mg QD  - Norvasc 5mg QD  - has had multiple short runs of NSVT - patient unable to provide review if symptomatic          Acute encephalopathy    Stable, awaiting placement.    - Likely 2/2 sequelae of embolic stroke  - CT head unrevealing  - urine culture growing gram negative non lactose fermenting - will hold treatment as asymptomatic              VTE Risk Mitigation         Ordered     apixaban tablet 5 mg  2 times daily     Route:  Per G Tube        04/06/18 0820              Riley Milner MD  Department of Hospital Medicine   [facility]

## 2018-04-08 NOTE — SUBJECTIVE & OBJECTIVE
Interval History: please see hospital course    Review of Systems   Unable to perform ROS: Mental status change     Objective:     Vital Signs (Most Recent):  Temp: 97.9 °F (36.6 °C) (04/08/18 0744)  Pulse: 87 (04/08/18 0744)  Resp: 18 (04/08/18 0744)  BP: (!) 141/92 (04/08/18 0744)  SpO2: 99 % (04/08/18 0744) Vital Signs (24h Range):  Temp:  [97.9 °F (36.6 °C)-99 °F (37.2 °C)] 97.9 °F (36.6 °C)  Pulse:  [71-87] 87  Resp:  [18] 18  SpO2:  [97 %-99 %] 99 %  BP: (122-150)/(73-92) 141/92     Weight: 64.4 kg (141 lb 15.6 oz)  Body mass index is 21.59 kg/m².    Intake/Output Summary (Last 24 hours) at 04/08/18 1403  Last data filed at 04/07/18 2200   Gross per 24 hour   Intake              150 ml   Output                0 ml   Net              150 ml      Physical Exam   Constitutional: He is oriented to person, place, and time. He appears well-developed.   HENT:   Head: Atraumatic.   Eyes: Pupils are equal, round, and reactive to light.   Patient with rightward gaze preference  Arcus senilis bilaterally   Neck: No JVD present.   Cardiovascular: Normal rate, regular rhythm, S1 normal, S2 normal and intact distal pulses.    Pulses:       Radial pulses are 2+ on the right side, and 2+ on the left side.        Dorsalis pedis pulses are 1+ on the right side, and 1+ on the left side.   Pulmonary/Chest: Effort normal and breath sounds normal.   Abdominal: Soft. Bowel sounds are normal. He exhibits no distension. There is no tenderness. There is no guarding.       Genitourinary:   Genitourinary Comments: Condom catheter in place   Musculoskeletal: He exhibits no edema.   Neurological: He is alert and oriented to person, place, and time. He exhibits abnormal muscle tone. GCS eye subscore is 4. GCS verbal subscore is 3. GCS motor subscore is 6.   Only oriented to person. Expressive and receptive aphasia. LUE weak - 0/5 strength in Left hand     Skin: Skin is warm. No rash noted.   Psychiatric: His speech is slurred.   Nursing  note and vitals reviewed.      Significant Labs:   Recent Results (from the past 24 hour(s))   POCT glucose    Collection Time: 04/08/18 12:04 AM   Result Value Ref Range    POCT Glucose 138 (H) 70 - 110 mg/dL   Comprehensive metabolic panel    Collection Time: 04/08/18  4:09 AM   Result Value Ref Range    Sodium 140 136 - 145 mmol/L    Potassium 4.3 3.5 - 5.1 mmol/L    Chloride 110 95 - 110 mmol/L    CO2 24 23 - 29 mmol/L    Glucose 132 (H) 70 - 110 mg/dL    BUN, Bld 31 (H) 8 - 23 mg/dL    Creatinine 0.8 0.5 - 1.4 mg/dL    Calcium 10.4 8.7 - 10.5 mg/dL    Total Protein 6.7 6.0 - 8.4 g/dL    Albumin 2.5 (L) 3.5 - 5.2 g/dL    Total Bilirubin 0.3 0.1 - 1.0 mg/dL    Alkaline Phosphatase 60 55 - 135 U/L    AST 38 10 - 40 U/L    ALT 26 10 - 44 U/L    Anion Gap 6 (L) 8 - 16 mmol/L    eGFR if African American >60.0 >60 mL/min/1.73 m^2    eGFR if non African American >60.0 >60 mL/min/1.73 m^2   CBC auto differential    Collection Time: 04/08/18  4:09 AM   Result Value Ref Range    WBC 11.98 3.90 - 12.70 K/uL    RBC 3.60 (L) 4.60 - 6.20 M/uL    Hemoglobin 7.7 (L) 14.0 - 18.0 g/dL    Hematocrit 27.4 (L) 40.0 - 54.0 %    MCV 76 (L) 82 - 98 fL    MCH 21.4 (L) 27.0 - 31.0 pg    MCHC 28.1 (L) 32.0 - 36.0 g/dL    RDW SEE COMMENT 11.5 - 14.5 %    Platelets 535 (H) 150 - 350 K/uL    MPV 10.7 9.2 - 12.9 fL    Immature Granulocytes 1.3 (H) 0.0 - 0.5 %    Gran # (ANC) 8.9 (H) 1.8 - 7.7 K/uL    Immature Grans (Abs) 0.16 (H) 0.00 - 0.04 K/uL    Lymph # 1.3 1.0 - 4.8 K/uL    Mono # 1.1 (H) 0.3 - 1.0 K/uL    Eos # 0.6 (H) 0.0 - 0.5 K/uL    Baso # 0.02 0.00 - 0.20 K/uL    nRBC 0 0 /100 WBC    Gran% 74.0 (H) 38.0 - 73.0 %    Lymph% 10.7 (L) 18.0 - 48.0 %    Mono% 9.0 4.0 - 15.0 %    Eosinophil% 4.8 0.0 - 8.0 %    Basophil% 0.2 0.0 - 1.9 %    Differential Method Automated    Magnesium    Collection Time: 04/08/18  4:09 AM   Result Value Ref Range    Magnesium 2.3 1.6 - 2.6 mg/dL   Phosphorus    Collection Time: 04/08/18  4:09 AM   Result  Value Ref Range    Phosphorus 2.9 2.7 - 4.5 mg/dL   POCT glucose    Collection Time: 04/08/18  5:23 AM   Result Value Ref Range    POCT Glucose 135 (H) 70 - 110 mg/dL   POCT glucose    Collection Time: 04/08/18  1:11 PM   Result Value Ref Range    POCT Glucose 162 (H) 70 - 110 mg/dL         Significant Imaging: I have reviewed all pertinent imaging results/findings within the past 24 hours.

## 2018-04-08 NOTE — PT/OT/SLP PROGRESS
"Occupational Therapy   Treatment    Name: Zion Hines  MRN: [id number]  Admitting Diagnosis:  Embolic stroke involving right middle cerebral artery  5 Days Post-Op    Recommendations:     Discharge Recommendations: nursing facility, skilled    Subjective   "I want a cigarette."  Communicated with: RN prior to session.  Pain/Comfort:  · Pain Rating 1: 0/10  · Pain Rating Post-Intervention 1: 0/10    Patients cultural, spiritual, Voodoo conflicts given the current situation: none stated    Objective:     Patient found with: PEG Tube, restraints, bed alarm    General Precautions: Standard, aspiration, fall, NPO     Occupational Performance:    Bed Mobility:    · Patient completed Rolling/Turning to Right with total assistance  · Patient completed Scooting/Bridging with total assistance scooting forward & backward on EOB & to the right along EOB  · Patient completed Supine to Sit with total assistance  · Patient completed Sit to Supine with total assistance       Activities of Daily Living:  · Grooming: total assistance seated EOB    Patient left supine with all lines intact, call button in reach, bed alarm on, restraints reapplied at end of session, RN notified and white board updated.    [facility] 6 Click:  [facility] Total Score: 6    Treatment & Education:  Pt required Max-total (A) with postural control while seated EOB.  Provided verbal & physical cues to facilitate postural control.  Pt with noted truncal rotation to the right & posterior leaning therefore provided (A) to facilitate neutral positioning.  Provided LUE weight bearing & decreased RUE pushing while seated EOB.  Provided activities to facilitate visual scanning/attention to the left while seated EOB with pt achieving 1 glance to the left with max cues while seated EOB.  Provided daily orientation due to pt disoriented.  Pt perseverated frequently during session requiring max cues to attend to & participate fully in activities in therapy.  Provided PROM to LUE " & LLE in all planes x 10 reps each while supine.  Pt had no further questions & when asked whether there were any concerns pt reported none.    Education:    Assessment:     Zion Hines is a 78 y.o. male with a medical diagnosis of Embolic stroke involving right middle cerebral artery.  He presents with fair participation and motivation.  Pt continues to be highly distracted during sessions requiring max cues to more fully participate.  Performance deficits affecting function are weakness, impaired endurance, impaired self care skills, impaired functional mobilty, impaired sensation, impaired balance, visual deficits, impaired cognition, decreased lower extremity function, decreased upper extremity function, decreased coordination, decreased safety awareness.      Rehab Prognosis:  fair; patient would benefit from acute skilled OT services to address these deficits and reach maximum level of function.       Plan:     Patient to be seen 4 x/week to address the above listed problems via self-care/home management, therapeutic activities, therapeutic exercises, sensory integration, cognitive retraining, neuromuscular re-education  · Plan of Care Expires: 18  · Plan of Care Reviewed with: patient    This Plan of care has been discussed with the patient who was involved in its development and understands and is in agreement with the identified goals and treatment plan    GOALS:    Occupational Therapy Goals        Problem: Occupational Therapy Goal    Goal Priority Disciplines Outcome Interventions   Occupational Therapy Goal     OT, PT/OT Ongoing (interventions implemented as appropriate)    Description:  Goals to be met by:  4/15/18    Patient will increase functional independence with ADLs by performin. Supine to sit with Moderate Assistance. - not met  2. Roll to R with Min A. - not met  3. Roll to L with Min A. - not met  4. Grooming while supine with HOB elevated with Minimal Assistance. - not met  5. UE  Dressing with Moderate Assistance. - not met  6. Pt will perform grasp/release 2/3 functional items (towel, cup, tissue box) per UE crossing midline with Min A while seated EOB. - not met  7. Pt will perform functional sitting at EOB x5 min with stand by Assist. - not met  8. Transfers with Moderate assistance.                              Time Tracking:     OT Date of Treatment: 04/08/18  OT Start Time: 0830  OT Stop Time: 0858  OT Total Time (min): 28 min    Billable Minutes:Therapeutic Activity 14  Therapeutic Exercise 14    FRANCISCO Quintero  4/8/2018

## 2018-04-08 NOTE — PLAN OF CARE
Problem: Occupational Therapy Goal  Goal: Occupational Therapy Goal  Goals to be met by:  4/15/18    Patient will increase functional independence with ADLs by performin. Supine to sit with Moderate Assistance. - not met  2. Roll to R with Min A. - not met  3. Roll to L with Min A. - not met  4. Grooming while supine with HOB elevated with Minimal Assistance. - not met  5. UE Dressing with Moderate Assistance. - not met  6. Pt will perform grasp/release 2/3 functional items (towel, cup, tissue box) per UE crossing midline with Min A while seated EOB. - not met  7. Pt will perform functional sitting at EOB x5 min with stand by Assist. - not met  8. Transfers with Moderate assistance.            Outcome: Ongoing (interventions implemented as appropriate)  Goals updated.

## 2018-04-08 NOTE — ASSESSMENT & PLAN NOTE
Stable, awaiting placement.    - Likely 2/2 sequelae of embolic stroke  - CT head unrevealing  - urine culture growing gram negative non lactose fermenting - will hold treatment as asymptomatic

## 2018-04-08 NOTE — PLAN OF CARE
Problem: Patient Care Overview  Goal: Plan of Care Review  Outcome: Ongoing (interventions implemented as appropriate)  No acute events throughout shift. VS and assessment performed per orders.  Pt remains NPO, pt noted to be restless throughout shift, constantly asking for water or cold drink. Tube feedings continues as ordered. Pt incontinent for urine, diaper change x4 this shift, no BM. Pt free of injury or falls.

## 2018-04-09 LAB
ALBUMIN SERPL BCP-MCNC: 2.5 G/DL
ALP SERPL-CCNC: 53 U/L
ALT SERPL W/O P-5'-P-CCNC: 26 U/L
ANION GAP SERPL CALC-SCNC: 5 MMOL/L
ANISOCYTOSIS BLD QL SMEAR: SLIGHT
AST SERPL-CCNC: 34 U/L
BASOPHILS # BLD AUTO: 0.01 K/UL
BASOPHILS NFR BLD: 0.1 %
BILIRUB SERPL-MCNC: 0.3 MG/DL
BUN SERPL-MCNC: 31 MG/DL
CALCIUM SERPL-MCNC: 9.8 MG/DL
CHLORIDE SERPL-SCNC: 110 MMOL/L
CO2 SERPL-SCNC: 24 MMOL/L
CREAT SERPL-MCNC: 0.8 MG/DL
DIFFERENTIAL METHOD: ABNORMAL
EOSINOPHIL # BLD AUTO: 0.6 K/UL
EOSINOPHIL NFR BLD: 5.2 %
ERYTHROCYTE [DISTWIDTH] IN BLOOD BY AUTOMATED COUNT: ABNORMAL %
EST. GFR  (AFRICAN AMERICAN): >60 ML/MIN/1.73 M^2
EST. GFR  (NON AFRICAN AMERICAN): >60 ML/MIN/1.73 M^2
GIANT PLATELETS BLD QL SMEAR: PRESENT
GLUCOSE SERPL-MCNC: 125 MG/DL
HCT VFR BLD AUTO: 25.6 %
HGB BLD-MCNC: 7.2 G/DL
HYPOCHROMIA BLD QL SMEAR: ABNORMAL
IMM GRANULOCYTES # BLD AUTO: 0.1 K/UL
IMM GRANULOCYTES NFR BLD AUTO: 0.9 %
LYMPHOCYTES # BLD AUTO: 1.1 K/UL
LYMPHOCYTES NFR BLD: 10.3 %
MAGNESIUM SERPL-MCNC: 2 MG/DL
MCH RBC QN AUTO: 21.1 PG
MCHC RBC AUTO-ENTMCNC: 28.1 G/DL
MCV RBC AUTO: 75 FL
MONOCYTES # BLD AUTO: 1 K/UL
MONOCYTES NFR BLD: 9.3 %
NEUTROPHILS # BLD AUTO: 8 K/UL
NEUTROPHILS NFR BLD: 74.2 %
NRBC BLD-RTO: 0 /100 WBC
OVALOCYTES BLD QL SMEAR: ABNORMAL
PHOSPHATE SERPL-MCNC: 2.7 MG/DL
PLATELET # BLD AUTO: 537 K/UL
PMV BLD AUTO: 10.3 FL
POCT GLUCOSE: 134 MG/DL (ref 70–110)
POCT GLUCOSE: 150 MG/DL (ref 70–110)
POCT GLUCOSE: 94 MG/DL (ref 70–110)
POIKILOCYTOSIS BLD QL SMEAR: SLIGHT
POLYCHROMASIA BLD QL SMEAR: ABNORMAL
POTASSIUM SERPL-SCNC: 4.4 MMOL/L
PROT SERPL-MCNC: 6.3 G/DL
RBC # BLD AUTO: 3.41 M/UL
SODIUM SERPL-SCNC: 139 MMOL/L
WBC # BLD AUTO: 10.78 K/UL

## 2018-04-09 PROCEDURE — 99233 SBSQ HOSP IP/OBS HIGH 50: CPT | Mod: GC,,, | Performed by: PSYCHIATRY & NEUROLOGY

## 2018-04-09 PROCEDURE — 84100 ASSAY OF PHOSPHORUS: CPT

## 2018-04-09 PROCEDURE — 99231 SBSQ HOSP IP/OBS SF/LOW 25: CPT | Mod: ,,, | Performed by: HOSPITALIST

## 2018-04-09 PROCEDURE — 11000001 HC ACUTE MED/SURG PRIVATE ROOM

## 2018-04-09 PROCEDURE — 25000003 PHARM REV CODE 250: Performed by: HOSPITALIST

## 2018-04-09 PROCEDURE — 36415 COLL VENOUS BLD VENIPUNCTURE: CPT

## 2018-04-09 PROCEDURE — 85025 COMPLETE CBC W/AUTO DIFF WBC: CPT

## 2018-04-09 PROCEDURE — 80053 COMPREHEN METABOLIC PANEL: CPT

## 2018-04-09 PROCEDURE — 97112 NEUROMUSCULAR REEDUCATION: CPT

## 2018-04-09 PROCEDURE — 25000003 PHARM REV CODE 250: Performed by: STUDENT IN AN ORGANIZED HEALTH CARE EDUCATION/TRAINING PROGRAM

## 2018-04-09 PROCEDURE — 97530 THERAPEUTIC ACTIVITIES: CPT

## 2018-04-09 PROCEDURE — 97110 THERAPEUTIC EXERCISES: CPT

## 2018-04-09 PROCEDURE — 83735 ASSAY OF MAGNESIUM: CPT

## 2018-04-09 RX ADMIN — PANTOPRAZOLE SODIUM 40 MG: 40 GRANULE, DELAYED RELEASE ORAL at 09:04

## 2018-04-09 RX ADMIN — STANDARDIZED SENNA CONCENTRATE AND DOCUSATE SODIUM 1 TABLET: 8.6; 5 TABLET, FILM COATED ORAL at 09:04

## 2018-04-09 RX ADMIN — APIXABAN 5 MG: 5 TABLET, FILM COATED ORAL at 09:04

## 2018-04-09 RX ADMIN — OXYCODONE HYDROCHLORIDE 10 MG: 5 TABLET ORAL at 09:04

## 2018-04-09 RX ADMIN — METOPROLOL TARTRATE 50 MG: 50 TABLET, FILM COATED ORAL at 09:04

## 2018-04-09 RX ADMIN — POLYETHYLENE GLYCOL 3350 17 G: 17 POWDER, FOR SOLUTION ORAL at 09:04

## 2018-04-09 RX ADMIN — LISINOPRIL 20 MG: 20 TABLET ORAL at 09:04

## 2018-04-09 RX ADMIN — AMLODIPINE BESYLATE 5 MG: 5 TABLET ORAL at 09:04

## 2018-04-09 RX ADMIN — VITAMIN C 1 TABLET: TAB at 09:04

## 2018-04-09 RX ADMIN — Medication 10 ML: at 09:04

## 2018-04-09 RX ADMIN — FOLIC ACID 1 MG: 1 TABLET ORAL at 09:04

## 2018-04-09 RX ADMIN — Medication 100 MG: at 09:04

## 2018-04-09 RX ADMIN — MINERAL SUPPLEMENT IRON 300 MG / 5 ML STRENGTH LIQUID 100 PER BOX UNFLAVORED 300 MG: at 09:04

## 2018-04-09 NOTE — ASSESSMENT & PLAN NOTE
Stable, awaiting placement.    - Likely 2/2 sequelae of embolic stroke  - CT head unrevealing  - urine culture growing ESBL E.coli - colonized - will hold treatment as asymptomatic

## 2018-04-09 NOTE — PLAN OF CARE
Problem: Physical Therapy Goal  Goal: Physical Therapy Goal  Goals to be met by: 18     Patient will increase functional independence with mobility by performin. Supine to sit with Maximum Assistance  2. Sit to supine with Maximum Assistance  3. Rolling to Left and Right with Maximum Assistance.  4. Sitting at edge of bed x 10 minutes with Minimal Assistance maintaining midline orientation and using at least 1 UE support for balance.   5.  Sit to stand with Maximum Assistance.   6. Bed to chair transfer with Maximum Assistance using most appropriate AD.          Pt progressing towards goals. All goals remain appropriate at this time.    Nadja Sawyer, PT, DPT  2018

## 2018-04-09 NOTE — PLAN OF CARE
Problem: Patient Care Overview  Goal: Plan of Care Review  Outcome: Ongoing (interventions implemented as appropriate)  No acute events throughout shift. VS and assessment performed per orders. Pt NPO as ordered, oral care provided and pt repositioned q2h. Tube feedings continues at 50ml/hr, residuals less than 10ml. Right hand non-violent wrist restraints continued. Pt incontinent for urine and bowel, 1 BM this shift. Pt free of injury or falls.

## 2018-04-09 NOTE — PLAN OF CARE
Referral sent to     [facility] [phone]  [facility] [phone]  [facility] [phone]  [facility]  [phone]  [facility] [phone]  [facility] [phone]    Malgorzata is f/u.

## 2018-04-09 NOTE — PLAN OF CARE
SW received a call from [facility] that they are denying pt b/c he cannot participate in 3 hrs of therapy daily. Bianca from [facility] called that [facility] will come and assess pt tomorrow.      [facility]    Nadja Estrada, [facility]  [phone]

## 2018-04-09 NOTE — PT/OT/SLP PROGRESS
Physical Therapy Treatment    Patient Name:  Zion Hines   MRN:  [id number]    Recommendations:     Discharge Recommendations:  nursing facility, skilled   Discharge Equipment Recommendations:  [facility]   Barriers to discharge: Inaccessible home and Decreased caregiver support requires increase assist/care at this time    Assessment:     Zion Hines is a 78 y.o. male admitted with a medical diagnosis of Embolic stroke involving right middle cerebral artery.  He presents with the following impairments/functional limitations:  weakness, impaired endurance, impaired self care skills, impaired functional mobilty, gait instability, impaired cognition, decreased upper extremity function, decreased lower extremity function, abnormal tone, impaired fine motor, decreased safety awareness, impaired balance, decreased coordination.      Pt tolerated session well this visit with no indication of pain or discomfort.  Pt was more lethargic this visit with eyes closed throughout most of session.  Pt continues to require Total A for all mobility and demonstrates poor sitting balance with frequent trunk sway in all directions.  Pt would benefit from continued skilled physical therapy to address listed impairments, improve functional independence, and maximize safety with mobility.  PT recommends dc disposition to SNF for further PT, OT and SLP intervention to progress pt's functional mobility and improve overall quality of life. Pt would benefit from further therapy to prevent adverse health risks associated with immobility and deconditioning.       Education:  Education provided to pt regarding: PT role/POC; daily orientation. Verbalized understanding.     Whiteboard updated with correct mobility information. RN/PCT notified.  Transfer with therapy only at this time.       Rehab Prognosis:  fair; patient would benefit from acute skilled PT services to address these deficits and reach maximum level of function.   "    Recent Surgery: Procedure(s) (LRB):  PLACEMENT-TUBE-PEG (N/A) 6 Days Post-Op    Plan:     During this hospitalization, patient to be seen 4 x/week to address the above listed problems via gait training, therapeutic activities, therapeutic exercises, neuromuscular re-education  · Plan of Care Expires:  04/28/18   Plan of Care Reviewed with: patient    Subjective     Patient found supine upon PT entry to room, agreeable to treatment.      "I'm alright."   Patient comments/goals: none stated  Pain/Comfort: none reported      Patients cultural, spiritual, Yazidism conflicts given the current situation: no    Objective:     Patient found with:  (all lines intact)     General Precautions: Standard, aspiration, fall   Orthopedic Precautions:N/A   Braces: N/A     Functional Mobility:  Bed Mobility:  Rolling: Total Assistance   Supine to Sit:  Total Assistance   Sit to supine: Total Assistance   Scooting: Maximum Assistance to EOB    Transfers:   Not performed this visit due to poor sitting balance and trunk sway in all directions with decrease protective reflexes          Balance:  Static Sitting: Maximum Assistance   Dynamic Sitting: Activity did not occur   Static Standing: Activity did not occur   Dynamic Standing: Activity did not occur       Therapeutic Activities/Exercises:  Performed PROM/AAROM to bilateral LE's: heel slides; AP; and hip abd/add. Performed bilateral hamstring and gastroc stretching (30 sec x2).  PT also performed cervical flexors stretch to promote extension and neutral alignment. Increase resistance noted.       Neuro Reeducation:  Sitting Balance (15 minutes)  Required Max A for balance during majority of session. PT provided trunk support throughout session due to posterior lean and pt demonstrating no protective reflexes to correct posterior LOB. Pt demonstrated trunk sway in all directions with no corrective reactions. Pt would often lean forward with no use of R UE to maintain trunk " balance/safety. Trunk sway mostly slow and controlled, but pt unable to correct with verbal cues requiring PT/OT to assist. OT worked on L UE weightbearing while EOB. Pt demonstrated downward gaze with increase cervical flexion and required therapist assist for extension.       AM-PAC 6 CLICK MOBILITY  Turning over in bed (including adjusting bedclothes, sheets and blankets)?: 2  Sitting down on and standing up from a chair with arms (e.g., wheelchair, bedside commode, etc.): 2  Moving from lying on back to sitting on the side of the bed?: 2  Moving to and from a bed to a chair (including a wheelchair)?: 1  Need to walk in hospital room?: 1  Climbing 3-5 steps with a railing?: 1  Total Score: 9         Patient left supine with all lines intact and call button in reach..    GOALS:    Physical Therapy Goals        Problem: Physical Therapy Goal    Goal Priority Disciplines Outcome Goal Variances Interventions   Physical Therapy Goal     PT/OT, PT      Description:  Goals to be met by: 18     Patient will increase functional independence with mobility by performin. Supine to sit with Maximum Assistance  2. Sit to supine with Maximum Assistance  3. Rolling to Left and Right with Maximum Assistance.  4. Sitting at edge of bed x 10 minutes with Minimal Assistance maintaining midline orientation and using at least 1 UE support for balance.   5.  Sit to stand with Maximum Assistance.   6. Bed to chair transfer with Maximum Assistance using most appropriate AD.                     Time Tracking:     PT Received On: 18  PT Start Time: 951     PT Stop Time: 1020  PT Total Time (min): 29 min     Billable Minutes: Therapeutic Exercise 9 and Neuromuscular Re-education 20    Treatment Type: Treatment  PT/PTA: PT     PTA Visit Number: 2       Nadja Sawyer PT, DPT  Pager: [phone]  2018

## 2018-04-09 NOTE — PROGRESS NOTES
Patient's chart was reviewed by a stroke team provider.  Patient Pending placement and is currently on appropiate medical therapy.  .  There is no new imaging to review.  Pending diagnostics to follow up on include: None   For other recommendations please see our previous note completed on: 4/06.      There are no new recommendations at this time. Will continue to follow. Discussed patient with staff. Please contact stroke team for any questions or concerns.       Jeffrey Carvalho M.D.  IM PGY-2  Pager [phone]

## 2018-04-09 NOTE — PLAN OF CARE
LURDES called [facility] and spoke with Caryl. They are denying admission because they cannot meet pt's needs. LURDES spoke with Malgorzata([phone]) to notify her of this. She now wants SW to look at [facility]. LURDES explained that therapy is recommending SNF but Malgorzata still wants NO to look at her dad because her cousin was there. She asked LURDES to send referrals to a couple of places in [place] and 2 close to [facility]. LURDES emailed [facility] to initiate referrals.      Nadja Estrada, GÓMEZ  [phone]

## 2018-04-09 NOTE — PROGRESS NOTES
[facility] Hospital Medicine  Progress Note    Patient Name: Zion Hines  MRN: [id number]  Patient Class: IP- Inpatient   Admission Date: 3/23/2018  Length of Stay: 17 days  Attending Physician: Tess Andrews MD  Primary Care Provider: Primary Doctor [facility] Medicine Team: [facility] HOSP MED 2 Riley Milner MD    Subjective:     Principal Problem:Embolic stroke involving right middle cerebral artery    HPI:  77 yo M with PMHx HTN known to be non-compliant with medications and remote hx of anemia presents to [facility] ED 18 [facility]  Patient is a poor historian with no family at bedside, hx largely obtained from OSH records.  He presented to OSH originally with symptoms of weakness and fatigue, his daughter brought him to ED thinking he was likely anemic again.  It was noted that patient had some dysarthria and aphasia (unspecified expressive vs receptive) with L-sided weakness.  He was out of the window for tPA and was sent to [facility] emergently for CTA Stroke Multiphase and possible endovascular intervention. CTA stroke multiphase with no LVO, no intervention planned.  Of note, patient is anemic per OSH labs with a Hgb of 5.8 g/dL and likely has pancreatitis with lipase 3738, WBC count of 15.8 k/uL, and CT abdomen showing peripancreatic fluid around the tail.  He was transfused a unit of PRBCs prior to transfer.  Labs were otherwise largely unremarkable.  On presentation to [facility] ED, he is able to move both BLE but complains of pain in BLE.  Not moving LUE but is intact to noxious stimuli.  Has a R gaze preference that is not overcome by VOR testing and complaint of neck pain with moving head side-to-side.  Otherwise has mild abdominal distension with mild hypogastric tenderness to palpation. HTN to SBP 200s.  Of note, patient's wife  recently and family expressed concern in outpatient ED that patient has not been eating much for the past month but deny  EtOH use.    Hospital Course:  03/23/18:  Admission to Neuro ICU 2/2 concern for stroke with multiple concerns for patient to become unstable--pancreatitis, severe anemia, HTN.  03/24: has had no interval development of multiorgan involvement form pancreatitis, drop in hemoglobin secondary to erosive gastritis, transfused and on protonix bid  03/25: Patient on dilt drip for a-fib. Repeat EKG shows NSR.  3/26: Discontinuing diltiazem drip today. Will continue monitor HR. Continue IV fluids 50 cc/hr for pancreatitis. Repeat lipase.   3/28: NAEON. VSSA. No overt signs of bleeding. Neuro exam unchanged from prior documented exams. Continues to not follow commands, continues to answer questions inappropriately. Remains effectively hemiplegic on left. No further ICU needs. S/D today to hospital medicine after d/w vascular neurology.  03/29/2018 VSS, no signs of blood loss in stool. Loose stools on examination. NG tube replaced and xray confirmed in duodenum - retracted. Will make definitive decision on anticoagulation 3/30. Furthering discussions with family regarding definitive feeding  03/30/2018 VSS, no signs of blood loss on ZENIA. NG tube replaced after patient pulled O/N. Confirmed placement.Family desires PEG tube placed. GI consulted for placement on Monday.   03/31/2018 VSS. Drop in Hb from 8.1 to 7.4 overnight. TF continued. Holding anticoagulation due to worsening anemia. GI recommendations for further evaluation of inflammatory changes of the pancreatic tail, recommending EUS or MRCP in future with PBS follow up outpatient. New fluid filled hernia noted on exam - US ordered  04/02/2018 PEG placement today. WBC 15. Hb improving. Metoprolol changes to 50mg BID, Lisinopril increased 20 mg QD.   04/03/2018 PEG placement scheduled for today. Leukocytosis stable. Hb stable. Added Norvasc for BP control today. Will monitor. Unable to contact any family members since yesterday afternoon.   04/04/2018 PEG in place, OK to  use for TF, Meds etc. Leukocytosis stable. Hb stable - so signs of bleeding, will start Eliquis for his anticoagulation post stroke.   04/06/2018 patient continues on TF, interval increase in leukocyte count, hb stable, patient less responsive and less appropriate today, CXR with no edema or consolidation, CT Head non con for change in responsiveness  04/07/2018 patient returned to baseline mental status, will escalate bowel regime today due to no recorded BM for >1 week, leukocytosis and reactive thrombocytosis are concerning for infection but VSS - will monitor  04/08/2018 patient at baseline mental state, escalating bowel regime today, leukocytosis resolved but urine culture growing gram neg non lactose fermeter at the moment, patient asymptomatic, will hold treating at this time  04/09/2018 patient at baseline mental state, multiple bowel movements after edema yesterday, colonized with ESBL e.coli, asymptomatic, awaiting placement    Interval History: please see hospital course    Review of Systems   Unable to perform ROS: Mental status change     Objective:     Vital Signs (Most Recent):  Temp: 97 °F (36.1 °C) (04/09/18 1143)  Pulse: 62 (04/09/18 1143)  Resp: 17 (04/09/18 1143)  BP: (!) 145/71 (04/09/18 1143)  SpO2: 99 % (04/09/18 1143) Vital Signs (24h Range):  Temp:  [97 °F (36.1 °C)-98.3 °F (36.8 °C)] 97 °F (36.1 °C)  Pulse:  [62-82] 62  Resp:  [16-18] 17  SpO2:  [97 %-100 %] 99 %  BP: (126-145)/(61-78) 145/71     Weight: 64.4 kg (141 lb 15.6 oz)  Body mass index is 21.59 kg/m².    Intake/Output Summary (Last 24 hours) at 04/09/18 1152  Last data filed at 04/09/18 0500   Gross per 24 hour   Intake              250 ml   Output                0 ml   Net              250 ml      Physical Exam   Constitutional: He is oriented to person, place, and time. He appears well-developed.   HENT:   Head: Atraumatic.   Eyes: Pupils are equal, round, and reactive to light.   Patient with rightward gaze preference  Arcus  senilis bilaterally   Neck: No JVD present.   Cardiovascular: Normal rate, regular rhythm, S1 normal, S2 normal and intact distal pulses.    Pulses:       Radial pulses are 2+ on the right side, and 2+ on the left side.        Dorsalis pedis pulses are 1+ on the right side, and 1+ on the left side.   Pulmonary/Chest: Effort normal and breath sounds normal.   Abdominal: Soft. Bowel sounds are normal. He exhibits no distension. There is no tenderness. There is no guarding.       Genitourinary:   Genitourinary Comments: Condom catheter in place   Musculoskeletal: He exhibits no edema.   Neurological: He is alert and oriented to person, place, and time. He exhibits abnormal muscle tone. GCS eye subscore is 4. GCS verbal subscore is 3. GCS motor subscore is 6.   Only oriented to person. Expressive and receptive aphasia. LUE weak - 0/5 strength in Left hand     Skin: Skin is warm. No rash noted.   Psychiatric: His speech is slurred.   Nursing note and vitals reviewed.      Significant Labs:   Recent Results (from the past 24 hour(s))   POCT glucose    Collection Time: 04/08/18  1:11 PM   Result Value Ref Range    POCT Glucose 162 (H) 70 - 110 mg/dL   POCT glucose    Collection Time: 04/08/18  4:44 PM   Result Value Ref Range    POCT Glucose 126 (H) 70 - 110 mg/dL   POCT glucose    Collection Time: 04/08/18 11:39 PM   Result Value Ref Range    POCT Glucose 115 (H) 70 - 110 mg/dL   Comprehensive metabolic panel    Collection Time: 04/09/18  4:01 AM   Result Value Ref Range    Sodium 139 136 - 145 mmol/L    Potassium 4.4 3.5 - 5.1 mmol/L    Chloride 110 95 - 110 mmol/L    CO2 24 23 - 29 mmol/L    Glucose 125 (H) 70 - 110 mg/dL    BUN, Bld 31 (H) 8 - 23 mg/dL    Creatinine 0.8 0.5 - 1.4 mg/dL    Calcium 9.8 8.7 - 10.5 mg/dL    Total Protein 6.3 6.0 - 8.4 g/dL    Albumin 2.5 (L) 3.5 - 5.2 g/dL    Total Bilirubin 0.3 0.1 - 1.0 mg/dL    Alkaline Phosphatase 53 (L) 55 - 135 U/L    AST 34 10 - 40 U/L    ALT 26 10 - 44 U/L    Anion  Gap 5 (L) 8 - 16 mmol/L    eGFR if African American >60.0 >60 mL/min/1.73 m^2    eGFR if non African American >60.0 >60 mL/min/1.73 m^2   CBC auto differential    Collection Time: 04/09/18  4:01 AM   Result Value Ref Range    WBC 10.78 3.90 - 12.70 K/uL    RBC 3.41 (L) 4.60 - 6.20 M/uL    Hemoglobin 7.2 (L) 14.0 - 18.0 g/dL    Hematocrit 25.6 (L) 40.0 - 54.0 %    MCV 75 (L) 82 - 98 fL    MCH 21.1 (L) 27.0 - 31.0 pg    MCHC 28.1 (L) 32.0 - 36.0 g/dL    RDW SEE COMMENT 11.5 - 14.5 %    Platelets 537 (H) 150 - 350 K/uL    MPV 10.3 9.2 - 12.9 fL    Immature Granulocytes 0.9 (H) 0.0 - 0.5 %    Gran # (ANC) 8.0 (H) 1.8 - 7.7 K/uL    Immature Grans (Abs) 0.10 (H) 0.00 - 0.04 K/uL    Lymph # 1.1 1.0 - 4.8 K/uL    Mono # 1.0 0.3 - 1.0 K/uL    Eos # 0.6 (H) 0.0 - 0.5 K/uL    Baso # 0.01 0.00 - 0.20 K/uL    nRBC 0 0 /100 WBC    Gran% 74.2 (H) 38.0 - 73.0 %    Lymph% 10.3 (L) 18.0 - 48.0 %    Mono% 9.3 4.0 - 15.0 %    Eosinophil% 5.2 0.0 - 8.0 %    Basophil% 0.1 0.0 - 1.9 %    Aniso Slight     Poik Slight     Poly Occasional     Hypo Occasional     Ovalocytes Occasional     Large/Giant Platelets Present     Differential Method Automated    Magnesium    Collection Time: 04/09/18  4:01 AM   Result Value Ref Range    Magnesium 2.0 1.6 - 2.6 mg/dL   Phosphorus    Collection Time: 04/09/18  4:01 AM   Result Value Ref Range    Phosphorus 2.7 2.7 - 4.5 mg/dL   POCT glucose    Collection Time: 04/09/18  5:37 AM   Result Value Ref Range    POCT Glucose 134 (H) 70 - 110 mg/dL         Significant Imaging: I have reviewed all pertinent imaging results/findings within the past 24 hours.    Assessment/Plan:      * Embolic stroke involving right middle cerebral artery    Pending placement, Stable   - 03/23/18 CTA Stroke Multiphase with multiple non-occlusive thrombi noted  - MRI brain w/o show multiple infarcts without evidence of hemorrhagic conversion.  - Given recent in-hospital atrial fibrillation and takotsubo's CM suspicion for embolic  stroke, in addition 2D Echo showed severely depressed LVEF 15-20% with evidence of stress CM  - PT/OT reordered - PT working with patient, recommending SNF - needs maximal assistance with all moves  - Continue eliquis 5mg BID - follow weight closely for need to change dosing          Bacteria in urine    - asymptomatic colonized ESBL e.coli  - holding treatment at this time        Inguinal hernia of right side without obstruction or gangrene    - feels fluid filled on exam  - US ordered consistent with fluid filled channel in inguinal canals, findings unspecific.         Oropharyngeal dysphagia    S/p peg tube           Cytotoxic cerebral edema              Chronic systolic HF (heart failure)    - unknown if this is chronic issue or if apical ballooning and depressed EF is secondary to emotional event and passing of wife or if due to neurological event with stroke and cytotoxic edema and ICU stay as both are known to cause stress cardiomyopathy  - will need f/u echo to evaluate EF and recovery           NSVT (nonsustained ventricular tachycardia)    - patient with daily episodes of NSVT  - remains on metoprolol for Afib w RVR  - unable to obtain ROS if symptomatic during episodes  - on telemetry - will monitor          Takotsubo cardiomyopathy    - pt with loss of wife 2/2018, now with dilated cardiomyopathy EF 15-20 percent and imaging suggestive of takotsubo CM  - will need repeat echo to evaluate improvement  - embolic episode may have been exacerbated by new onset CM   - elevated ePAP 61          Alcohol abuse    - unable to obtain alcohol history from patient          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    - Patient noted to be in Afib with RVR 3/25  - In NSR this  AM with rate of 70s-90s, short runs of NSVT  - Would recommend anticoaulation - If anticoagulation is initiated for Afib, Eliquis is $8.35/month--Eliquis was shown to have similar GIB risk as warfarin in [facility]; other DOACs shown to have increased risk compared to warfarin in major Afib studies - is 80 but Cr <1.5 and weight >60kg so 5mg BID dosing   - cardiac monitoring  - Currently on metoprolol 50mg BID for rate control               Anemia    - has iron def anemia, continue iron replacement  - unknown baseline hb, no melena or bloody bowel movements reported while IP  - no known history of GIB or varices  - Hb 9, on iron replacement via NG  - ZENIA negative for black/red stool        Essential hypertension     - SBP goal <180, prn labetalol ordered  - Has not required PRNS  - Now on metoprolol 50mg BID   - Lisinopril 20mg QD  - Norvasc 5mg QD  - has had multiple short runs of NSVT - patient unable to provide review if symptomatic          Acute encephalopathy    Stable, awaiting placement.    - Likely 2/2 sequelae of embolic stroke  - CT head unrevealing  - urine culture growing ESBL E.coli - colonized - will hold treatment as asymptomatic              VTE Risk Mitigation         Ordered     apixaban tablet 5 mg  2 times daily     Route:  Per G Tube        04/06/18 0835              Riley Milner MD  Department of Hospital Medicine   [facility]

## 2018-04-09 NOTE — PLAN OF CARE
Called [facility] & spoke to [facility]. Received referral No beds available    Called [facility] & left message

## 2018-04-09 NOTE — PLAN OF CARE
Problem: Occupational Therapy Goal  Goal: Occupational Therapy Goal  Goals to be met by:  4/15/18    Patient will increase functional independence with ADLs by performin. Supine to sit with Moderate Assistance. - not met  2. Roll to R with Min A. - not met  3. Roll to L with Min A. - not met  4. Grooming while supine with HOB elevated with Minimal Assistance. - not met  5. UE Dressing with Moderate Assistance. - not met  6. Pt will perform grasp/release 2/3 functional items (towel, cup, tissue box) per UE crossing midline with Min A while seated EOB. - not met  7. Pt will perform functional sitting at EOB x5 min with stand by Assist. - not met  8. Transfers with Moderate assistance.             Outcome: Ongoing (interventions implemented as appropriate)  RAMON Purvis/NAMITA      2018

## 2018-04-09 NOTE — PT/OT/SLP PROGRESS
Occupational Therapy   Treatment    Name: Zion Hines  MRN: [id number]  Admitting Diagnosis:  Embolic stroke involving right middle cerebral artery  6 Days Post-Op    Recommendations:     Discharge Recommendations: nursing facility, skilled  Discharge Equipment Recommendations:   (TBD)  Barriers to discharge:  None    Subjective     Communicated with: nurse prior to session.  Pain/Comfort:  · Pain Rating 1: 0/10  · Pain Rating Post-Intervention 1: 0/10    Patients cultural, spiritual, Yarsani conflicts given the current situation: none stated    Objective:     Patient found with:  (all lines in place.)    General Precautions: Standard, aspiration, fall   Orthopedic Precautions:N/A   Braces: N/A     Occupational Performance:    Bed Mobility:    · Patient completed Rolling/Turning to Left with  total assistance  · Patient completed Supine to Sit with total assistance  · Patient completed Sit to Supine with total assistance     Functional Mobility/Transfers:  Activity not attempted secondary to lethargy.    Activities of Daily Living:  · Grooming: Dependent. Hand over hand attempted but Pt was unable to assist with the activity.     Patient left HOB elevated with all lines intact, call button in reach and nurse notified    [facility] 6 Click:  [facility] Total Score: 6    Treatment & Education:  PROM performed to (L) UE all planes with no assisted noted from patient . ROM performed 1 set 10 reps all planes with Pt supine in bed with HOB elevated to 30 degrees.  Education:    Assessment:     Zion Hines is a 78 y.o. male with a medical diagnosis of Embolic stroke involving right middle cerebral artery.  He presents with performance deficits affecting function are weakness, impaired endurance, impaired functional mobilty, gait instability, impaired balance, impaired self care skills, decreased coordination, impaired cognition, decreased upper extremity function, decreased safety awareness, decreased ROM, abnormal tone, impaired  coordination, impaired fine motor.      Rehab Prognosis:  Fair; patient would benefit from acute skilled OT services to address these deficits and reach maximum level of function.       Plan:     Patient to be seen 4 x/week to address the above listed problems via self-care/home management, therapeutic activities, cognitive retraining, neuromuscular re-education, sensory integration  · Plan of Care Expires: 18  · Plan of Care Reviewed with: patient    This Plan of care has been discussed with the patient who was involved in its development and understands and is in agreement with the identified goals and treatment plan    GOALS:    Occupational Therapy Goals        Problem: Occupational Therapy Goal    Goal Priority Disciplines Outcome Interventions   Occupational Therapy Goal     OT, PT/OT Ongoing (interventions implemented as appropriate)    Description:  Goals to be met by:  4/15/18    Patient will increase functional independence with ADLs by performin. Supine to sit with Moderate Assistance. - not met  2. Roll to R with Min A. - not met  3. Roll to L with Min A. - not met  4. Grooming while supine with HOB elevated with Minimal Assistance. - not met  5. UE Dressing with Moderate Assistance. - not met  6. Pt will perform grasp/release 2/3 functional items (towel, cup, tissue box) per UE crossing midline with Min A while seated EOB. - not met  7. Pt will perform functional sitting at EOB x5 min with stand by Assist. - not met  8. Transfers with Moderate assistance.                              Time Tracking:     OT Date of Treatment: 18  OT Start Time: 1025  OT Stop Time: 1035  OT Total Time (min): 10 min    Billable Minutes:Therapeutic Activity 10    RAMON Purvis/NAMITA  2018

## 2018-04-10 LAB
ALBUMIN SERPL BCP-MCNC: 2.4 G/DL
ALP SERPL-CCNC: 56 U/L
ALT SERPL W/O P-5'-P-CCNC: 28 U/L
ANION GAP SERPL CALC-SCNC: 6 MMOL/L
AST SERPL-CCNC: 38 U/L
BASOPHILS # BLD AUTO: 0.01 K/UL
BASOPHILS NFR BLD: 0.1 %
BILIRUB SERPL-MCNC: 0.3 MG/DL
BUN SERPL-MCNC: 32 MG/DL
CALCIUM SERPL-MCNC: 10 MG/DL
CHLORIDE SERPL-SCNC: 108 MMOL/L
CO2 SERPL-SCNC: 24 MMOL/L
CREAT SERPL-MCNC: 0.8 MG/DL
DIFFERENTIAL METHOD: ABNORMAL
EOSINOPHIL # BLD AUTO: 0.7 K/UL
EOSINOPHIL NFR BLD: 6.7 %
ERYTHROCYTE [DISTWIDTH] IN BLOOD BY AUTOMATED COUNT: ABNORMAL %
EST. GFR  (AFRICAN AMERICAN): >60 ML/MIN/1.73 M^2
EST. GFR  (NON AFRICAN AMERICAN): >60 ML/MIN/1.73 M^2
GLUCOSE SERPL-MCNC: 114 MG/DL
HCT VFR BLD AUTO: 25.6 %
HGB BLD-MCNC: 7.3 G/DL
IMM GRANULOCYTES # BLD AUTO: 0.09 K/UL
IMM GRANULOCYTES NFR BLD AUTO: 0.8 %
LYMPHOCYTES # BLD AUTO: 1.2 K/UL
LYMPHOCYTES NFR BLD: 10.9 %
MAGNESIUM SERPL-MCNC: 2.1 MG/DL
MCH RBC QN AUTO: 21.7 PG
MCHC RBC AUTO-ENTMCNC: 28.5 G/DL
MCV RBC AUTO: 76 FL
MONOCYTES # BLD AUTO: 0.9 K/UL
MONOCYTES NFR BLD: 8.5 %
NEUTROPHILS # BLD AUTO: 7.9 K/UL
NEUTROPHILS NFR BLD: 73 %
NRBC BLD-RTO: 0 /100 WBC
PHOSPHATE SERPL-MCNC: 3 MG/DL
PLATELET # BLD AUTO: 540 K/UL
PMV BLD AUTO: 10.2 FL
POCT GLUCOSE: 109 MG/DL (ref 70–110)
POCT GLUCOSE: 129 MG/DL (ref 70–110)
POCT GLUCOSE: 131 MG/DL (ref 70–110)
POCT GLUCOSE: 95 MG/DL (ref 70–110)
POTASSIUM SERPL-SCNC: 4.6 MMOL/L
PROT SERPL-MCNC: 6.3 G/DL
RBC # BLD AUTO: 3.36 M/UL
SODIUM SERPL-SCNC: 138 MMOL/L
WBC # BLD AUTO: 10.78 K/UL

## 2018-04-10 PROCEDURE — 25000003 PHARM REV CODE 250: Performed by: STUDENT IN AN ORGANIZED HEALTH CARE EDUCATION/TRAINING PROGRAM

## 2018-04-10 PROCEDURE — 85025 COMPLETE CBC W/AUTO DIFF WBC: CPT

## 2018-04-10 PROCEDURE — 99233 SBSQ HOSP IP/OBS HIGH 50: CPT | Mod: GC,,, | Performed by: PSYCHIATRY & NEUROLOGY

## 2018-04-10 PROCEDURE — 25000003 PHARM REV CODE 250: Performed by: HOSPITALIST

## 2018-04-10 PROCEDURE — 83735 ASSAY OF MAGNESIUM: CPT

## 2018-04-10 PROCEDURE — 97530 THERAPEUTIC ACTIVITIES: CPT

## 2018-04-10 PROCEDURE — 36415 COLL VENOUS BLD VENIPUNCTURE: CPT

## 2018-04-10 PROCEDURE — 84100 ASSAY OF PHOSPHORUS: CPT

## 2018-04-10 PROCEDURE — 11000001 HC ACUTE MED/SURG PRIVATE ROOM

## 2018-04-10 PROCEDURE — 92526 ORAL FUNCTION THERAPY: CPT

## 2018-04-10 PROCEDURE — 80053 COMPREHEN METABOLIC PANEL: CPT

## 2018-04-10 PROCEDURE — 99231 SBSQ HOSP IP/OBS SF/LOW 25: CPT | Mod: ,,, | Performed by: HOSPITALIST

## 2018-04-10 RX ADMIN — Medication 100 MG: at 10:04

## 2018-04-10 RX ADMIN — PANTOPRAZOLE SODIUM 40 MG: 40 GRANULE, DELAYED RELEASE ORAL at 08:04

## 2018-04-10 RX ADMIN — METOPROLOL TARTRATE 50 MG: 50 TABLET, FILM COATED ORAL at 10:04

## 2018-04-10 RX ADMIN — STANDARDIZED SENNA CONCENTRATE AND DOCUSATE SODIUM 1 TABLET: 8.6; 5 TABLET, FILM COATED ORAL at 10:04

## 2018-04-10 RX ADMIN — POLYETHYLENE GLYCOL 3350 17 G: 17 POWDER, FOR SOLUTION ORAL at 10:04

## 2018-04-10 RX ADMIN — APIXABAN 5 MG: 5 TABLET, FILM COATED ORAL at 08:04

## 2018-04-10 RX ADMIN — LISINOPRIL 20 MG: 20 TABLET ORAL at 10:04

## 2018-04-10 RX ADMIN — APIXABAN 5 MG: 5 TABLET, FILM COATED ORAL at 10:04

## 2018-04-10 RX ADMIN — VITAMIN C 1 TABLET: TAB at 10:04

## 2018-04-10 RX ADMIN — FOLIC ACID 1 MG: 1 TABLET ORAL at 10:04

## 2018-04-10 RX ADMIN — AMLODIPINE BESYLATE 5 MG: 5 TABLET ORAL at 10:04

## 2018-04-10 RX ADMIN — PANTOPRAZOLE SODIUM 40 MG: 40 GRANULE, DELAYED RELEASE ORAL at 10:04

## 2018-04-10 RX ADMIN — Medication 10 ML: at 10:04

## 2018-04-10 RX ADMIN — METOPROLOL TARTRATE 50 MG: 50 TABLET, FILM COATED ORAL at 08:04

## 2018-04-10 RX ADMIN — MINERAL SUPPLEMENT IRON 300 MG / 5 ML STRENGTH LIQUID 100 PER BOX UNFLAVORED 300 MG: at 10:04

## 2018-04-10 RX ADMIN — MINERAL SUPPLEMENT IRON 300 MG / 5 ML STRENGTH LIQUID 100 PER BOX UNFLAVORED 300 MG: at 08:04

## 2018-04-10 NOTE — PLAN OF CARE
"Saroj called left message for Bianca with [facility] to see if Pt was seen and if he was accepted. Saroj placed call to Robb at (c) [phone], "have not seen Pt's information but will look in am." Saroj to follow. Keya with [facility] informed Saroj that she did receive Pt's updates and will have Bianca from [facility] contact Saroj in the am. Saroj to follow.   "

## 2018-04-10 NOTE — PLAN OF CARE
Problem: Patient Care Overview  Goal: Plan of Care Review  Outcome: Ongoing (interventions implemented as appropriate)   VS and assessment performed per orders. Pain medication given as ordered. Pt NPO as ordered, oral care provided and pt repositioned q2h. Tube feedings continues at 50ml/hr, residuals less than 10ml. Right hand non-violent wrist restraint continued. Pt incontinent for urine and bowel, no BM this shift. Pt free of injury or falls.

## 2018-04-10 NOTE — PT/OT/SLP PROGRESS
"Speech Language Pathology Treatment    Patient Name:  Zion Hines   MRN:  [id number]  Admitting Diagnosis: Embolic stroke involving right middle cerebral artery    Recommendations:                 General Recommendations:  Dysphagia therapy, Speech/language therapy and Cognitive-linguistic therapy  Diet recommendations:  NPO, Liquid Diet Level: NPO   Aspiration Precautions: Continue alternate means of nutrition and Strict aspiration precautions   General Precautions: Standard, aspiration, fall, NPO  Communication strategies:  go to room if call light pushed    Subjective     "They don't given me no food around here."    Pain/Comfort:  · Pain Rating 1: 0/10    Objective:     Has the patient been evaluated by SLP for swallowing?   Yes  Keep patient NPO? Yes   Current Respiratory Status: room air      Pt seen at bedside with soft restraints on RUE.  No restraint on LUE due to HP.  Pt notably confused and fixated on having had frequent bowel movements. Mod-max redirection required to engage pt in ongoing assessment of swallowing function.  Oral care provided using toothette moistened with oral antiseptic rinse. Pt's tongue noted to have a white coating.  Right facial droop and dysarthria remain significant.  Following oral care, pt accepted the following PO trials: ice chip x 1, 1/2 tsp thin water x 3, full tsp x 1, and cup sip x 1, 1/3-1/2 tsp applesauce x 2.  Pt holding and then orally expelling ice chip and initial 1/2 tsp thin water, stating "let me spit it out" shortly following acceptance.  Pt willing to accept and initiated more timely swallow for remaining thin liquid trials, but change in vocal quality and throat clear present for cup sip of thin water.  Pt with poor oral acceptance/spoon stripping ability and attention to bolus for 1st pureed trial.  Max cues were required for pt to finally attend to bolus, initiate A-P transit, and initiate pharyngeal swallow.  Pt more readily accepted and managed 2nd trial of " puree, but with slight throat clear present after the swallow.  No further PO presentations given at this time.    Assessment:     Zion Hines is a 78 y.o. male with an SLP diagnosis of Dysphagia, Dysarthria, Cognitive-Linguistic Impairment and Visio-Spatial Impairment.      Goals:    SLP Goals        Problem: SLP Goal    Goal Priority Disciplines Outcome   SLP Goal     SLP Ongoing (interventions implemented as appropriate)   Description:  Speech Language Pathology   Goal expected to be met by 4/8 - goals re-assessed on 4/10 and remain appropriate; expected to be met 4/17:  1. Patient will answer orientation questions x4 given mod A.   2. Given model, patient will follow 1 step directions with 90% acc.   3. Given repetitions x2 max, patient will answer simple y/n questions with 90% acc.  4. Given phonemic cuing, patient will complete simple naming tasks with 80% acc.   5. Given verbal and visual cueing, patient will complete rote speech tasks with 80% acc.    6.  Participate in ongoing assesment of swallow                                Plan:     · Patient to be seen:  3 x/week   · Plan of Care expires:  04/30/18  · Plan of Care reviewed with:  patient   · SLP Follow-Up:  Yes       Discharge recommendations:  nursing facility, skilled     Time Tracking:     SLP Treatment Date:   04/10/18  Speech Start Time:  1318  Speech Stop Time:  1345     Speech Total Time (min):  27 min    Billable Minutes: Treatment Swallowing Dysfunction 27    YANNI Palumbo, CCC-SLP  04/10/2018     YANNI Palumbo, CCC-SLP  Speech Language Pathologist  [phone]  4/10/2018

## 2018-04-10 NOTE — SUBJECTIVE & OBJECTIVE
Interval History: please see hospital course    Review of Systems   Unable to perform ROS: Mental status change     Objective:     Vital Signs (Most Recent):  Temp: 97.6 °F (36.4 °C) (04/10/18 1113)  Pulse: 67 (04/10/18 1113)  Resp: 18 (04/10/18 1113)  BP: 133/81 (04/10/18 1113)  SpO2: 100 % (04/10/18 1113) Vital Signs (24h Range):  Temp:  [97.6 °F (36.4 °C)-98.8 °F (37.1 °C)] 97.6 °F (36.4 °C)  Pulse:  [64-71] 67  Resp:  [16-18] 18  SpO2:  [96 %-100 %] 100 %  BP: (115-143)/(65-81) 133/81     Weight: 63.5 kg (139 lb 15.9 oz)  Body mass index is 21.29 kg/m².    Intake/Output Summary (Last 24 hours) at 04/10/18 1146  Last data filed at 04/10/18 0500   Gross per 24 hour   Intake              300 ml   Output                0 ml   Net              300 ml      Physical Exam   Constitutional: He is oriented to person, place, and time. He appears well-developed.   HENT:   Head: Atraumatic.   Mouth with thick white secretions   Eyes: Pupils are equal, round, and reactive to light.   Patient with rightward gaze preference  Arcus senilis bilaterally   Neck: No JVD present.   Cardiovascular: Normal rate, regular rhythm, S1 normal, S2 normal and intact distal pulses.    Pulses:       Radial pulses are 2+ on the right side, and 2+ on the left side.        Dorsalis pedis pulses are 1+ on the right side, and 1+ on the left side.   Pulmonary/Chest: Effort normal and breath sounds normal.   Abdominal: Soft. Bowel sounds are normal. He exhibits no distension. There is no tenderness. There is no guarding.       Genitourinary:   Genitourinary Comments: Condom catheter in place   Musculoskeletal: He exhibits no edema.   Neurological: He is alert and oriented to person, place, and time. He exhibits abnormal muscle tone. GCS eye subscore is 4. GCS verbal subscore is 3. GCS motor subscore is 6.   Only oriented to person. Expressive and receptive aphasia. LUE weak - 0/5 strength in Left hand     Skin: Skin is warm. No rash noted.    Psychiatric: His speech is slurred.   Nursing note and vitals reviewed.      Significant Labs:   Recent Results (from the past 24 hour(s))   POCT glucose    Collection Time: 04/09/18 12:14 PM   Result Value Ref Range    POCT Glucose 150 (H) 70 - 110 mg/dL   POCT glucose    Collection Time: 04/09/18  6:34 PM   Result Value Ref Range    POCT Glucose 94 70 - 110 mg/dL   POCT glucose    Collection Time: 04/10/18  2:56 AM   Result Value Ref Range    POCT Glucose 129 (H) 70 - 110 mg/dL   POCT glucose    Collection Time: 04/10/18  5:15 AM   Result Value Ref Range    POCT Glucose 109 70 - 110 mg/dL   Comprehensive metabolic panel    Collection Time: 04/10/18  5:35 AM   Result Value Ref Range    Sodium 138 136 - 145 mmol/L    Potassium 4.6 3.5 - 5.1 mmol/L    Chloride 108 95 - 110 mmol/L    CO2 24 23 - 29 mmol/L    Glucose 114 (H) 70 - 110 mg/dL    BUN, Bld 32 (H) 8 - 23 mg/dL    Creatinine 0.8 0.5 - 1.4 mg/dL    Calcium 10.0 8.7 - 10.5 mg/dL    Total Protein 6.3 6.0 - 8.4 g/dL    Albumin 2.4 (L) 3.5 - 5.2 g/dL    Total Bilirubin 0.3 0.1 - 1.0 mg/dL    Alkaline Phosphatase 56 55 - 135 U/L    AST 38 10 - 40 U/L    ALT 28 10 - 44 U/L    Anion Gap 6 (L) 8 - 16 mmol/L    eGFR if African American >60.0 >60 mL/min/1.73 m^2    eGFR if non African American >60.0 >60 mL/min/1.73 m^2   CBC auto differential    Collection Time: 04/10/18  5:35 AM   Result Value Ref Range    WBC 10.78 3.90 - 12.70 K/uL    RBC 3.36 (L) 4.60 - 6.20 M/uL    Hemoglobin 7.3 (L) 14.0 - 18.0 g/dL    Hematocrit 25.6 (L) 40.0 - 54.0 %    MCV 76 (L) 82 - 98 fL    MCH 21.7 (L) 27.0 - 31.0 pg    MCHC 28.5 (L) 32.0 - 36.0 g/dL    RDW SEE COMMENT 11.5 - 14.5 %    Platelets 540 (H) 150 - 350 K/uL    MPV 10.2 9.2 - 12.9 fL    Immature Granulocytes 0.8 (H) 0.0 - 0.5 %    Gran # (ANC) 7.9 (H) 1.8 - 7.7 K/uL    Immature Grans (Abs) 0.09 (H) 0.00 - 0.04 K/uL    Lymph # 1.2 1.0 - 4.8 K/uL    Mono # 0.9 0.3 - 1.0 K/uL    Eos # 0.7 (H) 0.0 - 0.5 K/uL    Baso # 0.01 0.00 -  0.20 K/uL    nRBC 0 0 /100 WBC    Gran% 73.0 38.0 - 73.0 %    Lymph% 10.9 (L) 18.0 - 48.0 %    Mono% 8.5 4.0 - 15.0 %    Eosinophil% 6.7 0.0 - 8.0 %    Basophil% 0.1 0.0 - 1.9 %    Differential Method Automated    Magnesium    Collection Time: 04/10/18  5:35 AM   Result Value Ref Range    Magnesium 2.1 1.6 - 2.6 mg/dL   Phosphorus    Collection Time: 04/10/18  5:35 AM   Result Value Ref Range    Phosphorus 3.0 2.7 - 4.5 mg/dL         Significant Imaging: I have reviewed all pertinent imaging results/findings within the past 24 hours.

## 2018-04-10 NOTE — PLAN OF CARE
Sw uploaded PT/OT updated to all referrals and Sw to follow. Uploaded PT update and will follow with referral.

## 2018-04-10 NOTE — PLAN OF CARE
Problem: Physical Therapy Goal  Goal: Physical Therapy Goal  Goals to be met by: 18     Patient will increase functional independence with mobility by performin. Supine to sit with Maximum Assistance  2. Sit to supine with Maximum Assistance  3. Rolling to Left and Right with Maximum Assistance.  4. Sitting at edge of bed x 10 minutes with Minimal Assistance maintaining midline orientation and using at least 1 UE support for balance.   5.  Sit to stand with Maximum Assistance.   6. Bed to chair transfer with Maximum Assistance using most appropriate AD.    Goals remain appropriate. Continue with PT POC as indicated.

## 2018-04-10 NOTE — PROGRESS NOTES
Patient's chart was reviewed by a stroke team provider.  Patient Pending placement and is currently on appropiate medical therapy.  .  There is no new imaging to review.  Pending diagnostics to follow up on include: None   For other recommendations please see our previous note completed on: 4/06.     There are no new recommendations at this time. Will continue to follow. Discussed patient with staff. Please contact stroke team for any questions or concerns.         Jeffrey Carvalho M.D.  [facility] PGY-2  Pager [phone]

## 2018-04-10 NOTE — PLAN OF CARE
Problem: SLP Goal  Goal: SLP Goal  Speech Language Pathology   Goal expected to be met by 4/8 - goals re-assessed on 4/10 and remain appropriate; expected to be met 4/17:  1. Patient will answer orientation questions x4 given mod A.   2. Given model, patient will follow 1 step directions with 90% acc.   3. Given repetitions x2 max, patient will answer simple y/n questions with 90% acc.  4. Given phonemic cuing, patient will complete simple naming tasks with 80% acc.   5. Given verbal and visual cueing, patient will complete rote speech tasks with 80% acc.    6.  Participate in ongoing assesment of swallow              Outcome: Ongoing (interventions implemented as appropriate)  Pt seen for ongoing swallowing assessment. Pt confused and perseverating.  Cont NPO. Cont POC. YANNI Plaumbo, CCC-SLP  Speech Language Pathologist  [phone]  4/10/2018

## 2018-04-10 NOTE — PT/OT/SLP PROGRESS
Physical Therapy Treatment    Patient Name:  Zion Hines   MRN:  [id number]    Recommendations:     Discharge Recommendations:  nursing facility, skilled   Discharge Equipment Recommendations:  (TBD at next level of care)   Barriers to discharge: Inaccessible home and Decreased caregiver support    Assessment:     Zion Hines is a 78 y.o. male admitted with a medical diagnosis of Embolic stroke involving right middle cerebral artery.  He presents with the following impairments/functional limitations:  weakness, impaired endurance, impaired self care skills, impaired functional mobilty, gait instability, impaired balance, decreased coordination, impaired cognition, decreased upper extremity function, decreased ROM, abnormal tone, impaired coordination, impaired fine motor. Pt fair to treatment session, and will continue to benefit from PT services at this time. Continue with PT POC as indicated.     Rehab Prognosis:  fair; patient would benefit from acute skilled PT services to address these deficits and reach maximum level of function.      Recent Surgery: Procedure(s) (LRB):  PLACEMENT-TUBE-PEG (N/A) 7 Days Post-Op    Plan:     During this hospitalization, patient to be seen 4 x/week to address the above listed problems via gait training, therapeutic activities, therapeutic exercises, neuromuscular re-education  · Plan of Care Expires:  04/28/18   Plan of Care Reviewed with: patient    Subjective     Communicated with nursing prior to session.  Patient found supine upon PT entry to room, agreeable to treatment.      Chief Complaint: none stated  Patient comments/goals: none stated  Pain/Comfort:  · Pain Rating 1:  (Pt did not rate. )  · Location - Side 1: Right  · Location 1: hip  · Pain Addressed 1: Reposition, Distraction  · Pain Rating Post-Intervention 1:  (Pt did not rate. )    Patients cultural, spiritual, Protestant conflicts given the current situation: no    Objective:     Patient found with:  (lines intact)  and soft restraint to (R) wrist    General Precautions: Standard, aspiration, fall, NPO   Orthopedic Precautions:N/A   Braces: N/A     Functional Mobility:  · Bed Mobility:  Rolling: total assistance to doff dirty brief and reposition blue pads and sheets                              Scooting: total assistance x2 via draw sheet technique to HOB      AM-PAC 6 CLICK MOBILITY  Turning over in bed (including adjusting bedclothes, sheets and blankets)?: 2  Sitting down on and standing up from a chair with arms (e.g., wheelchair, bedside commode, etc.): 2  Moving from lying on back to sitting on the side of the bed?: 2  Moving to and from a bed to a chair (including a wheelchair)?: 1  Need to walk in hospital room?: 1  Climbing 3-5 steps with a railing?: 1  Total Score: 9       Therapeutic Activities and Exercises:   -B LE therex 2x15 reps w/ PROM/AAROM to improve B LE ROM, strength, and endurance.    Patient left supine with all lines intact, call button in reach, bed alarm on, nursing present, and (R) soft wrist restraint intact.    GOALS:    Physical Therapy Goals        Problem: Physical Therapy Goal    Goal Priority Disciplines Outcome Goal Variances Interventions   Physical Therapy Goal     PT/OT, PT      Description:  Goals to be met by: 18     Patient will increase functional independence with mobility by performin. Supine to sit with Maximum Assistance  2. Sit to supine with Maximum Assistance  3. Rolling to Left and Right with Maximum Assistance.  4. Sitting at edge of bed x 10 minutes with Minimal Assistance maintaining midline orientation and using at least 1 UE support for balance.   5.  Sit to stand with Maximum Assistance.   6. Bed to chair transfer with Maximum Assistance using most appropriate AD.                     Time Tracking:     PT Received On: 04/10/18  PT Start Time: 1412     PT Stop Time: 1430  PT Total Time (min): 18 min     Billable Minutes: Therapeutic Activity 18    Treatment  Type: Treatment  PT/PTA: PTA     PTA Visit Number: 1     Florencia Perry, PTA  04/10/2018

## 2018-04-10 NOTE — PROGRESS NOTES
[facility] Hospital Medicine  Progress Note    Patient Name: Zion Hines  MRN: [id number]  Patient Class: IP- Inpatient   Admission Date: 3/23/2018  Length of Stay: 18 days  Attending Physician: Tess Andrews MD  Primary Care Provider: Primary Doctor [facility] Medicine Team: [facility] HOSP MED 2 Riley Milner MD    Subjective:     Principal Problem:Embolic stroke involving right middle cerebral artery    HPI:  79 yo M with PMHx HTN known to be non-compliant with medications and remote hx of anemia presents to [facility] ED 18 as a transfer from [facility].  Patient is a poor historian with no family at bedside, hx largely obtained from OSH records.  He presented to OSH originally with symptoms of weakness and fatigue, his daughter brought him to ED thinking he was likely anemic again.  It was noted that patient had some dysarthria and aphasia (unspecified expressive vs receptive) with L-sided weakness.  He was out of the window for tPA and was sent to [facility] emergently for CTA Stroke Multiphase and possible endovascular intervention. CTA stroke multiphase with no LVO, no intervention planned.  Of note, patient is anemic per OSH labs with a Hgb of 5.8 g/dL and likely has pancreatitis with lipase 3738, WBC count of 15.8 k/uL, and CT abdomen showing peripancreatic fluid around the tail.  He was transfused a unit of PRBCs prior to transfer.  Labs were otherwise largely unremarkable.  On presentation to [facility] ED, he is able to move both BLE but complains of pain in BLE.  Not moving LUE but is intact to noxious stimuli.  Has a R gaze preference that is not overcome by VOR testing and complaint of neck pain with moving head side-to-side.  Otherwise has mild abdominal distension with mild hypogastric tenderness to palpation. HTN to SBP 200s.  Of note, patient's wife  recently and family expressed concern in outpatient ED that patient has not been eating much for the past month but deny  EtOH use.    Hospital Course:  03/23/18:  Admission to Neuro ICU 2/2 concern for stroke with multiple concerns for patient to become unstable--pancreatitis, severe anemia, HTN.  03/24: has had no interval development of multiorgan involvement form pancreatitis, drop in hemoglobin secondary to erosive gastritis, transfused and on protonix bid  03/25: Patient on dilt drip for a-fib. Repeat EKG shows NSR.  3/26: Discontinuing diltiazem drip today. Will continue monitor HR. Continue IV fluids 50 cc/hr for pancreatitis. Repeat lipase.   3/28: NAEON. VSSA. No overt signs of bleeding. Neuro exam unchanged from prior documented exams. Continues to not follow commands, continues to answer questions inappropriately. Remains effectively hemiplegic on left. No further ICU needs. S/D today to hospital medicine after d/w vascular neurology.  03/29/2018 VSS, no signs of blood loss in stool. Loose stools on examination. NG tube replaced and xray confirmed in duodenum - retracted. Will make definitive decision on anticoagulation 3/30. Furthering discussions with family regarding definitive feeding  03/30/2018 VSS, no signs of blood loss on ZENIA. NG tube replaced after patient pulled O/N. Confirmed placement.Family desires PEG tube placed. GI consulted for placement on Monday.   03/31/2018 VSS. Drop in Hb from 8.1 to 7.4 overnight. TF continued. Holding anticoagulation due to worsening anemia. GI recommendations for further evaluation of inflammatory changes of the pancreatic tail, recommending EUS or MRCP in future with PBS follow up outpatient. New fluid filled hernia noted on exam - US ordered  04/02/2018 PEG placement today. WBC 15. Hb improving. Metoprolol changes to 50mg BID, Lisinopril increased 20 mg QD.   04/03/2018 PEG placement scheduled for today. Leukocytosis stable. Hb stable. Added Norvasc for BP control today. Will monitor. Unable to contact any family members since yesterday afternoon.   04/04/2018 PEG in place, OK to  use for TF, Meds etc. Leukocytosis stable. Hb stable - so signs of bleeding, will start Eliquis for his anticoagulation post stroke.   04/06/2018 patient continues on TF, interval increase in leukocyte count, hb stable, patient less responsive and less appropriate today, CXR with no edema or consolidation, CT Head non con for change in responsiveness  04/07/2018 patient returned to baseline mental status, will escalate bowel regime today due to no recorded BM for >1 week, leukocytosis and reactive thrombocytosis are concerning for infection but VSS - will monitor  04/08/2018 patient at baseline mental state, escalating bowel regime today, leukocytosis resolved but urine culture growing gram neg non lactose fermeter at the moment, patient asymptomatic, will hold treating at this time  04/09/2018 patient at baseline mental state, multiple bowel movements after edema yesterday, colonized with ESBL e.coli, asymptomatic, awaiting placement  04/10/2018 patient at baseline orientation, contact precautions for ESBL e.coli, awaiting placement, being evaluated today for placement    Interval History: please see hospital course    Review of Systems   Unable to perform ROS: Mental status change     Objective:     Vital Signs (Most Recent):  Temp: 97.6 °F (36.4 °C) (04/10/18 1113)  Pulse: 67 (04/10/18 1113)  Resp: 18 (04/10/18 1113)  BP: 133/81 (04/10/18 1113)  SpO2: 100 % (04/10/18 1113) Vital Signs (24h Range):  Temp:  [97.6 °F (36.4 °C)-98.8 °F (37.1 °C)] 97.6 °F (36.4 °C)  Pulse:  [64-71] 67  Resp:  [16-18] 18  SpO2:  [96 %-100 %] 100 %  BP: (115-143)/(65-81) 133/81     Weight: 63.5 kg (139 lb 15.9 oz)  Body mass index is 21.29 kg/m².    Intake/Output Summary (Last 24 hours) at 04/10/18 1146  Last data filed at 04/10/18 0500   Gross per 24 hour   Intake              300 ml   Output                0 ml   Net              300 ml      Physical Exam   Constitutional: He is oriented to person, place, and time. He appears  well-developed.   HENT:   Head: Atraumatic.   Mouth with thick white secretions   Eyes: Pupils are equal, round, and reactive to light.   Patient with rightward gaze preference  Arcus senilis bilaterally   Neck: No JVD present.   Cardiovascular: Normal rate, regular rhythm, S1 normal, S2 normal and intact distal pulses.    Pulses:       Radial pulses are 2+ on the right side, and 2+ on the left side.        Dorsalis pedis pulses are 1+ on the right side, and 1+ on the left side.   Pulmonary/Chest: Effort normal and breath sounds normal.   Abdominal: Soft. Bowel sounds are normal. He exhibits no distension. There is no tenderness. There is no guarding.       Genitourinary:   Genitourinary Comments: Condom catheter in place   Musculoskeletal: He exhibits no edema.   Neurological: He is alert and oriented to person, place, and time. He exhibits abnormal muscle tone. GCS eye subscore is 4. GCS verbal subscore is 3. GCS motor subscore is 6.   Only oriented to person. Expressive and receptive aphasia. LUE weak - 0/5 strength in Left hand     Skin: Skin is warm. No rash noted.   Psychiatric: His speech is slurred.   Nursing note and vitals reviewed.      Significant Labs:   Recent Results (from the past 24 hour(s))   POCT glucose    Collection Time: 04/09/18 12:14 PM   Result Value Ref Range    POCT Glucose 150 (H) 70 - 110 mg/dL   POCT glucose    Collection Time: 04/09/18  6:34 PM   Result Value Ref Range    POCT Glucose 94 70 - 110 mg/dL   POCT glucose    Collection Time: 04/10/18  2:56 AM   Result Value Ref Range    POCT Glucose 129 (H) 70 - 110 mg/dL   POCT glucose    Collection Time: 04/10/18  5:15 AM   Result Value Ref Range    POCT Glucose 109 70 - 110 mg/dL   Comprehensive metabolic panel    Collection Time: 04/10/18  5:35 AM   Result Value Ref Range    Sodium 138 136 - 145 mmol/L    Potassium 4.6 3.5 - 5.1 mmol/L    Chloride 108 95 - 110 mmol/L    CO2 24 23 - 29 mmol/L    Glucose 114 (H) 70 - 110 mg/dL    BUN, Bld  32 (H) 8 - 23 mg/dL    Creatinine 0.8 0.5 - 1.4 mg/dL    Calcium 10.0 8.7 - 10.5 mg/dL    Total Protein 6.3 6.0 - 8.4 g/dL    Albumin 2.4 (L) 3.5 - 5.2 g/dL    Total Bilirubin 0.3 0.1 - 1.0 mg/dL    Alkaline Phosphatase 56 55 - 135 U/L    AST 38 10 - 40 U/L    ALT 28 10 - 44 U/L    Anion Gap 6 (L) 8 - 16 mmol/L    eGFR if African American >60.0 >60 mL/min/1.73 m^2    eGFR if non African American >60.0 >60 mL/min/1.73 m^2   CBC auto differential    Collection Time: 04/10/18  5:35 AM   Result Value Ref Range    WBC 10.78 3.90 - 12.70 K/uL    RBC 3.36 (L) 4.60 - 6.20 M/uL    Hemoglobin 7.3 (L) 14.0 - 18.0 g/dL    Hematocrit 25.6 (L) 40.0 - 54.0 %    MCV 76 (L) 82 - 98 fL    MCH 21.7 (L) 27.0 - 31.0 pg    MCHC 28.5 (L) 32.0 - 36.0 g/dL    RDW SEE COMMENT 11.5 - 14.5 %    Platelets 540 (H) 150 - 350 K/uL    MPV 10.2 9.2 - 12.9 fL    Immature Granulocytes 0.8 (H) 0.0 - 0.5 %    Gran # (ANC) 7.9 (H) 1.8 - 7.7 K/uL    Immature Grans (Abs) 0.09 (H) 0.00 - 0.04 K/uL    Lymph # 1.2 1.0 - 4.8 K/uL    Mono # 0.9 0.3 - 1.0 K/uL    Eos # 0.7 (H) 0.0 - 0.5 K/uL    Baso # 0.01 0.00 - 0.20 K/uL    nRBC 0 0 /100 WBC    Gran% 73.0 38.0 - 73.0 %    Lymph% 10.9 (L) 18.0 - 48.0 %    Mono% 8.5 4.0 - 15.0 %    Eosinophil% 6.7 0.0 - 8.0 %    Basophil% 0.1 0.0 - 1.9 %    Differential Method Automated    Magnesium    Collection Time: 04/10/18  5:35 AM   Result Value Ref Range    Magnesium 2.1 1.6 - 2.6 mg/dL   Phosphorus    Collection Time: 04/10/18  5:35 AM   Result Value Ref Range    Phosphorus 3.0 2.7 - 4.5 mg/dL         Significant Imaging: I have reviewed all pertinent imaging results/findings within the past 24 hours.    Assessment/Plan:      * Embolic stroke involving right middle cerebral artery    Pending placement, Stable   - 03/23/18 CTA Stroke Multiphase with multiple non-occlusive thrombi noted  - MRI brain w/o show multiple infarcts without evidence of hemorrhagic conversion.  - Given recent in-hospital atrial fibrillation and  takotsubo's CM suspicion for embolic stroke, in addition 2D Echo showed severely depressed LVEF 15-20% with evidence of stress CM  - PT/OT reordered - PT working with patient, recommending SNF - needs maximal assistance with all moves  - Continue eliquis 5mg BID - follow weight closely for need to change dosing          Bacteria in urine    - asymptomatic colonized ESBL e.coli  - holding treatment at this time        Inguinal hernia of right side without obstruction or gangrene    - feels fluid filled on exam  - US ordered consistent with fluid filled channel in inguinal canals, findings unspecific.         Oropharyngeal dysphagia    S/p peg tube           Cytotoxic cerebral edema              Chronic systolic HF (heart failure)    - unknown if this is chronic issue or if apical ballooning and depressed EF is secondary to emotional event and passing of wife or if due to neurological event with stroke and cytotoxic edema and ICU stay as both are known to cause stress cardiomyopathy  - will need f/u echo to evaluate EF and recovery           NSVT (nonsustained ventricular tachycardia)    - patient with daily episodes of NSVT  - remains on metoprolol for Afib w RVR  - unable to obtain ROS if symptomatic during episodes  - on telemetry - will monitor          Takotsubo cardiomyopathy    - pt with loss of wife 2/2018, now with dilated cardiomyopathy EF 15-20 percent and imaging suggestive of takotsubo CM  - will need repeat echo to evaluate improvement  - embolic episode may have been exacerbated by new onset CM   - elevated ePAP 61          Alcohol abuse    - unable to obtain alcohol history from patient          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    - Patient noted to be  in Afib with RVR 3/25  - In NSR this AM with rate of 70s-90s, short runs of NSVT  - Would recommend anticoaulation - If anticoagulation is initiated for Afib, Eliquis is $8.35/month--Eliquis was shown to have similar GIB risk as warfarin in [facility]; other DOACs shown to have increased risk compared to warfarin in major Afib studies - is 80 but Cr <1.5 and weight >60kg so 5mg BID dosing   - cardiac monitoring  - Currently on metoprolol 50mg BID for rate control               Anemia    - has iron def anemia, continue iron replacement  - unknown baseline hb, no melena or bloody bowel movements reported while IP  - no known history of GIB or varices  - Hb 9, on iron replacement via NG  - ZENIA negative for black/red stool        Essential hypertension     - SBP goal <180, prn labetalol ordered  - Has not required PRNS  - Now on metoprolol 50mg BID   - Lisinopril 20mg QD  - Norvasc 5mg QD  - has had multiple short runs of NSVT - patient unable to provide review if symptomatic          Acute encephalopathy    Stable, awaiting placement.    - Likely 2/2 sequelae of embolic stroke  - CT head unrevealing  - urine culture growing ESBL E.coli - colonized - will hold treatment as asymptomatic              VTE Risk Mitigation         Ordered     apixaban tablet 5 mg  2 times daily     Route:  Per G Tube        04/06/18 8038              Riley Milner MD  Department of Hospital Medicine   [facility]

## 2018-04-11 VITALS
BODY MASS INDEX: 21.22 KG/M2 | TEMPERATURE: 98 F | OXYGEN SATURATION: 100 % | DIASTOLIC BLOOD PRESSURE: 73 MMHG | HEART RATE: 73 BPM | RESPIRATION RATE: 18 BRPM | SYSTOLIC BLOOD PRESSURE: 136 MMHG | WEIGHT: 140 LBS | HEIGHT: 68 IN

## 2018-04-11 LAB
ABO + RH BLD: NORMAL
ALBUMIN SERPL BCP-MCNC: 2.7 G/DL
ALP SERPL-CCNC: 62 U/L
ALT SERPL W/O P-5'-P-CCNC: 34 U/L
ANION GAP SERPL CALC-SCNC: 8 MMOL/L
ANISOCYTOSIS BLD QL SMEAR: ABNORMAL
AST SERPL-CCNC: 51 U/L
BASOPHILS # BLD AUTO: 0.01 K/UL
BASOPHILS NFR BLD: 0.1 %
BILIRUB SERPL-MCNC: 0.2 MG/DL
BLD GP AB SCN CELLS X3 SERPL QL: NORMAL
BLD PROD TYP BPU: NORMAL
BLOOD UNIT EXPIRATION DATE: NORMAL
BLOOD UNIT TYPE CODE: 5100
BLOOD UNIT TYPE: NORMAL
BUN SERPL-MCNC: 35 MG/DL
CALCIUM SERPL-MCNC: 10.5 MG/DL
CHLORIDE SERPL-SCNC: 105 MMOL/L
CO2 SERPL-SCNC: 24 MMOL/L
CODING SYSTEM: NORMAL
CREAT SERPL-MCNC: 0.8 MG/DL
DIFFERENTIAL METHOD: ABNORMAL
DISPENSE STATUS: NORMAL
EOSINOPHIL # BLD AUTO: 0.5 K/UL
EOSINOPHIL NFR BLD: 4.3 %
ERYTHROCYTE [DISTWIDTH] IN BLOOD BY AUTOMATED COUNT: ABNORMAL %
EST. GFR  (AFRICAN AMERICAN): >60 ML/MIN/1.73 M^2
EST. GFR  (NON AFRICAN AMERICAN): >60 ML/MIN/1.73 M^2
GLUCOSE SERPL-MCNC: 106 MG/DL
HCT VFR BLD AUTO: 26.8 %
HGB BLD-MCNC: 7.5 G/DL
HGB BLD-MCNC: 8.9 G/DL
HYPOCHROMIA BLD QL SMEAR: ABNORMAL
IMM GRANULOCYTES # BLD AUTO: 0.08 K/UL
IMM GRANULOCYTES NFR BLD AUTO: 0.7 %
LYMPHOCYTES # BLD AUTO: 1.2 K/UL
LYMPHOCYTES NFR BLD: 10.6 %
MAGNESIUM SERPL-MCNC: 2.4 MG/DL
MCH RBC QN AUTO: 21.7 PG
MCHC RBC AUTO-ENTMCNC: 28 G/DL
MCV RBC AUTO: 78 FL
MONOCYTES # BLD AUTO: 0.9 K/UL
MONOCYTES NFR BLD: 8.5 %
NEUTROPHILS # BLD AUTO: 8.3 K/UL
NEUTROPHILS NFR BLD: 75.8 %
NRBC BLD-RTO: 0 /100 WBC
OVALOCYTES BLD QL SMEAR: ABNORMAL
PHOSPHATE SERPL-MCNC: 3.1 MG/DL
PLATELET # BLD AUTO: 567 K/UL
PMV BLD AUTO: 10.3 FL
POCT GLUCOSE: 128 MG/DL (ref 70–110)
POCT GLUCOSE: 139 MG/DL (ref 70–110)
POCT GLUCOSE: 140 MG/DL (ref 70–110)
POIKILOCYTOSIS BLD QL SMEAR: SLIGHT
POLYCHROMASIA BLD QL SMEAR: ABNORMAL
POTASSIUM SERPL-SCNC: 5.5 MMOL/L
PROT SERPL-MCNC: 7.1 G/DL
RBC # BLD AUTO: 3.45 M/UL
SODIUM SERPL-SCNC: 137 MMOL/L
TARGETS BLD QL SMEAR: ABNORMAL
TRANS ERYTHROCYTES VOL PATIENT: NORMAL ML
WBC # BLD AUTO: 10.92 K/UL

## 2018-04-11 PROCEDURE — 85018 HEMOGLOBIN: CPT

## 2018-04-11 PROCEDURE — 36415 COLL VENOUS BLD VENIPUNCTURE: CPT

## 2018-04-11 PROCEDURE — 25000003 PHARM REV CODE 250: Performed by: STUDENT IN AN ORGANIZED HEALTH CARE EDUCATION/TRAINING PROGRAM

## 2018-04-11 PROCEDURE — 25000003 PHARM REV CODE 250: Performed by: HOSPITALIST

## 2018-04-11 PROCEDURE — 99239 HOSP IP/OBS DSCHRG MGMT >30: CPT | Mod: ,,, | Performed by: HOSPITALIST

## 2018-04-11 PROCEDURE — 83735 ASSAY OF MAGNESIUM: CPT

## 2018-04-11 PROCEDURE — 86850 RBC ANTIBODY SCREEN: CPT

## 2018-04-11 PROCEDURE — 84100 ASSAY OF PHOSPHORUS: CPT

## 2018-04-11 PROCEDURE — 80053 COMPREHEN METABOLIC PANEL: CPT

## 2018-04-11 PROCEDURE — 85025 COMPLETE CBC W/AUTO DIFF WBC: CPT

## 2018-04-11 PROCEDURE — P9021 RED BLOOD CELLS UNIT: HCPCS

## 2018-04-11 PROCEDURE — 86920 COMPATIBILITY TEST SPIN: CPT

## 2018-04-11 PROCEDURE — 99233 SBSQ HOSP IP/OBS HIGH 50: CPT | Mod: GC,,, | Performed by: PSYCHIATRY & NEUROLOGY

## 2018-04-11 RX ORDER — ACETAMINOPHEN 325 MG/1
650 TABLET ORAL EVERY 6 HOURS PRN
Refills: 0
Start: 2018-04-11

## 2018-04-11 RX ORDER — INSULIN ASPART 100 [IU]/ML
1-10 INJECTION, SOLUTION INTRAVENOUS; SUBCUTANEOUS EVERY 6 HOURS PRN
Refills: 0
Start: 2018-04-11 | End: 2019-04-11

## 2018-04-11 RX ORDER — AMOXICILLIN 250 MG
1 CAPSULE ORAL DAILY
COMMUNITY
Start: 2018-04-12

## 2018-04-11 RX ORDER — LISINOPRIL 20 MG/1
20 TABLET ORAL DAILY
Qty: 90 TABLET | Refills: 3
Start: 2018-04-12 | End: 2019-04-12

## 2018-04-11 RX ORDER — PANTOPRAZOLE SODIUM 40 MG/1
40 FOR SUSPENSION ORAL 2 TIMES DAILY
Qty: 60 PACKET | Refills: 11
Start: 2018-04-11 | End: 2019-04-11

## 2018-04-11 RX ORDER — ONDANSETRON 2 MG/ML
4 INJECTION INTRAMUSCULAR; INTRAVENOUS EVERY 8 HOURS PRN
Start: 2018-04-11

## 2018-04-11 RX ORDER — LANOLIN ALCOHOL/MO/W.PET/CERES
100 CREAM (GRAM) TOPICAL DAILY
COMMUNITY
Start: 2018-04-12

## 2018-04-11 RX ORDER — FERROUS SULFATE 300 MG/5ML
300 LIQUID (ML) ORAL 2 TIMES DAILY
Refills: 0 | COMMUNITY
Start: 2018-04-11

## 2018-04-11 RX ORDER — FOLIC ACID 1 MG/1
1 TABLET ORAL DAILY
Refills: 0
Start: 2018-04-12 | End: 2019-04-12

## 2018-04-11 RX ORDER — AMLODIPINE BESYLATE 5 MG/1
5 TABLET ORAL DAILY
Qty: 30 TABLET | Refills: 11
Start: 2018-04-12 | End: 2019-04-12

## 2018-04-11 RX ORDER — B-COMPLEX WITH VITAMIN C
1 TABLET ORAL DAILY
COMMUNITY
Start: 2018-04-12

## 2018-04-11 RX ORDER — POLYETHYLENE GLYCOL 3350 17 G/17G
17 POWDER, FOR SOLUTION ORAL DAILY
Refills: 0
Start: 2018-04-12

## 2018-04-11 RX ORDER — METOPROLOL TARTRATE 50 MG/1
50 TABLET ORAL 2 TIMES DAILY
Qty: 60 TABLET | Refills: 11
Start: 2018-04-11 | End: 2019-04-11

## 2018-04-11 RX ORDER — HYDROCODONE BITARTRATE AND ACETAMINOPHEN 500; 5 MG/1; MG/1
TABLET ORAL
Status: DISCONTINUED | OUTPATIENT
Start: 2018-04-11 | End: 2018-04-11 | Stop reason: HOSPADM

## 2018-04-11 RX ADMIN — APIXABAN 5 MG: 5 TABLET, FILM COATED ORAL at 08:04

## 2018-04-11 RX ADMIN — FOLIC ACID 1 MG: 1 TABLET ORAL at 08:04

## 2018-04-11 RX ADMIN — Medication 10 ML: at 08:04

## 2018-04-11 RX ADMIN — METOPROLOL TARTRATE 50 MG: 50 TABLET, FILM COATED ORAL at 08:04

## 2018-04-11 RX ADMIN — Medication 100 MG: at 08:04

## 2018-04-11 RX ADMIN — MINERAL SUPPLEMENT IRON 300 MG / 5 ML STRENGTH LIQUID 100 PER BOX UNFLAVORED 300 MG: at 08:04

## 2018-04-11 RX ADMIN — LISINOPRIL 20 MG: 20 TABLET ORAL at 08:04

## 2018-04-11 RX ADMIN — METOPROLOL TARTRATE 50 MG: 50 TABLET, FILM COATED ORAL at 09:04

## 2018-04-11 RX ADMIN — PANTOPRAZOLE SODIUM 40 MG: 40 GRANULE, DELAYED RELEASE ORAL at 08:04

## 2018-04-11 RX ADMIN — STANDARDIZED SENNA CONCENTRATE AND DOCUSATE SODIUM 1 TABLET: 8.6; 5 TABLET, FILM COATED ORAL at 08:04

## 2018-04-11 RX ADMIN — VITAMIN C 1 TABLET: TAB at 08:04

## 2018-04-11 RX ADMIN — POLYETHYLENE GLYCOL 3350 17 G: 17 POWDER, FOR SOLUTION ORAL at 08:04

## 2018-04-11 RX ADMIN — AMLODIPINE BESYLATE 5 MG: 5 TABLET ORAL at 08:04

## 2018-04-11 NOTE — PLAN OF CARE
Problem: Patient Care Overview  Goal: Plan of Care Review    Recommendations     Recommendation/Intervention:   1. Continue current TF order of Isosource at 50 ml/hr.   -Bolus recommendations: 5 cans Isosource daily. This will provide 1875 kcal, 85 gm pro, and 955 ml free water. An additional 950 ml free water will be needed for normal hydration, or per MD.   2. RD following.     Goals: TF to meet >/=85% EEN/EPN  Nutrition Goal Status: goal met

## 2018-04-11 NOTE — PLAN OF CARE
Pt was accepted to [facility] per Saroj Valenzuela to inform IM team and follow to see if Pt is stable to d/c. CM informing spouse Malgorzata to be sure all in agreement. Orders need for rehab/facility transfer orders.

## 2018-04-11 NOTE — PLAN OF CARE
Pt stable per team, orders completed for rehab, spouse agrees, Mitsy notified, rehab orders uploaded to [facility] and  to arrange stretcher transport for after lunch, floor nurse aware of d/c, packet with  to transport with Pt.

## 2018-04-11 NOTE — PROGRESS NOTES
" [facility]  Adult Nutrition  Progress Note    SUMMARY       Recommendations    Recommendation/Intervention:   1. Continue current TF order of Isosource at 50 ml/hr.   -Bolus recommendations: 5 cans Isosource daily. This will provide 1875 kcal, 85 gm pro, and 955 ml free water. An additional 950 ml free water will be needed for normal hydration, or per MD.   2. RD following.    Goals: TF to meet >/=85% EEN/EPN  Nutrition Goal Status: goal met  Communication of RD Recs:  (POC)    Reason for Assessment    Reason for Assessment: RD follow-up  Diagnosis: stroke/CVA  Relevant Medical History: HTN    General Information Comments: Tolerating TF per MD note.    Nutrition Discharge Planning: Adequate nutrition via TF    Nutrition Risk Screen    Nutrition Risk Screen: dysphagia or difficulty swallowing    Nutrition/Diet History    Food Preferences: N/a  Do you have any cultural, spiritual, Gnosticist conflicts, given your current situation?: no  Factors Affecting Nutritional Intake: difficulty/impaired swallowing, impaired cognitive status/motor control, NPO    Anthropometrics    Temp: 97.3 °F (36.3 °C)  Height Method: Stated (per daughter )  Height: 5' 7.99" (172.7 cm)  Height (inches): 67.99 in  Weight Method: Bed Scale  Weight: 63.5 kg (139 lb 15.9 oz)  Weight (lb): 139.99 lb  Ideal Body Weight (IBW), Male: 153.94 lb  % Ideal Body Weight, Male (lb): 90.94 lb  BMI (Calculated): 21.3  BMI Grade: 18.5-24.9 - normal       Lab/Procedures/Meds    Pertinent Labs Reviewed: reviewed  Pertinent Labs Comments: K 5.5, BUN 35, Alb 2.7  Pertinent Medications Reviewed: reviewed  Pertinent Medications Comments: vit B complex with vit C, Fe, folic acid, MVI, pantoprazole, polyethylene glycol, senna docusate, thiamien    Physical Findings/Assessment    Overall Physical Appearance: lethargic, listlessness  Tubes: gastrostomy tube  Oral/Mouth Cavity: tooth/teeth missing  Skin: intact    Estimated/Assessed Needs    Weight " Used For Calorie Calculations: 70.4 kg (155 lb 3.3 oz) (Dosing weight)  Energy Calorie Requirements (kcal): 1693  Energy Need Method: Coden-St Jeor (x 1.25 (PAL))  Protein Requirements: 70-85 gm (1.0-1.2 gm/kg)  Weight Used For Protein Calculations: 70.4 kg (155 lb 3.3 oz) (dosing weight)     Fluid Need Method: RDA Method (1 ml/kcal or per MD)  RDA Method (mL): 1693       Nutrition Prescription Ordered    Current Diet Order: NPO  Current Nutrition Support Formula Ordered: Isosource 1.5  Current Nutrition Support Rate Ordered: 50 (ml)  Current Nutrition Support Frequency Ordered: mL/hr    Evaluation of Received Nutrient/Fluid Intake    Enteral Calories (kcal): 1800  Enteral Protein (gm): 82  Enteral (Free Water) Fluid (mL): 917  Free Water Flush Fluid (mL):  (None ordered)  % Kcal Needs: 106  % Protein Needs: 100  Energy Calories Required: meeting needs  Protein Required: meeting needs  Fluid Required:  (per MD)  Comments: LBM 4/9  % Intake of Estimated Energy Needs: 75 - 100 %  % Meal Intake: NPO    Nutrition Risk    Level of Risk/Frequency of Follow-up:  (f/u 1x/week)     Assessment and Plan    Oropharyngeal dysphagia    Contributing Nutrition Diagnosis  Inadequate energy intake     Related to (etiology):   NPO     Signs and Symptoms (as evidenced by):   Pt meeting 0% EEN/EPN     Interventions/Recommendations (treatment strategy):  See recs     Nutrition Diagnosis Status:   Resolved           Monitor and Evaluation    Food and Nutrient Intake: enteral nutrition intake  Food and Nutrient Adminstration: enteral and parenteral nutrition administration  Knowledge/Beliefs/Attitudes: food and nutrition knowledge/skill  Anthropometric Measurements: weight, weight change, body mass index  Biochemical Data, Medical Tests and Procedures: electrolyte and renal panel, inflammatory profile, lipid profile, gastrointestinal profile, glucose/endocrine profile  Nutrition-Focused Physical Findings: overall appearance     Nutrition  Follow-Up    RD Follow-up?: Yes

## 2018-04-11 NOTE — PLAN OF CARE
Sw did setup stretcher transport for 7pm with Callum by Gaurav, nurse needs to call report to [facility] at  and packet with  to transport with Pt, spouse aware and nurse as well.

## 2018-04-11 NOTE — PLAN OF CARE
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 [I63.9, G81.04]  Yes     Priority: 2     Acute encephalopathy [G93.40]  Yes     Priority: 3     Atrial fibrillation [I48.91]  Yes     Priority: 4     Essential hypertension [I10]  Yes     Priority: 6     NSVT (nonsustained ventricular tachycardia) [I47.2]  Yes     Priority: 7     Chronic systolic HF (heart failure) [I50.22]  Yes     Priority: 7     Takotsubo cardiomyopathy [I51.81]  Yes     Priority: 7     Iron deficiency anemia [D50.9]  Yes     Priority: 14     Inguinal hernia of right side without obstruction or gangrene [K40.90]  Yes     Priority: 20     Oropharyngeal dysphagia [R13.12]  Yes     Priority: 21     Alcohol abuse [F10.10]  Yes     Priority: 22     Bacteria in urine [R82.71]  Yes     Priority: 25 - Low      Resolved Hospital Problems    Diagnosis Date Resolved POA    Acute pancreatitis [K85.90] 04/05/2018 Yes     Priority: 5        Allergies:Review of patient's allergies indicates:  No Known Allergies    Diet: tube feedings: Continuous Isosource 1.5 Kwabena at 50 mL per hour for 24 hours per day    Activities: per facility protocol     Nursing:      Labs: per facility protocol    CONSULTS:    Physical Therapy to evaluate and treat.  and Occupational Therapy to evaluate and treat.    MISCELLANEOUS CARE:    WOUND CARE ORDERS    Medications: Review discharge medications with patient and family and provide  education.      Current Discharge Medication List      START taking these medications    Details   acetaminophen (TYLENOL) 325 MG tablet Take 2 tablets (650 mg total) by mouth every 6 (six) hours as needed for Temperature greater than (99.5).  Refills: 0      amLODIPine (NORVASC) 5 MG tablet 1 tablet (5 mg total) by Per G Tube route once daily.  Qty: 30 tablet, Refills: 11      apixaban 5 mg Tab 1 tablet (5 mg total) by Per G Tube route 2 (two) times daily.      B-complex with vitamin C (Z-BEC OR EQUIV) tablet 1 tablet by Per G Tube route once daily.      ferrous sulfate 300 mg (60 mg iron)/5 mL syrup 5 mLs (300 mg total) by Per G Tube route 2 (two) times daily.  Refills: 0      folic acid (FOLVITE) 1 MG tablet 1 tablet (1 mg total) by Per G Tube route once daily.  Refills: 0      insulin aspart U-100 (NOVOLOG) 100 unit/mL InPn pen Inject 1-10 Units into the skin every 6 (six) hours as needed (Hyperglycemia).  Refills: 0      lisinopril (PRINIVIL,ZESTRIL) 20 MG tablet 1 tablet (20 mg total) by Per G Tube route once daily.  Qty: 90 tablet, Refills: 3      metoprolol tartrate (LOPRESSOR) 50 MG tablet 1 tablet (50 mg total) by Per G Tube route 2 (two) times daily.  Qty: 60 tablet, Refills: 11      multivitamin liquid no.118 Liqd 10 mLs by Per G Tube route once daily.      ondansetron 4 mg/2 mL Soln Inject 4 mg into the vein every 8 (eight) hours as needed.      pantoprazole (PROTONIX) 40 mg GrPS 1 packet (40 mg total) by Per G Tube route 2 (two) times daily.  Qty: 60 packet, Refills: 11      polyethylene glycol (GLYCOLAX) 17 gram PwPk 17 g by Per G Tube route once daily.  Refills: 0      senna-docusate 8.6-50 mg (PERICOLACE) 8.6-50 mg per tablet 1 tablet by Per G Tube route once daily.      thiamine 100 MG tablet 1 tablet (100 mg total) by Per G Tube route once daily.          _________________________________  Luis Gilbert IV, MD  04/11/2018

## 2018-04-11 NOTE — NURSING
The doctor called the nurse at 11:40 stating that he will renew the orders for restraints. He later called back at 12:04 stating that he will not reorder the restraints because the patient is going to a skilled nursing facility. MD verbalized an that he is aware of patients inability to cooperate, attempts to get out of the bed, that he is confused, and his attempts to pull out his peg tube.

## 2018-04-11 NOTE — PROGRESS NOTES
Patient's chart was reviewed by a stroke team provider.  Patient Transferring to SNF, stable.  There is no new imaging to review.  Pending diagnostics to follow up on include: None .  For other recommendations please see our previous note completed on: 4/06.      There are no new recommendations at this time. Will continue to follow. Discussed patient with staff. Please contact stroke team for any questions or concerns.     Plans for discharge to SNF    Jeffrey Carvalho M.D.  IM PGY-2  Pager [phone]

## 2018-04-11 NOTE — ASSESSMENT & PLAN NOTE
Contributing Nutrition Diagnosis  Inadequate energy intake     Related to (etiology):   NPO     Signs and Symptoms (as evidenced by):   Pt meeting 0% EEN/EPN     Interventions/Recommendations (treatment strategy):  See recs     Nutrition Diagnosis Status:   Resolved

## 2018-04-11 NOTE — NURSING
ATTEMPTED TO CALL REPORT TO RECEIVING NURSE. I WAS ASKED TO CALL BACK IN 30 MINUTES BECAUSE RECEIVING NURSE, CRISTINA, HAS NOT ARRIVED TO WORK YET.

## 2018-04-11 NOTE — PROGRESS NOTES
[facility] Hospital Medicine  Progress Note    Patient Name: Zion Hines  MRN: [id number]  Patient Class: IP- Inpatient   Admission Date: 3/23/2018  Length of Stay: 19 days  Attending Physician: Tess Andrews MD  Primary Care Provider: [facility] Medicine Team: [facility] HOSP MED 2 Luis Gilbert IV, MD    Subjective:     Principal Problem:Embolic stroke involving right middle cerebral artery    HPI:  77 yo M with PMHx HTN known to be non-compliant with medications and remote hx of anemia presents to [facility] ED 18 as a transfer from [facility].  Patient is a poor historian with no family at bedside, hx largely obtained from OSH records.  He presented to OSH originally with symptoms of weakness and fatigue, his daughter brought him to ED thinking he was likely anemic again.  It was noted that patient had some dysarthria and aphasia (unspecified expressive vs receptive) with L-sided weakness.  He was out of the window for tPA and was sent to [facility] emergently for CTA Stroke Multiphase and possible endovascular intervention. CTA stroke multiphase with no LVO, no intervention planned.  Of note, patient is anemic per OSH labs with a Hgb of 5.8 g/dL and likely has pancreatitis with lipase 3738, WBC count of 15.8 k/uL, and CT abdomen showing peripancreatic fluid around the tail.  He was transfused a unit of PRBCs prior to transfer.  Labs were otherwise largely unremarkable.  On presentation to [facility] ED, he is able to move both BLE but complains of pain in BLE.  Not moving LUE but is intact to noxious stimuli.  Has a R gaze preference that is not overcome by VOR testing and complaint of neck pain with moving head side-to-side.  Otherwise has mild abdominal distension with mild hypogastric tenderness to palpation. HTN to SBP 200s.  Of note, patient's wife  recently and family expressed concern in outpatient ED that patient has not been eating much for the past month but deny  EtOH use.    Hospital Course:  03/23/18:  Admission to Neuro ICU 2/2 concern for stroke with multiple concerns for patient to become unstable--pancreatitis, severe anemia, HTN.  03/24: has had no interval development of multiorgan involvement form pancreatitis, drop in hemoglobin secondary to erosive gastritis, transfused and on protonix bid  03/25: Patient on dilt drip for a-fib. Repeat EKG shows NSR.  3/26: Discontinuing diltiazem drip today. Will continue monitor HR. Continue IV fluids 50 cc/hr for pancreatitis. Repeat lipase.   3/28: NAEON. VSSA. No overt signs of bleeding. Neuro exam unchanged from prior documented exams. Continues to not follow commands, continues to answer questions inappropriately. Remains effectively hemiplegic on left. No further ICU needs. S/D today to hospital medicine after d/w vascular neurology.  03/29/2018 VSS, no signs of blood loss in stool. Loose stools on examination. NG tube replaced and xray confirmed in duodenum - retracted. Will make definitive decision on anticoagulation 3/30. Furthering discussions with family regarding definitive feeding  03/30/2018 VSS, no signs of blood loss on ZENIA. NG tube replaced after patient pulled O/N. Confirmed placement.Family desires PEG tube placed. GI consulted for placement on Monday.   03/31/2018 VSS. Drop in Hb from 8.1 to 7.4 overnight. TF continued. Holding anticoagulation due to worsening anemia. GI recommendations for further evaluation of inflammatory changes of the pancreatic tail, recommending EUS or MRCP in future with PBS follow up outpatient. New fluid filled hernia noted on exam - US ordered  04/02/2018 PEG placement today. WBC 15. Hb improving. Metoprolol changes to 50mg BID, Lisinopril increased 20 mg QD.   04/03/2018 PEG placement scheduled for today. Leukocytosis stable. Hb stable. Added Norvasc for BP control today. Will monitor. Unable to contact any family members since yesterday afternoon.   04/04/2018 PEG in place, OK to  use for TF, Meds etc. Leukocytosis stable. Hb stable - so signs of bleeding, will start Eliquis for his anticoagulation post stroke.   04/06/2018 patient continues on TF, interval increase in leukocyte count, hb stable, patient less responsive and less appropriate today, CXR with no edema or consolidation, CT Head non con for change in responsiveness  04/07/2018 patient returned to baseline mental status, will escalate bowel regime today due to no recorded BM for >1 week, leukocytosis and reactive thrombocytosis are concerning for infection but VSS - will monitor  04/08/2018 patient at baseline mental state, escalating bowel regime today, leukocytosis resolved but urine culture growing gram neg non lactose fermeter at the moment, patient asymptomatic, will hold treating at this time  04/09/2018 patient at baseline mental state, multiple bowel movements after edema yesterday, colonized with ESBL e.coli, asymptomatic, awaiting placement  04/10/2018 patient at baseline orientation, contact precautions for ESBL e.coli, awaiting placement, being evaluated today for placement    Interval History: please see hospital course    Review of Systems   Unable to perform ROS: Mental status change     Objective:     Vital Signs (Most Recent):  Temp: 97.3 °F (36.3 °C) (04/11/18 1117)  Pulse: 69 (04/11/18 1132)  Resp: 17 (04/11/18 1117)  BP: 129/65 (04/11/18 1132)  SpO2: 100 % (04/11/18 1117) Vital Signs (24h Range):  Temp:  [97.3 °F (36.3 °C)-98.8 °F (37.1 °C)] 97.3 °F (36.3 °C)  Pulse:  [54-80] 69  Resp:  [17-18] 17  SpO2:  [93 %-100 %] 100 %  BP: (125-174)/(63-79) 129/65     Weight: 63.5 kg (139 lb 15.9 oz)  Body mass index is 21.29 kg/m².    Intake/Output Summary (Last 24 hours) at 04/11/18 1235  Last data filed at 04/10/18 2000   Gross per 24 hour   Intake              100 ml   Output                0 ml   Net              100 ml      Physical Exam   Constitutional: He appears well-developed.   HENT:   Head: Atraumatic.   Mouth  with thick white secretions   Eyes: Pupils are equal, round, and reactive to light.   Patient with rightward gaze preference  Arcus senilis bilaterally   Neck: No JVD present.   Cardiovascular: Normal rate, regular rhythm, S1 normal, S2 normal and intact distal pulses.    Pulses:       Radial pulses are 2+ on the right side, and 2+ on the left side.        Dorsalis pedis pulses are 1+ on the right side, and 1+ on the left side.   Pulmonary/Chest: Effort normal and breath sounds normal.   Abdominal: Soft. Bowel sounds are normal. He exhibits no distension. There is no tenderness. There is no guarding.   Genitourinary:   Genitourinary Comments: Condom catheter in place   Musculoskeletal: He exhibits no edema.   Neurological: He is alert. He exhibits abnormal muscle tone.   Expressive and receptive aphasia. LUE weak - 0/5 strength in Left hand     Skin: Skin is warm. No rash noted.   Psychiatric: His speech is slurred.       Significant Labs:     Recent Labs  Lab 04/09/18  0401 04/10/18  0535 04/11/18  0402   WBC 10.78 10.78 10.92   HGB 7.2* 7.3* 7.5*   HCT 25.6* 25.6* 26.8*   * 540* 567*   MONO 9.3  1.0 8.5  0.9 8.5  0.9       Recent Labs  Lab 04/09/18  0401 04/10/18  0535 04/11/18  0402    138 137   K 4.4 4.6 5.5*    108 105   CO2 24 24 24   BUN 31* 32* 35*   CREATININE 0.8 0.8 0.8   CALCIUM 9.8 10.0 10.5   PROT 6.3 6.3 7.1   BILITOT 0.3 0.3 0.2   ALKPHOS 53* 56 62   ALT 26 28 34   AST 34 38 51*     Significant Imaging: I have reviewed all pertinent imaging results/findings within the past 24 hours.    Assessment/Plan:      * Embolic stroke involving right middle cerebral artery    Pending placement, Stable   - 03/23/18 CTA Stroke Multiphase with multiple non-occlusive thrombi noted  - MRI brain w/o show multiple infarcts without evidence of hemorrhagic conversion.  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    - Likely 2/2 sequelae of embolic stroke  - CT head unrevealing  - urine culture growing ESBL E.coli - colonized - will hold treatment as asymptomatic            Atrial fibrillation    Now in sinu, rate control and apixaban.    - Patient noted to be in Afib with RVR 3/25  - In NSR this AM with rate of 70s-90s, short runs of NSVT  - Eliquis  - cardiac monitoring  - Currently on metoprolol 50mg BID for rate control               Essential hypertension     - SBP goal <180, prn labetalol ordered  - Has not required PRNS  - Now on metoprolol 50mg BID   - Lisinopril 20mg QD  - Norvasc 5mg QD  - has had multiple short runs of NSVT - patient unable to provide review if symptomatic          Chronic systolic HF (heart failure)    - unknown if this is chronic issue or if apical ballooning and depressed EF is secondary to emotional event and passing of wife or if due to neurological event with stroke and cytotoxic edema and ICU stay as both are known to cause stress cardiomyopathy  - will need f/u echo to evaluate EF and recovery           NSVT (nonsustained ventricular tachycardia)    - patient with daily episodes of NSVT  - remains on metoprolol for Afib w RVR  - unable to obtain ROS if symptomatic during episodes  - on telemetry - will monitor          Takotsubo cardiomyopathy    - pt with loss of wife 2/2018, now with dilated cardiomyopathy EF 15-20 percent and imaging suggestive of takotsubo CM  - will need repeat echo to evaluate  improvement  - embolic episode may have been exacerbated by new onset CM   - elevated ePAP 61          Iron deficiency anemia    - see microcyctic anemia        Anemia    - has iron def anemia, continue iron replacement  - unknown baseline hb, no melena or bloody bowel movements reported while IP  - no known history of GIB or varices  - Hb 9, on iron replacement via NG  - ZENIA negative for black/red stool        Inguinal hernia of right side without obstruction or gangrene    - feels fluid filled on exam  - US ordered consistent with fluid filled channel in inguinal canals, findings unspecific.         Oropharyngeal dysphagia    S/p peg tube           Alcohol abuse    - unable to obtain alcohol history from patient          Bacteria in urine    - asymptomatic colonized ESBL e.coli  - holding treatment at this time          VTE Risk Mitigation         Ordered     apixaban tablet 5 mg  2 times daily     Route:  Per G Tube        04/06/18 0820              Luis Gilbert IV, MD  Department of Hospital Medicine   [facility]

## 2018-04-11 NOTE — SUBJECTIVE & OBJECTIVE
Interval History: please see hospital course    Review of Systems   Unable to perform ROS: Mental status change     Objective:     Vital Signs (Most Recent):  Temp: 97.3 °F (36.3 °C) (04/11/18 1117)  Pulse: 69 (04/11/18 1132)  Resp: 17 (04/11/18 1117)  BP: 129/65 (04/11/18 1132)  SpO2: 100 % (04/11/18 1117) Vital Signs (24h Range):  Temp:  [97.3 °F (36.3 °C)-98.8 °F (37.1 °C)] 97.3 °F (36.3 °C)  Pulse:  [54-80] 69  Resp:  [17-18] 17  SpO2:  [93 %-100 %] 100 %  BP: (125-174)/(63-79) 129/65     Weight: 63.5 kg (139 lb 15.9 oz)  Body mass index is 21.29 kg/m².    Intake/Output Summary (Last 24 hours) at 04/11/18 1235  Last data filed at 04/10/18 2000   Gross per 24 hour   Intake              100 ml   Output                0 ml   Net              100 ml      Physical Exam   Constitutional: He appears well-developed.   HENT:   Head: Atraumatic.   Mouth with thick white secretions   Eyes: Pupils are equal, round, and reactive to light.   Patient with rightward gaze preference  Arcus senilis bilaterally   Neck: No JVD present.   Cardiovascular: Normal rate, regular rhythm, S1 normal, S2 normal and intact distal pulses.    Pulses:       Radial pulses are 2+ on the right side, and 2+ on the left side.        Dorsalis pedis pulses are 1+ on the right side, and 1+ on the left side.   Pulmonary/Chest: Effort normal and breath sounds normal.   Abdominal: Soft. Bowel sounds are normal. He exhibits no distension. There is no tenderness. There is no guarding.   Genitourinary:   Genitourinary Comments: Condom catheter in place   Musculoskeletal: He exhibits no edema.   Neurological: He is alert. He exhibits abnormal muscle tone.   Expressive and receptive aphasia. LUE weak - 0/5 strength in Left hand     Skin: Skin is warm. No rash noted.   Psychiatric: His speech is slurred.       Significant Labs:     Recent Labs  Lab 04/09/18  0401 04/10/18  0535 04/11/18  0402   WBC 10.78 10.78 10.92   HGB 7.2* 7.3* 7.5*   HCT 25.6* 25.6* 26.8*    * 540* 567*   MONO 9.3  1.0 8.5  0.9 8.5  0.9       Recent Labs  Lab 04/09/18  0401 04/10/18  0535 04/11/18  0402    138 137   K 4.4 4.6 5.5*    108 105   CO2 24 24 24   BUN 31* 32* 35*   CREATININE 0.8 0.8 0.8   CALCIUM 9.8 10.0 10.5   PROT 6.3 6.3 7.1   BILITOT 0.3 0.3 0.2   ALKPHOS 53* 56 62   ALT 26 28 34   AST 34 38 51*     Significant Imaging: I have reviewed all pertinent imaging results/findings within the past 24 hours.

## 2018-04-11 NOTE — PLAN OF CARE
"Saroj informed by Sara with at [facility] at , "requesting Pt receive a unit of blood prior to admissions tonight." Sw informed resident,  to follow and update [facility]. Pt will receive unit of blood, Sara can accept Pt as late as possible, nurse updated, Saroj to arrange stretcher for later this pm.   "

## 2018-04-12 ENCOUNTER — PATIENT OUTREACH (OUTPATIENT)
Dept: ADMINISTRATIVE | Facility: CLINIC | Age: 78
End: 2018-04-12

## 2018-04-12 NOTE — PLAN OF CARE
04/12/18 0755   Final Note   Assessment Type Final Discharge Note   Discharge Disposition Rehab   Right Care Referral Info   Post Acute Recommendation IRF   Facility Name [facility]

## 2018-04-12 NOTE — TREATMENT PLAN
Occupational Therapy Discharge Summary    Zion Hines  MRN: [id number]   Principal Problem: Embolic stroke involving right middle cerebral artery      Patient Discharged from acute Occupational Therapy on 18.  Please refer to prior OT note dated 18 for functional status.    Assessment:      Patient was discharged unexpectedly.  Information required to complete an accurate discharge summary is unknown.  Refer to therapy initial evaluation and last progress note for initial and most recent functional status and goal achievement.  Recommendations made may be found in medical record.    Objective:     GOALS:    Occupational Therapy Goals        Problem: Occupational Therapy Goal    Goal Priority Disciplines Outcome Interventions   Occupational Therapy Goal     OT, PT/OT Ongoing (interventions implemented as appropriate)    Description:  Goals to be met by:  4/15/18    Patient will increase functional independence with ADLs by performin. Supine to sit with Moderate Assistance. - not met  2. Roll to R with Min A. - not met  3. Roll to L with Min A. - not met  4. Grooming while supine with HOB elevated with Minimal Assistance. - not met  5. UE Dressing with Moderate Assistance. - not met  6. Pt will perform grasp/release 2/3 functional items (towel, cup, tissue box) per UE crossing midline with Min A while seated EOB. - not met  7. Pt will perform functional sitting at EOB x5 min with stand by Assist. - not met  8. Transfers with Moderate assistance.                              Reasons for Discontinuation of Therapy Services  Transfer to alternate level of care.      Plan:     Patient Discharged to: Inpatient Rehab    RAMON Purvis/L  2018

## 2018-04-14 NOTE — DISCHARGE SUMMARY
[facility] Hospital Medicine  Discharge Summary      Patient Name: Zion Hines  MRN: [id number]  Admission Date: 3/23/2018  Hospital Length of Stay: 19 days  Discharge Date and Time: 2018  9:15 PM  Attending Physician: Priyanka att. providers found   Discharging Provider: Luis Gilbert IV, MD  Primary Care Provider: Primary Doctor Piryanka  [facility] Medicine Team: [facility] HOSP MED 2 Luis Gilbert IV, MD    HPI:   77 yo M with PMHx HTN known to be non-compliant with medications and remote hx of anemia presents to [facility] ED 18 as a transfer from [facility].  Patient is a poor historian with no family at bedside, hx largely obtained from OSH records.  He presented to OSH originally with symptoms of weakness and fatigue, his daughter brought him to ED thinking he was likely anemic again.  It was noted that patient had some dysarthria and aphasia (unspecified expressive vs receptive) with L-sided weakness.  He was out of the window for tPA and was sent to [facility] emergently for CTA Stroke Multiphase and possible endovascular intervention. CTA stroke multiphase with no LVO, no intervention planned.  Of note, patient is anemic per OSH labs with a Hgb of 5.8 g/dL and likely has pancreatitis with lipase 3738, WBC count of 15.8 k/uL, and CT abdomen showing peripancreatic fluid around the tail.  He was transfused a unit of PRBCs prior to transfer.  Labs were otherwise largely unremarkable.  On presentation to [facility] ED, he is able to move both BLE but complains of pain in BLE.  Not moving LUE but is intact to noxious stimuli.  Has a R gaze preference that is not overcome by VOR testing and complaint of neck pain with moving head side-to-side.  Otherwise has mild abdominal distension with mild hypogastric tenderness to palpation. HTN to SBP 200s.  Of note, patient's wife  recently and family expressed concern in outpatient ED that patient has not been eating much for the past month but deny EtOH  use.    Procedure(s) (LRB):  PLACEMENT-TUBE-PEG (N/A)      Hospital Course:   03/23/18:  Admission to Neuro ICU 2/2 concern for stroke with multiple concerns for patient to become unstable--pancreatitis, severe anemia, HTN.  03/24: has had no interval development of multiorgan involvement form pancreatitis, drop in hemoglobin secondary to erosive gastritis, transfused and on protonix bid  03/25: Patient on dilt drip for a-fib. Repeat EKG shows NSR.  3/26: Discontinuing diltiazem drip today. Will continue monitor HR. Continue IV fluids 50 cc/hr for pancreatitis. Repeat lipase.   3/28: NAEON. VSSA. No overt signs of bleeding. Neuro exam unchanged from prior documented exams. Continues to not follow commands, continues to answer questions inappropriately. Remains effectively hemiplegic on left. No further ICU needs. S/D today to hospital medicine after d/w vascular neurology.  03/29/2018 VSS, no signs of blood loss in stool. Loose stools on examination. NG tube replaced and xray confirmed in duodenum - retracted. Will make definitive decision on anticoagulation 3/30. Furthering discussions with family regarding definitive feeding  03/30/2018 VSS, no signs of blood loss on ZENIA. NG tube replaced after patient pulled O/N. Confirmed placement.Family desires PEG tube placed. GI consulted for placement on Monday.   03/31/2018 VSS. Drop in Hb from 8.1 to 7.4 overnight. TF continued. Holding anticoagulation due to worsening anemia. GI recommendations for further evaluation of inflammatory changes of the pancreatic tail, recommending EUS or MRCP in future with PBS follow up outpatient. New fluid filled hernia noted on exam - US ordered  04/02/2018 PEG placement today. WBC 15. Hb improving. Metoprolol changes to 50mg BID, Lisinopril increased 20 mg QD.   04/03/2018 PEG placement scheduled for today. Leukocytosis stable. Hb stable. Added Norvasc for BP control today. Will monitor. Unable to contact any family members since  yesterday afternoon.   04/04/2018 PEG in place, OK to use for TF, Meds etc. Leukocytosis stable. Hb stable - so signs of bleeding, will start Eliquis for his anticoagulation post stroke.   04/06/2018 patient continues on TF, interval increase in leukocyte count, hb stable, patient less responsive and less appropriate today, CXR with no edema or consolidation, CT Head non con for change in responsiveness  04/07/2018 patient returned to baseline mental status, will escalate bowel regime today due to no recorded BM for >1 week, leukocytosis and reactive thrombocytosis are concerning for infection but VSS - will monitor  04/08/2018 patient at baseline mental state, escalating bowel regime today, leukocytosis resolved but urine culture growing gram neg non lactose fermeter at the moment, patient asymptomatic, will hold treating at this time  04/09/2018 patient at baseline mental state, multiple bowel movements after edema yesterday, colonized with ESBL e.coli, asymptomatic, awaiting placement  04/10/2018 patient at baseline orientation, contact precautions for ESBL e.coli, awaiting placement, being evaluated today for placement  Discharged in stable condition to SNF for PT/OT and skilled nursing.     Consults:   Consults         Status Ordering Provider     Inpatient consult to Gastroenterology  Once     Provider:  (Not yet assigned)    Completed DIANE MEDRANO     Inpatient consult to Physical Medicine Rehab  Once     Provider:  (Not yet assigned)    Completed LIZBETH BARKSDALE     Inpatient consult to Registered Dietitian/Nutritionist  Once     Provider:  (Not yet assigned)    Completed LIZBETH BARKSDALE     Inpatient consult to Social Work/Case Management  Once     Provider:  (Not yet assigned)    Completed LIZBETH BARKSDALE     Inpatient consult to Vascular (Stroke) Neurology  Once     Provider:  (Not yet assigned)    LIZBETH Hooker        * Embolic stroke involving right middle cerebral  artery     Pending placement, Stable   - 03/23/18 CTA Stroke Multiphase with multiple non-occlusive thrombi noted  - MRI brain w/o show multiple infarcts without evidence of hemorrhagic conversion.  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    - Patient noted to be in Afib with RVR 3/25  - In NSR this AM with rate of 70s-90s, short runs of NSVT  - Eliquis  - cardiac monitoring  - Currently on metoprolol 50mg BID for rate control        Essential hypertension      - SBP goal <180, prn labetalol ordered  - Has not required PRNS  - Now on metoprolol 50mg BID   - Lisinopril 20mg QD  - Norvasc 5mg QD  - has had multiple short runs of NSVT which resolved by end of stay and was asymptomatic       Chronic systolic HF (heart failure)     - unknown if this is chronic issue or if apical ballooning and depressed EF is secondary to emotional event and passing of wife or if due to neurological event with stroke and cytotoxic edema and ICU stay as both are known to cause stress cardiomyopathy  - will need f/u echo to evaluate EF  and recovery         NSVT (nonsustained ventricular tachycardia)     - patient with daily episodes of NSVT  - remains on metoprolol for Afib w RVR  - unable to obtain ROS if symptomatic during episodes  - on telemetry - will monitor          Takotsubo cardiomyopathy     - pt with loss of wife 2/2018, now with dilated cardiomyopathy EF 15-20 percent and imaging suggestive of takotsubo CM  - will need repeat echo to evaluate improvement  - embolic episode may have been exacerbated by new onset CM   - elevated ePAP 61        Iron deficiency anemia     - see microcyctic anemia        Anemia     - has iron def anemia, continue iron replacement  - unknown baseline hb, no melena or bloody bowel movements reported while IP  - no known history of GIB or varices  - Hb 9, on iron replacement via NG  - ZENIA negative for black/red stool        Inguinal hernia of right side without obstruction or gangrene     - feels fluid filled on exam  - US ordered consistent with fluid filled channel in inguinal canals, findings unspecific.         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 [I63.9, G81.04] 03/29/2018 Yes    Acute encephalopathy [G93.40] 03/23/2018 Yes    Atrial fibrillation [I48.91] 03/27/2018 Yes    Essential hypertension [I10] 03/23/2018 Yes    NSVT (nonsustained ventricular tachycardia) [I47.2] 03/30/2018 Yes    Chronic systolic HF (heart failure) [I50.22] 03/30/2018 Yes    Takotsubo cardiomyopathy [I51.81] 03/29/2018 Yes     Iron deficiency anemia [D50.9] 03/28/2018 Yes    Inguinal hernia of right side without obstruction or gangrene [K40.90] 03/31/2018 Yes    Oropharyngeal dysphagia [R13.12] 03/30/2018 Yes    Alcohol abuse [F10.10] 03/29/2018 Yes    Bacteria in urine [R82.71] 04/08/2018 Yes      Problems Resolved During this Admission:    Diagnosis Date Noted Date Resolved POA    Acute pancreatitis [K85.90] 03/24/2018 04/05/2018 Yes       Discharged Condition: fair    Disposition: Skilled Nursing Facility    Follow Up:  Follow-up Information     [facility].    Specialty:  Rehabilitation  Contact information:  [address] [address] [phone]                 Patient Instructions:     Activity as tolerated     Notify your health care provider if you experience any of the following:  increased confusion or weakness     Notify your health care provider if you experience any of the following:  persistent dizziness, light-headedness, or visual disturbances     Notify your health care provider if you experience any of the following:  worsening rash     Notify your health care provider if you experience any of the following:  severe persistent headache     Notify your health care provider if you experience any of the following:  difficulty breathing or increased cough     Notify your health care provider if you experience any of the following:  redness, tenderness, or signs of infection (pain, swelling, redness, odor or green/yellow discharge around incision site)     Notify your health care provider if you experience any of the following:  severe uncontrolled pain     Notify your health care provider if you experience any of the following:  persistent nausea and vomiting or diarrhea     Notify your health care provider if you experience any of the following:  temperature >100.4         Significant Diagnostic Studies: Labs: All labs within the past 24 hours have been reviewed    Pending Diagnostic  Studies:     None         Medications:  Reconciled Home Medications:      Medication List      START taking these medications    acetaminophen 325 MG tablet  Commonly known as:  TYLENOL  Take 2 tablets (650 mg total) by mouth every 6 (six) hours as needed for Temperature greater than (99.5).     amLODIPine 5 MG tablet  Commonly known as:  NORVASC  1 tablet (5 mg total) by Per G Tube route once daily.     apixaban 5 mg Tab  1 tablet (5 mg total) by Per G Tube route 2 (two) times daily.     B-complex with vitamin C tablet  Commonly known as:  Z-Bec or Equiv  1 tablet by Per G Tube route once daily.     ferrous sulfate 300 mg (60 mg iron)/5 mL syrup  5 mLs (300 mg total) by Per G Tube route 2 (two) times daily.     folic acid 1 MG tablet  Commonly known as:  FOLVITE  1 tablet (1 mg total) by Per G Tube route once daily.     insulin aspart U-100 100 unit/mL Inpn pen  Commonly known as:  NovoLOG  Inject 1-10 Units into the skin every 6 (six) hours as needed (Hyperglycemia).     lisinopril 20 MG tablet  Commonly known as:  PRINIVIL,ZESTRIL  1 tablet (20 mg total) by Per G Tube route once daily.     metoprolol tartrate 50 MG tablet  Commonly known as:  LOPRESSOR  1 tablet (50 mg total) by Per G Tube route 2 (two) times daily.     multivitamin liquid no.118 Liqd  10 mLs by Per G Tube route once daily.     ondansetron 4 mg/2 mL Soln  Inject 4 mg into the vein every 8 (eight) hours as needed.     pantoprazole 40 mg Grps  Commonly known as:  PROTONIX  1 packet (40 mg total) by Per G Tube route 2 (two) times daily.     polyethylene glycol 17 gram Pwpk  Commonly known as:  GLYCOLAX  17 g by Per G Tube route once daily.     senna-docusate 8.6-50 mg 8.6-50 mg per tablet  Commonly known as:  PERICOLACE  1 tablet by Per G Tube route once daily.     thiamine 100 MG tablet  1 tablet (100 mg total) by Per G Tube route once daily.            Indwelling Lines/Drains at time of discharge:   Lines/Drains/Airways     Drain                  Gastrostomy/Enterostomy 04/03/18 LUQ 11 days                Time spent on the discharge of patient: 20 minutes  Patient was seen and examined on the date of discharge and determined to be suitable for discharge.         Luis Gilbert IV, MD  Department of Hospital Medicine  [facility]

## 2023-03-08 NOTE — PLAN OF CARE
Problem: Patient Care Overview  Goal: Plan of Care Review  Outcome: Ongoing (interventions implemented as appropriate)  POC reviewed with pt and family at bedside by RN and MD. Pt aphasic, family (niece) verbalized understanding. Questions and concerns addressed. Patient transferred from 10th floor r/t patient now in atrial fibrillation, HR currently wnl, no acute distress noted. Patient on nasal cannula per order.  Pt progressing toward goals. Will continue to monitor. See flowsheets for full assessment and VS info.          Gabapentin Counseling: I discussed with the patient the risks of gabapentin including but not limited to dizziness, somnolence, fatigue and ataxia.

## 2023-08-23 NOTE — PLAN OF CARE
Problem: SLP Goal  Goal: SLP Goal  Speech Language Pathology Goals  Goals expected to be met by 3/31  1. Pt will participate with ongoing assessment of swallow.  2. Pt will participate with speech, language, cognitive evaluation to determine need for tx.      Outcome: Ongoing (interventions implemented as appropriate)  Bedside swallow assessment initiated though pt with limited oral acceptance.  Rec cont npo with strict aspiration precautions at this time. YANNI Farris, CCC/SLP  3/24/2018         Information: Selecting Yes will display possible errors in your note based on the variables you have selected. This validation is only offered as a suggestion for you. PLEASE NOTE THAT THE VALIDATION TEXT WILL BE REMOVED WHEN YOU FINALIZE YOUR NOTE. IF YOU WANT TO FAX A PRELIMINARY NOTE YOU WILL NEED TO TOGGLE THIS TO 'NO' IF YOU DO NOT WANT IT IN YOUR FAXED NOTE.